# Patient Record
Sex: FEMALE | Race: BLACK OR AFRICAN AMERICAN | NOT HISPANIC OR LATINO | Employment: UNEMPLOYED | URBAN - METROPOLITAN AREA
[De-identification: names, ages, dates, MRNs, and addresses within clinical notes are randomized per-mention and may not be internally consistent; named-entity substitution may affect disease eponyms.]

---

## 2017-01-10 ENCOUNTER — OFFICE VISIT (OUTPATIENT)
Dept: FAMILY MEDICINE | Facility: CLINIC | Age: 42
End: 2017-01-10

## 2017-01-10 VITALS
DIASTOLIC BLOOD PRESSURE: 85 MMHG | HEIGHT: 62 IN | TEMPERATURE: 98.1 F | HEART RATE: 88 BPM | SYSTOLIC BLOOD PRESSURE: 128 MMHG | BODY MASS INDEX: 37.91 KG/M2 | WEIGHT: 206 LBS

## 2017-01-10 DIAGNOSIS — N39.41 URGENCY INCONTINENCE: Primary | ICD-10-CM

## 2017-01-10 RX ORDER — OXYBUTYNIN CHLORIDE 10 MG/1
10 TABLET, EXTENDED RELEASE ORAL DAILY
Qty: 90 TABLET | Refills: 3 | Status: SHIPPED | OUTPATIENT
Start: 2017-01-10 | End: 2018-01-04

## 2017-01-10 NOTE — NURSING NOTE
Patient says she takes seizure medication and blood pressure meds, but does not know the names of them

## 2017-01-10 NOTE — PROGRESS NOTES
Preceptor attestation:  Patient seen and discussed with the resident.  Assessment and plan reviewed with resident and agreed upon.  Supervising physician: Harvey Rondon  [facility]

## 2017-01-10 NOTE — MR AVS SNAPSHOT
"              After Visit Summary   1/10/2017    Mattie Pierre    MRN: [id number]           Patient Information     Date Of Birth          1975        Visit Information        Provider Department      1/10/2017 8:40 AM Marcelo Arriaga MD [facility]        Today's Diagnoses     Urgency incontinence    -  1        Follow-ups after your visit        Who to contact     Please call your clinic at [phone] to:    Ask questions about your health    Make or cancel appointments    Discuss your medicines    Learn about your test results    Speak to your doctor   If you have compliments or concerns about an experience at your clinic, or if you wish to file a complaint, please contact [facility] Patient Relations at [phone] or email us at [email or web address]         Additional Information About Your Visit        Care EveryWhere ID     This is your Care EveryWhere ID. This could be used by other organizations to access your [place] medical records  [id number]        Your Vitals Were     Pulse Temperature Height BMI (Body Mass Index) Last Period       88 98.1  F (36.7  C) (Oral) 5' 2\" (157.5 cm) 37.67 kg/m2 12/12/2016        Blood Pressure from Last 3 Encounters:   01/10/17 128/85   11/30/16 121/74   11/01/16 121/82    Weight from Last 3 Encounters:   01/10/17 206 lb (93.441 kg)   11/30/16 208 lb 12.8 oz (94.711 kg)   11/01/16 205 lb (92.987 kg)              Today, you had the following     No orders found for display         Today's Medication Changes          These changes are accurate as of: 1/10/17  9:34 AM.  If you have any questions, ask your nurse or doctor.               Start taking these medicines.        Dose/Directions    order for DME   Used for:  Urgency incontinence   Started by:  Marcelo Arriaga MD        Equipment being ordered: Adult incontinence diapers   Quantity:  150 Units   Refills:  0       oxybutynin 10 MG 24 hr tablet   Commonly " known as:  DITROPAN XL   Used for:  Urgency incontinence   Started by:  Marcelo Arriaga MD        Dose:  10 mg   Take 1 tablet (10 mg) by mouth daily   Quantity:  90 tablet   Refills:  3            Where to get your medicines      These medications were sent to [id number] - [place], [place] - [address] [place] [address] AT [address]  [address], [place] [address]     Phone:  [phone]    - oxybutynin 10 MG 24 hr tablet      Some of these will need a paper prescription and others can be bought over the counter.  Ask your nurse if you have questions.     Bring a paper prescription for each of these medications    - order for DME             Primary Care Provider Office Phone # Fax #    Marcelo Arriaga [phone]       [facility] [address] [place] [address]        Thank you!     Thank you for choosing [facility]  for your care. Our goal is always to provide you with excellent care. Hearing back from our patients is one way we can continue to improve our services. Please take a few minutes to complete the written survey that you may receive in the mail after your visit with us. Thank you!             Your Updated Medication List - Protect others around you: Learn how to safely use, store and throw away your medicines at www.disposemymeds.org.          This list is accurate as of: 1/10/17  9:34 AM.  Always use your most recent med list.                   Brand Name Dispense Instructions for use    KLONOPIN PO          order for DME     150 Units    Equipment being ordered: Adult incontinence diapers       oxybutynin 10 MG 24 hr tablet    DITROPAN XL    90 tablet    Take 1 tablet (10 mg) by mouth daily       * SEROQUEL PO      Take 50 mg by mouth 2 times daily       * SEROQUEL PO      Take 50 mg by mouth 1 in am and 1 at night and 1 prn       * Notice:  This list has 2 medication(s) that are the same as other medications prescribed for you.  Read the directions carefully, and ask your doctor or other care provider to review them with you.

## 2017-01-10 NOTE — PROGRESS NOTES
"S: Mattie Pierre is a 41 year old female with PMH significant for major depression, obesity, and iron deficient anemia here for evaluation of urinary incontinence. She has had a previous diagnosis of urge incontinence, but she does not remember what she was taking for it in the past, and has not been taking any medications for it in at least 2 years. She notes that she has had 5-6 public \"accidents\" in the last month where she has felt the urge to urinate and soon after empties her bladder. She has this sensation at home around 3 times per day where she is unable to get to her bathroom in time to begin urination. She has intense feelings of embarrassment associated with these episodes. She does not note any urine leak or feeling of incomplete voiding.     Patient also with recent surgery to correct corns on left feet and ganglion cyst of right foot. She walks everywhere for transportation and has not been able to be non-weight bearing following surgery. She notes that pain is worse than before her procedure.     Patient also wishes to discuss her weight management and notes she continues to gain weight despite attempts at lifestyle changes.     Patient Active Problem List   Diagnosis     Moderate major depression (H)     Obesity     Cellulitis of axilla, left     Iron deficiency anemia     Seizure disorder (H)     Hidradenitis suppurativa of left axilla     Medications:   Patient reports taking seroquel, unknown dosing.   Patient has seen multiple physicians within the last year due to social situation and her medication list's do not match up. Patient will bring her medications into clinic at her next visit.    Allergies: Morphine    FMH: Family history reviewed and updated.    O:  /85 mmHg  Pulse 88  Temp(Src) 98.1  F (36.7  C) (Oral)  Ht 5' 2\" (157.5 cm)  Wt 206 lb (93.441 kg)  BMI 37.67 kg/m2  LMP 12/12/2016  General: Patient is obese, well appearing, has surgical shoe on left foot, pleasant to speak " to, answers questions appropriately, NAD.  CV: S1, S2, RRR. II/VI early systolic murmur best heard at RUSB.    RESP: Clear to auscultation bilaterally  ABD: Soft, NT, ND, palpation of suprapubic region does not reproduce sense of urgency. BS +.  EXTREMITIES: No peripheral edema noted. Right dorsum of foot has 1.5 inch healing scar. Area underneath is swollen and tender to touch. No erythema noted. Left foot in surgical shoe and plastic bag.    A/P:  Mattie Pierre is a 41 year old female with PMH significant for major depression, obesity, and iron deficient anemia here for evaluation of urinary incontinence. Symptoms are consistent with urge incontinency. Unlikely UTI, patient has no complaints of dysuria or fever.   - Oxybutynin XL 10mg daily  - Ordered adult incontinence diapers  - Follow-up in  1 week for further evaluation of bilateral feet and for discussion of weight management. Patient to bring medications for accurate med-reconciliation at that time.     Marcelo Arriaga MD  PGY1 [facility].

## 2017-01-11 ASSESSMENT — PATIENT HEALTH QUESTIONNAIRE - PHQ9: SUM OF ALL RESPONSES TO PHQ QUESTIONS 1-9: 19

## 2017-01-17 ENCOUNTER — OFFICE VISIT (OUTPATIENT)
Dept: FAMILY MEDICINE | Facility: CLINIC | Age: 42
End: 2017-01-17

## 2017-01-17 VITALS
WEIGHT: 205.8 LBS | DIASTOLIC BLOOD PRESSURE: 74 MMHG | HEIGHT: 62 IN | BODY MASS INDEX: 37.87 KG/M2 | SYSTOLIC BLOOD PRESSURE: 113 MMHG | HEART RATE: 102 BPM

## 2017-01-17 DIAGNOSIS — M79.675 PAIN OF TOE OF LEFT FOOT: ICD-10-CM

## 2017-01-17 DIAGNOSIS — E66.09 NON MORBID OBESITY DUE TO EXCESS CALORIES: Primary | ICD-10-CM

## 2017-01-17 NOTE — PATIENT INSTRUCTIONS
I will place your bariatric referral today, you will need to schedule that visit for after your psychiatry appointment on February 7th. Please talk to your psychiatrist about readiness for bariatric surgery. Please have him send us records of your visit as well.     Thank you    Marcelo Arriaga MD  PGY1 [facility]  \    Your referral information is below:    Please call to get scheduled for an informational class with the [facility] Bariatric Team at [phone]. I have also included a step by step brochure if you decide to proceed with surgery.    Any other concerns please call our referral coordinator at [phone]  [facility]  Care Coordinator     MAILED TO PATIENT

## 2017-01-17 NOTE — PROGRESS NOTES
SUBJECTIVE       Mattie Pierre is a 41 year old  female with a PMH significant for:     Patient Active Problem List   Diagnosis     Moderate major depression (H)     Obesity     Cellulitis of axilla, left     Iron deficiency anemia     Seizure disorder (H)     Hidradenitis suppurativa of left axilla     Urgency incontinence     She presents for follow-up of left foot pain and weight management.     Patient had partial phalangectomy on the left 2nd toe 3 weeks prior, still has significant pain in the toe. Was told to be non-weightbearing for 2 weeks following procedure, but was unable to comply as walking is her main means of transportation. She notes some slight swelling of the toe. Scar is well healing, no discharge. Denies any fever or chills.    Patient also here to talk about weight management and low back pain. She has seen lifestyle management and implemented changes in her diet as we have asked. She has cut out sugary drinks from her diet and has reduced the amount of fatty snacks that she eats. She has also seen physical therapy and has been doing exercises at home. She notes that her back pain persists and feels like she is not losing any weight. She feels as if bariatric surgery will solve her weight and back pain issues. She denies any pain radiating to her legs. Notes that back pain is worse with walking. She also gets SOB while walking up a flight of stairs. Echocardiogram in October showed LVEF of 65%. She has been seeing a psychiatrist at [facility] regarding her depression and has an appointment on Feb 7th.         PMH, Medications and Allergies were reviewed and updated as needed.        REVIEW OF SYSTEMS     CONSTITUTIONAL: NEGATIVE for fever, chills, change in weight  INTEGUMENTARY/SKIN: NEGATIVE for worrisome rashes, moles or lesions  RESP: NEGATIVE for significant cough  CV: NEGATIVE for chest pain, palpitations or peripheral edema  MUSCULOSKELETAL: See Above  NEURO: NEGATIVE for  "weakness, dizziness or paresthesias  PSYCHIATRIC: Patient feels as if her depression may be getting worse, blames her weight and back pain.         OBJECTIVE     Filed Vitals:    01/17/17 0926   BP: 113/74   Pulse: 102   Height: 5' 2\" (157.5 cm)   Weight: 205 lb 12.8 oz (93.35 kg)     Body mass index is 37.63 kg/(m^2).    Constitutional: Awake, alert, cooperative, no apparent distress, and appears stated age.  Back: Symmetric, no curvature, spinous processes are non-tender on palpation, Lumbar perispinous tenderness to palpation.  Lungs: No increased work of breathing, good air exchange, clear to auscultation bilaterally, no crackles or wheezing.  Cardiovascular: Regular rate and rhythm, normal S1 and S2, no S3 or S4,  II/VI Earlysystolic murmur best heard at RUSB.   Musculoskeletal: Well healing scar on 2nd digit of left toe. No extending erythema noted. Mild edema seen. Moderate tenderness to palpation of the PIP joint. Pain with passive motion of the toe. Patient unable to flex toe.   Psych: Patient denies any suicidal ideation. Notes that her mood feels depressed, slightly worse over the last month.          ASSESSMENT AND PLAN     (E66.09) Non morbid obesity due to excess calories  (primary encounter diagnosis)  Comment: Patient's weight has stabilized since October, with BMI now at 37. She has been compliant with current lifestyle changes and believes she is not making progress despite stabilization of weight. She is unable to engage in increased exercise due to back pain and SOB. Patient has not had a recent psychiatry visit and we have not had records from them. She will speak to psychiatry regarding readiness for bariatric surgery in terms of her depression and we asked that they have her records sent to clinic. Bariatric referral has been made and patient was asked to schedule the appointment following her psychiatry appointment.       (M79.533) Pain of toe of left foot  Comment: Patient with continued " pain in her left 2nd digit following partial phalangectomy. She has follow-up appointment with podiatry today. Physical exam not worrisome for cellulitis or wound infection.   Plan: Follow-up with podiatry.       Follow-up after bariatric consult.     Marcelo Arriaga MD  PGY1, [facility]

## 2017-01-17 NOTE — PROGRESS NOTES
Preceptor attestation:  Patient seen and discussed with the resident.  Assessment and plan reviewed with resident and agreed upon.  Supervising physician: Harvey Rondon  [facility]

## 2017-01-17 NOTE — MR AVS SNAPSHOT
"              After Visit Summary   1/17/2017    Mattie Pierre    MRN: [id number]           Patient Information     Date Of Birth          1975        Visit Information        Provider Department      1/17/2017 9:20 AM Marcelo Arriaga MD [facility]        Today's Diagnoses     Non morbid obesity due to excess calories    -  1       Care Instructions    I will place your bariatric referral today, you will need to schedule that visit for after your psychiatry appointment on February 7th. Please talk to your psychiatrist about readiness for bariatric surgery. Please have him send us records of your visit as well.     Thank you    Marcelo Arriaga MD  PGY1 [facility]        Follow-ups after your visit        Additional Services     BARIATRIC ADULT REFERRAL       Patient prefers to be called    Reason for Referral: Obesity, has attempted lifestyle change for >6months.     needed: No  Language: English    May leave message on voicemail: Yes                  Future tests that were ordered for you today     Open Future Orders        Priority Expected Expires Ordered    BARIATRIC ADULT REFERRAL Routine  1/17/2018 1/17/2017            Who to contact     Please call your clinic at [phone] to:    Ask questions about your health    Make or cancel appointments    Discuss your medicines    Learn about your test results    Speak to your doctor   If you have compliments or concerns about an experience at your clinic, or if you wish to file a complaint, please contact [facility] Patient Relations at [phone] or email us at [email or web address]         Additional Information About Your Visit        Care EveryWhere ID     This is your Care EveryWhere ID. This could be used by other organizations to access your [place] medical records  [id number]        Your Vitals Were     Pulse Height BMI (Body Mass Index) Last Period          102 5' 2\" (157.5 cm) " 37.63 kg/m2 12/12/2016         Blood Pressure from Last 3 Encounters:   01/17/17 113/74   01/10/17 128/85   11/30/16 121/74    Weight from Last 3 Encounters:   01/17/17 205 lb 12.8 oz (93.35 kg)   01/10/17 206 lb (93.441 kg)   11/30/16 208 lb 12.8 oz (94.711 kg)               Primary Care Provider Office Phone # Fax #    Marcelo Enzo Arriaga [phone]       [facility] [address] [place] [address]        Thank you!     Thank you for choosing [facility]  for your care. Our goal is always to provide you with excellent care. Hearing back from our patients is one way we can continue to improve our services. Please take a few minutes to complete the written survey that you may receive in the mail after your visit with us. Thank you!             Your Updated Medication List - Protect others around you: Learn how to safely use, store and throw away your medicines at www.disposemymeds.org.          This list is accurate as of: 1/17/17 10:15 AM.  Always use your most recent med list.                   Brand Name Dispense Instructions for use    KLONOPIN PO          order for DME     150 Units    Equipment being ordered: Adult incontinence diapers       oxybutynin 10 MG 24 hr tablet    DITROPAN XL    90 tablet    Take 1 tablet (10 mg) by mouth daily       * SEROQUEL PO      Take 50 mg by mouth 2 times daily       * SEROQUEL PO      Take 50 mg by mouth 1 in am and 1 at night and 1 prn       * Notice:  This list has 2 medication(s) that are the same as other medications prescribed for you. Read the directions carefully, and ask your doctor or other care provider to review them with you.

## 2017-01-30 ENCOUNTER — TELEPHONE (OUTPATIENT)
Dept: FAMILY MEDICINE | Facility: CLINIC | Age: 42
End: 2017-01-30

## 2017-01-30 NOTE — TELEPHONE ENCOUNTER
[facility] phone call message- general phone call:    Reason for call: Pt was prescribed Oxybutynin 10 mg by Dr Arriaga.   What the doctor doesn't know is the patient is still on Vesicare 5 mg by a different doctor.  The nurse did not start the new medication and would like to know what Dr Arriaga would like her to do.    Return call needed: Yes    OK to leave a message on voice mail? Yes    Primary language: English      needed? No    Call taken on January 30, 2017 at 11:25 AM by Ananda Casarez

## 2017-01-31 NOTE — TELEPHONE ENCOUNTER
Patient is unaware of her medications and she has not brought medications in for med rec. What is the best number to contact her home health nurse so I can speak to her about Mattie's meds?     Marcelo Arriaga MD  PGY1 [facility]

## 2017-02-10 ENCOUNTER — TELEPHONE (OUTPATIENT)
Dept: FAMILY MEDICINE | Facility: CLINIC | Age: 42
End: 2017-02-10

## 2017-02-10 NOTE — TELEPHONE ENCOUNTER
[facility] phone call message- general phone call:    Reason for call: She would like to speak with a nurse in regards to getting a 4 wheel walker for her. Would like to speak with her PCP or her nurse.     Return call needed: Yes    OK to leave a message on voice mail? Yes    Primary language: English      needed? No    Call taken on February 10, 2017 at 12:08 PM by Geovani Watts

## 2017-02-10 NOTE — TELEPHONE ENCOUNTER
Patient calling with request for a prescription for a 4 wheeled walker. She thinks her [employer] worker told her to have it sent to [facility] [employer] but she is not sure. Patient's [employer] worker name is Cindy at [phone].     Nurse called Cindy and she will call back with the name of the medical supply company to send the request to. /GARRY Judge  Routed to Dr. JOHNS

## 2017-02-15 ENCOUNTER — TELEPHONE (OUTPATIENT)
Dept: FAMILY MEDICINE | Facility: CLINIC | Age: 42
End: 2017-02-15

## 2017-02-15 NOTE — TELEPHONE ENCOUNTER
[facility] phone call message- general phone call:    Reason for call: ..    Return call needed: Yes    OK to leave a message on voice mail? Yes    Primary language: English      needed? No    Call taken on February 15, 2017 at 2:31 PM by Nate Nevarez

## 2017-02-21 ENCOUNTER — OFFICE VISIT (OUTPATIENT)
Dept: FAMILY MEDICINE | Facility: CLINIC | Age: 42
End: 2017-02-21

## 2017-02-21 VITALS
HEIGHT: 62 IN | TEMPERATURE: 97.5 F | WEIGHT: 203.8 LBS | SYSTOLIC BLOOD PRESSURE: 112 MMHG | HEART RATE: 73 BPM | DIASTOLIC BLOOD PRESSURE: 74 MMHG | BODY MASS INDEX: 37.5 KG/M2

## 2017-02-21 DIAGNOSIS — G89.29 CHRONIC BILATERAL LOW BACK PAIN WITHOUT SCIATICA: Primary | ICD-10-CM

## 2017-02-21 DIAGNOSIS — M54.50 CHRONIC BILATERAL LOW BACK PAIN WITHOUT SCIATICA: Primary | ICD-10-CM

## 2017-02-21 NOTE — PROGRESS NOTES
Preceptor attestation:  Patient seen and discussed with the resident. Assessment and plan reviewed with resident and agreed upon.  Supervising physician: Kelly Thorpe  [facility]

## 2017-02-21 NOTE — MR AVS SNAPSHOT
"              After Visit Summary   2/21/2017    Mattie Pierre    MRN: [id number]           Patient Information     Date Of Birth          1975        Visit Information        Provider Department      2/21/2017 1:50 PM Marcelo Arriaga MD [facility]        Today's Diagnoses     Chronic bilateral low back pain without sciatica    -  1      Care Instructions    [employer]  [facility]  [phone]        Follow-ups after your visit        Who to contact     Please call your clinic at [phone] to:    Ask questions about your health    Make or cancel appointments    Discuss your medicines    Learn about your test results    Speak to your doctor   If you have compliments or concerns about an experience at your clinic, or if you wish to file a complaint, please contact [facility] Patient Relations at [phone] or email us at [email or web address]         Additional Information About Your Visit        Care EveryWhere ID     This is your Care EveryWhere ID. This could be used by other organizations to access your [place] medical records  [id number]        Your Vitals Were     Pulse Temperature Height BMI (Body Mass Index)          73 97.5  F (36.4  C) 5' 2.25\" (158.1 cm) 36.98 kg/m2         Blood Pressure from Last 3 Encounters:   02/21/17 112/74   01/17/17 113/74   01/10/17 128/85    Weight from Last 3 Encounters:   02/21/17 203 lb 12.8 oz (92.4 kg)   01/17/17 205 lb 12.8 oz (93.4 kg)   01/10/17 206 lb (93.4 kg)              Today, you had the following     No orders found for display         Today's Medication Changes          These changes are accurate as of: 2/21/17  2:44 PM.  If you have any questions, ask your nurse or doctor.               These medicines have changed or have updated prescriptions.        Dose/Directions    * order for DME   This may have changed:  Another medication with the same name was added. Make sure you understand " how and when to take each.   Used for:  Urgency incontinence   Changed by:  Marcelo Arriaga MD        Equipment being ordered: Adult incontinence diapers   Quantity:  150 Units   Refills:  0       * order for DME   This may have changed:  You were already taking a medication with the same name, and this prescription was added. Make sure you understand how and when to take each.   Used for:  Chronic bilateral low back pain without sciatica   Changed by:  Marcelo Arriaga MD        Equipment being ordered: 4 wheeled walker with seat.   Quantity:  1 Device   Refills:  0       * Notice:  This list has 2 medication(s) that are the same as other medications prescribed for you. Read the directions carefully, and ask your doctor or other care provider to review them with you.         Where to get your medicines      Some of these will need a paper prescription and others can be bought over the counter.  Ask your nurse if you have questions.     Bring a paper prescription for each of these medications     order for DME                Primary Care Provider Office Phone # Fax #    Marcelo Arriaga [phone]       [facility] [address] [place] [address]        Thank you!     Thank you for choosing [facility]  for your care. Our goal is always to provide you with excellent care. Hearing back from our patients is one way we can continue to improve our services. Please take a few minutes to complete the written survey that you may receive in the mail after your visit with us. Thank you!             Your Updated Medication List - Protect others around you: Learn how to safely use, store and throw away your medicines at www.disposemymeds.org.          This list is accurate as of: 2/21/17  2:44 PM.  Always use your most recent med list.                   Brand Name Dispense Instructions for use    KLONOPIN PO          * order for DME     150 Units    Equipment being ordered:  Adult incontinence diapers       * order for DME     1 Device    Equipment being ordered: 4 wheeled walker with seat.       oxybutynin 10 MG 24 hr tablet    DITROPAN XL    90 tablet    Take 1 tablet (10 mg) by mouth daily       * SEROQUEL PO      Take 50 mg by mouth 2 times daily       * SEROQUEL PO      Take 50 mg by mouth 1 in am and 1 at night and 1 prn       * Notice:  This list has 4 medication(s) that are the same as other medications prescribed for you. Read the directions carefully, and ask your doctor or other care provider to review them with you.

## 2017-02-26 NOTE — PROGRESS NOTES
SUBJECTIVE       Mattie Pierre is a 41 year old  female with a PMH significant for:     Patient Active Problem List   Diagnosis     Moderate major depression (H)     Obesity     Cellulitis of axilla, left     Iron deficiency anemia     Seizure disorder (H)     Hidradenitis suppurativa of left axilla     Urgency incontinence     She presents for further discussion and evaluation of her obesity and lower back pain. During our last visit Mattie was given bariatric surgery referral. She states that she would be unable to do all the things necessary to qualify for surgery and opted not to pursue that option. She is here to discuss further options. She notes that she has cut soda from her diet and mostly replaced it with water. She does endorse drinking a bottle of Gatorade a day however. She routinely eats one large meal a day and otherwise tends to snack throughout the day eating sugar free jello, sugar free pudding, and fruit snacks. She notes that her back pain bothers her the worst when walking throughout the community, specifically after walking a few blocks. She requests a four wheeled walker with seat to help her rest every few blocks. She also notes that she has ridden her nephew's bike a few times and says that she does not feel back pain while riding a bike, despite it being too small for her. She feels as if she is not making much progress in her weight loss.     Patient also with bilateral foot procedures in December for a ganglion cyst removal, now noting that her pain is now resolving with only intermittent sharp pains in her left 2nd and 3rd digits.    Mattie did not bring updated medication list to clinic, unable to do a medication reconciliation.      Mattie continues to live alone in an apartment building. She walks for most of her transportation or takes the bus. She does not drive.        REVIEW OF SYSTEMS     CONSTITUTIONAL: NEGATIVE for fever, chills, change in weight  INTEGUMENTARY/SKIN:  "NEGATIVE for worrisome rashes, moles or lesions  RESP: NEGATIVE for significant cough or SOB  CV: NEGATIVE for chest pain, palpitations or peripheral edema  GI: NEGATIVE for nausea, abdominal pain, heartburn, or change in bowel habits  MUSCULOSKELETAL: See above  PSYCHIATRIC: NEGATIVE for changes in mood or affect        OBJECTIVE     Vitals:    02/21/17 1321   BP: 112/74   Pulse: 73   Temp: 97.5  F (36.4  C)   Weight: 203 lb 12.8 oz (92.4 kg)   Height: 5' 2.25\" (158.1 cm)     Body mass index is 36.98 kg/(m^2).    Constitutional: Obese, Awake, alert, cooperative, no apparent distress, and appears stated age.  Back: Symmetric, no curvature, Tenderness to palpation of spinal process of L3-L4 with associated paraspinal tenderness. She does not note any radiation of pain. No costal vertebral tenderness.  Lungs: No increased work of breathing, good air exchange, clear to auscultation bilaterally, no crackles or wheezing.  Cardiovascular: S1, S2, RRR. II/VI systolic murmur best heard at RUSB.  Psych: No suicidal or homicidal ideation. Mood is normal    No results found for this or any previous visit (from the past 24 hour(s)).        ASSESSMENT AND PLAN     1. Chronic bilateral low back pain without sciatica, Obesity  Chronic low back pain likely secondary to obesity. Patient has now lost 5lbs since late November despite her not feeling like making much progress. She has embraced the changes that we have discussed regarding her diet and have made further changes in her diet, suggesting that she substitute fruit snacks for fresh fruit such as bananas or clementines which she is agreeable to.   - Provided her information regarding [employer] to assist her in obtaining a bicycle.  -Suggested that she schedule a CPM visit which she was agreeable to.   - order for DME; Equipment being ordered: 4 wheeled walker with seat.  Dispense: 1 Device; Refill: 0    Follow-up in 1 week for initiation of CPM.     Marcelo Arriaga MD " PGY1  [facility]

## 2017-02-28 ENCOUNTER — MEDICAL CORRESPONDENCE (OUTPATIENT)
Dept: HEALTH INFORMATION MANAGEMENT | Facility: CLINIC | Age: 42
End: 2017-02-28

## 2017-02-28 ENCOUNTER — OFFICE VISIT (OUTPATIENT)
Dept: FAMILY MEDICINE | Facility: CLINIC | Age: 42
End: 2017-02-28

## 2017-02-28 VITALS
TEMPERATURE: 97.9 F | DIASTOLIC BLOOD PRESSURE: 75 MMHG | BODY MASS INDEX: 37.19 KG/M2 | SYSTOLIC BLOOD PRESSURE: 111 MMHG | WEIGHT: 205 LBS | HEART RATE: 72 BPM

## 2017-02-28 DIAGNOSIS — G89.4 CHRONIC PAIN SYNDROME: Primary | ICD-10-CM

## 2017-02-28 LAB
AMPHETAMINES QUAL: NEGATIVE
BARBITURATES QUAL URINE: NEGATIVE
BENZODIAZEPINE QUAL URINE: NEGATIVE
BUPRENORPHINE QUAL URINE: NEGATIVE
CANNABINOIDS UR QL SCN: NEGATIVE
COCAINE QUAL URINE: POSITIVE
METHAMPHETAMINE: NEGATIVE
METHODONE QUAL: NEGATIVE
MORPHINE QUAL: NEGATIVE
OXYCODONE QUAL: NEGATIVE
TEMPERATURE OF URINE WAS BETWEEN 90-100 DEGREES F: YES
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
## 2017-02-28 NOTE — PROGRESS NOTES
Preceptor attestation:  Patient seen and discussed with the resident. Assessment and plan reviewed with resident and agreed upon.  Supervising physician: Garth Combs  [facility]

## 2017-03-03 NOTE — PATIENT INSTRUCTIONS
MENTAL HEALTH REFERRAL CPM:  Left voicemail message for patient to call me to schedule CPM with Dr. Lombardi or Dr. Brown.  Daisy Chaidez  3/3/17    Left voicemail message for patient to call me to schedule CPM with Dr. Lombardi or Dr. Brown.  Daisy Chaidez  3/7/17    Patient is scheduled for:  Date:  Tuesday March 21, 2017  Time:  8:30 AM with Dr. Lombardi for 1 hour/CPM  Gave patient information via phone today.  Daisy Chaidez  3/7/17

## 2017-03-14 RX ORDER — LAMOTRIGINE 150 MG/1
150 TABLET ORAL 2 TIMES DAILY
COMMUNITY
Start: 2017-03-14 | End: 2018-06-20

## 2017-03-14 RX ORDER — BUSPIRONE HYDROCHLORIDE 15 MG/1
15 TABLET ORAL 2 TIMES DAILY
Qty: 60 TABLET | Refills: 1 | COMMUNITY
Start: 2017-03-14 | End: 2017-09-05

## 2017-03-14 RX ORDER — GABAPENTIN 300 MG/1
300 CAPSULE ORAL 2 TIMES DAILY
Qty: 90 CAPSULE | Refills: 1 | COMMUNITY
Start: 2017-03-14 | End: 2023-01-11

## 2017-03-14 RX ORDER — HYDROCHLOROTHIAZIDE 25 MG/1
25 TABLET ORAL DAILY
Qty: 30 TABLET | Refills: 1 | COMMUNITY
Start: 2017-03-14 | End: 2017-07-20

## 2017-03-14 RX ORDER — BUPROPION HYDROCHLORIDE 300 MG/1
300 TABLET ORAL EVERY MORNING
Qty: 30 TABLET | Refills: 1 | COMMUNITY
Start: 2017-03-14 | End: 2017-09-05

## 2017-03-15 ENCOUNTER — OFFICE VISIT (OUTPATIENT)
Dept: FAMILY MEDICINE | Facility: CLINIC | Age: 42
End: 2017-03-15

## 2017-03-15 ENCOUNTER — RECORDS - HEALTHEAST (OUTPATIENT)
Dept: ADMINISTRATIVE | Facility: OTHER | Age: 42
End: 2017-03-15

## 2017-03-15 VITALS
SYSTOLIC BLOOD PRESSURE: 111 MMHG | TEMPERATURE: 98.9 F | DIASTOLIC BLOOD PRESSURE: 71 MMHG | OXYGEN SATURATION: 99 % | BODY MASS INDEX: 37.85 KG/M2 | WEIGHT: 208.6 LBS | HEART RATE: 72 BPM

## 2017-03-15 DIAGNOSIS — G89.4 CHRONIC PAIN SYNDROME: Primary | ICD-10-CM
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
## 2017-03-15 NOTE — PATIENT INSTRUCTIONS
You are scheduled for MRI Referral at:  [facility]  Radiology Department 1st floor  [address] [address]  [phone]  Date:  Monday March 20, 2017  Time:  11:00 AM    PHYSICAL THERAPY REFERRAL:  You will recieve a call from [facility] for scheduling.  If you haven't heard from them within 1 week, please give me a call.  Daisy Chaidez  [phone]  Orders and demographics faxed to [facility]  PH:  [phone]  FAX:  [phone]  3/24/17

## 2017-03-15 NOTE — PROGRESS NOTES
Preceptor attestation:  Patient seen and discussed with the resident. Assessment and plan reviewed with resident and agreed upon.  Supervising physician: Sunday Martinez  [facility]

## 2017-03-19 NOTE — PROGRESS NOTES
Patient told of positive cocaine results. She does not know how that may have happened, as she hasn't taken cocaine.

## 2017-03-21 ENCOUNTER — TELEPHONE (OUTPATIENT)
Dept: PSYCHOLOGY | Facility: CLINIC | Age: 42
End: 2017-03-21

## 2017-03-21 NOTE — TELEPHONE ENCOUNTER
This provider placed an outreach call to the patient as she did not show for her scheduled appointment today 3/21/2017. Talked with the patient. Apologized for missing her scheduled appointment and noted she had overslept. Expressed interest in rescheduling. Transferred the patient to scheduling in order to reschedule her appointment.     Nathaniel Lombardi, PhD   Behavioral Health Fellow

## 2017-03-22 ENCOUNTER — HOSPITAL ENCOUNTER (OUTPATIENT)
Dept: MRI IMAGING | Facility: CLINIC | Age: 42
Discharge: HOME | End: 2017-03-22

## 2017-03-22 ENCOUNTER — TRANSFERRED RECORDS (OUTPATIENT)
Dept: HEALTH INFORMATION MANAGEMENT | Facility: CLINIC | Age: 42
End: 2017-03-22

## 2017-03-22 DIAGNOSIS — M54.50 LOW BACK PAIN: ICD-10-CM

## 2017-03-22 RX ORDER — SOLIFENACIN SUCCINATE 10 MG/1
10 TABLET, FILM COATED ORAL DAILY
Qty: 30 TABLET | Refills: 1 | COMMUNITY
Start: 2017-03-15 | End: 2017-07-20

## 2017-03-22 RX ORDER — TOPIRAMATE 50 MG/1
50 TABLET, FILM COATED ORAL AT BEDTIME
Qty: 60 TABLET | Refills: 1 | COMMUNITY
Start: 2017-03-15 | End: 2023-11-10

## 2017-03-23 ENCOUNTER — TELEPHONE (OUTPATIENT)
Dept: FAMILY MEDICINE | Facility: CLINIC | Age: 42
End: 2017-03-23

## 2017-03-23 ENCOUNTER — DOCUMENTATION ONLY (OUTPATIENT)
Dept: FAMILY MEDICINE | Facility: CLINIC | Age: 42
End: 2017-03-23

## 2017-03-23 NOTE — TELEPHONE ENCOUNTER
[facility] phone call message- patient requesting results:    Test: Lab and MRI    Date of test: YESTERDAY    Additional Comments: She would like a call back with her results.    OK to leave a message on voice mail? Yes      Primary language: English      needed? No    Call taken on March 23, 2017 at 11:43 AM by Nate Nevarez

## 2017-03-23 NOTE — TELEPHONE ENCOUNTER
Mattie got the MRI done at [facility]. I got the result in my hospital inbox today. I have it printed and will get it scanned into her chart. I will attempt to call her with the results today.    Marcelo Arriaga MD PGY1

## 2017-03-23 NOTE — TELEPHONE ENCOUNTER
Routed to Dr. Arriaga and referrals.    MRI scheduled on 3/20 but not scanned in EMR yet. Could we request this?    Pt calling for results of MRI and lab (last lab was drug urine test).    /GARRY Granados

## 2017-03-23 NOTE — TELEPHONE ENCOUNTER
Called patient regarding lumbar MRI results done at [facility].   Results are as follows:  1.Mild lumbar spondylosis. No singnificant spinal canal narrowing.  2. Moderate bilateral L5-S1 foraminal narrowing.  3. Minimal scattered multilevel degenerative disc disease and mild lower lumbar facet arthropathy.    These results are unlikely to be causing the severity of pain that Mattie has and I explained this to her. She acknowledges this, though does seem a little bit disappointed. She is still willing to pursue pool therapy at this time.    Marcelo Arriaga MD PGY1  [facility].
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
## 2017-03-28 ENCOUNTER — MEDICAL CORRESPONDENCE (OUTPATIENT)
Dept: HEALTH INFORMATION MANAGEMENT | Facility: CLINIC | Age: 42
End: 2017-03-28

## 2017-04-04 ENCOUNTER — OFFICE VISIT (OUTPATIENT)
Dept: PSYCHOLOGY | Facility: CLINIC | Age: 42
End: 2017-04-04

## 2017-04-04 ENCOUNTER — TRANSFERRED RECORDS (OUTPATIENT)
Dept: HEALTH INFORMATION MANAGEMENT | Facility: CLINIC | Age: 42
End: 2017-04-04

## 2017-04-04 DIAGNOSIS — G89.4 CHRONIC PAIN SYNDROME: Primary | ICD-10-CM

## 2017-04-04 ASSESSMENT — ANXIETY QUESTIONNAIRES
GAD7 TOTAL SCORE: 15
3. WORRYING TOO MUCH ABOUT DIFFERENT THINGS: NEARLY EVERY DAY
2. NOT BEING ABLE TO STOP OR CONTROL WORRYING: SEVERAL DAYS
IF YOU CHECKED OFF ANY PROBLEMS ON THIS QUESTIONNAIRE, HOW DIFFICULT HAVE THESE PROBLEMS MADE IT FOR YOU TO DO YOUR WORK, TAKE CARE OF THINGS AT HOME, OR GET ALONG WITH OTHER PEOPLE: VERY DIFFICULT
5. BEING SO RESTLESS THAT IT IS HARD TO SIT STILL: MORE THAN HALF THE DAYS
1. FEELING NERVOUS, ANXIOUS, OR ON EDGE: SEVERAL DAYS
6. BECOMING EASILY ANNOYED OR IRRITABLE: NEARLY EVERY DAY
7. FEELING AFRAID AS IF SOMETHING AWFUL MIGHT HAPPEN: NEARLY EVERY DAY

## 2017-04-04 ASSESSMENT — PATIENT HEALTH QUESTIONNAIRE - PHQ9: 5. POOR APPETITE OR OVEREATING: MORE THAN HALF THE DAYS

## 2017-04-04 NOTE — MR AVS SNAPSHOT
After Visit Summary   2017    Mattie Pierre    MRN: [id number]           Patient Information     Date Of Birth          1975        Visit Information        Provider Department      2017 10:30 AM Lombardi, Nathaniel, PhD [facility]        Care Instructions    Personal Care Plan for Chronic Pain      1.  Personal Goals:    *  Other Functional Goals (e.g., hobbies, daily activities, etc.): Increase my walking.  *  To live a really healthy life.    2.  Sleep:     *  Basic sleep plan:    *Avoid caffeine after 5:00PM     * Continue going to bed at target bedtime:  8:00pm to 9:00pm    *  Continue taking niighttime medications as instructed.    3.  Physical Activity:    * Formal physical rehabilitation:    * Get started with pool therapy     * Listen to your body.  Pace yourself for success.  Don't over-do it.     4.  Nutrition/Weight:     * Continue discussing this with PCP moving forward.     5.  Mood/Stress Management:     * Consider formal counseling/therapy moving forward.   * Consider home/community based interventions:  * Relaxation techniques  * Meditation  * Yoga  * Creative activity  * Spiritual /prayer  * Service-based activity.   * Medications: Continue working with Dr. Patel    6.  Tobacco/Alcohol/Drug Use:       * Consider tobacco quit plan in the future if interested.     * Maintain healthy relationship with alcohol    * For women this would be no more than 1 drink per day   * Continue avoiding recreational drugs   * Participate in outpatient chemical dependency treatment.    7.  Pain:     * Non-medication treatments:    * Continue using ice/heat    * Continue using massage    * Continue using epsom salts when taking baths    * Consider potential referral for massage in the future    8.  Pain Medications: Discuss this with Dr. Arriaga moving forward.    Follow up with PCP, Dr. Arriaga: 2017 (1:30PM)   Follow up with Behavioral Health, Dr. Lombardi, if interested in the  future.          Follow-ups after your visit        Your next 10 appointments already scheduled     Apr 05, 2017  1:30 PM CDT   Return Visit with Marcelo Arriaga MD   [facility]    [address] [place], [place] [id number] [phone]              Who to contact     Please call your clinic at [phone] to:    Ask questions about your health    Make or cancel appointments    Discuss your medicines    Learn about your test results    Speak to your doctor   If you have compliments or concerns about an experience at your clinic, or if you wish to file a complaint, please contact [facility] Patient Relations at [phone] or email us at [email or web address]         Additional Information About Your Visit        Care EveryWhere ID     This is your Care EveryWhere ID. This could be used by other organizations to access your [facility] medical records  [id number]         Blood Pressure from Last 3 Encounters:   03/15/17 111/71   02/28/17 111/75   02/21/17 112/74    Weight from Last 3 Encounters:   03/15/17 208 lb 9.6 oz (94.6 kg)   02/28/17 205 lb (93 kg)   02/21/17 203 lb 12.8 oz (92.4 kg)              Today, you had the following     No orders found for display       Primary Care Provider Office Phone # Fax #    Marcelo Arriaga [phone]       [facility] [address] [place] [address]        Thank you!     Thank you for choosing [facility]  for your care. Our goal is always to provide you with excellent care. Hearing back from our patients is one way we can continue to improve our services. Please take a few minutes to complete the written survey that you may receive in the mail after your visit with us. Thank you!             Your Updated Medication List - Protect others around you: Learn how to safely use, store and throw away your medicines at www.disposemymeds.org.          This list is accurate as of:  4/4/17 11:40 AM.  Always use your most recent med list.                   Brand Name Dispense Instructions for use    buPROPion 300 MG 24 hr tablet    WELLBUTRIN XL    30 tablet    Take 1 tablet (300 mg) by mouth every morning       busPIRone 15 MG tablet    BUSPAR    60 tablet    Take 1 tablet (15 mg) by mouth 2 times daily       gabapentin 300 MG capsule    NEURONTIN    90 capsule    Take 1 capsule (300 mg) by mouth 2 times daily       hydrochlorothiazide 25 MG tablet    HYDRODIURIL    30 tablet    Take 1 tablet (25 mg) by mouth daily       KLONOPIN PO          lamoTRIgine 150 MG tablet    LaMICtal     Take 1 tablet (150 mg) by mouth 2 times daily       * order for DME     150 Units    Equipment being ordered: Adult incontinence diapers       * order for DME     1 Device    Equipment being ordered: 4 wheeled walker with seat.       oxybutynin 10 MG 24 hr tablet    DITROPAN XL    90 tablet    Take 1 tablet (10 mg) by mouth daily       * SEROQUEL PO      Take 50 mg by mouth 2 times daily       * SEROQUEL PO      Take 50 mg by mouth 1 in am and 1 at night and 1 prn       TOPAMAX 50 MG tablet   Generic drug:  topiramate     60 tablet    Take 1 tablet (50 mg) by mouth At Bedtime       VESICARE 10 MG tablet   Generic drug:  solifenacin     30 tablet    Take 1 tablet (10 mg) by mouth daily       * Notice:  This list has 4 medication(s) that are the same as other medications prescribed for you. Read the directions carefully, and ask your doctor or other care provider to review them with you.

## 2017-04-04 NOTE — Clinical Note
Please note the addended assessment section of the documentation reflecting the requested addition/modification.  Lulu Johnson
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
## 2017-04-04 NOTE — PATIENT INSTRUCTIONS
Personal Care Plan for Chronic Pain      1.  Personal Goals:    *  Other Functional Goals (e.g., hobbies, daily activities, etc.): Increase my walking.  *  To live a really healthy life.    2.  Sleep:     *  Basic sleep plan:    *Avoid caffeine after 5:00PM     * Continue going to bed at target bedtime:  8:00pm to 9:00pm    *  Continue taking nighttime medications as instructed.    3.  Physical Activity:    * Formal physical rehabilitation:    * Get started with pool therapy     * Listen to your body.  Pace yourself for success.  Don't over-do it.     4.  Nutrition/Weight:     * Continue discussing this with PCP moving forward.     5.  Mood/Stress Management:     * Consider formal counseling/therapy moving forward.   * Consider home/community based interventions:  * Relaxation techniques  * Meditation  * Yoga  * Creative activity  * Spiritual /prayer  * Service-based activity.   * Medications: Continue working with Dr. Patel    6.  Tobacco/Alcohol/Drug Use:       * Consider tobacco quit plan in the future if interested.     * Maintain healthy relationship with alcohol    * For women this would be no more than 1 drink per day   * Continue avoiding recreational drugs   * Participate in outpatient chemical dependency treatment.    7.  Pain:     * Non-medication treatments:    * Continue using ice/heat    * Continue using massage    * Continue using epsom salts when taking baths    * Consider potential referral for massage in the future    8.  Pain Medications: Discuss this with Dr. Arriaga moving forward.    Follow up with PCP, Dr. Arriaga: 2017 (1:30PM)   Follow up with Behavioral Health, Dr. Lombardi, if interested in the future.

## 2017-04-04 NOTE — Clinical Note
Please see the completed Behavioral Health consultation. Overview updated to include the most recent FAQ and ORT. Let me know if you'd like me to forward the patient's chart to the supervisory committee.   Lulu Johnson

## 2017-04-05 ENCOUNTER — OFFICE VISIT (OUTPATIENT)
Dept: FAMILY MEDICINE | Facility: CLINIC | Age: 42
End: 2017-04-05

## 2017-04-05 VITALS
BODY MASS INDEX: 37.38 KG/M2 | WEIGHT: 206 LBS | OXYGEN SATURATION: 100 % | SYSTOLIC BLOOD PRESSURE: 107 MMHG | DIASTOLIC BLOOD PRESSURE: 63 MMHG | TEMPERATURE: 97.9 F | HEART RATE: 65 BPM

## 2017-04-05 DIAGNOSIS — L70.0 ACNE VULGARIS: ICD-10-CM

## 2017-04-05 DIAGNOSIS — E66.09 NON MORBID OBESITY DUE TO EXCESS CALORIES: ICD-10-CM

## 2017-04-05 DIAGNOSIS — N91.2 ABSENCE OF MENSTRUATION: Primary | ICD-10-CM

## 2017-04-05 LAB — HCG UR QL: NEGATIVE

## 2017-04-05 ASSESSMENT — ANXIETY QUESTIONNAIRES: GAD7 TOTAL SCORE: 15

## 2017-04-05 ASSESSMENT — PATIENT HEALTH QUESTIONNAIRE - PHQ9: SUM OF ALL RESPONSES TO PHQ QUESTIONS 1-9: 12

## 2017-04-05 NOTE — PROGRESS NOTES
Preceptor attestation:  Patient seen and discussed with the resident. Assessment and plan reviewed with resident and agreed upon.  Supervising physician: Justice Rodriguez  [facility]

## 2017-04-05 NOTE — PATIENT INSTRUCTIONS
Plastic Surgery Referral  Patient given instructions to contact her insurance for coverage and in-network locations.   Nieves Wolf 2:25 PM 4/5/2017

## 2017-04-05 NOTE — PROGRESS NOTES
SUBJECTIVE       Mattie Pierre is a 41 year old  female with a PMH significant for:     Patient Active Problem List   Diagnosis     Moderate major depression (H)     Obesity     Cellulitis of axilla, left     Iron deficiency anemia     Seizure disorder (H)     Hidradenitis suppurativa of left axilla     Urgency incontinence     Chronic pain syndrome     She presents with missed period, continued low back pain, and continued complaints of chronic low back pain. Patient states that she missed her period one week ago, normally does not have much variation in her period. Does not note any cramping or suprapubic pain. Is concerned she may be pregnant.     Also notes that she has had worsening acne on her upper arms and upper back. She does not note any scarring, but does note lots of hyperpigmentation. Lesions are painful to her.     We also continued to have a conversation about her chronic back pain. Her pool therapy begins on April 10th. She continues to believe that her breast weight is a significant factor in her back pain. She is becoming dismayed that even though her weight gain has stabilized, .     Restoration recovery, on [facility] in [facility]. Monday through Friday, from 9-12. Pool therapy starts on April 10th. Continues to feel short of breath while walking and going up steps.     PMH, Medications and Allergies were reviewed and updated as needed.        REVIEW OF SYSTEMS     CONSTITUTIONAL: NEGATIVE for fever, chills, change in weight  RESP: Mild shortness of breath with walking  CV: NEGATIVE for chest pain, palpitations or peripheral edema  MUSCULOSKELETAL: See HPI.  NEURO: NEGATIVE for weakness, dizziness or paresthesias        OBJECTIVE     Vitals:    04/05/17 1326   BP: 107/63   Pulse: 65   Temp: 97.9  F (36.6  C)   TempSrc: Oral   SpO2: 100%   Weight: 206 lb (93.4 kg)     Body mass index is 37.38 kg/(m^2).    Constitutional: Awake, alert, cooperative, no apparent distress, and appears stated  age.  Lungs: No increased work of breathing, good air exchange, clear to auscultation bilaterally, no crackles or wheezing.  Cardiovascular: Regular rate and rhythm, normal S1 and S2, no S3 or S4, II/IV early systolic murmur best heard at RUSB.   Abdomen: No scars, normal bowel sounds, soft, non-distended, non-tender, no masses palpated, no hepatosplenomegally.  Musculoskeletal: No redness, warmth, or swelling of the joints.  Full range of motion noted.  Motor strength is 5 out of 5 all extremities bilaterally.  Tone is normal.  Neuropsychiatric: Normal affect, mood, orientation, memory and insight.  Skin: Scattered inflamed comedones throughout upper back.     No results found for this or any previous visit (from the past 24 hour(s)).        ASSESSMENT AND PLAN     (N91.2) Absence of menstruation  (primary encounter diagnosis)  Comment: Patient notes that she is one week late with her menstruation.  Urine beta hCG is negative.  We'll readdress if patient continues with abnormal menstruation.  Plan: HCG Qualitative Urine (UPT)  ([facility])            (L70.0) Acne vulgaris  Comment: Patient with mild acne on back and upper arms.  Will try to avoid oral antibiotics at this time.  We'll start with topical benzoyl peroxide at this time.  Plan: benzoyl peroxide (BENZOYL PEROXIDE CLEANSER) 3         % LOTN            (E66.09) Non morbid obesity due to excess calories  Comment: Patient continues to believe that her back pain is secondary to her breast mass.  She has not gained any weight over the last few months, but has not lost weight either.  Patient is scheduled for pool therapy, but is considering breast reduction surgery.  Patient has undergone many non-medical, nonsurgical route of weight loss, I believe that this may be appropriate at this time.  Plan: PLASTIC SURGERY REFERRAL for possible breast reduction          Marcelo Arriaga PGY1  [facility]

## 2017-04-05 NOTE — MR AVS SNAPSHOT
After Visit Summary   4/5/2017    Mattie Pierre    MRN: [id number]           Patient Information     Date Of Birth          1975        Visit Information        Provider Department      4/5/2017 1:30 PM Marcelo Arriaga MD [facility]        Today's Diagnoses     Absence of menstruation    -  1    Acne vulgaris        Non morbid obesity due to excess calories           Follow-ups after your visit        Additional Services     PLASTIC SURGERY REFERRAL       Patient to stop at the [facility] Desk    Reason for Referral: Referral for breast reduction surgery     needed: No  Language: English    May leave message on voicemail: Yes                  Future tests that were ordered for you today     Open Future Orders        Priority Expected Expires Ordered    PLASTIC SURGERY REFERRAL Routine  4/5/2018 4/5/2017            Who to contact     Please call your clinic at [phone] to:    Ask questions about your health    Make or cancel appointments    Discuss your medicines    Learn about your test results    Speak to your doctor   If you have compliments or concerns about an experience at your clinic, or if you wish to file a complaint, please contact [facility] Patient Relations at [phone] or email us at [email or web address]         Additional Information About Your Visit        Care EveryWhere ID     This is your Care EveryWhere ID. This could be used by other organizations to access your [place] medical records  [id number]        Your Vitals Were     Pulse Temperature Pulse Oximetry BMI (Body Mass Index)          65 97.9  F (36.6  C) (Oral) 100% 37.38 kg/m2         Blood Pressure from Last 3 Encounters:   04/05/17 107/63   03/15/17 111/71   02/28/17 111/75    Weight from Last 3 Encounters:   04/05/17 206 lb (93.4 kg)   03/15/17 208 lb 9.6 oz (94.6 kg)   02/28/17 205 lb (93 kg)              We Performed the Following     HCG Qualitative  Urine (UPT)  ([facility])          Today's Medication Changes          These changes are accurate as of: 4/5/17  2:09 PM.  If you have any questions, ask your nurse or doctor.               Start taking these medicines.        Dose/Directions    benzoyl peroxide 3 % Lotn   Commonly known as:  BENZOYL PEROXIDE CLEANSER   Used for:  Acne vulgaris   Started by:  Marcelo Arriaga MD        Externally apply topically 3 times daily   Quantity:  1 Bottle   Refills:  0            Where to get your medicines      These medications were sent to [id number] - [place], [place] - [address] [place] [address] AT [place] & [facility] [address]  [address], [place] [address]     Phone:  [phone]     benzoyl peroxide 3 % Lotn                Primary Care Provider Office Phone # Fax #    Marcelo Arriaga [phone]       [facility] [address] [place] [address]        Thank you!     Thank you for choosing [facility]  for your care. Our goal is always to provide you with excellent care. Hearing back from our patients is one way we can continue to improve our services. Please take a few minutes to complete the written survey that you may receive in the mail after your visit with us. Thank you!             Your Updated Medication List - Protect others around you: Learn how to safely use, store and throw away your medicines at www.disposemymeds.org.          This list is accurate as of: 4/5/17  2:09 PM.  Always use your most recent med list.                   Brand Name Dispense Instructions for use    benzoyl peroxide 3 % Lotn    BENZOYL PEROXIDE CLEANSER    1 Bottle    Externally apply topically 3 times daily       buPROPion 300 MG 24 hr tablet    WELLBUTRIN XL    30 tablet    Take 1 tablet (300 mg) by mouth every morning       busPIRone 15 MG tablet    BUSPAR    60 tablet    Take 1 tablet (15 mg) by mouth 2 times daily       gabapentin 300 MG capsule    NEURONTIN    90 capsule    Take  1 capsule (300 mg) by mouth 2 times daily       hydrochlorothiazide 25 MG tablet    HYDRODIURIL    30 tablet    Take 1 tablet (25 mg) by mouth daily       KLONOPIN PO          lamoTRIgine 150 MG tablet    LaMICtal     Take 1 tablet (150 mg) by mouth 2 times daily       * order for DME     150 Units    Equipment being ordered: Adult incontinence diapers       * order for DME     1 Device    Equipment being ordered: 4 wheeled walker with seat.       oxybutynin 10 MG 24 hr tablet    DITROPAN XL    90 tablet    Take 1 tablet (10 mg) by mouth daily       * SEROQUEL PO      Take 50 mg by mouth 2 times daily       * SEROQUEL PO      Take 50 mg by mouth 1 in am and 1 at night and 1 prn       TOPAMAX 50 MG tablet   Generic drug:  topiramate     60 tablet    Take 1 tablet (50 mg) by mouth At Bedtime       VESICARE 10 MG tablet   Generic drug:  solifenacin     30 tablet    Take 1 tablet (10 mg) by mouth daily       * Notice:  This list has 4 medication(s) that are the same as other medications prescribed for you. Read the directions carefully, and ask your doctor or other care provider to review them with you.

## 2017-04-06 DIAGNOSIS — L70.0 ACNE VULGARIS: ICD-10-CM

## 2017-04-07 NOTE — PROGRESS NOTES
I have reviewed and agree with the behavioral health fellow's documentation for this visit.  I did not personally see the patient.  Leeanna Khan, PhD., LP

## 2017-04-21 ENCOUNTER — TELEPHONE (OUTPATIENT)
Dept: FAMILY MEDICINE | Facility: CLINIC | Age: 42
End: 2017-04-21

## 2017-04-21 ENCOUNTER — MEDICAL CORRESPONDENCE (OUTPATIENT)
Dept: HEALTH INFORMATION MANAGEMENT | Facility: CLINIC | Age: 42
End: 2017-04-21

## 2017-04-21 NOTE — TELEPHONE ENCOUNTER
[facility] phone call message- general phone call:    Reason for call: home care wondering if pt should be taking meds for DM, pt has no glucometer, should she be ck her glucose, also have found set up meds pt has not taken in the last 2-4 weeks. Not sure how compliant pt is with taking meds.    Return call needed: Yes    OK to leave a message on voice mail? Yes    Primary language: English      needed? No    Call taken on April 21, 2017 at 3:39 PM by Keyonna Anton

## 2017-04-29 ENCOUNTER — MEDICAL CORRESPONDENCE (OUTPATIENT)
Dept: HEALTH INFORMATION MANAGEMENT | Facility: CLINIC | Age: 42
End: 2017-04-29

## 2017-05-01 NOTE — TELEPHONE ENCOUNTER
Able to contact home health service. Unable to speak to [facility] at the time, faxed over orders to discontinue glucometer and glucose checks as patient is prediabetic with hgba1c of 6.0% in July 2016 with no prior documentation of diabetes. Currently managing with lifestyle changes.    Marcelo Arriaga MD PGY1  [facility]

## 2017-05-08 ENCOUNTER — TELEPHONE (OUTPATIENT)
Dept: FAMILY MEDICINE | Facility: CLINIC | Age: 42
End: 2017-05-08

## 2017-05-08 NOTE — TELEPHONE ENCOUNTER
[facility] phone call message- general phone call:    Reason for call: She needs a call back re if she has a chest cold or the flu she has been coughing and it hurts.    Return call needed: Yes    OK to leave a message on voice mail? Yes    Primary language: English      needed? No    Call taken on May 8, 2017 at 3:43 PM by Nate Nevarez

## 2017-05-10 ENCOUNTER — OFFICE VISIT (OUTPATIENT)
Dept: FAMILY MEDICINE | Facility: CLINIC | Age: 42
End: 2017-05-10

## 2017-05-10 VITALS
HEART RATE: 80 BPM | TEMPERATURE: 99.1 F | DIASTOLIC BLOOD PRESSURE: 71 MMHG | OXYGEN SATURATION: 99 % | WEIGHT: 198.8 LBS | HEIGHT: 62 IN | BODY MASS INDEX: 36.58 KG/M2 | SYSTOLIC BLOOD PRESSURE: 109 MMHG

## 2017-05-10 DIAGNOSIS — F31.31 BIPOLAR AFFECTIVE DISORDER, CURRENTLY DEPRESSED, MILD (H): Primary | ICD-10-CM

## 2017-05-10 DIAGNOSIS — J02.9 SORE THROAT: ICD-10-CM

## 2017-05-10 DIAGNOSIS — J30.2 SEASONAL ALLERGIC RHINITIS, UNSPECIFIED ALLERGIC RHINITIS TRIGGER: ICD-10-CM

## 2017-05-10 DIAGNOSIS — J06.9 VIRAL URI WITH COUGH: Primary | ICD-10-CM

## 2017-05-10 LAB — S PYO AG THROAT QL IA.RAPID: NEGATIVE

## 2017-05-10 RX ORDER — PRAZOSIN HYDROCHLORIDE 5 MG/1
5 CAPSULE ORAL AT BEDTIME
COMMUNITY
Start: 2017-05-10 | End: 2018-08-23

## 2017-05-10 RX ORDER — LORATADINE 10 MG/1
10 TABLET ORAL DAILY
Qty: 30 TABLET | Refills: 1 | Status: SHIPPED | OUTPATIENT
Start: 2017-05-10 | End: 2017-05-10

## 2017-05-10 RX ORDER — DEXTROMETHORPHAN POLISTIREX 30 MG/5ML
60 SUSPENSION ORAL 2 TIMES DAILY
Qty: 148 ML | Refills: 0 | Status: SHIPPED | OUTPATIENT
Start: 2017-05-10 | End: 2018-01-18

## 2017-05-10 RX ORDER — ACETAMINOPHEN 325 MG/1
650 TABLET ORAL EVERY 4 HOURS PRN
Qty: 100 TABLET | Refills: 0 | Status: SHIPPED | OUTPATIENT
Start: 2017-05-10 | End: 2017-10-23

## 2017-05-10 RX ORDER — LORATADINE 10 MG/1
10 TABLET ORAL DAILY
Qty: 30 TABLET | Refills: 1 | Status: SHIPPED | OUTPATIENT
Start: 2017-05-10 | End: 2017-09-05

## 2017-05-10 NOTE — PROGRESS NOTES
"       SUBJECTIVE       aMttie Pierre is a 41 year old  female with a PMH significant for:     Patient Active Problem List   Diagnosis     Moderate major depression (H)     Obesity     Cellulitis of axilla, left     Iron deficiency anemia     Seizure disorder (H)     Hidradenitis suppurativa of left axilla     Urgency incontinence     Chronic pain syndrome     Acne vulgaris     She presents with feeling sick for the last three days.  She has been coughing and had chest pain when she coughs.  She has a sore throat and this has become severe.  She has been coughing up phlegm as well.  She is having congestion and chills as well for the last few days.  She had an OTC cough medication, which did not help too much with the pain. She also is having some headache as well.   She has body aches but this is more chronic for her.      She had some vomiting yesterday after trying to use cough medicine and the taste caused this - but that resolved.  She has no known exposure to the flu or strep.  She has a cat and reports seasonal allergies.  She is not having any itchy eyes but is having runny nose and sneezing.  She is using no allergy medicines at this time.     PMH, Medications and Allergies were reviewed and updated as needed.        REVIEW OF SYSTEMS     Pertinent review, per HPI.        OBJECTIVE     Vitals:    05/10/17 0907   BP: 109/71   Pulse: 80   Temp: 99.1  F (37.3  C)   TempSrc: Oral   SpO2: 99%   Weight: 198 lb 12.8 oz (90.2 kg)   Height: 5' 2.25\" (158.1 cm)     Body mass index is 36.07 kg/(m^2).    Constitutional: Well appearing, no acute distress.  HEENT: TM normal bilaterally, no tonsillar erythema or exudates, hypertrophy.  Bilateral shotty cervical lymphadenopathy.   Cardio: Regular rate and rhythm, no murmurs  Respiratory: No respiratory distress, lungs clear to posterior auscultation bilaterally.     No results found for this or any previous visit (from the past 24 hour(s)).        ASSESSMENT AND PLAN "     Sore throat, nasal congestion, cough:  Patient with subjective fevers at home and lymphadenopathy.   Will screen for strep today.  Likely viral URI with pharyngitis but patient prefers to know she does not have a bacterial infection that could require treatment.   - Strep negative  - Supportive cares, cough syrup and tylenol for pain, fevers.   - Possible and likely rhinitis is from pollen and allergies - claritin prescribed to see if it will help with rhinitis     RTC PRN or sooner if develops new or worsening symptoms.      Ewelina Magana MD  PGY-3 [facility]

## 2017-05-10 NOTE — MR AVS SNAPSHOT
After Visit Summary   5/10/2017    Mattie Pierre    MRN: [id number]           Patient Information     Date Of Birth          1975        Visit Information        Provider Department      5/10/2017 9:20 AM Ewelina Magana MD [facility]        Today's Diagnoses     Viral URI with cough    -  1    Seasonal allergic rhinitis, unspecified allergic rhinitis trigger        Sore throat           Follow-ups after your visit        Who to contact     Please call your clinic at [phone] to:    Ask questions about your health    Make or cancel appointments    Discuss your medicines    Learn about your test results    Speak to your doctor   If you have compliments or concerns about an experience at your clinic, or if you wish to file a complaint, please contact [facility] Patient Relations at [phone] or email us at [email or web address]         Additional Information About Your Visit        MyChart Information     [employer] is an electronic gateway that provides easy, online access to your medical records. With [employer], you can request a clinic appointment, read your test results, renew a prescription or communicate with your care team.     To sign up for [employer] visit the website at [email or web address]   You will be asked to enter the access code listed below, as well as some personal information. Please follow the directions to create your username and password.     Your access code is: [email or web address]  Expires: 2017  9:44 AM     Your access code will  in 90 days. If you need help or a new code, please contact your [facility] or call [phone] for assistance.        Care EveryWhere ID     This is your Care EveryWhere ID. This could be used by other organizations to access your [place] medical records  [id number]        Your Vitals Were     Pulse Temperature Height Pulse Oximetry BMI (Body Mass Index)       80  "99.1  F (37.3  C) (Oral) 5' 2.25\" (158.1 cm) 99% 36.07 kg/m2        Blood Pressure from Last 3 Encounters:   05/10/17 109/71   04/05/17 107/63   03/15/17 111/71    Weight from Last 3 Encounters:   05/10/17 198 lb 12.8 oz (90.2 kg)   04/05/17 206 lb (93.4 kg)   03/15/17 208 lb 9.6 oz (94.6 kg)              We Performed the Following     Group A Strep Throat ([facility])     Rapid Strep Screen (Group) ([facility])          Today's Medication Changes          These changes are accurate as of: 5/10/17  9:44 AM.  If you have any questions, ask your nurse or doctor.               Start taking these medicines.        Dose/Directions    loratadine 10 MG tablet   Commonly known as:  CLARITIN   Used for:  Seasonal allergic rhinitis, unspecified allergic rhinitis trigger   Started by:  Ewelina Magana MD        Dose:  10 mg   Take 1 tablet (10 mg) by mouth daily   Quantity:  30 tablet   Refills:  1            Where to get your medicines      These medications were sent to [facility] - [place], [place] - [address] AT [place] & [facility] [address]  [address], [place] [address]     Phone:  [phone]     loratadine 10 MG tablet                Primary Care Provider Office Phone # Fax #    Marcelo Arriaga [phone]       [facility] [address] [place] [address]        Thank you!     Thank you for choosing [facility]  for your care. Our goal is always to provide you with excellent care. Hearing back from our patients is one way we can continue to improve our services. Please take a few minutes to complete the written survey that you may receive in the mail after your visit with us. Thank you!             Your Updated Medication List - Protect others around you: Learn how to safely use, store and throw away your medicines at www.disposemymeds.org.          This list is accurate as of: 5/10/17  9:44 AM.  Always use your most recent med list.                   Brand Name " Dispense Instructions for use    benzoyl peroxide 3 % Lotn    BENZOYL PEROXIDE CLEANSER    1 Bottle    Externally apply topically 3 times daily       buPROPion 300 MG 24 hr tablet    WELLBUTRIN XL    30 tablet    Take 1 tablet (300 mg) by mouth every morning       busPIRone 15 MG tablet    BUSPAR    60 tablet    Take 1 tablet (15 mg) by mouth 2 times daily       gabapentin 300 MG capsule    NEURONTIN    90 capsule    Take 1 capsule (300 mg) by mouth 2 times daily       hydrochlorothiazide 25 MG tablet    HYDRODIURIL    30 tablet    Take 1 tablet (25 mg) by mouth daily       KLONOPIN PO          lamoTRIgine 150 MG tablet    LaMICtal     Take 1 tablet (150 mg) by mouth 2 times daily       loratadine 10 MG tablet    CLARITIN    30 tablet    Take 1 tablet (10 mg) by mouth daily       * order for DME     150 Units    Equipment being ordered: Adult incontinence diapers       * order for DME     1 Device    Equipment being ordered: 4 wheeled walker with seat.       oxybutynin 10 MG 24 hr tablet    DITROPAN XL    90 tablet    Take 1 tablet (10 mg) by mouth daily       * SEROQUEL PO      Take 50 mg by mouth 2 times daily       * SEROQUEL PO      Take 50 mg by mouth 1 in am and 1 at night and 1 prn       TOPAMAX 50 MG tablet   Generic drug:  topiramate     60 tablet    Take 1 tablet (50 mg) by mouth At Bedtime       VESICARE 10 MG tablet   Generic drug:  solifenacin     30 tablet    Take 1 tablet (10 mg) by mouth daily       * Notice:  This list has 4 medication(s) that are the same as other medications prescribed for you. Read the directions carefully, and ask your doctor or other care provider to review them with you.

## 2017-05-10 NOTE — PROGRESS NOTES
Preceptor attestation:  Patient seen and discussed with the resident. Assessment and plan reviewed with resident and agreed upon.  Supervising physician: Heath Levi  [facility]

## 2017-05-11 LAB — GROUP A STREP,THROAT: NORMAL

## 2017-05-11 ASSESSMENT — PATIENT HEALTH QUESTIONNAIRE - PHQ9: SUM OF ALL RESPONSES TO PHQ QUESTIONS 1-9: 15

## 2017-05-22 ENCOUNTER — OFFICE VISIT (OUTPATIENT)
Dept: FAMILY MEDICINE | Facility: CLINIC | Age: 42
End: 2017-05-22

## 2017-05-22 VITALS
BODY MASS INDEX: 36.62 KG/M2 | OXYGEN SATURATION: 98 % | DIASTOLIC BLOOD PRESSURE: 76 MMHG | WEIGHT: 199 LBS | HEIGHT: 62 IN | HEART RATE: 60 BPM | SYSTOLIC BLOOD PRESSURE: 108 MMHG | TEMPERATURE: 98.6 F

## 2017-05-22 DIAGNOSIS — L73.2 HIDRADENITIS SUPPURATIVA: Primary | ICD-10-CM

## 2017-05-22 DIAGNOSIS — B35.0 TINEA CAPITIS: ICD-10-CM

## 2017-05-22 DIAGNOSIS — R23.4 FISSURE IN SKIN: ICD-10-CM

## 2017-05-22 RX ORDER — GRISEOFULVIN 250 MG/1
500 TABLET ORAL DAILY
Qty: 30 TABLET | Refills: 1 | Status: SHIPPED | OUTPATIENT
Start: 2017-05-22 | End: 2017-09-05

## 2017-05-22 RX ORDER — DOXYCYCLINE 100 MG/1
100 CAPSULE ORAL 2 TIMES DAILY
Qty: 56 CAPSULE | Refills: 0 | Status: SHIPPED | OUTPATIENT
Start: 2017-05-22 | End: 2017-06-19

## 2017-05-22 NOTE — MR AVS SNAPSHOT
After Visit Summary   5/22/2017    Mattie Pierre    MRN: [id number]           Patient Information     Date Of Birth          1975        Visit Information        Provider Department      5/22/2017 9:40 AM Mikael Iqbal MD [facility]        Today's Diagnoses     Hidradenitis suppurativa of left axilla    -  1    Tinea capitis        Fissure in skin          Care Instructions    Mattie was seen today for cyst and mass.    Diagnoses and all orders for this visit:    Hidradenitis suppurativa of left axilla  -     doxycycline Monohydrate 100 MG CAPS; Take 1 capsule (100 mg) by mouth 2 times daily for 28 days    Tinea capitis  -     griseofulvin microsize (GRIFULVIN V) 500 MG TABS tablet; Take 1 tablet (500 mg) by mouth daily    Fissure in skin  -     mupirocin (BACTROBAN NASAL) 2 % nasal ointment; Apply 1 g into each nare 2 times daily for 10 days      Please return to clinic in 2 weeks to recheck, sooner as needed.     Thank you for coming to [facility].  **If you had lab testing today and your results are reassuring or normal they will be be mailed to you within 7 days.   **If the lab tests need quick action we will call you with the results.  The phone number we will call with results is # [phone] (home) . If this is not the best number please call our clinic and change the number.  If you need any refills please call your pharmacy and they will contact us.  If you have any concerns about today's visit or wish to schedule another appointment please call our office during normal business hours [phone] (8-5:00 M-F)  If you have urgent medical concerns please call [phone] at any time of the day.  If you a medical emergency please call [phone]  Again thank you for choosing [facility] and please let us know how we can best partner with you to improve you and your family's health.            Follow-ups after your visit        Who to contact     Please call your clinic at  "[phone] to:    Ask questions about your health    Make or cancel appointments    Discuss your medicines    Learn about your test results    Speak to your doctor   If you have compliments or concerns about an experience at your clinic, or if you wish to file a complaint, please contact [facility] Patient Relations at [phone] or email us at [email or web address]         Additional Information About Your Visit        [employer] Information     [employer] is an electronic gateway that provides easy, online access to your medical records. With [employer], you can request a clinic appointment, read your test results, renew a prescription or communicate with your care team.     To sign up for [employer] visit the website at [email or web address]   You will be asked to enter the access code listed below, as well as some personal information. Please follow the directions to create your username and password.     Your access code is: [email or web address]  Expires: 2017  9:44 AM     Your access code will  in 90 days. If you need help or a new code, please contact your [facility] or call [phone] for assistance.        Care EveryWhere ID     This is your Care EveryWhere ID. This could be used by other organizations to access your [facility] medical records  [id number]        Your Vitals Were     Pulse Temperature Height Pulse Oximetry BMI (Body Mass Index)       60 98.6  F (37  C) 5' 2\" (157.5 cm) 98% 36.4 kg/m2        Blood Pressure from Last 3 Encounters:   17 108/76   05/10/17 109/71   17 107/63    Weight from Last 3 Encounters:   17 199 lb (90.3 kg)   05/10/17 198 lb 12.8 oz (90.2 kg)   17 206 lb (93.4 kg)              Today, you had the following     No orders found for display         Today's Medication Changes          These changes are accurate as of: 17 10:37 AM.  If you have any questions, ask your nurse or " doctor.               Start taking these medicines.        Dose/Directions    doxycycline Monohydrate 100 MG Caps   Used for:  Hidradenitis suppurativa of left axilla   Started by:  Mikael Iqbal MD        Dose:  100 mg   Take 1 capsule (100 mg) by mouth 2 times daily for 28 days   Quantity:  56 capsule   Refills:  0       griseofulvin microsize 500 MG Tabs tablet   Commonly known as:  GRIFULVIN V   Used for:  Tinea capitis   Started by:  Mikael Iqbal MD        Dose:  500 mg   Take 1 tablet (500 mg) by mouth daily   Quantity:  30 tablet   Refills:  1       mupirocin 2 % nasal ointment   Commonly known as:  BACTROBAN NASAL   Used for:  Fissure in skin   Started by:  Mikael Iqbal MD        Dose:  1 g   Apply 1 g into each nare 2 times daily for 10 days   Quantity:  20 g   Refills:  0            Where to get your medicines      These medications were sent to [id number] - [place], [place] - [address] AT [address] [facility] [address]  [address], [place] [address]     Phone:  [phone]     doxycycline Monohydrate 100 MG Caps    griseofulvin microsize 500 MG Tabs tablet    mupirocin 2 % nasal ointment                Primary Care Provider Office Phone # Fax #    Marcelo Arriaga [phone]       [facility] [address] [place] [address]        Thank you!     Thank you for choosing [facility]  for your care. Our goal is always to provide you with excellent care. Hearing back from our patients is one way we can continue to improve our services. Please take a few minutes to complete the written survey that you may receive in the mail after your visit with us. Thank you!             Your Updated Medication List - Protect others around you: Learn how to safely use, store and throw away your medicines at www.disposemymeds.org.          This list is accurate as of: 5/22/17 10:37 AM.  Always use your most recent med list.                   Brand Name  Dispense Instructions for use    acetaminophen 325 MG tablet    TYLENOL    100 tablet    Take 2 tablets (650 mg) by mouth every 4 hours as needed for mild pain       benzoyl peroxide 3 % Lotn    BENZOYL PEROXIDE CLEANSER    1 Bottle    Externally apply topically 3 times daily       buPROPion 300 MG 24 hr tablet    WELLBUTRIN XL    30 tablet    Take 1 tablet (300 mg) by mouth every morning       busPIRone 15 MG tablet    BUSPAR    60 tablet    Take 1 tablet (15 mg) by mouth 2 times daily       dextromethorphan 30 MG/5ML liquid    DELSYM    148 mL    Take 10 mLs (60 mg) by mouth 2 times daily       doxycycline Monohydrate 100 MG Caps     56 capsule    Take 1 capsule (100 mg) by mouth 2 times daily for 28 days       gabapentin 300 MG capsule    NEURONTIN    90 capsule    Take 1 capsule (300 mg) by mouth 2 times daily       griseofulvin microsize 500 MG Tabs tablet    GRIFULVIN V    30 tablet    Take 1 tablet (500 mg) by mouth daily       hydrochlorothiazide 25 MG tablet    HYDRODIURIL    30 tablet    Take 1 tablet (25 mg) by mouth daily       KLONOPIN PO          lamoTRIgine 150 MG tablet    LaMICtal     Take 1 tablet (150 mg) by mouth 2 times daily       loratadine 10 MG tablet    CLARITIN    30 tablet    Take 1 tablet (10 mg) by mouth daily       mupirocin 2 % nasal ointment    BACTROBAN NASAL    20 g    Apply 1 g into each nare 2 times daily for 10 days       * order for DME     150 Units    Equipment being ordered: Adult incontinence diapers       * order for DME     1 Device    Equipment being ordered: 4 wheeled walker with seat.       oxybutynin 10 MG 24 hr tablet    DITROPAN XL    90 tablet    Take 1 tablet (10 mg) by mouth daily       prazosin 5 MG capsule    MINIPRESS     Take 1 capsule (5 mg) by mouth At Bedtime       * SEROQUEL PO      Take 50 mg by mouth 2 times daily       * SEROQUEL PO      Take 50 mg by mouth 1 in am and 1 at night and 1 prn       TOPAMAX 50 MG tablet   Generic drug:  topiramate     60  tablet    Take 1 tablet (50 mg) by mouth At Bedtime       VESICARE 10 MG tablet   Generic drug:  solifenacin     30 tablet    Take 1 tablet (10 mg) by mouth daily       * Notice:  This list has 4 medication(s) that are the same as other medications prescribed for you. Read the directions carefully, and ask your doctor or other care provider to review them with you.

## 2017-05-22 NOTE — PATIENT INSTRUCTIONS
Mattie was seen today for cyst and mass.    Diagnoses and all orders for this visit:    Hidradenitis suppurativa of left axilla  -     doxycycline Monohydrate 100 MG CAPS; Take 1 capsule (100 mg) by mouth 2 times daily for 28 days    Tinea capitis  -     griseofulvin microsize (GRIFULVIN V) 500 MG TABS tablet; Take 1 tablet (500 mg) by mouth daily    Fissure in skin  -     mupirocin (BACTROBAN NASAL) 2 % nasal ointment; Apply 1 g into each nare 2 times daily for 10 days      Please return to clinic in 2 weeks to recheck, sooner as needed.     Thank you for coming to [facility].  **If you had lab testing today and your results are reassuring or normal they will be be mailed to you within 7 days.   **If the lab tests need quick action we will call you with the results.  The phone number we will call with results is # [phone] (home) . If this is not the best number please call our clinic and change the number.  If you need any refills please call your pharmacy and they will contact us.  If you have any concerns about today's visit or wish to schedule another appointment please call our office during normal business hours [phone] (8-5:00 M-F)  If you have urgent medical concerns please call [phone] at any time of the day.  If you a medical emergency please call [phone]  Again thank you for choosing [facility] and please let us know how we can best partner with you to improve you and your family's health.

## 2017-05-22 NOTE — PROGRESS NOTES
OFFICE VISIT    ASSESSMENT/PLAN:   Mattie was seen today for cyst and mass.  Diagnoses and all orders for this visit:    Hidradenitis suppurativa (right axilla and left thigh): right axilla is drained now. No fluctuance on left groin. Will treat with antibiotics at this time. May need long term treatment.   -     doxycycline Monohydrate 100 MG CAPS; Take 1 capsule (100 mg) by mouth 2 times daily for 28 days    Tinea capitis: most likely based on exam. No personal/family history of lupus noted. Could be a seborrheic keratosis but those are not usually tender to palpation. No signs of injury. Surrounding skin appears normal. Griseofulvin does not appear to interfere with antiseizure medications.   -     griseofulvin microsize (GRIFULVIN V) 500 MG TABS tablet; Take 1 tablet (500 mg) by mouth daily    Fissure in skin: small, shallow. Discussed behavioral changes to avoid re-opening of fissure so healing can take place.   -     mupirocin (BACTROBAN NASAL) 2 % nasal ointment; Apply 1 g into each nare 2 times daily for 10 days    Please return to clinic in 2 weeks to recheck, sooner as needed.     SUBJECTIVE: 41 year old female with history of boil on  presenting with boil on inner thigh and lining of butt, bump on head.     Boil: had issues with hydranitis suppurativa for a long time now. Had surgeries in the past for this. Thinks she has some in the buttocks area. It is sore. Also has one on the left inner thigh. There is also one in the axilla. These all started about four days ago, maybe. These are all sore. The axilla one was draining push. Feels cold. Had a viral URI with cough. Still coughing. Denies fevers. Some runny nose. Headaches comes and goes.     Bump on head: bump on left temporal/parietal area. Keeps scabbing. Present for about a week now. No pus. Clear juice comes out and then scabs over again. Not sure if there has been bug bites. Never had this before. No family history of lupus or autoimmune issues.  "    The 10 point ROS is negative except as stated in the HPI.    OBJECTIVE:   /76  Pulse 60  Temp 98.6  F (37  C)  Ht 5' 2\" (157.5 cm)  Wt 199 lb (90.3 kg)  SpO2 98%  BMI 36.4 kg/m2  GENERAL: No acute distress. Cooperative. Well-appearing.  HEENT: Normocephalic atraumatic. No conjunctival injection or scleral icterus. Nares patent without rhinorrhea. Oral mucosa is moist  CARDIO: Regular rate and rhythm. S1 S2 present. No murmurs, gallops, or rubs.  RESP: Clear to auscultation bilaterally. No wheezing, rales or rhonchi. Good breath sounds throughout. No use of intercostal or other accessory muscles to breathe.  : there is a fissure at midline of the intergluteal cleft. Goes through epidermis. No maxi involvements. No surrounding erythema or pus.   NEURO: CNII-XII grossly intact. Patellar and Achilles reflexes are 2+. No focal deficits.  PSYCH: Alert and oriented x 3. Affect is appropriate.  INTEGUMENTARY: Warm, dry. Head, left temporal area: There is a 1.5cm circular raised lesion with rough (similar to wart) top that is tender to palpation. No underlying fluctuance noted. No surround skin changes. There are multiple broken hairs in the lesion. Right armpit: there is a healing, almost scabbed lesion with no underlying fluctuance. No surrounding erythema or evidence of pus. Left thigh: a single lesion on the medial upper left thigh with mild erythema of lesion. Firmness noted. No fluctuance or discharge.     Discussed with Dr. Villegas who agrees with the above assessment and plan.    Mikael Iqbal MD      "

## 2017-05-22 NOTE — PROGRESS NOTES
Preceptor attestation:  Patient seen and discussed with the resident. Assessment and plan reviewed with resident and agreed upon.  Supervising physician: Juanis Villegas  [facility]

## 2017-05-26 ENCOUNTER — TELEPHONE (OUTPATIENT)
Dept: FAMILY MEDICINE | Facility: CLINIC | Age: 42
End: 2017-05-26

## 2017-05-26 NOTE — TELEPHONE ENCOUNTER
Per Pharmacy: Bactroban Nasal 2% ointment, this medication has been discontinued by the manufacture. Please call or fax us with new Rx to replace this medication. Please advise. Thanks.

## 2017-06-28 ENCOUNTER — MEDICAL CORRESPONDENCE (OUTPATIENT)
Dept: HEALTH INFORMATION MANAGEMENT | Facility: CLINIC | Age: 42
End: 2017-06-28

## 2017-07-14 ENCOUNTER — OFFICE VISIT (OUTPATIENT)
Dept: FAMILY MEDICINE | Facility: CLINIC | Age: 42
End: 2017-07-14

## 2017-07-14 VITALS
HEART RATE: 69 BPM | BODY MASS INDEX: 35.74 KG/M2 | TEMPERATURE: 98 F | SYSTOLIC BLOOD PRESSURE: 114 MMHG | DIASTOLIC BLOOD PRESSURE: 73 MMHG | HEIGHT: 62 IN | WEIGHT: 194.2 LBS

## 2017-07-14 DIAGNOSIS — R10.2 PELVIC PAIN: Primary | ICD-10-CM

## 2017-07-14 DIAGNOSIS — O92.70 LACTATION DISORDER: ICD-10-CM

## 2017-07-14 DIAGNOSIS — R53.83 FATIGUE, UNSPECIFIED TYPE: ICD-10-CM

## 2017-07-14 DIAGNOSIS — N63.0 LUMP OR MASS IN BREAST: ICD-10-CM

## 2017-07-14 DIAGNOSIS — N89.8 VAGINAL ITCHING: ICD-10-CM

## 2017-07-14 DIAGNOSIS — N92.6 MISSED PERIOD: ICD-10-CM

## 2017-07-14 LAB
BACTERIA: NORMAL
CLUE CELLS: NORMAL
HCG UR QL: NEGATIVE
MOTILE TRICHOMONAS: NEGATIVE
ODOR: POSITIVE
PH WET PREP: NORMAL
PROLACTIN SERPL-MCNC: 14.9 NG/ML (ref 0–20)
TSH SERPL DL<=0.05 MIU/L-ACNC: 2.77 UIU/ML (ref 0.3–5)
WBC WET PREP: NORMAL
YEAST: NORMAL

## 2017-07-14 ASSESSMENT — ANXIETY QUESTIONNAIRES
2. NOT BEING ABLE TO STOP OR CONTROL WORRYING: MORE THAN HALF THE DAYS
1. FEELING NERVOUS, ANXIOUS, OR ON EDGE: MORE THAN HALF THE DAYS
6. BECOMING EASILY ANNOYED OR IRRITABLE: NEARLY EVERY DAY
5. BEING SO RESTLESS THAT IT IS HARD TO SIT STILL: NEARLY EVERY DAY
IF YOU CHECKED OFF ANY PROBLEMS ON THIS QUESTIONNAIRE, HOW DIFFICULT HAVE THESE PROBLEMS MADE IT FOR YOU TO DO YOUR WORK, TAKE CARE OF THINGS AT HOME, OR GET ALONG WITH OTHER PEOPLE: VERY DIFFICULT
7. FEELING AFRAID AS IF SOMETHING AWFUL MIGHT HAPPEN: NEARLY EVERY DAY
GAD7 TOTAL SCORE: 19
3. WORRYING TOO MUCH ABOUT DIFFERENT THINGS: NEARLY EVERY DAY

## 2017-07-14 ASSESSMENT — PATIENT HEALTH QUESTIONNAIRE - PHQ9: 5. POOR APPETITE OR OVEREATING: NEARLY EVERY DAY

## 2017-07-14 NOTE — PROGRESS NOTES
Preceptor attestation:  Patient seen and discussed with the resident. Assessment and plan reviewed with resident and agreed upon.  Supervising physician: Kelly Thorpe  [facility]

## 2017-07-14 NOTE — PROGRESS NOTES
"  ASSESSMENT AND PLAN      Mattie was seen today for refill request.    Diagnoses and all orders for this visit:    Pelvic pain  -     Chlamydia/Gono Amplified ([facility])    Vaginal itching  -     Wet Prep ([facility])  -     Chlamydia/Gono Amplified ([facility])    Missed period  -     HCG Qualitative Urine (UPT)  ([facility])      Lump or mass in breast  -     US Breast Left Limited 1-3 Quadrants; Future  -     MA DIAGNOSTIC  DIGITAL BILATERAL; Future    Lactation disorder  -     Thyroid [place] ([facility])  -     Prolactin ([facility])    Fatigue, unspecified type  -     Thyroid [place] ([facility])        RTC in 2 weeks for follow up of fatigue or sooner if develops new or worsening symptoms.    Marcelo Arriaga MD PGY2  [facility] Medicine                SUBJECTIVE       Mattie Pierre is a 41 year old  female with a PMH significant for:     Patient Active Problem List   Diagnosis     Bipolar disorder (H)     Obesity     Cellulitis of axilla, left     Iron deficiency anemia     Seizure disorder (H)     Hidradenitis suppurativa of left axilla     Urgency incontinence     Chronic pain syndrome     Acne vulgaris     Tinea capitis     Fissure in skin     She presents with fatigue. Patient also noticing that she is lactating at this time. She was seen a year ago for a similar complaint. It was thought at that time it was secondary to use of Seroquel. She began noticing this happening again over the last few days. Notes that it is bilateral discharge, described as a clear/white fluid. No blood noted.     Patient also has complaints of vaginal itching at this time, noted to have been present for about a week. She states \"It smells bad when I put my hand down there.\" She believes \"My vagina is dry.\"    She also notes that she is late on her period. Her last period was 9th or 10th of June. She is sexually active at this time.     PMH, Medications and Allergies were reviewed and updated as needed.        REVIEW OF SYSTEMS " "    CONSTITUTIONAL: NEGATIVE for fever, chills, change in weight  EYES: NEGATIVE for vision changes or irritation  RESP: NEGATIVE for significant cough or SOB  CV: NEGATIVE for chest pain, palpitations or peripheral edema  GI: NEGATIVE for nausea, abdominal pain, heartburn, or change in bowel habits  : NEGATIVE for frequency, dysuria, or hematuria  NEURO: NEGATIVE for dizziness or paresthesias  ENDOCRINE: NEGATIVE for temperature intolerance, skin/hair changes        OBJECTIVE     Vitals:    07/14/17 1505   BP: 114/73   BP Location: Left arm   Patient Position: Sitting   Cuff Size: Adult Large   Pulse: 69   Temp: 98  F (36.7  C)   TempSrc: Oral   Weight: 194 lb 3.2 oz (88.1 kg)   Height: 5' 1.5\" (156.2 cm)     Body mass index is 36.1 kg/(m^2).    Constitutional: Awake, alert, cooperative, no apparent distress, and appears stated age.  Neck: Supple, symmetrical, trachea midline, no adenopathy, thyroid symmetric, not enlarged and no tenderness, skin normal.  Hematologic / Lymphatic: No cervical lymphadenopathy and no supraclavicular lymphadenopathy, no axillary lymphadenopathy   Lungs: No increased work of breathing, good air exchange, clear to auscultation bilaterally, no crackles or wheezing.  Cardiovascular: Regular rate and rhythm, normal S1 and S2, no S3 or S4, and no murmur noted.  Chest / Breast: nodule on left breast, 1 cm, located at 3 o'clock, irregular, firm and non-tender  Abdomen: No scars, normal bowel sounds, soft, non-distended, non-tender, no masses palpated, no hepatosplenomegally.  Genitourinary: Chalky, white vaginal discharge noted on cervical exam. No vaginal wall tenderness. Cervix appears normal. Patient notes mild uterine tenderness on bimanual exam. No ovarian masses noted.   Skin: Noted to have scars in bilateral axilla. No rashes, erythema, pallor, petechia or purpura.    No results found for this or any previous visit (from the past 24 hour(s)).          "

## 2017-07-14 NOTE — MR AVS SNAPSHOT
After Visit Summary   2017    Mattie Pierre    MRN: [id number]           Patient Information     Date Of Birth          1975        Visit Information        Provider Department      2017 3:10 PM Marcelo Arriaga MD [facility]        Today's Diagnoses     Pelvic pain    -  1    Vaginal itching        Missed period        Lump or mass in breast        Lactation disorder        Fatigue, unspecified type          Care Instructions    -follow-up in 1 week.   -Schedule breast ultrasound  -Schedule ultrasound of lump in right shoulder.          Follow-ups after your visit        Future tests that were ordered for you today     Open Future Orders        Priority Expected Expires Ordered    US Breast Bilateral Routine  2018            Who to contact     Please call your clinic at [phone] to:    Ask questions about your health    Make or cancel appointments    Discuss your medicines    Learn about your test results    Speak to your doctor   If you have compliments or concerns about an experience at your clinic, or if you wish to file a complaint, please contact [facility] Patient Relations at [phone] or email us at [email or web address]         Additional Information About Your Visit        MyChart Information     [employer] is an electronic gateway that provides easy, online access to your medical records. With [employer], you can request a clinic appointment, read your test results, renew a prescription or communicate with your care team.     To sign up for [employer] visit the website at [email or web address]   You will be asked to enter the access code listed below, as well as some personal information. Please follow the directions to create your username and password.     Your access code is: [email or web address]  Expires: 2017  9:44 AM     Your access code will  in 90 days. If you need help or a new code, please contact  "your [facility] or call [phone] for assistance.        Care EveryWhere ID     This is your Care EveryWhere ID. This could be used by other organizations to access your [place] medical records  [id number]        Your Vitals Were     Pulse Temperature Height Last Period BMI (Body Mass Index)       69 98  F (36.7  C) (Oral) 5' 1.5\" (156.2 cm) 06/09/2017 36.1 kg/m2        Blood Pressure from Last 3 Encounters:   07/14/17 114/73   05/22/17 108/76   05/10/17 109/71    Weight from Last 3 Encounters:   07/14/17 194 lb 3.2 oz (88.1 kg)   05/22/17 199 lb (90.3 kg)   05/10/17 198 lb 12.8 oz (90.2 kg)              We Performed the Following     Chlamydia/Gono Amplified ([facility])     HCG Qualitative Urine (UPT)  ([facility])     Prolactin ([facility])     Thyroid [place] ([facility])     Wet Prep ([facility])        Primary Care Provider Office Phone # Fax #    Marcelo Enzo Arriaga [phone]       [facility] [address] [place] [address]        Equal Access to Services     MCKAYLA ALANIZ AH: Hadii yanique Castro, waaxda luireneadaha, qaybta kaalmada adehiroda, paula tobar. So [facility] [phone].    ATENCIÓN: Si habla español, tiene a campos disposición servicios gratuitos de asistencia lingüística. Llame al [phone].    We comply with applicable federal civil rights laws and [place] laws. We do not discriminate on the basis of race, color, national origin, age, disability sex, sexual orientation or gender identity.            Thank you!     Thank you for choosing [facility]  for your care. Our goal is always to provide you with excellent care. Hearing back from our patients is one way we can continue to improve our services. Please take a few minutes to complete the written survey that you may receive in the mail after your visit with us. Thank you!             Your Updated Medication List - Protect others around you: Learn " how to safely use, store and throw away your medicines at www.disposemymeds.org.          This list is accurate as of: 7/14/17  4:09 PM.  Always use your most recent med list.                   Brand Name Dispense Instructions for use Diagnosis    acetaminophen 325 MG tablet    TYLENOL    100 tablet    Take 2 tablets (650 mg) by mouth every 4 hours as needed for mild pain    Viral URI with cough       benzoyl peroxide 3 % Lotn    BENZOYL PEROXIDE CLEANSER    1 Bottle    Externally apply topically 3 times daily    Acne vulgaris       buPROPion 300 MG 24 hr tablet    WELLBUTRIN XL    30 tablet    Take 1 tablet (300 mg) by mouth every morning        busPIRone 15 MG tablet    BUSPAR    60 tablet    Take 1 tablet (15 mg) by mouth 2 times daily        dextromethorphan 30 MG/5ML liquid    DELSYM    148 mL    Take 10 mLs (60 mg) by mouth 2 times daily    Viral URI with cough       gabapentin 300 MG capsule    NEURONTIN    90 capsule    Take 1 capsule (300 mg) by mouth 2 times daily        griseofulvin microsize 500 MG Tabs tablet    GRIFULVIN V    30 tablet    Take 1 tablet (500 mg) by mouth daily    Tinea capitis       hydrochlorothiazide 25 MG tablet    HYDRODIURIL    30 tablet    Take 1 tablet (25 mg) by mouth daily        KLONOPIN PO           lamoTRIgine 150 MG tablet    LaMICtal     Take 1 tablet (150 mg) by mouth 2 times daily        loratadine 10 MG tablet    CLARITIN    30 tablet    Take 1 tablet (10 mg) by mouth daily    Seasonal allergic rhinitis, unspecified allergic rhinitis trigger       * order for DME     150 Units    Equipment being ordered: Adult incontinence diapers    Urgency incontinence       * order for DME     1 Device    Equipment being ordered: 4 wheeled walker with seat.    Chronic bilateral low back pain without sciatica       oxybutynin 10 MG 24 hr tablet    DITROPAN XL    90 tablet    Take 1 tablet (10 mg) by mouth daily    Urgency incontinence       prazosin 5 MG capsule    MINIPRESS     Take  1 capsule (5 mg) by mouth At Bedtime        * SEROQUEL PO      Take 50 mg by mouth 2 times daily        * SEROQUEL PO      Take 50 mg by mouth 1 in am and 1 at night and 1 prn        TOPAMAX 50 MG tablet   Generic drug:  topiramate     60 tablet    Take 1 tablet (50 mg) by mouth At Bedtime        VESICARE 10 MG tablet   Generic drug:  solifenacin     30 tablet    Take 1 tablet (10 mg) by mouth daily        * Notice:  This list has 4 medication(s) that are the same as other medications prescribed for you. Read the directions carefully, and ask your doctor or other care provider to review them with you.

## 2017-07-14 NOTE — PATIENT INSTRUCTIONS
-follow-up in 1 week.   -Schedule breast ultrasound  -Schedule ultrasound of lump in right shoulder.    [facility]  Radiology Department  [address] [place] [address]  [phone]    Appointment  Date:7/28/17  Time:2:30pm         **The day of your mammogram please refrain from using lotions, powders, or perfumes in the area as this could interfere with the imaging.     Please contact the above clinic if you need to cancel or reschedule. Feel free to contact me with any questions. Thanks!    Ewelina  Referral Coordinator  [phone]

## 2017-07-15 ASSESSMENT — ANXIETY QUESTIONNAIRES: GAD7 TOTAL SCORE: 19

## 2017-07-17 ENCOUNTER — TELEPHONE (OUTPATIENT)
Dept: FAMILY MEDICINE | Facility: CLINIC | Age: 42
End: 2017-07-17

## 2017-07-17 LAB
C TRACH RRNA CVX QL NAA+PROBE: NEGATIVE
N GONORRHOEA RRNA SPEC QL NAA+PROBE: NEGATIVE

## 2017-07-17 NOTE — TELEPHONE ENCOUNTER
Patient calling wanting to know if her lab results are in.   Please return call when able.   Thank you.     Ewelina

## 2017-07-17 NOTE — TELEPHONE ENCOUNTER
[facility] phone call message- patient requesting results:    Test: Lab    Date of test: 7/15/2017    Additional Comments: the pt called to ask for her results and would like a call  back ok to leave results on voice mail     OK to leave a message on voice mail? Yes    Primary language: English      needed? No    Call taken on July 17, 2017 at 8:29 AM by Sunday Lemos

## 2017-07-17 NOTE — TELEPHONE ENCOUNTER
Patient notified of results and was informed that her GC and chlamydia was not back yet. Wet showed positive for BV and patient states she does have and odor but denies itching or discharge. She would like something sent to her pharmacy. /GARRY Judge  Routed to Dr. Arriaga

## 2017-07-18 ENCOUNTER — MEDICAL CORRESPONDENCE (OUTPATIENT)
Dept: HEALTH INFORMATION MANAGEMENT | Facility: CLINIC | Age: 42
End: 2017-07-18

## 2017-07-18 DIAGNOSIS — B96.89 BACTERIAL VAGINOSIS: Primary | ICD-10-CM

## 2017-07-18 DIAGNOSIS — N76.0 BACTERIAL VAGINOSIS: Primary | ICD-10-CM

## 2017-07-18 RX ORDER — METRONIDAZOLE 500 MG/1
500 TABLET ORAL 2 TIMES DAILY
Qty: 14 TABLET | Refills: 0 | Status: SHIPPED | OUTPATIENT
Start: 2017-07-18 | End: 2017-09-05

## 2017-07-18 NOTE — TELEPHONE ENCOUNTER
[facility]. Tx being sent for positive bacterial infections. . /Alfie RN

## 2017-07-18 NOTE — PROGRESS NOTES
Patient was called and informed of results. Patient with positive wet prep for bacterial vaginosis. Patient started on metronidazole 500mg BID for 7 days.    Marcelo Arriaga MD PGY2  [facility]

## 2017-07-18 NOTE — NURSING NOTE
Patient informed that tx was sent for bv to her pharmacy. She questions if she can take 2 tabs daily vs 1 tab twice daily. Nurse spoke with Dr. Nieves and DR. Cazares in clinic and was told that taking the medication that way is not recommended and the patient should take the medication as prescribed. Phone call to patient and detailed message regarding above was given. LM to call clinic with questions. /GARRY Judge      Routed to Dr. JOHNS

## 2017-07-19 ENCOUNTER — RECORDS - HEALTHEAST (OUTPATIENT)
Dept: ADMINISTRATIVE | Facility: OTHER | Age: 42
End: 2017-07-19

## 2017-07-20 ENCOUNTER — OFFICE VISIT (OUTPATIENT)
Dept: FAMILY MEDICINE | Facility: CLINIC | Age: 42
End: 2017-07-20

## 2017-07-20 VITALS
DIASTOLIC BLOOD PRESSURE: 66 MMHG | OXYGEN SATURATION: 99 % | SYSTOLIC BLOOD PRESSURE: 102 MMHG | WEIGHT: 194 LBS | TEMPERATURE: 98.3 F | HEART RATE: 79 BPM | BODY MASS INDEX: 35.7 KG/M2 | HEIGHT: 62 IN

## 2017-07-20 DIAGNOSIS — J30.89 CHRONIC NONSEASONAL ALLERGIC RHINITIS DUE TO OTHER ALLERGEN: ICD-10-CM

## 2017-07-20 DIAGNOSIS — N39.41 URGENCY INCONTINENCE: ICD-10-CM

## 2017-07-20 DIAGNOSIS — I10 BENIGN ESSENTIAL HYPERTENSION: Primary | ICD-10-CM

## 2017-07-20 RX ORDER — SOLIFENACIN SUCCINATE 10 MG/1
10 TABLET, FILM COATED ORAL DAILY
Qty: 30 TABLET | Refills: 2 | Status: SHIPPED | OUTPATIENT
Start: 2017-07-20 | End: 2017-09-05

## 2017-07-20 RX ORDER — HYDROCHLOROTHIAZIDE 25 MG/1
25 TABLET ORAL DAILY
Qty: 30 TABLET | Refills: 2 | Status: SHIPPED | OUTPATIENT
Start: 2017-07-20 | End: 2017-09-05

## 2017-07-20 NOTE — PROGRESS NOTES
"       SUBJECTIVE       Mattie Pierre is a 41 year old  female with a PMH significant for:     Patient Active Problem List   Diagnosis     Bipolar disorder (H)     Obesity     Cellulitis of axilla, left     Iron deficiency anemia     Seizure disorder (H)     Hidradenitis suppurativa of left axilla     Urgency incontinence     Chronic pain syndrome     Acne vulgaris     Tinea capitis     Fissure in skin     She presents for follow-up of medications. Patient was supposed to bring in medications for med check today, but forgot to prior to her appointment. Patient says that she needs refills of her hydrochlorothiazide and vesicare. Both were previously provided to her by a different clinic.     Patient is current smoker, 1.5 ppd, drinks 1 alcoholic drink per week, denies any drug use. Is in an outpatient chemical dependency program.     PMH, Medications and Allergies were reviewed and updated as needed.        REVIEW OF SYSTEMS     CONSTITUTIONAL: NEGATIVE for fever, chills, change in weight  INTEGUMENTARY/SKIN: NEGATIVE for worrisome rashes, moles or lesions  EYES: NEGATIVE for vision changes or irritation  RESP: NEGATIVE for significant cough or SOB  CV: NEGATIVE for chest pain, palpitations or peripheral edema  GI: NEGATIVE for nausea, abdominal pain, heartburn, or change in bowel habits  : Continues to have vaginal itching        OBJECTIVE     Vitals:    07/20/17 1122 07/20/17 1125   BP: 99/64 102/66   Pulse: 79    Temp: 98.3  F (36.8  C)    TempSrc: Oral    SpO2: 99%    Weight: 194 lb (88 kg)    Height: 5' 2\" (157.5 cm)      Body mass index is 35.48 kg/(m^2).    Constitutional: Awake, alert, cooperative, no apparent distress, and appears stated age.  Lungs: No increased work of breathing, good air exchange, clear to auscultation bilaterally, no crackles or wheezing.  Cardiovascular: Regular rate and rhythm, normal S1 and S2, no S3 or S4, and no murmur noted.  Abdomen: No scars, normal bowel sounds, soft, " non-distended, non-tender, no masses palpated, no hepatosplenomegally.  Skin: No rashes, erythema, pallor, petechia or purpura.    No results found for this or any previous visit (from the past 24 hour(s)).        ASSESSMENT AND PLAN     1. Benign essential hypertension- Patient's blood pressure is soft at this time, will consider discontinuing HCTZ at next visit if systolic blood pressures continue to be ~100.     - hydrochlorothiazide (HYDRODIURIL) 25 MG tablet; Take 1 tablet (25 mg) by mouth daily  Dispense: 30 tablet; Refill: 2  -follow-up in 1 month    2. Urgency incontinence  - solifenacin (VESICARE) 10 MG tablet; Take 1 tablet (10 mg) by mouth daily  Dispense: 30 tablet; Refill: 2         RTC in 1 month for follow up of hypertension or sooner if develops new or worsening symptoms.    Marcelo Arriaga MD PGY2

## 2017-07-20 NOTE — MR AVS SNAPSHOT
After Visit Summary   7/20/2017    Mattie Pierre    MRN: [id number]           Patient Information     Date Of Birth          1975        Visit Information        Provider Department      7/20/2017 11:20 AM Marcelo Arriaga MD [facility]        Today's Diagnoses     Benign essential hypertension    -  1    Urgency incontinence        Chronic nonseasonal allergic rhinitis due to other allergen          Care Instructions    -I will come for a home visit on 7/24 at 2PM          Follow-ups after your visit        Who to contact     Please call your clinic at [phone] to:    Ask questions about your health    Make or cancel appointments    Discuss your medicines    Learn about your test results    Speak to your doctor   If you have compliments or concerns about an experience at your clinic, or if you wish to file a complaint, please contact [facility] Patient Relations at [phone] or email us at [email or web address]         Additional Information About Your Visit        MyChart Information     [employer] gives you secure access to your electronic health record. If you see a primary care provider, you can also send messages to your care team and make appointments. If you have questions, please call your primary care clinic.  If you do not have a primary care provider, please call [phone] and they will assist you.      [employer] is an electronic gateway that provides easy, online access to your medical records. With [employer], you can request a clinic appointment, read your test results, renew a prescription or communicate with your care team.     To access your existing account, please contact your [facility] or call [phone] for assistance.        Care EveryWhere ID     This is your Care EveryWhere ID. This could be used by other organizations to access your [place] medical records  [id number]        Your Vitals  "Were     Pulse Temperature Height Last Period Pulse Oximetry BMI (Body Mass Index)    79 98.3  F (36.8  C) (Oral) 5' 2\" (157.5 cm) 06/09/2017 99% 35.48 kg/m2       Blood Pressure from Last 3 Encounters:   07/20/17 102/66   07/14/17 114/73   05/22/17 108/76    Weight from Last 3 Encounters:   07/20/17 194 lb (88 kg)   07/14/17 194 lb 3.2 oz (88.1 kg)   05/22/17 199 lb (90.3 kg)              Today, you had the following     No orders found for display         Today's Medication Changes          These changes are accurate as of: 7/20/17 12:20 PM.  If you have any questions, ask your nurse or doctor.               Start taking these medicines.        Dose/Directions    fluticasone 27.5 MCG/SPRAY spray   Commonly known as:  VERAMYST   Used for:  Chronic nonseasonal allergic rhinitis due to other allergen   Started by:  Marcelo Arriaga MD        Dose:  1-2 spray   Spray 1-2 sprays into both nostrils daily   Quantity:  10 g   Refills:  3            Where to get your medicines      These medications were sent to [facility] - [place], [place] - [address] AT [place] [address] [facility] [address]  [address], [place] [address]     Phone:  [phone]     fluticasone 27.5 MCG/SPRAY spray    hydrochlorothiazide 25 MG tablet    solifenacin 10 MG tablet                Primary Care Provider Office Phone # Fax #    Marcelo Arriaga [phone]       [facility] [address] [place] [address]        Equal Access to Services     [facility] Center[facility] AH: Hadii yanique connell Sodemetrius, waaxda luqadaha, qaybta kaalmada hector, paula tobar. So [facility] [phone].    ATENCIÓN: Si habla español, tiene a campos disposición servicios gratuitos de asistencia lingüística. Anand macdonald [phone].    We comply with applicable federal civil rights laws and [place] laws. We do not discriminate on the basis of race, color, national origin, age, disability sex, sexual " orientation or gender identity.            Thank you!     Thank you for choosing [facility]  for your care. Our goal is always to provide you with excellent care. Hearing back from our patients is one way we can continue to improve our services. Please take a few minutes to complete the written survey that you may receive in the mail after your visit with us. Thank you!             Your Updated Medication List - Protect others around you: Learn how to safely use, store and throw away your medicines at www.disposemymeds.org.          This list is accurate as of: 7/20/17 12:20 PM.  Always use your most recent med list.                   Brand Name Dispense Instructions for use Diagnosis    acetaminophen 325 MG tablet    TYLENOL    100 tablet    Take 2 tablets (650 mg) by mouth every 4 hours as needed for mild pain    Viral URI with cough       benzoyl peroxide 3 % Lotn    BENZOYL PEROXIDE CLEANSER    1 Bottle    Externally apply topically 3 times daily    Acne vulgaris       buPROPion 300 MG 24 hr tablet    WELLBUTRIN XL    30 tablet    Take 1 tablet (300 mg) by mouth every morning        busPIRone 15 MG tablet    BUSPAR    60 tablet    Take 1 tablet (15 mg) by mouth 2 times daily        dextromethorphan 30 MG/5ML liquid    DELSYM    148 mL    Take 10 mLs (60 mg) by mouth 2 times daily    Viral URI with cough       fluticasone 27.5 MCG/SPRAY spray    VERAMYST    10 g    Spray 1-2 sprays into both nostrils daily    Chronic nonseasonal allergic rhinitis due to other allergen       gabapentin 300 MG capsule    NEURONTIN    90 capsule    Take 1 capsule (300 mg) by mouth 2 times daily        griseofulvin microsize 500 MG Tabs tablet    GRIFULVIN V    30 tablet    Take 1 tablet (500 mg) by mouth daily    Tinea capitis       hydrochlorothiazide 25 MG tablet    HYDRODIURIL    30 tablet    Take 1 tablet (25 mg) by mouth daily    Benign essential hypertension       KLONOPIN PO           lamoTRIgine 150 MG tablet     LaMICtal     Take 1 tablet (150 mg) by mouth 2 times daily        loratadine 10 MG tablet    CLARITIN    30 tablet    Take 1 tablet (10 mg) by mouth daily    Seasonal allergic rhinitis, unspecified allergic rhinitis trigger       metroNIDAZOLE 500 MG tablet    FLAGYL    14 tablet    Take 1 tablet (500 mg) by mouth 2 times daily    Bacterial vaginosis       * order for DME     150 Units    Equipment being ordered: Adult incontinence diapers    Urgency incontinence       * order for DME     1 Device    Equipment being ordered: 4 wheeled walker with seat.    Chronic bilateral low back pain without sciatica       oxybutynin 10 MG 24 hr tablet    DITROPAN XL    90 tablet    Take 1 tablet (10 mg) by mouth daily    Urgency incontinence       prazosin 5 MG capsule    MINIPRESS     Take 1 capsule (5 mg) by mouth At Bedtime        * SEROQUEL PO      Take 50 mg by mouth 2 times daily        * SEROQUEL PO      Take 50 mg by mouth 1 in am and 1 at night and 1 prn        solifenacin 10 MG tablet    VESICARE    30 tablet    Take 1 tablet (10 mg) by mouth daily    Urgency incontinence       TOPAMAX 50 MG tablet   Generic drug:  topiramate     60 tablet    Take 1 tablet (50 mg) by mouth At Bedtime        * Notice:  This list has 4 medication(s) that are the same as other medications prescribed for you. Read the directions carefully, and ask your doctor or other care provider to review them with you.

## 2017-07-20 NOTE — PROGRESS NOTES
Preceptor attestation:  Patient seen and discussed with the resident. Assessment and plan reviewed with resident and agreed upon.  Supervising physician: Justice Rodriguez  [facility]

## 2017-07-21 ENCOUNTER — TELEPHONE (OUTPATIENT)
Dept: FAMILY MEDICINE | Facility: CLINIC | Age: 42
End: 2017-07-21

## 2017-07-21 DIAGNOSIS — J30.2 SEASONAL ALLERGIC RHINITIS, UNSPECIFIED CHRONICITY, UNSPECIFIED TRIGGER: Primary | ICD-10-CM

## 2017-07-21 NOTE — TELEPHONE ENCOUNTER
PA needed for fluticasone (Veramyst).  Pharmacist states Flonase would be covered. Please send flonase rx if appropriate. Thank you.    Routed to Dr. Arriaga. /GARRY Granados

## 2017-07-27 ENCOUNTER — TELEPHONE (OUTPATIENT)
Dept: FAMILY MEDICINE | Facility: CLINIC | Age: 42
End: 2017-07-27

## 2017-07-27 NOTE — TELEPHONE ENCOUNTER
[facility] phone call message-patient reporting a symptom:     Symptom: pos vaginal infection      Same Day Visit Offered: was seen     Additional comments: the pt called to ask the Dr to call her she is still having odd discharge     OK to leave message on voice mail? Yes    Primary language: English      needed? No    Call taken on July 27, 2017 at 3:00 PM by Sunday Lemos

## 2017-07-28 ENCOUNTER — HOSPITAL ENCOUNTER (OUTPATIENT)
Dept: MAMMOGRAPHY | Facility: HOSPITAL | Age: 42
Discharge: HOME | End: 2017-07-28

## 2017-07-28 DIAGNOSIS — N63.20 LUMP OF LEFT BREAST: ICD-10-CM

## 2017-07-28 DIAGNOSIS — N63.10 LUMP OF RIGHT BREAST: ICD-10-CM

## 2017-07-28 NOTE — TELEPHONE ENCOUNTER
Phone call to patient. VMB is full unable to leave message. /GARRY Judge      Per Dr. Arriaga patient needs to come in for reevaluation. /Alfie RN

## 2017-08-01 DIAGNOSIS — N63.0 LUMP OR MASS IN BREAST: ICD-10-CM

## 2017-08-01 LAB — MAMMOGRAM: NORMAL

## 2017-08-01 NOTE — TELEPHONE ENCOUNTER
S/w pt yesterday, her vaginal symptoms resolved.    Pt requesting results of mammo that was done at [facility]--unable to locate in [facility]. Referrals-could you get copy of this? Thanks.     /GARRY Granados

## 2017-08-02 NOTE — TELEPHONE ENCOUNTER
Gave results of mammo-normal, continue routine screening as recommended by your MD. Pt requested result to be mailed to her. verified address. Mailed. /GARRY Granados

## 2017-08-03 RX ORDER — FLUTICASONE PROPIONATE 50 MCG
1-2 SPRAY, SUSPENSION (ML) NASAL DAILY
Qty: 1 BOTTLE | Refills: 11 | Status: SHIPPED | OUTPATIENT
Start: 2017-08-03 | End: 2018-01-04

## 2017-08-07 NOTE — PROGRESS NOTES
Matti Phelps,    Your mammogram results are back and are normal at this time. No signs suggestive of cancer were found. If you have any questions, please call the clinic. Thanks!    Marcelo Arriaga MD PGY2  [facility]

## 2017-08-09 ENCOUNTER — TELEPHONE (OUTPATIENT)
Dept: FAMILY MEDICINE | Facility: CLINIC | Age: 42
End: 2017-08-09

## 2017-08-09 ENCOUNTER — MEDICAL CORRESPONDENCE (OUTPATIENT)
Dept: HEALTH INFORMATION MANAGEMENT | Facility: CLINIC | Age: 42
End: 2017-08-09

## 2017-08-09 NOTE — TELEPHONE ENCOUNTER
"Left a message on recorder to call back. Ok to relay message:   \"08/03/17 PA needed for veramyst. Alt sent. /GARRY Judge\"  "

## 2017-08-09 NOTE — TELEPHONE ENCOUNTER
[facility] phone call message- general phone call:    Reason for call: Pt has a few questions about the PA for nasal spray    Return call needed: Yes    OK to leave a message on voice mail? Yes    Primary language: English      needed? No    Call taken on August 9, 2017 at 8:17 AM by Keyonna Anton

## 2017-08-16 ENCOUNTER — TELEPHONE (OUTPATIENT)
Dept: FAMILY MEDICINE | Facility: CLINIC | Age: 42
End: 2017-08-16

## 2017-08-16 NOTE — TELEPHONE ENCOUNTER
[facility] phone call message- general phone call:    Reason for call: She needs a call back re some paperwork  was supposed to get for her for depends. She wants to know if he received the order for it.    Return call needed: Yes    OK to leave a message on voice mail? Yes    Primary language: English      needed? No    Call taken on August 16, 2017 at 11:43 AM by Nate Nevarez

## 2017-08-16 NOTE — TELEPHONE ENCOUNTER
Dr. Arriaga have you received these forms. Please advise. /GARRY Judge  Routed to Dr. Arriaga    Routed to Resource Nurse. /Alfie RN

## 2017-08-18 ENCOUNTER — MEDICAL CORRESPONDENCE (OUTPATIENT)
Dept: HEALTH INFORMATION MANAGEMENT | Facility: CLINIC | Age: 42
End: 2017-08-18

## 2017-08-21 ENCOUNTER — MYC MEDICAL ADVICE (OUTPATIENT)
Dept: FAMILY MEDICINE | Facility: CLINIC | Age: 42
End: 2017-08-21

## 2017-08-21 ENCOUNTER — TELEPHONE (OUTPATIENT)
Dept: FAMILY MEDICINE | Facility: CLINIC | Age: 42
End: 2017-08-21

## 2017-08-21 NOTE — TELEPHONE ENCOUNTER
[facility] Medicine phone call message- general phone call:    Reason for call:   Checking on the status of medical supply order of Depends. Patient upset that no-one is getting back to her. Please advise.    Return call needed: Yes    OK to leave a message on voice mail? Yes    Primary language: English      needed? No    Call taken on August 21, 2017 at 11:36 AM by Daisy Chaidez

## 2017-08-21 NOTE — TELEPHONE ENCOUNTER
Pt states she spoke with Dr. Arriaga a few min ago who told her that he filled out and signed orders for depends to [facility]. Advised pt to check with [facility] about this. If they haven't received it, ask them to refax another order form to the clinic.    /GARRY Granados

## 2017-08-22 ENCOUNTER — TELEPHONE (OUTPATIENT)
Dept: FAMILY MEDICINE | Facility: CLINIC | Age: 42
End: 2017-08-22

## 2017-08-22 DIAGNOSIS — N39.41 URGENCY INCONTINENCE: Primary | ICD-10-CM

## 2017-08-22 NOTE — TELEPHONE ENCOUNTER
Routed to Dr. Arriaga. Please include length of need on Depends rx. Rx should be faxed to phone number provided by pt.   /GARRY Granados

## 2017-08-22 NOTE — TELEPHONE ENCOUNTER
Pt notified that rx has been faxed.  Pt is wondering if Dr. Arraiga signed the order for an equipment to her breath at night, pt didn't specify what it was. Pt states she talked to Dr. Arriaga about it already.    Routed to Dr. Arriaga /GARRY Granados

## 2017-08-22 NOTE — TELEPHONE ENCOUNTER
[facility] Family Medicine phone call message- general phone call:    Reason for call:   Please refax order for Depends diapers to [facility] at fax number [phone] or [phone]. URENT!!!   Patient has een waiting for supply for 2 weeks.    Return call needed: Yes    OK to leave a message on voice mail? Yes    Primary language: English      needed? No    Call taken on August 22, 2017 at 11:23 AM by Daisy Chaidez

## 2017-08-27 ENCOUNTER — MEDICAL CORRESPONDENCE (OUTPATIENT)
Dept: HEALTH INFORMATION MANAGEMENT | Facility: CLINIC | Age: 42
End: 2017-08-27

## 2017-08-29 ENCOUNTER — TELEPHONE (OUTPATIENT)
Dept: FAMILY MEDICINE | Facility: CLINIC | Age: 42
End: 2017-08-29

## 2017-08-29 NOTE — TELEPHONE ENCOUNTER
[facility]. Ask to speak with Triage RN.    Need refill on vesicar--  Fax med list to?  Bp?  /M.Earm,RN

## 2017-08-29 NOTE — TELEPHONE ENCOUNTER
[facility] Family Medicine phone call message- general phone call:    Reason for call: the home care provider called to ask about the pt's hcyz medication if she should start it again bp has been 94 to 114 over 62 to75 they need an end date for the antifungal medication they need a refill on vesicaer.  And a curent med list      Return call needed: Yes    OK to leave a message on voice mail? Yes    Primary language: English      needed? No    Call taken on August 29, 2017 at 1:18 PM by Sunday Lemos

## 2017-08-30 DIAGNOSIS — B35.0 TINEA CAPITIS: ICD-10-CM

## 2017-08-30 NOTE — TELEPHONE ENCOUNTER
Called pt's N at [phone]. N giving update.  BPs lately have been:   systolic  60-75 diasystolic  Pt is asymptomatic.   Pt has not been taking been taking HCTZ-fyi.    Regarding fungal infection, it's better now.     Faxed current med list to [facility]: [phone]    topiramate 200 mg at bedtime prescribed by Dr. Barkley-this is not in our med list. Asked N to fax her med list to us.   Routed to Dr. Arriaga. /GARRY Granados

## 2017-09-05 ENCOUNTER — OFFICE VISIT (OUTPATIENT)
Dept: FAMILY MEDICINE | Facility: CLINIC | Age: 42
End: 2017-09-05

## 2017-09-05 VITALS
SYSTOLIC BLOOD PRESSURE: 106 MMHG | WEIGHT: 186.4 LBS | DIASTOLIC BLOOD PRESSURE: 72 MMHG | TEMPERATURE: 97.9 F | HEART RATE: 69 BPM | BODY MASS INDEX: 34.09 KG/M2

## 2017-09-05 DIAGNOSIS — Z76.0 ENCOUNTER FOR MEDICATION REFILL: Primary | ICD-10-CM

## 2017-09-05 DIAGNOSIS — K08.89 PAIN, DENTAL: ICD-10-CM

## 2017-09-05 DIAGNOSIS — J31.0 CHRONIC RHINITIS, UNSPECIFIED TYPE: Primary | ICD-10-CM

## 2017-09-05 DIAGNOSIS — N39.41 URGENCY INCONTINENCE: ICD-10-CM

## 2017-09-05 RX ORDER — SOLIFENACIN SUCCINATE 10 MG/1
10 TABLET, FILM COATED ORAL DAILY
Qty: 30 TABLET | Refills: 2 | Status: SHIPPED | OUTPATIENT
Start: 2017-09-05 | End: 2018-01-04

## 2017-09-05 RX ORDER — NAPROXEN 500 MG/1
500 TABLET ORAL 2 TIMES DAILY PRN
Qty: 30 TABLET | Refills: 0 | Status: SHIPPED | OUTPATIENT
Start: 2017-09-05 | End: 2017-10-23

## 2017-09-05 RX ORDER — LORATADINE 10 MG/1
10 TABLET ORAL DAILY
Qty: 30 TABLET | Refills: 1 | Status: SHIPPED | OUTPATIENT
Start: 2017-09-05 | End: 2018-01-04

## 2017-09-05 RX ORDER — TOPIRAMATE 100 MG/1
200 TABLET, FILM COATED ORAL AT BEDTIME
Qty: 180 TABLET | Refills: 0 | COMMUNITY
Start: 2017-09-05 | End: 2019-03-28

## 2017-09-05 NOTE — PATIENT INSTRUCTIONS
- Try the allergiy medications  - Go see the dentist for the tooth pain. In the mean time, use ice, or warm packs, use the naproxen 1 in the morning, 1 at night  - If it becomes infected, trouble swallowing, increased pain, discharge or bleeding, come see us right away

## 2017-09-05 NOTE — MR AVS SNAPSHOT
After Visit Summary   9/5/2017    Mattie Pierre    MRN: [id number]           Patient Information     Date Of Birth          1975        Visit Information        Provider Department      9/5/2017 8:20 AM Jorge Cantrell DO [facility]        Today's Diagnoses     Chronic rhinitis, unspecified type    -  1    Pain, dental          Care Instructions    - Try the allergiy medications  - Go see the dentist for the tooth pain. In the mean time, use ice, or warm packs, use the naproxen 1 in the morning, 1 at night  - If it becomes infected, trouble swallowing, increased pain, discharge or bleeding, come see us right away          Follow-ups after your visit        Who to contact     Please call your clinic at [phone] to:    Ask questions about your health    Make or cancel appointments    Discuss your medicines    Learn about your test results    Speak to your doctor   If you have compliments or concerns about an experience at your clinic, or if you wish to file a complaint, please contact [facility] Patient Relations at [phone] or email us at [email or web address]         Additional Information About Your Visit        MyChart Information     Moreboats gives you secure access to your electronic health record. If you see a primary care provider, you can also send messages to your care team and make appointments. If you have questions, please call your primary care clinic.  If you do not have a primary care provider, please call [phone] and they will assist you.      Moreboats is an electronic gateway that provides easy, online access to your medical records. With Moreboats, you can request a clinic appointment, read your test results, renew a prescription or communicate with your care team.     To access your existing account, please contact your [facility] or call [phone] for assistance.        Care EveryWhere ID      This is your Care EveryWhere ID. This could be used by other organizations to access your [place] medical records  [id number]        Your Vitals Were     Pulse Temperature Last Period BMI (Body Mass Index)          69 97.9  F (36.6  C) (Oral) 09/03/2017 34.09 kg/m2         Blood Pressure from Last 3 Encounters:   09/05/17 106/72   07/20/17 102/66   07/14/17 114/73    Weight from Last 3 Encounters:   09/05/17 186 lb 6.4 oz (84.6 kg)   07/20/17 194 lb (88 kg)   07/14/17 194 lb 3.2 oz (88.1 kg)              Today, you had the following     No orders found for display         Today's Medication Changes          These changes are accurate as of: 9/5/17  9:08 AM.  If you have any questions, ask your nurse or doctor.               Start taking these medicines.        Dose/Directions    naproxen 500 MG tablet   Commonly known as:  NAPROSYN   Used for:  Pain, dental   Started by:  Jorge Cantrell,         Dose:  500 mg   Take 1 tablet (500 mg) by mouth 2 times daily as needed for moderate pain   Quantity:  30 tablet   Refills:  0            Where to get your medicines      These medications were sent to [facility] - [place], [place] - [address] AT [address], [place] [address]     Phone:  [phone]     loratadine 10 MG tablet    naproxen 500 MG tablet    solifenacin 10 MG tablet                Primary Care Provider Office Phone # Fax #    Marcelo Arriaga [phone]       [facility] [address] [place] [address]        Equal Access to Services     MCKAYLA ALANIZ AH: Slava Castro, bubba luethan, qacade kaalpaula hinojosa. So [facility] [phone].    ATENCIÓN: Si habla español, tiene a campos disposición servicios gratuitos de asistencia lingüística. Anand al [phone].    We comply with applicable federal civil rights laws and [place] laws. We do not discriminate on the  basis of race, color, national origin, age, disability sex, sexual orientation or gender identity.            Thank you!     Thank you for choosing [facility]  for your care. Our goal is always to provide you with excellent care. Hearing back from our patients is one way we can continue to improve our services. Please take a few minutes to complete the written survey that you may receive in the mail after your visit with us. Thank you!             Your Updated Medication List - Protect others around you: Learn how to safely use, store and throw away your medicines at www.disposemymeds.org.          This list is accurate as of: 9/5/17  9:08 AM.  Always use your most recent med list.                   Brand Name Dispense Instructions for use Diagnosis    acetaminophen 325 MG tablet    TYLENOL    100 tablet    Take 2 tablets (650 mg) by mouth every 4 hours as needed for mild pain    Viral URI with cough       benzoyl peroxide 3 % Lotn    BENZOYL PEROXIDE CLEANSER    1 Bottle    Externally apply topically 3 times daily    Acne vulgaris       dextromethorphan 30 MG/5ML liquid    DELSYM    148 mL    Take 10 mLs (60 mg) by mouth 2 times daily    Viral URI with cough       fluticasone 27.5 MCG/SPRAY spray    VERAMYST    10 g    Spray 1-2 sprays into both nostrils daily    Chronic nonseasonal allergic rhinitis due to other allergen       fluticasone 50 MCG/ACT spray    FLONASE    1 Bottle    Spray 1-2 sprays into both nostrils daily    Seasonal allergic rhinitis, unspecified chronicity, unspecified trigger       gabapentin 300 MG capsule    NEURONTIN    90 capsule    Take 1 capsule (300 mg) by mouth 2 times daily        KLONOPIN PO           lamoTRIgine 150 MG tablet    LaMICtal     Take 1 tablet (150 mg) by mouth 2 times daily        loratadine 10 MG tablet    CLARITIN    30 tablet    Take 1 tablet (10 mg) by mouth daily    Chronic rhinitis, unspecified type       naproxen 500 MG tablet    NAPROSYN    30 tablet    Take  1 tablet (500 mg) by mouth 2 times daily as needed for moderate pain    Pain, dental       * order for DME     150 Units    Equipment being ordered: Adult incontinence diapers    Urgency incontinence       * order for DME     1 Device    Equipment being ordered: 4 wheeled walker with seat.    Chronic bilateral low back pain without sciatica       * order for DME     1 Box    Depends- or other adult diapers.    Urgency incontinence       oxybutynin 10 MG 24 hr tablet    DITROPAN XL    90 tablet    Take 1 tablet (10 mg) by mouth daily    Urgency incontinence       prazosin 5 MG capsule    MINIPRESS     Take 1 capsule (5 mg) by mouth At Bedtime        SEROQUEL PO      Take 300 mg by mouth Take at bedtime        solifenacin 10 MG tablet    VESICARE    30 tablet    Take 1 tablet (10 mg) by mouth daily    Urgency incontinence       * TOPAMAX 50 MG tablet   Generic drug:  topiramate     60 tablet    Take 1 tablet (50 mg) by mouth At Bedtime        * topiramate 100 MG tablet    TOPAMAX    180 tablet    Take 2 tablets (200 mg) by mouth At Bedtime        * Notice:  This list has 5 medication(s) that are the same as other medications prescribed for you. Read the directions carefully, and ask your doctor or other care provider to review them with you.

## 2017-09-05 NOTE — PROGRESS NOTES
Preceptor attestation:  Patient seen and discussed with the resident. Assessment and plan reviewed with resident and agreed upon.  Supervising physician: Tyson North MD  [facility]

## 2017-09-05 NOTE — PROGRESS NOTES
Family History   Problem Relation Age of Onset     Dementia Mother      DIABETES Mother      Crohn Disease Mother      Colon Cancer Mother      Unknown age of onset     CEREBROVASCULAR DISEASE Father      Coronary Artery Disease No family hx of      Social History     Social History     Marital status: Single     Spouse name: N/A     Number of children: N/A     Years of education: N/A     Social History Main Topics     Smoking status: Current Every Day Smoker     Packs/day: 1.50     Years: 23.00     Types: Cigarettes     Smokeless tobacco: Never Used      Comment: trying to quit, but states that it's hard     Alcohol use 0.6 oz/week     1 Standard drinks or equivalent per week     Drug use: No     Sexual activity: Yes     Partners: Male     Birth control/ protection: None     Other Topics Concern     None     Social History Narrative       There are no exam notes on file for this visit.  Chief Complaint   Patient presents with     Medication Refill     needs her allergy medication refilled and also having some dental pain as well      Blood pressure 106/72, pulse 69, temperature 97.9  F (36.6  C), temperature source Oral, weight 186 lb 6.4 oz (84.6 kg), last menstrual period 09/03/2017.      S:  Patient is here for 2 issues  1.) Has allergies. Occurs randomly, runny nose, itchy eyes for a couple of days. Use flonase without much relief, would like refill of the claritin. No fevers, chills, trouble hearing, sore throat    2.) ALso has teeth pain for 2 days on area where there is gap. Has had pain before and air and cold aggrevates it. No fevers, no trouble swallowing, no swelling on the outside. Has used tylenol XR advil without relief. No discharge from it. Has called dentist and awaiting appointment for later this month.     O:  /72 (BP Location: Left arm, Patient Position: Chair)  Pulse 69  Temp 97.9  F (36.6  C) (Oral)  Wt 186 lb 6.4 oz (84.6 kg)  LMP 09/03/2017  BMI 34.09 kg/m2  General: On Chair,  occasional cough, NAD,   HEENT: No conjunctivitis, no scleral injections, EOM intact.  TM is non-bulging, no erythema, no fluid behind ear.   Patient has none erythematous nasal tubunates, has clear rhinorhea. raul nares. Patient has missing teeths throughout mouth (upper and lower canines bilaterally). Gum not swollen, not bleeding, no discharge. odour was non-offensive  Throat is non erythmatous with no postnasal drip noted with non swollen tonsils  Neck has no adenopathy  Heart: RRR, no murmurs, rubs clicks  Respiratory: No respiratory distress, no accessory muscle use, no cough. normal  breath sounds throughout. No wheeze, no crackles  Skin: No lesions noted    A/P:  1. Chronic rhinitis, unspecified type  Will refill medications. Also discussed continuing flonase. Unknown triggers. Patient has good breath sounds throughout  - loratadine (CLARITIN) 10 MG tablet; Take 1 tablet (10 mg) by mouth daily  Dispense: 30 tablet; Refill: 1    2. Pain, dental  From symptoms, can be cold sensitivity vs. Tooth infection. From examination, no abscess or infection noted. Patient has dentist appointment scheduled. No narcotics prescribed and patient was visibily disappointed. Will prescribe naproxen and have patient follow up in 3-4 weeks if not seen dentist at that time. Can consider orogel at that time and ensure no infection that warrants abx. Due to no infection from today's PE, will not prescribe Abx  - naproxen (NAPROSYN) 500 MG tablet; Take 1 tablet (500 mg) by mouth 2 times daily as needed for moderate pain  Dispense: 30 tablet; Refill: 0  - discussed warning symptoms of infection and to call us at that time  - encouraged visit to dentist    Jorge Cantrell  Family Medicine Resident PGY3

## 2017-09-07 RX ORDER — GRISEOFULVIN 250 MG/1
500 TABLET ORAL DAILY
Qty: 30 TABLET | Refills: 1 | OUTPATIENT
Start: 2017-09-07

## 2017-09-07 NOTE — TELEPHONE ENCOUNTER
Issue with BP resolved in clinic with Dr. Cantrell. Added Topiramate 200mg qhs to Mattie's med list.    Marcelo Arriaga MD PGY2  [facility]

## 2017-10-23 ENCOUNTER — OFFICE VISIT (OUTPATIENT)
Dept: FAMILY MEDICINE | Facility: CLINIC | Age: 42
End: 2017-10-23

## 2017-10-23 VITALS
TEMPERATURE: 97.9 F | DIASTOLIC BLOOD PRESSURE: 81 MMHG | SYSTOLIC BLOOD PRESSURE: 131 MMHG | BODY MASS INDEX: 32.57 KG/M2 | HEIGHT: 62 IN | WEIGHT: 177 LBS | HEART RATE: 75 BPM

## 2017-10-23 DIAGNOSIS — Z11.3 SCREEN FOR STD (SEXUALLY TRANSMITTED DISEASE): ICD-10-CM

## 2017-10-23 DIAGNOSIS — G43.109 MIGRAINE WITH AURA AND WITHOUT STATUS MIGRAINOSUS, NOT INTRACTABLE: ICD-10-CM

## 2017-10-23 DIAGNOSIS — N89.8 VAGINAL ODOR: ICD-10-CM

## 2017-10-23 DIAGNOSIS — A59.01 TRICHOMONAL VAGINITIS: Primary | ICD-10-CM

## 2017-10-23 LAB
BACTERIA: NORMAL
CLUE CELLS: NORMAL
MOTILE TRICHOMONAS: POSITIVE
ODOR: NORMAL
PH WET PREP: NORMAL
WBC WET PREP: NORMAL
YEAST: NORMAL

## 2017-10-23 RX ORDER — METRONIDAZOLE 500 MG/1
2000 TABLET ORAL ONCE
Qty: 4 TABLET | Refills: 0 | Status: SHIPPED | OUTPATIENT
Start: 2017-10-23 | End: 2017-10-23

## 2017-10-23 RX ORDER — NAPROXEN 500 MG/1
500 TABLET ORAL 2 TIMES DAILY PRN
Qty: 30 TABLET | Refills: 0 | Status: SHIPPED | OUTPATIENT
Start: 2017-10-23 | End: 2018-01-04

## 2017-10-23 RX ORDER — ACETAMINOPHEN 325 MG/1
650 TABLET ORAL EVERY 4 HOURS PRN
Qty: 100 TABLET | Refills: 0 | Status: SHIPPED | OUTPATIENT
Start: 2017-10-23 | End: 2018-02-15

## 2017-10-23 NOTE — PROGRESS NOTES
"  ASSESSMENT AND PLAN      Mattie was seen today for vaginal problem and std.    Diagnoses and all orders for this visit:    Trichomonal vaginitis  -     metroNIDAZOLE (FLAGYL) 500 MG tablet; Take 4 tablets (2,000 mg) by mouth once for 1 dose    Vaginal odor  -     Wet Prep ([facility])    Migraine with aura and without status migrainosus, not intractable  -     acetaminophen (TYLENOL) 325 MG tablet; Take 2 tablets (650 mg) by mouth every 4 hours as needed for mild pain  -     naproxen (NAPROSYN) 500 MG tablet; Take 1 tablet (500 mg) by mouth 2 times daily as needed for moderate pain    Screen for STD (sexually transmitted disease)  -     Chlamydia/Gono Amplified ([facility])        Follow-up as needed    Marcelo Arriaga MD PGY2  [facility]                SUBJECTIVE       Mattie Pierre is a 41 year old  female with a PMH significant for:     Patient Active Problem List   Diagnosis     Bipolar disorder (H)     Obesity     Cellulitis of axilla, left     Iron deficiency anemia     Seizure disorder (H)     Hidradenitis suppurativa of left axilla     Urgency incontinence     Chronic pain syndrome     Acne vulgaris     Tinea capitis     Fissure in skin     She presents with vaginal odor, and itching. Has been going on since last Thursday. Noting cloudy discharge. No bloody discharge. No fever. Had diarrhea the last two days. No abdominal pain. No fever. No nausea or vomiting. Patient states that she is using a feminine wipe and inserting it into her vagina to \"clean my vagina.\" She does not note any new sexual partners, she does report being sexually active at this time. She does not believe he is symptomatic at this time.     PMH, Medications and Allergies were reviewed and updated as needed.            OBJECTIVE     Vitals:    10/23/17 1523   BP: 131/81   Pulse: 75   Temp: 97.9  F (36.6  C)   TempSrc: Oral   Weight: 177 lb (80.3 kg)   Height: 5' 2\" (157.5 cm)     Body mass index is 32.37 " kg/(m^2).    Constitutional: Awake, alert, cooperative, no apparent distress, and appears stated age.  Lungs: No increased work of breathing, good air exchange, clear to auscultation bilaterally, no crackles or wheezing.  Cardiovascular: Regular rate and rhythm, normal S1 and S2, no S3 or S4, II/VI systolic murmur.   Abdomen: normal bowel sounds, soft, non-distended, non-tender, no masses palpated    Wet Prep: Positive for Trichomonas.  <20% clue cells, no yeast noted.

## 2017-10-23 NOTE — Clinical Note
Dr. Arriaga,  Please complete this encounter ASAP and route to your preceptor for closure.  Thank you- Jennie

## 2017-10-23 NOTE — PROGRESS NOTES
Patient with positive wet prep for trichomonas vaginalis. Called patient to inform her of the results.  I have sent her a prescription of metronidozole 2,000mg 1 dose for treatment. I also informed her that she should have her sexual partners treated for trichomonas as well and that she runs the risk of recurrent infection if they are not treated.    Marcelo Arriaga MD PGY2  [facility]

## 2017-10-23 NOTE — MR AVS SNAPSHOT
After Visit Summary   10/23/2017    Mattie Pierre    MRN: [id number]           Patient Information     Date Of Birth          1975        Visit Information        Provider Department      10/23/2017 3:10 PM Marcelo Arriaga MD [facility]        Today's Diagnoses     Vaginal odor    -  1    Migraine with aura and without status migrainosus, not intractable        Screen for STD (sexually transmitted disease)           Follow-ups after your visit        Who to contact     Please call your clinic at [phone] to:    Ask questions about your health    Make or cancel appointments    Discuss your medicines    Learn about your test results    Speak to your doctor   If you have compliments or concerns about an experience at your clinic, or if you wish to file a complaint, please contact [facility] Patient Relations at [phone] or email us at [email or web address]         Additional Information About Your Visit        MyChart Information     Vouchercloudt gives you secure access to your electronic health record. If you see a primary care provider, you can also send messages to your care team and make appointments. If you have questions, please call your primary care clinic.  If you do not have a primary care provider, please call [phone] and they will assist you.      Truveris is an electronic gateway that provides easy, online access to your medical records. With [employer], you can request a clinic appointment, read your test results, renew a prescription or communicate with your care team.     To access your existing account, please contact your [facility] or call [phone] for assistance.        Care EveryWhere ID     This is your Care EveryWhere ID. This could be used by other organizations to access your [place] medical records  [id number]        Your Vitals Were     Pulse Temperature Height BMI (Body Mass Index)  "         75 97.9  F (36.6  C) (Oral) 5' 2\" (157.5 cm) 32.37 kg/m2         Blood Pressure from Last 3 Encounters:   10/23/17 131/81   09/05/17 106/72   07/20/17 102/66    Weight from Last 3 Encounters:   10/23/17 177 lb (80.3 kg)   09/05/17 186 lb 6.4 oz (84.6 kg)   07/20/17 194 lb (88 kg)              We Performed the Following     Chlamydia/Gono Amplified ([facility])     Wet Prep (P )          Where to get your medicines      These medications were sent to [id number] - [place], [place] - [address] AT [address]  [address], [place] [address]     Phone:  [phone]     acetaminophen 325 MG tablet    naproxen 500 MG tablet          Primary Care Provider Office Phone # Fax #    Marcelo Enzo Arriaga [phone]       [facility] [address] [place] [address]        Equal Access to Services     ASHLEY ALANIZ : Hadii aad ku hadasho Soomaali, waaxda luqadaha, qaybta kaalmada adeegyada, paula wall . So [facility] [phone].    ATENCIÓN: Si habla español, tiene a campos disposición servicios gratuitos de asistencia lingüística. Llame al [phone].    We comply with applicable federal civil rights laws and [place] laws. We do not discriminate on the basis of race, color, national origin, age, disability, sex, sexual orientation, or gender identity.            Thank you!     Thank you for choosing [facility]  for your care. Our goal is always to provide you with excellent care. Hearing back from our patients is one way we can continue to improve our services. Please take a few minutes to complete the written survey that you may receive in the mail after your visit with us. Thank you!             Your Updated Medication List - Protect others around you: Learn how to safely use, store and throw away your medicines at www.disposemymeds.org.          This list is accurate as of: 10/23/17  4:16 PM.  Always use your " most recent med list.                   Brand Name Dispense Instructions for use Diagnosis    acetaminophen 325 MG tablet    TYLENOL    100 tablet    Take 2 tablets (650 mg) by mouth every 4 hours as needed for mild pain    Migraine with aura and without status migrainosus, not intractable       benzoyl peroxide 3 % Lotn    BENZOYL PEROXIDE CLEANSER    1 Bottle    Externally apply topically 3 times daily    Acne vulgaris       dextromethorphan 30 MG/5ML liquid    DELSYM    148 mL    Take 10 mLs (60 mg) by mouth 2 times daily    Viral URI with cough       fluticasone 27.5 MCG/SPRAY spray    VERAMYST    10 g    Spray 1-2 sprays into both nostrils daily    Chronic nonseasonal allergic rhinitis due to other allergen       fluticasone 50 MCG/ACT spray    FLONASE    1 Bottle    Spray 1-2 sprays into both nostrils daily    Seasonal allergic rhinitis, unspecified chronicity, unspecified trigger       gabapentin 300 MG capsule    NEURONTIN    90 capsule    Take 1 capsule (300 mg) by mouth 2 times daily        KLONOPIN PO           lamoTRIgine 150 MG tablet    LaMICtal     Take 1 tablet (150 mg) by mouth 2 times daily        loratadine 10 MG tablet    CLARITIN    30 tablet    Take 1 tablet (10 mg) by mouth daily    Chronic rhinitis, unspecified type       naproxen 500 MG tablet    NAPROSYN    30 tablet    Take 1 tablet (500 mg) by mouth 2 times daily as needed for moderate pain    Migraine with aura and without status migrainosus, not intractable       * order for DME     150 Units    Equipment being ordered: Adult incontinence diapers    Urgency incontinence       * order for DME     1 Device    Equipment being ordered: 4 wheeled walker with seat.    Chronic bilateral low back pain without sciatica       * order for DME     1 Box    Depends- or other adult diapers.    Urgency incontinence       oxybutynin 10 MG 24 hr tablet    DITROPAN XL    90 tablet    Take 1 tablet (10 mg) by mouth daily    Urgency incontinence        prazosin 5 MG capsule    MINIPRESS     Take 1 capsule (5 mg) by mouth At Bedtime        SEROQUEL PO      Take 300 mg by mouth Take at bedtime        solifenacin 10 MG tablet    VESICARE    30 tablet    Take 1 tablet (10 mg) by mouth daily    Urgency incontinence       * TOPAMAX 50 MG tablet   Generic drug:  topiramate     60 tablet    Take 1 tablet (50 mg) by mouth At Bedtime        * topiramate 100 MG tablet    TOPAMAX    180 tablet    Take 2 tablets (200 mg) by mouth At Bedtime        * Notice:  This list has 5 medication(s) that are the same as other medications prescribed for you. Read the directions carefully, and ask your doctor or other care provider to review them with you.

## 2017-10-24 ENCOUNTER — TELEPHONE (OUTPATIENT)
Dept: FAMILY MEDICINE | Facility: CLINIC | Age: 42
End: 2017-10-24

## 2017-10-24 LAB
C TRACH RRNA SPEC QL NAA+PROBE: NEGATIVE
N GONORRHOEA RRNA SPEC QL NAA+PROBE: NEGATIVE

## 2017-10-24 NOTE — TELEPHONE ENCOUNTER
[facility] phone call message- patient requesting results:    Test: Lab    Date of test: ?    Additional Comments: She would like a call back with her results.    OK to leave a message on voice mail? Yes      Primary language: English      needed? No    Call taken on October 24, 2017 at 2:03 PM by Nate Nevarez

## 2017-10-24 NOTE — TELEPHONE ENCOUNTER
Discussed pt's treatment for trichomonas. Advised pt to avoid sexual contact for the next 7 days and partner(s) should be treated as well. Pt verbalized understanding of instructions. /GARRY Granados

## 2017-10-25 ENCOUNTER — TELEPHONE (OUTPATIENT)
Dept: FAMILY MEDICINE | Facility: CLINIC | Age: 42
End: 2017-10-25

## 2017-10-25 NOTE — TELEPHONE ENCOUNTER
[facility] phone call message- general phone call:    Reason for call: she would like a call back from a nurse with her test results.    Return call needed: Yes    OK to leave a message on voice mail? Yes    Primary language: English      needed? No    Call taken on October 25, 2017 at 2:55 PM by Nate Nevarez

## 2017-10-27 NOTE — PROGRESS NOTES
Called patient to inform her of negative gonorrhea and chlamydia testing.    Marcelo Arriaga MD PGY2  [facility]

## 2017-11-07 NOTE — PROGRESS NOTES
Preceptor attestation:  Patient seen and discussed with the resident. Assessment and plan reviewed with resident and agreed upon.  Supervising physician: Girish Funes  [facility]

## 2018-01-04 ENCOUNTER — OFFICE VISIT (OUTPATIENT)
Dept: FAMILY MEDICINE | Facility: CLINIC | Age: 43
End: 2018-01-04
Payer: MEDICAID

## 2018-01-04 VITALS
RESPIRATION RATE: 24 BRPM | HEART RATE: 70 BPM | OXYGEN SATURATION: 97 % | TEMPERATURE: 97.9 F | HEIGHT: 62 IN | WEIGHT: 171 LBS | BODY MASS INDEX: 31.47 KG/M2 | DIASTOLIC BLOOD PRESSURE: 93 MMHG | SYSTOLIC BLOOD PRESSURE: 144 MMHG

## 2018-01-04 DIAGNOSIS — J31.0 CHRONIC RHINITIS, UNSPECIFIED TYPE: ICD-10-CM

## 2018-01-04 DIAGNOSIS — N39.41 URGENCY INCONTINENCE: ICD-10-CM

## 2018-01-04 DIAGNOSIS — J30.2 SEASONAL ALLERGIC RHINITIS, UNSPECIFIED CHRONICITY, UNSPECIFIED TRIGGER: ICD-10-CM

## 2018-01-04 DIAGNOSIS — G43.109 MIGRAINE WITH AURA AND WITHOUT STATUS MIGRAINOSUS, NOT INTRACTABLE: Primary | ICD-10-CM

## 2018-01-04 DIAGNOSIS — I10 BENIGN ESSENTIAL HYPERTENSION: ICD-10-CM

## 2018-01-04 LAB
% GRANULOCYTES: 53.6 %G (ref 40–75)
ALBUMIN SERPL-MCNC: 4.5 MG/DL (ref 3.9–5.1)
ALP SERPL-CCNC: 70 U/L (ref 40–150)
ALT SERPL-CCNC: 24.5 U/L (ref 0–45)
AST SERPL-CCNC: 18.8 U/L (ref 0–45)
BILIRUB SERPL-MCNC: 0.3 MG/DL (ref 0.2–1.3)
BUN SERPL-MCNC: 6.8 MG/DL (ref 7–19)
CALCIUM SERPL-MCNC: 9.7 MG/DL (ref 8.5–10.1)
CHLORIDE SERPLBLD-SCNC: 103 MMOL/L (ref 98–110)
CHOLEST SERPL-MCNC: 230.2 MG/DL (ref 0–200)
CHOLEST/HDLC SERPL: 4.6 {RATIO} (ref 0–5)
CO2 SERPL-SCNC: 32.1 MMOL/L (ref 20–32)
CREAT SERPL-MCNC: 0.9 MG/DL (ref 0.5–1)
GFR SERPL CREATININE-BSD FRML MDRD: 72.6 ML/MIN/1.7 M2
GLUCOSE SERPL-MCNC: 105.8 MG'DL (ref 70–99)
GRANULOCYTES #: 2.7 K/UL (ref 1.6–8.3)
HBA1C MFR BLD: 5.8 % (ref 4.1–5.7)
HCT VFR BLD AUTO: 41.1 % (ref 35–47)
HDLC SERPL-MCNC: 50.2 MG/DL
HEMOGLOBIN: 12.4 G/DL (ref 11.7–15.7)
LDLC SERPL CALC-MCNC: 158 MG/DL (ref 0–129)
LYMPHOCYTES # BLD AUTO: 1.8 K/UL (ref 0.8–5.3)
LYMPHOCYTES NFR BLD AUTO: 36 %L (ref 20–48)
MCH RBC QN AUTO: 23.6 PG (ref 26.5–35)
MCHC RBC AUTO-ENTMCNC: 30.2 G/DL (ref 32–36)
MCV RBC AUTO: 78.1 FL (ref 78–100)
MID #: 0.5 K/UL (ref 0–2.2)
MID %: 10.4 %M (ref 0–20)
PLATELET # BLD AUTO: 360 K/UL (ref 150–450)
POTASSIUM SERPL-SCNC: 3.9 MMOL/DL (ref 3.2–4.6)
PROT SERPL-MCNC: 7.7 G/DL (ref 6.8–8.8)
RBC # BLD AUTO: 5.3 M/UL (ref 3.8–5.2)
SODIUM SERPL-SCNC: 145.8 MMOL/L (ref 132–142)
TRIGL SERPL-MCNC: 108.7 MG/DL (ref 0–150)
VLDL CHOLESTEROL: 21.7 MG/DL (ref 7–32)
WBC # BLD AUTO: 5.1 K/UL (ref 4–11)

## 2018-01-04 RX ORDER — FLUTICASONE PROPIONATE 50 MCG
1-2 SPRAY, SUSPENSION (ML) NASAL DAILY
Qty: 1 BOTTLE | Refills: 11 | Status: SHIPPED | OUTPATIENT
Start: 2018-01-04 | End: 2019-02-20

## 2018-01-04 RX ORDER — OXYBUTYNIN CHLORIDE 10 MG/1
10 TABLET, EXTENDED RELEASE ORAL DAILY
Qty: 90 TABLET | Refills: 3 | Status: SHIPPED | OUTPATIENT
Start: 2018-01-04 | End: 2019-01-04

## 2018-01-04 RX ORDER — LORATADINE 10 MG/1
10 TABLET ORAL DAILY
Qty: 30 TABLET | Refills: 1 | Status: SHIPPED | OUTPATIENT
Start: 2018-01-04 | End: 2018-08-13

## 2018-01-04 RX ORDER — AMLODIPINE BESYLATE 5 MG/1
5 TABLET ORAL DAILY
Qty: 90 TABLET | Refills: 3 | Status: SHIPPED | OUTPATIENT
Start: 2018-01-04 | End: 2019-01-04

## 2018-01-04 ASSESSMENT — PATIENT HEALTH QUESTIONNAIRE - PHQ9: SUM OF ALL RESPONSES TO PHQ QUESTIONS 1-9: 15

## 2018-01-04 NOTE — MR AVS SNAPSHOT
After Visit Summary   1/4/2018    Mattie Pierre    MRN: [id number]           Patient Information     Date Of Birth          1975        Visit Information        Provider Department      1/4/2018 10:00 AM Garth Combs MD [facility]        Today's Diagnoses     Migraine with aura and without status migrainosus, not intractable    -  1    Benign essential hypertension        Seasonal allergic rhinitis, unspecified chronicity, unspecified trigger        Chronic rhinitis, unspecified type        Urgency incontinence          Care Instructions    Bad Headache.  Front of head.  Throbs.  Sounds bother.  No vomit.    Blood pressure noted to be elevated.  Wonder if it's the cold weather causing aches and pains    1) Add Amlodipine to lower blood pressure  2) Check labs  3) Use diclofenac for pain;  Take WITH FOOD as needed    Recheck next week          Follow-ups after your visit        Who to contact     Please call your clinic at [phone] to:    Ask questions about your health    Make or cancel appointments    Discuss your medicines    Learn about your test results    Speak to your doctor   If you have compliments or concerns about an experience at your clinic, or if you wish to file a complaint, please contact [facility] Patient Relations at [phone] or email us at [email or web address]         Additional Information About Your Visit        [employer] Information     [employer] gives you secure access to your electronic health record. If you see a primary care provider, you can also send messages to your care team and make appointments. If you have questions, please call your primary care clinic.  If you do not have a primary care provider, please call [phone] and they will assist you.      [employer] is an electronic gateway that provides easy, online access to your medical records. With [employer], you can request a clinic appointment, read your test  "results, renew a prescription or communicate with your care team.     To access your existing account, please contact your [facility] or call [phone] for assistance.        Care EveryWhere ID     This is your Care EveryWhere ID. This could be used by other organizations to access your [facility] medical records  [id number]        Your Vitals Were     Pulse Temperature Respirations Height Pulse Oximetry BMI (Body Mass Index)    70 97.9  F (36.6  C) (Oral) 24 5' 2\" (157.5 cm) 97% 31.28 kg/m2       Blood Pressure from Last 3 Encounters:   01/04/18 (!) 144/93   10/23/17 131/81   09/05/17 106/72    Weight from Last 3 Encounters:   01/04/18 171 lb (77.6 kg)   10/23/17 177 lb (80.3 kg)   09/05/17 186 lb 6.4 oz (84.6 kg)              We Performed the Following     CBC with Diff Plt ([facility])     Comprehensive Metabolic Panel ([facility])     Hemoglobin A1c ([facility])     Lipid Panel ([facility])          Today's Medication Changes          These changes are accurate as of: 1/4/18 10:17 AM.  If you have any questions, ask your nurse or doctor.               Start taking these medicines.        Dose/Directions    amLODIPine 5 MG tablet   Commonly known as:  NORVASC   Used for:  Benign essential hypertension   Started by:  Garth Combs MD        Dose:  5 mg   Take 1 tablet (5 mg) by mouth daily   Quantity:  90 tablet   Refills:  3       diclofenac 50 MG EC tablet   Commonly known as:  VOLTAREN   Used for:  Migraine with aura and without status migrainosus, not intractable   Started by:  Garth Combs MD        Dose:  50 mg   Take 1 tablet (50 mg) by mouth 3 times daily as needed for moderate pain   Quantity:  30 tablet   Refills:  1         Stop taking these medicines if you haven't already. Please contact your care team if you have questions.     naproxen 500 MG tablet   Commonly known as:  NAPROSYN   Stopped by:  Garth Combs MD           solifenacin 10 MG tablet   Commonly known as:  VESICARE "   Stopped by:  Garth Combs MD                Where to get your medicines      These medications were sent to [id number] - [place], [place] - [address] [place] [address] AT [address] [facility] [address]  [address], [place] [address]     Phone:  [phone]     amLODIPine 5 MG tablet    diclofenac 50 MG EC tablet    fluticasone 50 MCG/ACT spray    loratadine 10 MG tablet    oxybutynin 10 MG 24 hr tablet                Primary Care Provider Office Phone # Fax #    Marcelo Enzo Arriaga [phone]       [facility] [address] [place] [address]        Equal Access to Services     [facility]: Hadii aad ku hadasho Soomaali, waaxda luqadaha, qaybta kaalmada adeegyada, waxay idiin haydonnan adejo wall . So [facility] [phone].    ATENCIÓN: Si habla español, tiene a campos disposición servicios gratuitos de asistencia lingüística. [facility] [phone].    We comply with applicable federal civil rights laws and [place] laws. We do not discriminate on the basis of race, color, national origin, age, disability, sex, sexual orientation, or gender identity.            Thank you!     Thank you for choosing [facility]  for your care. Our goal is always to provide you with excellent care. Hearing back from our patients is one way we can continue to improve our services. Please take a few minutes to complete the written survey that you may receive in the mail after your visit with us. Thank you!             Your Updated Medication List - Protect others around you: Learn how to safely use, store and throw away your medicines at www.disposemymeds.org.          This list is accurate as of: 1/4/18 10:17 AM.  Always use your most recent med list.                   Brand Name Dispense Instructions for use Diagnosis    acetaminophen 325 MG tablet    TYLENOL    100 tablet    Take 2 tablets (650 mg) by mouth every 4 hours as needed for mild pain    Migraine with aura and without status  migrainosus, not intractable       amLODIPine 5 MG tablet    NORVASC    90 tablet    Take 1 tablet (5 mg) by mouth daily    Benign essential hypertension       benzoyl peroxide 3 % Lotn    BENZOYL PEROXIDE CLEANSER    1 Bottle    Externally apply topically 3 times daily    Acne vulgaris       dextromethorphan 30 MG/5ML liquid    DELSYM    148 mL    Take 10 mLs (60 mg) by mouth 2 times daily    Viral URI with cough       diclofenac 50 MG EC tablet    VOLTAREN    30 tablet    Take 1 tablet (50 mg) by mouth 3 times daily as needed for moderate pain    Migraine with aura and without status migrainosus, not intractable       fluticasone 27.5 MCG/SPRAY spray    VERAMYST    10 g    Spray 1-2 sprays into both nostrils daily    Chronic nonseasonal allergic rhinitis due to other allergen       fluticasone 50 MCG/ACT spray    FLONASE    1 Bottle    Spray 1-2 sprays into both nostrils daily    Seasonal allergic rhinitis, unspecified chronicity, unspecified trigger       gabapentin 300 MG capsule    NEURONTIN    90 capsule    Take 1 capsule (300 mg) by mouth 2 times daily        KLONOPIN PO           lamoTRIgine 150 MG tablet    LaMICtal     Take 1 tablet (150 mg) by mouth 2 times daily        loratadine 10 MG tablet    CLARITIN    30 tablet    Take 1 tablet (10 mg) by mouth daily    Chronic rhinitis, unspecified type       * order for DME     150 Units    Equipment being ordered: Adult incontinence diapers    Urgency incontinence       * order for DME     1 Device    Equipment being ordered: 4 wheeled walker with seat.    Chronic bilateral low back pain without sciatica       * order for DME     1 Box    Depends- or other adult diapers.    Urgency incontinence       oxybutynin 10 MG 24 hr tablet    DITROPAN XL    90 tablet    Take 1 tablet (10 mg) by mouth daily    Urgency incontinence       prazosin 5 MG capsule    MINIPRESS     Take 1 capsule (5 mg) by mouth At Bedtime        SEROQUEL PO      Take 300 mg by mouth Take at  bedtime        * TOPAMAX 50 MG tablet   Generic drug:  topiramate     60 tablet    Take 1 tablet (50 mg) by mouth At Bedtime        * topiramate 100 MG tablet    TOPAMAX    180 tablet    Take 2 tablets (200 mg) by mouth At Bedtime        * Notice:  This list has 5 medication(s) that are the same as other medications prescribed for you. Read the directions carefully, and ask your doctor or other care provider to review them with you.

## 2018-01-04 NOTE — PATIENT INSTRUCTIONS
Bad Headache.  Front of head.  Throbs.  Sounds bother.  No vomit.    Blood pressure noted to be elevated.  Wonder if it's the cold weather causing aches and pains    1) Add Amlodipine to lower blood pressure  2) Check labs  3) Use diclofenac for pain;  Take WITH FOOD as needed    Recheck next week

## 2018-01-04 NOTE — PROGRESS NOTES
"       SUBJECTIVE       Mattie Pierre is a 42 year old  female with a PMH significant for:     Patient Active Problem List   Diagnosis     Bipolar disorder (H)     Obesity     Cellulitis of axilla, left     Iron deficiency anemia     Seizure disorder (H)     Hidradenitis suppurativa of left axilla     Urgency incontinence     Chronic pain syndrome     Acne vulgaris     Tinea capitis     Fissure in skin     She presents with Bad Headache.  Front of head.  Throbs.  Sounds bother.  No vomit.    Blood pressure noted to be elevated by home health nurse.  Mattie's wondering if it's the cold weather causing aches and pains.    PMH, Medications and Allergies were reviewed and updated as needed.        REVIEW OF SYSTEMS     General: No fevers, chills, sweats, unexplained weight loss  Head: HA, see HPI  Neck: No swallowing problems   CV: No chest pain or palpitations  Resp: No shortness of breath.  No cough. No hemoptysis.  GI: No constipation, diarrhea, or blood in stool.  no nausea or vomiting  : No pain passing urine or urinary frequency            OBJECTIVE     Vitals:    01/04/18 0949 01/04/18 0956   BP: (!) 157/96 (!) 144/93   Pulse: 70 70   Resp: 24    Temp: 97.9  F (36.6  C)    TempSrc: Oral    SpO2: 97%    Weight: 171 lb (77.6 kg)    Height: 5' 2\" (157.5 cm)      Body mass index is 31.28 kg/(m^2).    Gen:  Well nourished and in minimal distress r/t HA  HEENT: PERRLA; TMs normal color and landmarks; nasopharynx pink and moist; oropharynx pink and moist  Neck: supple without lymphadenopathy  CV:  RRR  - no murmurs, rubs, or gallups,   Pulm:  CTAB, no wheezes/rales/rhonchi, good air entry   ABD: soft, nontender, no masses, no rebound, BS intact throughout  Extrem: no cyanosis, edema or clubbing  Psych: Euthymic     Results for orders placed or performed in visit on 01/04/18 (from the past 24 hour(s))   Comprehensive Metabolic Panel ([facility])   Result Value Ref Range    Albumin 4.5 3.9 - 5.1 mg/dL    Alkaline " Phosphatase 70.0 40.0 - 150.0 U/L    ALT 24.5 0.0 - 45.0 U/L    AST 18.8 0.0 - 45.0 U/L    Bilirubin Total 0.3 0.2 - 1.3 mg/dL    Urea Nitrogen 6.8 (L) 7.0 - 19.0 mg/dL    Calcium 9.7 8.5 - 10.1 mg/dL    Chloride 103.0 98.0 - 110.0 mmol/L    Carbon Dioxide 32.1 (H) 20.0 - 32.0 mmol/L    Creatinine 0.9 0.5 - 1.0 mg/dL    Glucose 105.8 (H) 70.0 - 99.0 mg'dL    Potassium 3.9 3.2 - 4.6 mmol/dL    Sodium 145.8 (H) 132.0 - 142.0 mmol/L    Protein Total 7.7 6.8 - 8.8 g/dL    GFR Estimate 72.6 >60.0 mL/min/1.7 m2    GFR Estimate If Black 87.9 >60.0 mL/min/1.7 m2   Hemoglobin A1c ([facility])   Result Value Ref Range    Hemoglobin A1C 5.8 (H) 4.1 - 5.7 %   Lipid Panel ([facility])   Result Value Ref Range    Cholesterol 230.2 (H) 0.0 - 200.0 mg/dL    Cholesterol/HDL Ratio 4.6 0.0 - 5.0    HDL Cholesterol 50.2 >40.0 mg/dL    LDL Cholesterol Calculated 158 (H) 0 - 129 mg/dL    Triglycerides 108.7 0.0 - 150.0 mg/dL    VLDL Cholesterol 21.7 7.0 - 32.0 mg/dL   CBC with Diff Plt ([facility])   Result Value Ref Range    WBC 5.1 4.0 - 11.0 K/uL    Lymphocytes # 1.8 0.8 - 5.3 K/uL    % Lymphocytes 36.0 20.0 - 48.0 %L    Mid # 0.5 0.0 - 2.2 K/uL    Mid % 10.4 0.0 - 20.0 %M    GRANULOCYTES # 2.7 1.6 - 8.3 K/uL    % Granulocytes 53.6 40.0 - 75.0 %G    RBC 5.3 (H) 3.8 - 5.2 M/uL    Hemoglobin 12.4 11.7 - 15.7 g/dL    Hematocrit 41.1 35.0 - 47.0 %    MCV 78.1 78.0 - 100.0 fL    MCH 23.6 (L) 26.5 - 35.0    MCHC 30.2 (L) 32.0 - 36.0 g/dL    Platelets 360.0 150.0 - 450.0 K/uL           ASSESSMENT AND PLAN     Mattie was seen today for headache, other, refill request and back pain.    Diagnoses and all orders for this visit:    Migraine with aura and without status migrainosus, not intractable  -     diclofenac (VOLTAREN) 50 MG EC tablet; Take 1 tablet (50 mg) by mouth 3 times daily as needed for moderate pain    Benign essential hypertension  -     amLODIPine (NORVASC) 5 MG tablet; Take 1 tablet (5 mg) by mouth daily  -     Comprehensive Metabolic  Panel ([facility])  -     Hemoglobin A1c ([facility])  -     Lipid Panel ([facility])  -     CBC with Diff Plt ([facility])    Seasonal allergic rhinitis, unspecified chronicity, unspecified trigger  -     fluticasone (FLONASE) 50 MCG/ACT spray; Spray 1-2 sprays into both nostrils daily    Chronic rhinitis, unspecified type  -     loratadine (CLARITIN) 10 MG tablet; Take 1 tablet (10 mg) by mouth daily    Urgency incontinence  -     oxybutynin (DITROPAN XL) 10 MG 24 hr tablet; Take 1 tablet (10 mg) by mouth daily        Patient Instructions   Bad Headache.  Front of head.  Throbs.  Sounds bother.  No vomit.    Blood pressure noted to be elevated.  Wonder if it's the cold weather causing aches and pains    1) Add Amlodipine to lower blood pressure  2) Check labs  3) Use diclofenac for pain;  Take WITH FOOD as needed    Recheck next week    30 minutes were spent in face to face contact with patient with > 50% in counseling and coordination of care.  Options for treatment and/or follow-up care were reviewed with the patient. Mattie Pierre was engaged and actively involved in the decision making process. She verbalized understanding of the options discussed and was satisfied with the final plan.    RTC in one week for follow up of headache, BP or sooner if develops new or worsening symptoms.    Garth Combs MD

## 2018-01-09 ENCOUNTER — TELEPHONE (OUTPATIENT)
Dept: FAMILY MEDICINE | Facility: CLINIC | Age: 43
End: 2018-01-09

## 2018-01-09 NOTE — TELEPHONE ENCOUNTER
[facility] phone call message- patient requesting results:    Test: Lab    Date of test: 1/4/18    Additional Comments: Please give her a call back with her test results.    OK to leave a message on voice mail? Yes      Primary language: English      needed? No    Call taken on January 9, 2018 at 11:45 AM by Nate Nevarez

## 2018-01-09 NOTE — TELEPHONE ENCOUNTER
Pt would like to know lab results from 1/4. What would you like me to tell her? Thanks!  Routed to Dr. Combs. /GARRY Granados

## 2018-01-18 ENCOUNTER — OFFICE VISIT (OUTPATIENT)
Dept: FAMILY MEDICINE | Facility: CLINIC | Age: 43
End: 2018-01-18
Payer: MEDICAID

## 2018-01-18 ENCOUNTER — TELEPHONE (OUTPATIENT)
Dept: FAMILY MEDICINE | Facility: CLINIC | Age: 43
End: 2018-01-18

## 2018-01-18 VITALS
SYSTOLIC BLOOD PRESSURE: 124 MMHG | HEIGHT: 62 IN | WEIGHT: 170.6 LBS | BODY MASS INDEX: 31.39 KG/M2 | HEART RATE: 58 BPM | OXYGEN SATURATION: 99 % | DIASTOLIC BLOOD PRESSURE: 78 MMHG | TEMPERATURE: 98 F

## 2018-01-18 DIAGNOSIS — N91.2 AMENORRHEA: ICD-10-CM

## 2018-01-18 DIAGNOSIS — I10 BENIGN ESSENTIAL HYPERTENSION: Primary | ICD-10-CM

## 2018-01-18 DIAGNOSIS — F31.31 BIPOLAR AFFECTIVE DISORDER, CURRENTLY DEPRESSED, MILD (H): ICD-10-CM

## 2018-01-18 DIAGNOSIS — M70.71 BURSITIS OF RIGHT HIP, UNSPECIFIED BURSA: ICD-10-CM

## 2018-01-18 LAB — HCG UR QL: NEGATIVE

## 2018-01-18 RX ORDER — METHYLPREDNISOLONE 4 MG
TABLET, DOSE PACK ORAL
Qty: 21 TABLET | Refills: 0 | Status: SHIPPED | OUTPATIENT
Start: 2018-01-18 | End: 2019-05-15

## 2018-01-18 NOTE — PROGRESS NOTES
"There are no exam notes on file for this visit.  Chief Complaint   Patient presents with     RECHECK     come here today to follow up from last visit on high blood pressure medication per patient.      Pain     have pain from back to upper thighs for the last three days per patient.      Blood pressure 109/67, pulse 58, temperature 98  F (36.7  C), temperature source Oral, height 5' 2\" (157.5 cm), weight 170 lb 9.6 oz (77.4 kg), SpO2 99 %.         HPI       Mattie Pierre is a 42 year old  female with PMH below who presents for follow up of hypertension and pain.     Pt was found to be hypertensive (144/93) during office visit on 1/4/18, started on amlodipine 5 mg daily. Pt tolerating medication well, reports taking it as prescribed. She initially experienced dry mouth and felt dehydrated after starting the medication, but symptoms have since resolved. She does not check her blood pressure at home.     Pt has experienced increased pain in right hip over the past week. She has been experiencing pain for years, usually more painful with cold weather. Pain is focused over right hip and does not radiate down her leg or into her groin. It is worse in the morning and at night. Rates the severity 10/10 this AM, required assistance to get out of bed and with getting dressed but is usually able to perform ADLs without help despite pain. Pt describes pain as \"deep bone pain\" and worries it might be due to arthritis. Pain is not improved with tylenol, ibuprofen or diclofenac. She has tried PT in the past, but states it did not help with pain.         Pt reports that she missed her last period (should have started on January 1st), which concerns her as her period has always been regular. She denies any cramping, spotting or vaginal discharge. She has 1 sexual partner and does not use any form of protection with intercourse, last intercourse late December. She thinks missed period may be related to menopause, does not know when " her mother went through menopause.     Patient Active Problem List   Diagnosis     Bipolar disorder (H)     Obesity     Cellulitis of axilla, left     Iron deficiency anemia     Seizure disorder (H)     Hidradenitis suppurativa of left axilla     Urgency incontinence     Chronic pain syndrome     Acne vulgaris     Tinea capitis     Fissure in skin     Current Outpatient Prescriptions   Medication Sig Dispense Refill     BUPROPION HCL PO        fluticasone (FLONASE) 50 MCG/ACT spray Spray 1-2 sprays into both nostrils daily 1 Bottle 11     loratadine (CLARITIN) 10 MG tablet Take 1 tablet (10 mg) by mouth daily 30 tablet 1     oxybutynin (DITROPAN XL) 10 MG 24 hr tablet Take 1 tablet (10 mg) by mouth daily 90 tablet 3     amLODIPine (NORVASC) 5 MG tablet Take 1 tablet (5 mg) by mouth daily 90 tablet 3     diclofenac (VOLTAREN) 50 MG EC tablet Take 1 tablet (50 mg) by mouth 3 times daily as needed for moderate pain 30 tablet 1     acetaminophen (TYLENOL) 325 MG tablet Take 2 tablets (650 mg) by mouth every 4 hours as needed for mild pain 100 tablet 0     topiramate (TOPAMAX) 100 MG tablet Take 2 tablets (200 mg) by mouth At Bedtime 180 tablet 0     order for DME Depends- or other adult diapers. 1 Box 99     fluticasone (VERAMYST) 27.5 MCG/SPRAY spray Spray 1-2 sprays into both nostrils daily 10 g 3     dextromethorphan (DELSYM) 30 MG/5ML liquid Take 10 mLs (60 mg) by mouth 2 times daily 148 mL 0     prazosin (MINIPRESS) 5 MG capsule Take 1 capsule (5 mg) by mouth At Bedtime       benzoyl peroxide (BENZOYL PEROXIDE CLEANSER) 3 % LOTN Externally apply topically 3 times daily 1 Bottle 0     topiramate (TOPAMAX) 50 MG tablet Take 1 tablet (50 mg) by mouth At Bedtime 60 tablet 1     gabapentin (NEURONTIN) 300 MG capsule Take 1 capsule (300 mg) by mouth 2 times daily 90 capsule 1     lamoTRIgine (LAMICTAL) 150 MG tablet Take 1 tablet (150 mg) by mouth 2 times daily       order for DME Equipment being ordered: 4 wheeled  "walker with seat. 1 Device 0     order for DME Equipment being ordered: Adult incontinence diapers 150 Units 0     QUEtiapine Fumarate (SEROQUEL PO) Take 300 mg by mouth Take at bedtime       ClonazePAM (KLONOPIN PO)        Allergies   Allergen Reactions     Morphine      hives     Family History   Problem Relation Age of Onset     Dementia Mother      DIABETES Mother      Crohn Disease Mother      Colon Cancer Mother      Unknown age of onset     CEREBROVASCULAR DISEASE Father      Coronary Artery Disease No family hx of      No results found for this or any previous visit (from the past 24 hour(s)).         Review of Systems:   As Above            Physical Exam:     Vitals:    01/18/18 0824   BP: 109/67   Pulse: 58   Temp: 98  F (36.7  C)   TempSrc: Oral   SpO2: 99%   Weight: 170 lb 9.6 oz (77.4 kg)   Height: 5' 2\" (157.5 cm)     Body mass index is 31.2 kg/(m^2).    GENERAL: healthy, alert, well nourished, well hydrated, no distress  EYES: Eyes grossly normal to inspection  NECK: no tenderness, no adenopathy, no asymmetry, no masses  RESP: lungs clear to auscultation - no rales, no rhonchi, no wheezes  CV: regular rates and rhythm, no murmur  ABDOMEN: soft, no tenderness  MSK: tenderness over right trochanter to palpation  BACK: no CVA tenderness, no paralumbar tenderness  LYMPHATICS: ant. cervical- normal, post. cervical- normal, supraclavicular- normal      Assessment and Plan     Mattie was seen today for recheck of hypertension and pain.    1. Benign essential hypertension  Pt hypertensive in clinic on 1/4/18, started on amlodipine 5 mg daily. Pt tolerating medication well, normotensive today.     2. Bursitis of right hip, unspecified bursa  Focal tenderness with palpation without radiation that improves throughout the day suggestive of bursitis vs arthritis. F/u in clinic in 4 weeks to discuss pain.   - methylPREDNISolone (MEDROL DOSEPAK) 4 MG tablet; Follow package instructions  Dispense: 21 tablet; Refill: " 0    3. Amenorrhea  Missed period on 1/1/2018, periods previously regular. Given age, perimenopause possible. Check UPT, f/u in clinic in 4 weeks.   - HCG Qualitative Urine (UPT)  ([facility])    Options for treatment and follow-up care were reviewed with the patient and/or guardian. Mattie Pierre and/or guardian engaged in the decision making process and verbalized understanding of the options discussed and agreed with the final plan.    Garth Combs MD    This note is scribed by JUMANA Bingham on behalf of Dr. Combs    The medical student acted as a scribe and the encounter documented above was performed completely by me and the documentation accurately reflects the work I have performed today. Garth Combs MD

## 2018-01-18 NOTE — PATIENT INSTRUCTIONS
"Seen 1/4/2018 for Headache, elevated Blood Pressure.  Started on amlodipine.  Blood tests that were checked looked good.    Blood Pressure improved today.  Noticing menstrual flow issues.  Late 2 weeks on period  1) check pregnancy test.  Additional blood tests if cycles don't return to regular. Report to Dr. Arriaga in 4 weeks    \"Hip\" area pains.  Right side \"bursitis\"  1) Medrol dose pack.  Report to Dr. Arriaga in 4 weeks if resolved    1) Continue amlodipine;  ONE every day.  2) Limit smoking  3) Limit alcohol    4) Recheck in 4 weeks with Dr. Arriaga  "

## 2018-01-18 NOTE — TELEPHONE ENCOUNTER
[facility] phone call message- patient requesting results:    Test: Lab LAB      Date of test: 01/18/2017     Additional Comments: UPT results    OK to leave a message on voice mail? Yes    Primary language: English      needed? No    Call taken on January 18, 2018 at 1:32 PM by Ananda Casarez

## 2018-01-18 NOTE — MR AVS SNAPSHOT
"              After Visit Summary   1/18/2018    Mattie Pierre    MRN: [id number]           Patient Information     Date Of Birth          1975        Visit Information        Provider Department      1/18/2018 8:20 AM Garth Combs MD [facility]        Today's Diagnoses     Benign essential hypertension    -  1    Bipolar affective disorder, currently depressed, mild (H)        Bursitis of right hip, unspecified bursa        Amenorrhea          Care Instructions    Seen 1/4/2018 for Headache, elevated Blood Pressure.  Started on amlodipine.  Blood tests that were checked looked good.    Blood Pressure improved today.  Noticing menstrual flow issues.  Late 2 weeks on period  1) check pregnancy test.  Additional blood tests if cycles don't return to regular. Report to Dr. Arriaga in 4 weeks    \"Hip\" area pains.  Right side \"bursitis\"  1) Medrol dose pack.  Report to Dr. Arriaga in 4 weeks if resolved    1) Continue amlodipine;  ONE every day.  2) Limit smoking  3) Limit alcohol    4) Recheck in 4 weeks with Dr. Arriaga          Follow-ups after your visit        Who to contact     Please call your clinic at [phone] to:    Ask questions about your health    Make or cancel appointments    Discuss your medicines    Learn about your test results    Speak to your doctor   If you have compliments or concerns about an experience at your clinic, or if you wish to file a complaint, please contact [facility] Patient Relations at [phone] or email us at [email or web address]         Additional Information About Your Visit        MyChart Information     [employer] gives you secure access to your electronic health record. If you see a primary care provider, you can also send messages to your care team and make appointments. If you have questions, please call your primary care clinic.  If you do not have a primary care provider, please call [phone] and they will assist you.      MyChart " "is an electronic gateway that provides easy, online access to your medical records. With [employer], you can request a clinic appointment, read your test results, renew a prescription or communicate with your care team.     To access your existing account, please contact your [facility] or call [phone] for assistance.        Care EveryWhere ID     This is your Care EveryWhere ID. This could be used by other organizations to access your Portal medical records  [id number]        Your Vitals Were     Pulse Temperature Height Pulse Oximetry BMI (Body Mass Index)       58 98  F (36.7  C) (Oral) 5' 2\" (157.5 cm) 99% 31.2 kg/m2        Blood Pressure from Last 3 Encounters:   01/18/18 109/67   01/04/18 (!) 144/93   10/23/17 131/81    Weight from Last 3 Encounters:   01/18/18 170 lb 9.6 oz (77.4 kg)   01/04/18 171 lb (77.6 kg)   10/23/17 177 lb (80.3 kg)              We Performed the Following     HCG Qualitative Urine (UPT)  ([facility])          Today's Medication Changes          These changes are accurate as of: 1/18/18  9:05 AM.  If you have any questions, ask your nurse or doctor.               Start taking these medicines.        Dose/Directions    methylPREDNISolone 4 MG tablet   Commonly known as:  MEDROL DOSEPAK   Used for:  Bursitis of right hip, unspecified bursa   Started by:  Garth Combs MD        Follow package instructions   Quantity:  21 tablet   Refills:  0         Stop taking these medicines if you haven't already. Please contact your care team if you have questions.     dextromethorphan 30 MG/5ML liquid   Commonly known as:  DELSYM   Stopped by:  Garth Combs MD                Where to get your medicines      These medications were sent to [id number] - [place], [place] - [address] [place] [address] AT [facility] [address]  [address], [place] [address]     Phone:  [phone]     methylPREDNISolone 4 MG tablet                Primary Care Provider Office " Phone # Fax #    Marcelo Enzo Arriaga [phone]       [facility] [address] [place] [address]        Equal Access to Services     MCKAYLA ALANIZ : Hadii aad ku hadshivtorsten Castro, wasteffanieda karunayoliha, qaangelineta kacydneyda filibertokendell, paula sladein hayaadenise de los santosjo alobogdan duke tobar. So [facility] [phone].    ATENCIÓN: Si habla español, tiene a campos disposición servicios gratuitos de asistencia lingüística. Llame al [phone].    We comply with applicable federal civil rights laws and [place] laws. We do not discriminate on the basis of race, color, national origin, age, disability, sex, sexual orientation, or gender identity.            Thank you!     Thank you for choosing [facility]  for your care. Our goal is always to provide you with excellent care. Hearing back from our patients is one way we can continue to improve our services. Please take a few minutes to complete the written survey that you may receive in the mail after your visit with us. Thank you!             Your Updated Medication List - Protect others around you: Learn how to safely use, store and throw away your medicines at www.disposemymeds.org.          This list is accurate as of: 1/18/18  9:05 AM.  Always use your most recent med list.                   Brand Name Dispense Instructions for use Diagnosis    acetaminophen 325 MG tablet    TYLENOL    100 tablet    Take 2 tablets (650 mg) by mouth every 4 hours as needed for mild pain    Migraine with aura and without status migrainosus, not intractable       amLODIPine 5 MG tablet    NORVASC    90 tablet    Take 1 tablet (5 mg) by mouth daily    Benign essential hypertension       benzoyl peroxide 3 % Lotn    BENZOYL PEROXIDE CLEANSER    1 Bottle    Externally apply topically 3 times daily    Acne vulgaris       BUPROPION HCL PO           diclofenac 50 MG EC tablet    VOLTAREN    30 tablet    Take 1 tablet (50 mg) by mouth 3 times daily as needed for moderate pain     Migraine with aura and without status migrainosus, not intractable       fluticasone 27.5 MCG/SPRAY spray    VERAMYST    10 g    Spray 1-2 sprays into both nostrils daily    Chronic nonseasonal allergic rhinitis due to other allergen       fluticasone 50 MCG/ACT spray    FLONASE    1 Bottle    Spray 1-2 sprays into both nostrils daily    Seasonal allergic rhinitis, unspecified chronicity, unspecified trigger       gabapentin 300 MG capsule    NEURONTIN    90 capsule    Take 1 capsule (300 mg) by mouth 2 times daily        KLONOPIN PO           lamoTRIgine 150 MG tablet    LaMICtal     Take 1 tablet (150 mg) by mouth 2 times daily        loratadine 10 MG tablet    CLARITIN    30 tablet    Take 1 tablet (10 mg) by mouth daily    Chronic rhinitis, unspecified type       methylPREDNISolone 4 MG tablet    MEDROL DOSEPAK    21 tablet    Follow package instructions    Bursitis of right hip, unspecified bursa       * order for DME     150 Units    Equipment being ordered: Adult incontinence diapers    Urgency incontinence       * order for DME     1 Device    Equipment being ordered: 4 wheeled walker with seat.    Chronic bilateral low back pain without sciatica       * order for DME     1 Box    Depends- or other adult diapers.    Urgency incontinence       oxybutynin 10 MG 24 hr tablet    DITROPAN XL    90 tablet    Take 1 tablet (10 mg) by mouth daily    Urgency incontinence       prazosin 5 MG capsule    MINIPRESS     Take 1 capsule (5 mg) by mouth At Bedtime        SEROQUEL PO      Take 300 mg by mouth Take at bedtime        * TOPAMAX 50 MG tablet   Generic drug:  topiramate     60 tablet    Take 1 tablet (50 mg) by mouth At Bedtime        * topiramate 100 MG tablet    TOPAMAX    180 tablet    Take 2 tablets (200 mg) by mouth At Bedtime        * Notice:  This list has 5 medication(s) that are the same as other medications prescribed for you. Read the directions carefully, and ask your doctor or other care provider to  review them with you.

## 2018-01-29 ENCOUNTER — TELEPHONE (OUTPATIENT)
Dept: FAMILY MEDICINE | Facility: CLINIC | Age: 43
End: 2018-01-29

## 2018-01-29 NOTE — TELEPHONE ENCOUNTER
[facility] phone call message- general phone call:    Reason for call: She would like to speak to a nurse. She has questions about breast exams.      Return call needed: Yes    OK to leave a message on voice mail? Yes    Primary language: English      needed? No    Call taken on January 29, 2018 at 11:47 AM by Duyen Esposito

## 2018-01-29 NOTE — TELEPHONE ENCOUNTER
Calling to find out when she is due to have another mammogram. Last mammo was August of last year and she would not be due for another one until August of this year. Patient verbalizes understanding. /GARRY Judge

## 2018-02-12 ENCOUNTER — MEDICAL CORRESPONDENCE (OUTPATIENT)
Dept: HEALTH INFORMATION MANAGEMENT | Facility: CLINIC | Age: 43
End: 2018-02-12

## 2018-02-15 ENCOUNTER — OFFICE VISIT (OUTPATIENT)
Dept: FAMILY MEDICINE | Facility: CLINIC | Age: 43
End: 2018-02-15
Payer: MEDICAID

## 2018-02-15 VITALS
TEMPERATURE: 97.8 F | HEIGHT: 62 IN | BODY MASS INDEX: 31.83 KG/M2 | OXYGEN SATURATION: 99 % | WEIGHT: 173 LBS | DIASTOLIC BLOOD PRESSURE: 79 MMHG | HEART RATE: 65 BPM | SYSTOLIC BLOOD PRESSURE: 125 MMHG | RESPIRATION RATE: 16 BRPM

## 2018-02-15 DIAGNOSIS — N92.6 IRREGULAR MENSES: ICD-10-CM

## 2018-02-15 DIAGNOSIS — M76.31 IT BAND SYNDROME, RIGHT: Primary | ICD-10-CM

## 2018-02-15 RX ORDER — ACETAMINOPHEN 325 MG/1
650 TABLET ORAL EVERY 4 HOURS PRN
Qty: 100 TABLET | Refills: 0 | Status: SHIPPED | OUTPATIENT
Start: 2018-02-15 | End: 2021-12-08

## 2018-02-15 NOTE — PROGRESS NOTES
Preceptor attestation:  Patient seen and discussed with the resident. Assessment and plan reviewed with resident and agreed upon.  Supervising physician: Harvey Rondon  [facility]

## 2018-02-15 NOTE — PROGRESS NOTES
ASSESSMENT AND PLAN      Mattie was seen today for recheck.    Diagnoses and all orders for this visit:    It band syndrome, right: Patient's physical exam findings consistent with IT band syndrome, however patient without significant history of overuse. Will trial mild analgesics at this time, follow-up in 1 month.  -     acetaminophen (TYLENOL) 325 MG tablet; Take 2 tablets (650 mg) by mouth every 4 hours as needed for mild pain    Irregular menses: Patient with recent menstruation, will continue to monitor frequency of menstruation, but will not further investigate at this time. Possible disturbances secondary to antipsychotic use, however patient with normal prolactin level in July.     Marcelo Arriaga MD PGY2  [facility]                SUBJECTIVE       Mattie Pierre is a 42 year old  female with a PMH significant for:     Patient Active Problem List   Diagnosis     Bipolar disorder (H)     Obesity     Cellulitis of axilla, left     Iron deficiency anemia     Seizure disorder (H)     Hidradenitis suppurativa of left axilla     Urgency incontinence     Chronic pain syndrome     Acne vulgaris     Tinea capitis     Fissure in skin     She presents for follow-up of right hip pain. Notes stiffness in the morning. Has been laying in bed, pain is reduced when laying down. When she gets up and moves around her pain is worse. The pain is not waking her up at night. Patient points to her right lateral thigh just proximal to the knee when asked where point of maximal tenderness is. Notes left flank tightness, has to lay on her left side so it doesn't feel like it pulls.     Also here to follow-up on lipids and menstruation. She was previously worried that she was pregnant when see on 1/18/18, but had her period on 1/30, two weeks late.     Patient denies any fever, chills, chest pain, SOB, abdominal pain, lower extremity weakness, or distal sensation loss.     Patient continues to smoke 0.5ppd, ASCVD risk 4.7%  "based on recent lipid panel.     PMH, Medications and Allergies were reviewed and updated as needed.            OBJECTIVE     Vitals:    02/15/18 1318   BP: 125/79   Pulse: 65   Resp: 16   Temp: 97.8  F (36.6  C)   TempSrc: Oral   SpO2: 99%   Weight: 173 lb (78.5 kg)   Height: 5' 2\" (157.5 cm)     Body mass index is 31.64 kg/(m^2).    General: Well appearing, NAD, answers questions appropriately.   CV: S1, S2, RRR, I/VI systolic murmur  RESP: Clear to auscultation bilaterally  MSK: Tenderness along the right IT band, greatest tenderness just proximal to the knee. No pain at the greater trochanter. Knee and hip ROM equal bilaterally. No pain with passive ROM.   Neuro: 5/5 LE str bilaterally. No distal sensation loss bilaterally. Negative straight leg raise bilaterally.     No results found for this or any previous visit (from the past 24 hour(s)).          "

## 2018-02-15 NOTE — MR AVS SNAPSHOT
"              After Visit Summary   2/15/2018    Mattie Pierre    MRN: [id number]           Patient Information     Date Of Birth          1975        Visit Information        Provider Department      2/15/2018 1:10 PM Marcelo Arriaga MD [facility]        Today's Diagnoses     It band syndrome, right    -  1      Care Instructions    -Follow-up with me in 1 month.           Follow-ups after your visit        Who to contact     Please call your clinic at [phone] to:    Ask questions about your health    Make or cancel appointments    Discuss your medicines    Learn about your test results    Speak to your doctor            Additional Information About Your Visit        MyChart Information     [employer] gives you secure access to your electronic health record. If you see a primary care provider, you can also send messages to your care team and make appointments. If you have questions, please call your primary care clinic.  If you do not have a primary care provider, please call [phone] and they will assist you.      [employer] is an electronic gateway that provides easy, online access to your medical records. With [employer], you can request a clinic appointment, read your test results, renew a prescription or communicate with your care team.     To access your existing account, please contact your [facility] or call [phone] for assistance.        Care EveryWhere ID     This is your Care EveryWhere ID. This could be used by other organizations to access your [place] medical records  [id number]        Your Vitals Were     Pulse Temperature Respirations Height Pulse Oximetry BMI (Body Mass Index)    65 97.8  F (36.6  C) (Oral) 16 5' 2\" (157.5 cm) 99% 31.64 kg/m2       Blood Pressure from Last 3 Encounters:   02/15/18 125/79   01/18/18 124/78   01/04/18 (!) 144/93    Weight from Last 3 Encounters:   02/15/18 173 lb (78.5 kg)   01/18/18 170 lb 9.6 oz (77.4 kg) "   01/04/18 171 lb (77.6 kg)              Today, you had the following     No orders found for display         Where to get your medicines      These medications were sent to [id number] - [place], [place] - [address] [place] [address] AT [facility] [address]  [address], [place] [address]     Phone:  [phone]     acetaminophen 325 MG tablet          Primary Care Provider Office Phone # Fax #    Marcelo Enzo Arriaga [phone]       [facility] [address] [place] [address]        Equal Access to Services     [facility]: Hadii aad ku hadasho Soomaali, waaxda luqadaha, qaybta kaalmada adeegyada, waxfelicia wall . So [facility] [phone].    ATENCIÓN: Si habla español, tiene a campos disposición servicios gratuitos de asistencia lingüística. [facility] [phone].    We comply with applicable federal civil rights laws and [place] laws. We do not discriminate on the basis of race, color, national origin, age, disability, sex, sexual orientation, or gender identity.            Thank you!     Thank you for choosing [facility]  for your care. Our goal is always to provide you with excellent care. Hearing back from our patients is one way we can continue to improve our services. Please take a few minutes to complete the written survey that you may receive in the mail after your visit with us. Thank you!             Your Updated Medication List - Protect others around you: Learn how to safely use, store and throw away your medicines at www.disposemymeds.org.          This list is accurate as of 2/15/18  2:13 PM.  Always use your most recent med list.                   Brand Name Dispense Instructions for use Diagnosis    acetaminophen 325 MG tablet    TYLENOL    100 tablet    Take 2 tablets (650 mg) by mouth every 4 hours as needed for mild pain    It band syndrome, right       amLODIPine 5 MG tablet    NORVASC    90 tablet    Take 1 tablet (5 mg)  by mouth daily    Benign essential hypertension       benzoyl peroxide 3 % Lotn    BENZOYL PEROXIDE CLEANSER    1 Bottle    Externally apply topically 3 times daily    Acne vulgaris       BUPROPION HCL PO           diclofenac 50 MG EC tablet    VOLTAREN    30 tablet    Take 1 tablet (50 mg) by mouth 3 times daily as needed for moderate pain    Migraine with aura and without status migrainosus, not intractable       fluticasone 27.5 MCG/SPRAY spray    VERAMYST    10 g    Spray 1-2 sprays into both nostrils daily    Chronic nonseasonal allergic rhinitis due to other allergen       fluticasone 50 MCG/ACT spray    FLONASE    1 Bottle    Spray 1-2 sprays into both nostrils daily    Seasonal allergic rhinitis, unspecified chronicity, unspecified trigger       gabapentin 300 MG capsule    NEURONTIN    90 capsule    Take 1 capsule (300 mg) by mouth 2 times daily        KLONOPIN PO           lamoTRIgine 150 MG tablet    LaMICtal     Take 1 tablet (150 mg) by mouth 2 times daily        loratadine 10 MG tablet    CLARITIN    30 tablet    Take 1 tablet (10 mg) by mouth daily    Chronic rhinitis, unspecified type       methylPREDNISolone 4 MG tablet    MEDROL DOSEPAK    21 tablet    Follow package instructions    Bursitis of right hip, unspecified bursa       * order for DME     150 Units    Equipment being ordered: Adult incontinence diapers    Urgency incontinence       * order for DME     1 Device    Equipment being ordered: 4 wheeled walker with seat.    Chronic bilateral low back pain without sciatica       * order for DME     1 Box    Depends- or other adult diapers.    Urgency incontinence       oxybutynin 10 MG 24 hr tablet    DITROPAN XL    90 tablet    Take 1 tablet (10 mg) by mouth daily    Urgency incontinence       prazosin 5 MG capsule    MINIPRESS     Take 1 capsule (5 mg) by mouth At Bedtime        SEROQUEL PO      Take 300 mg by mouth Take at bedtime        * TOPAMAX 50 MG tablet   Generic drug:  topiramate      60 tablet    Take 1 tablet (50 mg) by mouth At Bedtime        * topiramate 100 MG tablet    TOPAMAX    180 tablet    Take 2 tablets (200 mg) by mouth At Bedtime        * Notice:  This list has 5 medication(s) that are the same as other medications prescribed for you. Read the directions carefully, and ask your doctor or other care provider to review them with you.

## 2018-03-01 ENCOUNTER — MEDICAL CORRESPONDENCE (OUTPATIENT)
Dept: HEALTH INFORMATION MANAGEMENT | Facility: CLINIC | Age: 43
End: 2018-03-01

## 2018-03-05 ENCOUNTER — MEDICAL CORRESPONDENCE (OUTPATIENT)
Dept: HEALTH INFORMATION MANAGEMENT | Facility: CLINIC | Age: 43
End: 2018-03-05

## 2018-03-05 ENCOUNTER — TELEPHONE (OUTPATIENT)
Dept: FAMILY MEDICINE | Facility: CLINIC | Age: 43
End: 2018-03-05

## 2018-03-05 NOTE — TELEPHONE ENCOUNTER
[facility] phone call message- general phone call:    Reason for call: [employer] is waiting on a prescription for a fan for her to help her breathing at night.    Return call needed: Yes    OK to leave a message on voice mail? Yes      Primary language: English      needed? No    Call taken on March 5, 2018 at 10:52 AM by Nate Nevarez

## 2018-03-06 ENCOUNTER — MEDICAL CORRESPONDENCE (OUTPATIENT)
Dept: HEALTH INFORMATION MANAGEMENT | Facility: CLINIC | Age: 43
End: 2018-03-06

## 2018-03-20 ENCOUNTER — OFFICE VISIT (OUTPATIENT)
Dept: FAMILY MEDICINE | Facility: CLINIC | Age: 43
End: 2018-03-20
Payer: MEDICAID

## 2018-03-20 VITALS
RESPIRATION RATE: 24 BRPM | HEART RATE: 74 BPM | SYSTOLIC BLOOD PRESSURE: 105 MMHG | OXYGEN SATURATION: 99 % | WEIGHT: 174.4 LBS | BODY MASS INDEX: 31.9 KG/M2 | DIASTOLIC BLOOD PRESSURE: 62 MMHG | TEMPERATURE: 98.1 F

## 2018-03-20 DIAGNOSIS — M25.522 LEFT ELBOW PAIN: Primary | ICD-10-CM

## 2018-03-20 RX ORDER — AMLODIPINE BESYLATE 5 MG/1
5 TABLET ORAL DAILY
Qty: 90 TABLET | Refills: 3 | Status: CANCELLED | OUTPATIENT
Start: 2018-03-20

## 2018-03-20 NOTE — MR AVS SNAPSHOT
After Visit Summary   3/20/2018    Mattie Pierre    MRN: [id number]           Patient Information     Date Of Birth          1975        Visit Information        Provider Department      3/20/2018 11:00 AM Marcelo Arriaga MD [facility]        Today's Diagnoses     Left elbow pain    -  1      Care Instructions    -Follow-up in 2-3 weeks.           Follow-ups after your visit        Who to contact     Please call your clinic at [phone] to:    Ask questions about your health    Make or cancel appointments    Discuss your medicines    Learn about your test results    Speak to your doctor            Additional Information About Your Visit        MyChart Information     [employer] gives you secure access to your electronic health record. If you see a primary care provider, you can also send messages to your care team and make appointments. If you have questions, please call your primary care clinic.  If you do not have a primary care provider, please call [phone] and they will assist you.      [employer] is an electronic gateway that provides easy, online access to your medical records. With [employer], you can request a clinic appointment, read your test results, renew a prescription or communicate with your care team.     To access your existing account, please contact your [facility] or call [phone] for assistance.        Care EveryWhere ID     This is your Care EveryWhere ID. This could be used by other organizations to access your University medical records  [id number]        Your Vitals Were     Pulse Temperature Respirations Last Period Pulse Oximetry Breastfeeding?    74 98.1  F (36.7  C) (Oral) 24 02/19/2018 (Within Weeks) 99% No    BMI (Body Mass Index)                   31.9 kg/m2            Blood Pressure from Last 3 Encounters:   03/20/18 105/62   02/15/18 125/79   01/18/18 124/78    Weight from Last 3 Encounters:   03/20/18 174 lb 6.4 oz  (79.1 kg)   02/15/18 173 lb (78.5 kg)   01/18/18 170 lb 9.6 oz (77.4 kg)              Today, you had the following     No orders found for display         Where to get your medicines      These medications were sent to [id number] - [place], [place] - [address] [place] [address] AT [facility] [address]  [address], [place] [address]     Phone:  [phone]     diclofenac 50 MG EC tablet          Primary Care Provider Office Phone # Fax #    Marcelo Enzo Arriaga [phone]       [facility] [address] [place] [address]        Equal Access to Services     MCKAYLA ALANIZ : Slava Castro, bubba cabezas, qacade kaalmada hector, paula tobar. So [facility] [phone].    ATENCIÓN: Si habla español, tiene a campos disposición servicios gratuitos de asistencia lingüística. Llame al [phone].    We comply with applicable federal civil rights laws and [place] laws. We do not discriminate on the basis of race, color, national origin, age, disability, sex, sexual orientation, or gender identity.            Thank you!     Thank you for choosing [facility]  for your care. Our goal is always to provide you with excellent care. Hearing back from our patients is one way we can continue to improve our services. Please take a few minutes to complete the written survey that you may receive in the mail after your visit with us. Thank you!             Your Updated Medication List - Protect others around you: Learn how to safely use, store and throw away your medicines at www.disposemymeds.org.          This list is accurate as of 3/20/18 11:31 AM.  Always use your most recent med list.                   Brand Name Dispense Instructions for use Diagnosis    acetaminophen 325 MG tablet    TYLENOL    100 tablet    Take 2 tablets (650 mg) by mouth every 4 hours as needed for mild pain    It band syndrome, right       amLODIPine 5 MG tablet     NORVASC    90 tablet    Take 1 tablet (5 mg) by mouth daily    Benign essential hypertension       benzoyl peroxide 3 % Lotn    BENZOYL PEROXIDE CLEANSER    1 Bottle    Externally apply topically 3 times daily    Acne vulgaris       BUPROPION HCL PO           diclofenac 50 MG EC tablet    VOLTAREN    30 tablet    Take 1 tablet (50 mg) by mouth 3 times daily as needed for moderate pain        fluticasone 27.5 MCG/SPRAY spray    VERAMYST    10 g    Spray 1-2 sprays into both nostrils daily    Chronic nonseasonal allergic rhinitis due to other allergen       fluticasone 50 MCG/ACT spray    FLONASE    1 Bottle    Spray 1-2 sprays into both nostrils daily    Seasonal allergic rhinitis, unspecified chronicity, unspecified trigger       gabapentin 300 MG capsule    NEURONTIN    90 capsule    Take 1 capsule (300 mg) by mouth 2 times daily        KLONOPIN PO           lamoTRIgine 150 MG tablet    LaMICtal     Take 1 tablet (150 mg) by mouth 2 times daily        loratadine 10 MG tablet    CLARITIN    30 tablet    Take 1 tablet (10 mg) by mouth daily    Chronic rhinitis, unspecified type       methylPREDNISolone 4 MG tablet    MEDROL DOSEPAK    21 tablet    Follow package instructions    Bursitis of right hip, unspecified bursa       * order for DME     150 Units    Equipment being ordered: Adult incontinence diapers    Urgency incontinence       * order for DME     1 Device    Equipment being ordered: 4 wheeled walker with seat.    Chronic bilateral low back pain without sciatica       * order for DME     1 Box    Depends- or other adult diapers.    Urgency incontinence       oxybutynin 10 MG 24 hr tablet    DITROPAN XL    90 tablet    Take 1 tablet (10 mg) by mouth daily    Urgency incontinence       prazosin 5 MG capsule    MINIPRESS     Take 1 capsule (5 mg) by mouth At Bedtime        SEROQUEL PO      Take 300 mg by mouth Take at bedtime        * TOPAMAX 50 MG tablet   Generic drug:  topiramate     60 tablet    Take 1  tablet (50 mg) by mouth At Bedtime        * topiramate 100 MG tablet    TOPAMAX    180 tablet    Take 2 tablets (200 mg) by mouth At Bedtime        * Notice:  This list has 5 medication(s) that are the same as other medications prescribed for you. Read the directions carefully, and ask your doctor or other care provider to review them with you.

## 2018-03-20 NOTE — PROGRESS NOTES
Preceptor attestation:  Patient seen and discussed with the resident. Assessment and plan reviewed with resident and agreed upon.  Supervising physician: Adriano Nieves  [facility]

## 2018-03-20 NOTE — PROGRESS NOTES
"  ASSESSMENT AND PLAN      Mattie was seen today for refill request and medication reconciliation.    Diagnoses and all orders for this visit:    Left elbow pain: We will try diclofenac at this time.  Unable to talk to patient regarding follow-up, she was quite irritated with me in the left prior to having this discussion.  I am unsure of the chronicity of the patient's elbow pain, she has not mentioned it to me before, but the patient states that I have treated her for her left elbow pain in the past.  Pain does not seem neurogenic in origin.  Possible overuse injury, the patient has not changed any routines recently.  -     diclofenac (VOLTAREN) 50 MG EC tablet; Take 1 tablet (50 mg) by mouth 3 times daily as needed for moderate pain    Marcelo Arriaga MD PGY2  [facility]                SUBJECTIVE       Mattie Pierre is a 42 year old  female with a PMH significant for:     Patient Active Problem List   Diagnosis     Bipolar disorder (H)     Obesity     Cellulitis of axilla, left     Iron deficiency anemia     Seizure disorder (H)     Hidradenitis suppurativa of left axilla     Urgency incontinence     Chronic pain syndrome     Acne vulgaris     Tinea capitis     Fissure in skin     She presents for evaluation of left elbow pain. Patient states that her elbow pain has gradually getting worse \"for a long time.\" Has difficulties holding things in her left hand.  Pain is intermittent, described as a soreness.  She does not recall any acute injuries recently.  There is no swelling in the joint or redness in the joint that she notes.  She denies any loss of sensation in the distal left upper extremity.  Patient currently taking Tylenol  without any relief.  Patient using warm compresses with mild relief.    Patient denies any fever, chest pain, shortness of breath, abdominal pain.    PMH, Medications and Allergies were reviewed and updated as needed.          OBJECTIVE     Vitals:    03/20/18 1031   BP: 105/62 "   BP Location: Left arm   Patient Position: Sitting   Cuff Size: Adult Regular   Pulse: 74   Resp: 24   Temp: 98.1  F (36.7  C)   TempSrc: Oral   SpO2: 99%   Weight: 174 lb 6.4 oz (79.1 kg)     Body mass index is 31.9 kg/(m^2).    General: Patient in mild distress, appears irritated, answering questions appropriately.  CV: S1, S2, regular rate and rhythm.  1/6 systolic murmur noted.  Respiratory: Clear to auscultation bilaterally.  No wheezes or rhonchi noted.  MSK: No tenderness noted with palpation of the elbow, no swelling or erythema noted in the left elbow.  Pain with supination of the left wrist and flexion of the left elbow, and flexion. 4/5 str in the left elbow (flexion/extension).     No results found for this or any previous visit (from the past 24 hour(s)).

## 2018-03-26 ENCOUNTER — MEDICAL CORRESPONDENCE (OUTPATIENT)
Dept: HEALTH INFORMATION MANAGEMENT | Facility: CLINIC | Age: 43
End: 2018-03-26

## 2018-03-26 ENCOUNTER — TELEPHONE (OUTPATIENT)
Dept: FAMILY MEDICINE | Facility: CLINIC | Age: 43
End: 2018-03-26

## 2018-03-26 NOTE — TELEPHONE ENCOUNTER
Pt states [facility] had sent an order for a seated 4 wheeled walker to Dr. Arriaga--she is checking on the status of this.   Dr. Arriaga, did you this order form?  Routed to Dr. Arriaga. /GARRY Granados

## 2018-03-26 NOTE — TELEPHONE ENCOUNTER
I just got the form today. Filled it out, it should get faxed back soon.    Marcelo Arriaga MD PGY2  [facility]

## 2018-04-11 ENCOUNTER — MEDICAL CORRESPONDENCE (OUTPATIENT)
Dept: HEALTH INFORMATION MANAGEMENT | Facility: CLINIC | Age: 43
End: 2018-04-11

## 2018-04-12 ENCOUNTER — OFFICE VISIT (OUTPATIENT)
Dept: FAMILY MEDICINE | Facility: CLINIC | Age: 43
End: 2018-04-12
Payer: MEDICAID

## 2018-04-12 VITALS
HEIGHT: 62 IN | HEART RATE: 60 BPM | SYSTOLIC BLOOD PRESSURE: 118 MMHG | DIASTOLIC BLOOD PRESSURE: 77 MMHG | WEIGHT: 171.4 LBS | TEMPERATURE: 97.8 F | BODY MASS INDEX: 31.54 KG/M2

## 2018-04-12 DIAGNOSIS — Z20.2 STD EXPOSURE: Primary | ICD-10-CM

## 2018-04-12 PROBLEM — F32.A DEPRESSION: Status: ACTIVE | Noted: 2018-04-12

## 2018-04-12 LAB
BACTERIA: NORMAL
CLUE CELLS: NORMAL
HIV 1+2 AB+HIV1 P24 AG SERPL QL IA: NEGATIVE
MOTILE TRICHOMONAS: NEGATIVE
ODOR: NORMAL
PH WET PREP: NORMAL
WBC WET PREP: NORMAL
YEAST: NORMAL

## 2018-04-12 NOTE — PROGRESS NOTES
Preceptor Attestation:   Patient seen, evaluated and discussed with the resident. I have verified the content of the note, which accurately reflects my assessment of the patient and the plan of care.   Supervising Physician:  Garth Combs MD

## 2018-04-12 NOTE — MR AVS SNAPSHOT
After Visit Summary   4/12/2018    Mattie Pierre    MRN: [id number]           Patient Information     Date Of Birth          1975        Visit Information        Provider Department      4/12/2018 3:10 PM Dannie Samano MD [facility]        Today's Diagnoses     STD exposure    -  1      Care Instructions    Thank you for coming to [facility] for your care.     Please be sure to :-  1. What you had today is called the herpetic shaji, it should disappear on its own so no meds necessary at this time. It is not a communicable disease.  2. I will call you with the results of your test.  3. Return to clinic if you have new or concerning symptoms.   4. Call the Clinic or go to the ED if having any worsening and/or concerning symptoms.     Dannie Samano MD              Follow-ups after your visit        Who to contact     Please call your clinic at [phone] to:    Ask questions about your health    Make or cancel appointments    Discuss your medicines    Learn about your test results    Speak to your doctor            Additional Information About Your Visit        [employer] Information     [employer] gives you secure access to your electronic health record. If you see a primary care provider, you can also send messages to your care team and make appointments. If you have questions, please call your primary care clinic.  If you do not have a primary care provider, please call [phone] and they will assist you.      [employer] is an electronic gateway that provides easy, online access to your medical records. With [employer], you can request a clinic appointment, read your test results, renew a prescription or communicate with your care team.     To access your existing account, please contact your [facility] or call [phone] for assistance.        Care EveryWhere ID     This is your Care EveryWhere ID. This could be used by other  "organizations to access your [facility] medical records  [id number]        Your Vitals Were     Pulse Temperature Height BMI (Body Mass Index)          60 97.8  F (36.6  C) 5' 2\" (157.5 cm) 31.35 kg/m2         Blood Pressure from Last 3 Encounters:   04/12/18 118/77   03/20/18 105/62   02/15/18 125/79    Weight from Last 3 Encounters:   04/12/18 171 lb 6.4 oz (77.7 kg)   03/20/18 174 lb 6.4 oz (79.1 kg)   02/15/18 173 lb (78.5 kg)              We Performed the Following     Chlamydia/Gono Amplified ([facility])     Hepatitis B Surface Ab ([facility])     [facility] Ag [facility]     Hepatitis C Antibody ([facility])     HIV Ag/Ab Screen Fisher ([facility])     Syphilis Screen Fisher ([facility])     Wet Prep (P )        Primary Care Provider Office Phone # Fax #    Marcelo Enzo Arriaga [phone]       [facility] [address] [place] [address]        Equal Access to Services     [facility]: Hadii yanique ku hadasho Sodemetrius, waaxda luqadaha, qaybta kaalmada adekendell, paula wall . So [facility] [phone].    ATENCIÓN: Si habla español, tiene a campos disposición servicios gratuitos de asistencia lingüística. Llame al [phone].    We comply with applicable federal civil rights laws and [place] laws. We do not discriminate on the basis of race, color, national origin, age, disability, sex, sexual orientation, or gender identity.            Thank you!     Thank you for choosing [facility]  for your care. Our goal is always to provide you with excellent care. Hearing back from our patients is one way we can continue to improve our services. Please take a few minutes to complete the written survey that you may receive in the mail after your visit with us. Thank you!             Your Updated Medication List - Protect others around you: Learn how to safely use, store and throw away your medicines at www.disposemymeds.org.          This list " is accurate as of 4/12/18  4:23 PM.  Always use your most recent med list.                   Brand Name Dispense Instructions for use Diagnosis    acetaminophen 325 MG tablet    TYLENOL    100 tablet    Take 2 tablets (650 mg) by mouth every 4 hours as needed for mild pain    It band syndrome, right       amLODIPine 5 MG tablet    NORVASC    90 tablet    Take 1 tablet (5 mg) by mouth daily    Benign essential hypertension       benzoyl peroxide 3 % Lotn    BENZOYL PEROXIDE CLEANSER    1 Bottle    Externally apply topically 3 times daily    Acne vulgaris       BUPROPION HCL PO           diclofenac 50 MG EC tablet    VOLTAREN    30 tablet    Take 1 tablet (50 mg) by mouth 3 times daily as needed for moderate pain        fluticasone 27.5 MCG/SPRAY spray    VERAMYST    10 g    Spray 1-2 sprays into both nostrils daily    Chronic nonseasonal allergic rhinitis due to other allergen       fluticasone 50 MCG/ACT spray    FLONASE    1 Bottle    Spray 1-2 sprays into both nostrils daily    Seasonal allergic rhinitis, unspecified chronicity, unspecified trigger       gabapentin 300 MG capsule    NEURONTIN    90 capsule    Take 1 capsule (300 mg) by mouth 2 times daily        KLONOPIN PO           lamoTRIgine 150 MG tablet    LaMICtal     Take 1 tablet (150 mg) by mouth 2 times daily        loratadine 10 MG tablet    CLARITIN    30 tablet    Take 1 tablet (10 mg) by mouth daily    Chronic rhinitis, unspecified type       methylPREDNISolone 4 MG tablet    MEDROL DOSEPAK    21 tablet    Follow package instructions    Bursitis of right hip, unspecified bursa       * order for DME     150 Units    Equipment being ordered: Adult incontinence diapers    Urgency incontinence       * order for DME     1 Device    Equipment being ordered: 4 wheeled walker with seat.    Chronic bilateral low back pain without sciatica       * order for DME     1 Box    Depends- or other adult diapers.    Urgency incontinence       oxybutynin 10 MG 24 hr  tablet    DITROPAN XL    90 tablet    Take 1 tablet (10 mg) by mouth daily    Urgency incontinence       prazosin 5 MG capsule    MINIPRESS     Take 1 capsule (5 mg) by mouth At Bedtime        SEROQUEL PO      Take 300 mg by mouth Take at bedtime        * TOPAMAX 50 MG tablet   Generic drug:  topiramate     60 tablet    Take 1 tablet (50 mg) by mouth At Bedtime        * topiramate 100 MG tablet    TOPAMAX    180 tablet    Take 2 tablets (200 mg) by mouth At Bedtime        * Notice:  This list has 5 medication(s) that are the same as other medications prescribed for you. Read the directions carefully, and ask your doctor or other care provider to review them with you.

## 2018-04-12 NOTE — PROGRESS NOTES
"       SUBJECTIVE       Mattie Pierre is a 42 year old female with a PMH significant for   Patient Active Problem List   Diagnosis     Bipolar disorder (H)     Obesity     Cellulitis of axilla, left     Iron deficiency anemia     Seizure disorder (H)     Hidradenitis suppurativa     Urgency incontinence     Chronic pain syndrome     Acne vulgaris     Tinea capitis     Fissure in skin     Cocaine abuse     Depression     Hypertension     Subclinical hypothyroidism     Vitamin D deficiency    who presents for evaluation of rash on fingers and possible STD exposure.    Patient presents with a week history of vesicular rash on her fingers of her right hand that appeared to be herpetic shaji.  Rash is itchy and slightly painful.  She is worried that it might progress.    Patient also presenting with complaints of possible STD exposure.  She has been with her partner for the past 2 years.  Does not truly trust him and feels like he might have exposed her to an STD.  She is having some vaginal discharge, yellowish white in color, not foul-smelling.  Denies any abdominal pain.  Denies any fevers or chills.  Denies any past history of STDs.  Patient does not consistently use condoms with her partner.  However denies any concern for pregnancy at this time.    Patient speaks English, so an  was not used.    Problem list have been reviewed and updated.         REVIEW OF SYSTEMS     General: No fevers, chills  Head: No headache, dizziness  Neck: No swallowing problems   Resp: No cough. No congestion, coryza  GI: No constipation, diarrhea, no nausea or vomiting  Skin: No rash        OBJECTIVE     Vitals:    04/12/18 1515   BP: 118/77   Pulse: 60   Temp: 97.8  F (36.6  C)   Weight: 171 lb 6.4 oz (77.7 kg)   Height: 5' 2\" (1.575 m)     Body mass index is 31.35 kg/(m^2).  Gen:  In NAD, good color, anxious appearing, tangential at times.  HEENT: No conjunctival injection, jaundice, nasopharynx pink and moist; oropharynx " pink and moist   Neck: Supple without lymphadenopathy  CV:  RRR  - no murmurs, age appropriate rate  Pulm:  CTAB, no wheezes/rales/rhonchi, good air entry   Abdomen: soft, nontender, no masses, no rebound, BS intact throughout.  Skin: Right middle finger and base of right thumb notable for papular and vesicular rash that is most consistent with herpetic shaji.  Has postinflammatory rashes on her arms and chest however no other acute rashes noted.    Results for orders placed or performed in visit on 04/12/18 (from the past 24 hour(s))   Wet Prep ([facility])   Result Value Ref Range    Yeast Wet Prep None none    Motile Trichomonas Wet Prep Negative Negative    Clue Cells Wet Prep None NONE    WBC WET PREP 2-5 2 - 5    Bacteria Wet Prep Moderate None    pH Wet Prep Not performed 3.8 - 4.5    Odor Wet Prep None NONE     ASSESSMENT AND PLAN     Mattie was seen today for rash and vaginal problem. Diagnoses and all orders for this visit:    1. STD exposure  Concern for STD exposure, will plan to do a complete STD check.  Patient self swab did not have a chance to do a cervical exam.  Discussed with patient the importance of using condoms consistently especially if not in a monogamous relationship.  Patient reports understanding this.  Will call with results.  -     Wet Prep   -     Chlamydia/Gono Amplified   -     Syphilis Screen [place]   -     HIV Ag/Ab Screen [place]  -     Hepatitis B Surface Ab  -     Hepatitis B Surface Ag  -     Hepatitis C Antibody    2.  Hepatic withlow  Patient's rash was consistent with herpetic shaji.  Vesicular, pruritic, circular and located on the classic zones.  Discussed treating versus watching.  Acyclovir could decrease the duration of illness however patient okay with waiting and watching at this time.  Discussed following up if continued.  - Watchful waiting, should resolve on its own.    Options for treatment and/or follow-up care were reviewed with the patient who was engaged and  actively involved in the decision making process and verbalized understanding of the options discussed and was satisfied with the final plan.    I discussed the patient's findings, assessment and plan with attending physician Dr. Garth Combs who was agreeable with plan.    Dannie Samano, PGY1

## 2018-04-12 NOTE — PATIENT INSTRUCTIONS
Thank you for coming to [facility] for your care.     Please be sure to :-  1. What you had today is called the herpetic shaji, it should disappear on its own so no meds necessary at this time. It is not a communicable disease.  2. I will call you with the results of your test.  3. Return to clinic if you have new or concerning symptoms.   4. Call the Clinic or go to the ED if having any worsening and/or concerning symptoms.     Dannie Samano MD

## 2018-04-13 ENCOUNTER — TELEPHONE (OUTPATIENT)
Dept: FAMILY MEDICINE | Facility: CLINIC | Age: 43
End: 2018-04-13

## 2018-04-13 ENCOUNTER — MYC MEDICAL ADVICE (OUTPATIENT)
Dept: FAMILY MEDICINE | Facility: CLINIC | Age: 43
End: 2018-04-13

## 2018-04-13 LAB
C TRACH RRNA SPEC QL NAA+PROBE: NEGATIVE
HBV SURFACE AB SER-ACNC: POSITIVE M[IU]/ML
HBV SURFACE AG SERPL QL IA: NEGATIVE
HCV AB SER QL: NEGATIVE
N GONORRHOEA RRNA SPEC QL NAA+PROBE: NEGATIVE
TREPONEMA ANTIBODY (SYPHILIS): NEGATIVE

## 2018-04-13 NOTE — TELEPHONE ENCOUNTER
[facility] phone call message- patient requesting results:    Test: Lab    Date of test: 04/12/2018    Additional Comments: none    OK to leave a message on voice mail? Yes    Primary language: English      needed? No    Call taken on April 13, 2018 at 4:00 PM by Ananda Casarez

## 2018-04-14 ENCOUNTER — MYC REFILL (OUTPATIENT)
Dept: FAMILY MEDICINE | Facility: CLINIC | Age: 43
End: 2018-04-14

## 2018-04-14 DIAGNOSIS — N39.41 URGENCY INCONTINENCE: ICD-10-CM

## 2018-04-16 ENCOUNTER — TELEPHONE (OUTPATIENT)
Dept: URGENT CARE | Facility: URGENT CARE | Age: 43
End: 2018-04-16

## 2018-04-16 ENCOUNTER — TELEPHONE (OUTPATIENT)
Dept: FAMILY MEDICINE | Facility: CLINIC | Age: 43
End: 2018-04-16

## 2018-04-16 NOTE — TELEPHONE ENCOUNTER
Component      Latest Ref Rng & Units 4/12/2018   Yeast Wet Prep      none None   Motile Trichomonas Wet Prep      Negative Negative   Clue Cells Wet Prep      NONE None   WBC WET PREP      2 - 5 2-5   Bacteria Wet Prep      None Moderate   pH Wet Prep      3.8 - 4.5 Not performed   Odor Wet Prep      NONE None   Chlamydia trac,Amplified Prb      Negative Negative   N gonorrhoeae,Amplified Prb      Negative Negative   Treponema Antibody (Syphilis)      Negative Negative   HIV Antigen/Antibody      Negative Negative   Hepatitis B Surface Antibody      Negative Positive (A)   Hepatitis B Surface Antigen      Negative Negative   Hepatitis C Antibody Screen      Negative Negative     Gave the above results to pt.  Routing to [facility]--please mail result to pt per her request. Home address verified.   /GARRY Granados

## 2018-04-16 NOTE — TELEPHONE ENCOUNTER
[facility] phone call message- patient requesting results:    Test: Lab    Date of test: 4/13/2018    Additional Comments: the pt would like to get a call back    OK to leave a message on voice mail? Yes    Primary language: English      needed? No    Call taken on April 16, 2018 at 9:06 AM by Sunday Lemos

## 2018-04-16 NOTE — TELEPHONE ENCOUNTER
[facility] phone call message- general phone call:    Reason for call: Pt calling back about test results    Return call needed: Yes    OK to leave a message on voice mail? Yes    Primary language: English      needed? No    Call taken on April 16, 2018 at 8:08 AM by Keyonna Anton

## 2018-04-16 NOTE — TELEPHONE ENCOUNTER
Message from Rico:  Myah Rosales RN Mon Apr 16, 2018 8:24 AM        ----- Message -----   From: Mattie Pierre   Sent: 4/14/2018 12:04 AM   To: Finn Lopez Rn  Subject: Medication Renewal Request     Original authorizing provider: MD Mattie Bejarano would like a refill of the following medications:  order for DME [Harvey Rondon MD]    Preferred pharmacy: [id number] - [place], [place] - [address] AT [facility] [address]    Comment:  I need more but be small one or med on soon

## 2018-04-17 NOTE — TELEPHONE ENCOUNTER
Discussed results with patient. Labs came back negative for any STDs. She reports understanding and does not have any questions at this time.     Dannie Samano. PGY1

## 2018-04-18 ENCOUNTER — MEDICAL CORRESPONDENCE (OUTPATIENT)
Dept: HEALTH INFORMATION MANAGEMENT | Facility: CLINIC | Age: 43
End: 2018-04-18

## 2018-04-20 ENCOUNTER — TELEPHONE (OUTPATIENT)
Dept: FAMILY MEDICINE | Facility: CLINIC | Age: 43
End: 2018-04-20

## 2018-04-23 ENCOUNTER — OFFICE VISIT (OUTPATIENT)
Dept: FAMILY MEDICINE | Facility: CLINIC | Age: 43
End: 2018-04-23
Payer: MEDICAID

## 2018-04-23 ENCOUNTER — AMBULATORY - HEALTHEAST (OUTPATIENT)
Dept: ADMINISTRATIVE | Facility: REHABILITATION | Age: 43
End: 2018-04-23

## 2018-04-23 VITALS
BODY MASS INDEX: 31.83 KG/M2 | TEMPERATURE: 98.1 F | HEART RATE: 64 BPM | SYSTOLIC BLOOD PRESSURE: 101 MMHG | DIASTOLIC BLOOD PRESSURE: 63 MMHG | HEIGHT: 62 IN | WEIGHT: 173 LBS | OXYGEN SATURATION: 100 % | RESPIRATION RATE: 16 BRPM

## 2018-04-23 DIAGNOSIS — M77.12 LEFT TENNIS ELBOW: ICD-10-CM

## 2018-04-23 DIAGNOSIS — M62.838 MUSCLE SPASM: ICD-10-CM

## 2018-04-23 DIAGNOSIS — M77.12 LEFT TENNIS ELBOW: Primary | ICD-10-CM

## 2018-04-23 RX ORDER — CYCLOBENZAPRINE HCL 5 MG
5 TABLET ORAL 3 TIMES DAILY PRN
Qty: 15 TABLET | Refills: 0 | Status: SHIPPED | OUTPATIENT
Start: 2018-04-23 | End: 2023-01-11

## 2018-04-23 RX ORDER — NAPROXEN 500 MG/1
500 TABLET ORAL 2 TIMES DAILY PRN
Qty: 30 TABLET | Refills: 1 | Status: SHIPPED | OUTPATIENT
Start: 2018-04-23 | End: 2018-05-31

## 2018-04-23 NOTE — MR AVS SNAPSHOT
After Visit Summary   4/23/2018    Mattie Pierre    MRN: [id number]           Patient Information     Date Of Birth          1975        Visit Information        Provider Department      4/23/2018 8:00 AM Belkys Lizama MD [facility]        Today's Diagnoses     Left tennis elbow    -  1    Muscle spasm          Care Instructions    1. Pick prescription for tennis elbow  2. Medications for pain and muscle spasm  3. Flexeril can make you sleepy   4. Stop at desk to set up physical therapy    Thank you for allowing me to be a part of your health care team!    Sincerely,   Dr. Lizama            Follow-ups after your visit        Additional Services     PHYSICAL THERAPY REFERRAL       PT/OT REFERRAL  Mattie Pierre  1975  Phone #: [phone] (home)    needed: No  Language: English    PT/OT  Facility:   [facility], P: [phone], F: [phone]    History: tennis elbow on left for months    Precautions/Contraindications: None    Imaging Studies: None    Surgical Procedure/Test Results: None    Treatment Goals:   Increase: Flexibility, Strength and ROM  Decrease: Pain    Prognosis: good    Visits: Up to 12    Evaluate: Evaluate and treat    Plan: Manual Therapy, Flexibility Exercise and Strength Exercise    Ionto/Phonophoresis                  Future tests that were ordered for you today     Open Future Orders        Priority Expected Expires Ordered    PHYSICAL THERAPY REFERRAL Routine  4/23/2019 4/23/2018            Who to contact     Please call your clinic at [phone] to:    Ask questions about your health    Make or cancel appointments    Discuss your medicines    Learn about your test results    Speak to your doctor            Additional Information About Your Visit        yubackhart Information     [employer] gives you secure access to your electronic health record. If you see a primary care provider, you can also send messages to your care  "team and make appointments. If you have questions, please call your primary care clinic.  If you do not have a primary care provider, please call [phone] and they will assist you.      [email or web address] is an electronic gateway that provides easy, online access to your medical records. With [email or web address], you can request a clinic appointment, read your test results, renew a prescription or communicate with your care team.     To access your existing account, please contact your [facility] or call [phone] for assistance.        Care EveryWhere ID     This is your Care EveryWhere ID. This could be used by other organizations to access your [place] medical records  [id number]        Your Vitals Were     Pulse Temperature Respirations Height Pulse Oximetry BMI (Body Mass Index)    64 98.1  F (36.7  C) (Oral) 16 5' 2\" (157.5 cm) 100% 31.64 kg/m2       Blood Pressure from Last 3 Encounters:   04/23/18 101/63   04/12/18 118/77   03/20/18 105/62    Weight from Last 3 Encounters:   04/23/18 173 lb (78.5 kg)   04/12/18 171 lb 6.4 oz (77.7 kg)   03/20/18 174 lb 6.4 oz (79.1 kg)                 Today's Medication Changes          These changes are accurate as of 4/23/18  8:39 AM.  If you have any questions, ask your nurse or doctor.               Start taking these medicines.        Dose/Directions    cyclobenzaprine 5 MG tablet   Commonly known as:  FLEXERIL   Used for:  Left tennis elbow, Muscle spasm   Started by:  Belkys Lizama MD        Dose:  5 mg   Take 1 tablet (5 mg) by mouth 3 times daily as needed for muscle spasms   Quantity:  15 tablet   Refills:  0       naproxen 500 MG tablet   Commonly known as:  NAPROSYN   Used for:  Left tennis elbow   Started by:  Belkys Lizama MD        Dose:  500 mg   Take 1 tablet (500 mg) by mouth 2 times daily as needed for moderate pain   Quantity:  30 tablet   Refills:  1       order for DME   Used for:  Left tennis elbow   Started by:  " Belkys Lizama MD        Equipment being ordered: tennis elbow strap   Quantity:  1 Units   Refills:  0            Where to get your medicines      These [id number] - [place], [place] - [address] [place] [address] AT [facility] [address]  [address], [place] [address]     Phone:  [phone]     cyclobenzaprine 5 MG tablet    naproxen 500 MG tablet         Some of these will need a paper prescription and others can be bought over the counter.  Ask your nurse if you have questions.     Bring a paper prescription for each of these medications     order for DME                Primary Care Provider Office Phone # Fax #    Marcelo Arriaga [phone]       [facility] [address] [place] [address]        Equal Access to Services     MCKAYLA ALANIZ AH: Hadii yanique cruz hadasho Soomaali, waaxda luqadaha, qaybta kaalmada adeegyada, waxay dylon haydeana tobar. So [facility] [phone].    ATENCIÓN: Si habla español, tiene a campos disposición servicios gratuitos de asistencia lingüística. Anand al [phone].    We comply with applicable federal civil rights laws and [place] laws. We do not discriminate on the basis of race, color, national origin, age, disability, sex, sexual orientation, or gender identity.            Thank you!     Thank you for choosing [facility]  for your care. Our goal is always to provide you with excellent care. Hearing back from our patients is one way we can continue to improve our services. Please take a few minutes to complete the written survey that you may receive in the mail after your visit with us. Thank you!             Your Updated Medication List - Protect others around you: Learn how to safely use, store and throw away your medicines at www.disposemymeds.org.          This list is accurate as of 4/23/18  8:39 AM.  Always use your most recent med list.                   Brand Name Dispense  Instructions for use Diagnosis    acetaminophen 325 MG tablet    TYLENOL    100 tablet    Take 2 tablets (650 mg) by mouth every 4 hours as needed for mild pain    It band syndrome, right       amLODIPine 5 MG tablet    NORVASC    90 tablet    Take 1 tablet (5 mg) by mouth daily    Benign essential hypertension       benzoyl peroxide 3 % Lotn    BENZOYL PEROXIDE CLEANSER    1 Bottle    Externally apply topically 3 times daily    Acne vulgaris       BUPROPION HCL PO           cyclobenzaprine 5 MG tablet    FLEXERIL    15 tablet    Take 1 tablet (5 mg) by mouth 3 times daily as needed for muscle spasms    Left tennis elbow, Muscle spasm       diclofenac 50 MG EC tablet    VOLTAREN    30 tablet    Take 1 tablet (50 mg) by mouth 3 times daily as needed for moderate pain        fluticasone 27.5 MCG/SPRAY spray    VERAMYST    10 g    Spray 1-2 sprays into both nostrils daily    Chronic nonseasonal allergic rhinitis due to other allergen       fluticasone 50 MCG/ACT spray    FLONASE    1 Bottle    Spray 1-2 sprays into both nostrils daily    Seasonal allergic rhinitis, unspecified chronicity, unspecified trigger       gabapentin 300 MG capsule    NEURONTIN    90 capsule    Take 1 capsule (300 mg) by mouth 2 times daily        KLONOPIN PO           lamoTRIgine 150 MG tablet    LaMICtal     Take 1 tablet (150 mg) by mouth 2 times daily        loratadine 10 MG tablet    CLARITIN    30 tablet    Take 1 tablet (10 mg) by mouth daily    Chronic rhinitis, unspecified type       methylPREDNISolone 4 MG tablet    MEDROL DOSEPAK    21 tablet    Follow package instructions    Bursitis of right hip, unspecified bursa       naproxen 500 MG tablet    NAPROSYN    30 tablet    Take 1 tablet (500 mg) by mouth 2 times daily as needed for moderate pain    Left tennis elbow       * order for DME     1 Device    Equipment being ordered: 4 wheeled walker with seat.    Chronic bilateral low back pain without sciatica       * order for DME     1  Box    Depends- or other adult diapers.    Urgency incontinence       order for DME     150 Units    Equipment being ordered: Adult incontinence diapers    Urgency incontinence       order for DME     1 Units    Equipment being ordered: tennis elbow strap    Left tennis elbow       oxybutynin 10 MG 24 hr tablet    DITROPAN XL    90 tablet    Take 1 tablet (10 mg) by mouth daily    Urgency incontinence       prazosin 5 MG capsule    MINIPRESS     Take 1 capsule (5 mg) by mouth At Bedtime        SEROQUEL PO      Take 300 mg by mouth Take at bedtime        * TOPAMAX 50 MG tablet   Generic drug:  topiramate     60 tablet    Take 1 tablet (50 mg) by mouth At Bedtime        * topiramate 100 MG tablet    TOPAMAX    180 tablet    Take 2 tablets (200 mg) by mouth At Bedtime        * Notice:  This list has 4 medication(s) that are the same as other medications prescribed for you. Read the directions carefully, and ask your doctor or other care provider to review them with you.

## 2018-04-23 NOTE — PROGRESS NOTES
Preceptor Attestation:   Patient seen, evaluated and discussed with the resident. I have verified the content of the note, which accurately reflects my assessment of the patient and the plan of care.   Supervising Physician:  Gil Rosario MD

## 2018-04-23 NOTE — PROGRESS NOTES
"S: Mattie Pierre is a 42 year old female with a PMH of   Patient Active Problem List   Diagnosis     Bipolar disorder (H)     Obesity     Cellulitis of axilla, left     Iron deficiency anemia     Seizure disorder (H)     Hidradenitis suppurativa     Urgency incontinence     Chronic pain syndrome     Acne vulgaris     Tinea capitis     Fissure in skin     Cocaine abuse     Depression     Hypertension     Subclinical hypothyroidism     Vitamin D deficiency     presenting to clinic today with a chief complaint of \"i'm in pain\" can't bend arm for months. She reports it is her left arm and she gets swelling. She has tried exercise to help but this causes pain. Nothing improves pain. She tried tylenol, ice, ibuprofen, heat and nothing helps pain. She has seizures and wonders if a seizure caused it but denies recent seizures. Aching pain. Not improved with time. She reports difficulty sleeping. She reports no association with heavy activity or increased cardiovascular stress. She can't quantify her pain.       ROS:  General: No fevers, chills    CV: No chest pain or palpitations.  Resp: No shortness of breath.  No cough.       Current Outpatient Prescriptions   Medication Sig Dispense Refill     cyclobenzaprine (FLEXERIL) 5 MG tablet Take 1 tablet (5 mg) by mouth 3 times daily as needed for muscle spasms 15 tablet 0     naproxen (NAPROSYN) 500 MG tablet Take 1 tablet (500 mg) by mouth 2 times daily as needed for moderate pain 30 tablet 1     order for DME Equipment being ordered: tennis elbow strap 1 Units 0     acetaminophen (TYLENOL) 325 MG tablet Take 2 tablets (650 mg) by mouth every 4 hours as needed for mild pain 100 tablet 0     amLODIPine (NORVASC) 5 MG tablet Take 1 tablet (5 mg) by mouth daily 90 tablet 3     benzoyl peroxide (BENZOYL PEROXIDE CLEANSER) 3 % LOTN Externally apply topically 3 times daily 1 Bottle 0     BUPROPION HCL PO        ClonazePAM (KLONOPIN PO)        diclofenac (VOLTAREN) 50 MG EC tablet " "Take 1 tablet (50 mg) by mouth 3 times daily as needed for moderate pain 30 tablet 1     fluticasone (FLONASE) 50 MCG/ACT spray Spray 1-2 sprays into both nostrils daily 1 Bottle 11     fluticasone (VERAMYST) 27.5 MCG/SPRAY spray Spray 1-2 sprays into both nostrils daily 10 g 3     gabapentin (NEURONTIN) 300 MG capsule Take 1 capsule (300 mg) by mouth 2 times daily 90 capsule 1     lamoTRIgine (LAMICTAL) 150 MG tablet Take 1 tablet (150 mg) by mouth 2 times daily       loratadine (CLARITIN) 10 MG tablet Take 1 tablet (10 mg) by mouth daily 30 tablet 1     methylPREDNISolone (MEDROL DOSEPAK) 4 MG tablet Follow package instructions 21 tablet 0     order for DME Equipment being ordered: 4 wheeled walker with seat. 1 Device 0     order for DME Depends- or other adult diapers. 1 Box [address]     order for DME Equipment being ordered: Adult incontinence diapers 150 Units 0     oxybutynin (DITROPAN XL) 10 MG 24 hr tablet Take 1 tablet (10 mg) by mouth daily 90 tablet 3     prazosin (MINIPRESS) 5 MG capsule Take 1 capsule (5 mg) by mouth At Bedtime       QUEtiapine Fumarate (SEROQUEL PO) Take 300 mg by mouth Take at bedtime       topiramate (TOPAMAX) 100 MG tablet Take 2 tablets (200 mg) by mouth At Bedtime 180 tablet 0     topiramate (TOPAMAX) 50 MG tablet Take 1 tablet (50 mg) by mouth At Bedtime 60 tablet 1       O: /63  Pulse 64  Temp 98.1  F (36.7  C) (Oral)  Resp 16  Ht 5' 2\" (157.5 cm)  Wt 173 lb (78.5 kg)  SpO2 100%  BMI 31.64 kg/m2   Gen:  Well nourished and in No acute distress   Musculoskeletal: tender to palpation at lateral aspect of elbow and proximal aspect of flexor tendons on left. ROM equal bilaterally. No crepitus. No point tenderness  Extrem: no cyanosis, edema or clubbing  Psych: Euthymic      Assessment and Plan:  Mattie was seen today for musculoskeletal problem and medication reconciliation.    Diagnoses and all orders for this visit:    Left tennis elbow  -     naproxen (NAPROSYN) 500 MG " tablet; Take 1 tablet (500 mg) by mouth 2 times daily as needed for moderate pain  -     order for DME; Equipment being ordered: tennis elbow strap  -     PHYSICAL THERAPY REFERRAL; Future  -     cyclobenzaprine (FLEXERIL) 5 MG tablet; Take 1 tablet (5 mg) by mouth 3 times daily as needed for muscle spasms    Muscle spasm  -     cyclobenzaprine (FLEXERIL) 5 MG tablet; Take 1 tablet (5 mg) by mouth 3 times daily as needed for muscle spasms    Comment: plan to attend physical therapy. This does not appear to be cardiac in nature as patient does not have chest pain and there is no association with strenuous activity making the pain worse. Patient does not work outside of her home so no work note is needed. She will attend therapy and try the elbow strap and pain meds and muscle relaxants. Follow up as needed if not improved after attending physical therapy.     This patient was seen and discussed with Dr. Marlene Lizama DO  PGY 2

## 2018-04-23 NOTE — PATIENT INSTRUCTIONS
1. Pick prescription for tennis elbow  2. Medications for pain and muscle spasm  3. Flexeril can make you sleepy   4. Stop at desk to set up physical therapy    Thank you for allowing me to be a part of your health care team!    Sincerely,   Dr. Lizama      Referral sent to [facility]  Phone: [phone]  Fax: [phone]  Clinic will contact patient to schedule  es/April 23, 2018

## 2018-04-24 ASSESSMENT — PATIENT HEALTH QUESTIONNAIRE - PHQ9: SUM OF ALL RESPONSES TO PHQ QUESTIONS 1-9: 6

## 2018-05-05 ENCOUNTER — MEDICAL CORRESPONDENCE (OUTPATIENT)
Dept: HEALTH INFORMATION MANAGEMENT | Facility: CLINIC | Age: 43
End: 2018-05-05

## 2018-05-31 ENCOUNTER — OFFICE VISIT (OUTPATIENT)
Dept: FAMILY MEDICINE | Facility: CLINIC | Age: 43
End: 2018-05-31
Payer: MEDICAID

## 2018-05-31 VITALS
OXYGEN SATURATION: 98 % | BODY MASS INDEX: 30.8 KG/M2 | DIASTOLIC BLOOD PRESSURE: 81 MMHG | RESPIRATION RATE: 24 BRPM | SYSTOLIC BLOOD PRESSURE: 123 MMHG | TEMPERATURE: 98 F | HEART RATE: 67 BPM | WEIGHT: 168.4 LBS

## 2018-05-31 DIAGNOSIS — M77.12 LEFT TENNIS ELBOW: ICD-10-CM

## 2018-05-31 DIAGNOSIS — B00.89 HERPETIC WHITLOW: Primary | ICD-10-CM

## 2018-05-31 RX ORDER — NAPROXEN 500 MG/1
500 TABLET ORAL 2 TIMES DAILY PRN
Qty: 30 TABLET | Refills: 1 | Status: SHIPPED | OUTPATIENT
Start: 2018-05-31 | End: 2023-11-30

## 2018-05-31 RX ORDER — CAPSAICIN 0.025 %
CREAM (GRAM) TOPICAL
Qty: 45 G | Refills: 1 | Status: SHIPPED | OUTPATIENT
Start: 2018-05-31 | End: 2023-01-11

## 2018-05-31 RX ORDER — VALACYCLOVIR HYDROCHLORIDE 500 MG/1
500 TABLET, FILM COATED ORAL 2 TIMES DAILY
Qty: 6 TABLET | Refills: 3 | Status: SHIPPED | OUTPATIENT
Start: 2018-05-31 | End: 2022-03-29

## 2018-05-31 NOTE — PROGRESS NOTES
Preceptor Attestation:   Patient seen, evaluated and discussed with the resident. I have verified the content of the note, which accurately reflects my assessment of the patient and the plan of care.   Supervising Physician:  Kelly Thorpe MD

## 2018-05-31 NOTE — PROGRESS NOTES
ASSESSMENT AND PLAN      Mattie was seen today for follow up for and elbow pain.    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 This was noted last month during a visit and observation was planned. Patient noting significant itching with lesions on the left hand, wishes to be treated. Will start valtrex 500mg BID for 3 days. Refills given for recurrence.   -     valACYclovir (VALTREX) 500 MG tablet; Take 1 tablet (500 mg) by mouth 2 times daily    Left tennis elbow: Patient has had longstanding left lateral malleolus pain. She has a brace, but notes rash when she sweats which is painful so she does not use it often. She has not tried any topicals for this pain. Recommended rest and ICE. She is getting in contact with PT at this time.   -     naproxen (NAPROSYN) 500 MG tablet; Take 1 tablet (500 mg) by mouth 2 times daily as needed for moderate pain  -     capsaicin (ZOSTRIX) 0.025 % CREA cream; Apply to affected elbow three times a day as needed for pain.      RTC in 3 months for follow up of weight and tobacco use or sooner if develops new or worsening symptoms.    Marcelo Arriaga MD PGY2  [facility]                SUBJECTIVE       Mattie Pierre is a 42 year old  female with a PMH significant for:     Patient Active Problem List   Diagnosis     Bipolar disorder (H)     Obesity     Cellulitis of axilla, left     Iron deficiency anemia     Seizure disorder (H)     Hidradenitis suppurativa     Urgency incontinence     Chronic pain syndrome     Acne vulgaris     Tinea capitis     Fissure in skin     Cocaine abuse     Depression     Hypertension     Subclinical hypothyroidism     Vitamin D deficiency     She presents with rash on left middle finger. They appear to be clear roofed blisters 1mm in diameter. She notes that it comes and goes. She also notes it on the left and right thumbs. She was previously  seen by Dr. Samano on 4/12/18 who noted the lesions. Patient agreed to observation at the time, but notes that the lesions have not fully regressed. The lesions on her left thumb are pruritic but non-painful. She doesn't note any drainage, but she has not attempted to open any of the blisters noted. Patient denies any fever, chills, chest pain, SOB.     Patient also continuing to have left elbow pain. Patient has history of tennis elbow. She has a brace, but gets too sweaty and a rash develops. She then avoids using the brace. She does use intermittent nsaids for the pain during the night. She has not tried any topicals. She has not started PT for this yet, but notes that they have called her about it.    Patient endorses smoking 12 cigarettes per day.       PMH, Medications and Allergies were reviewed and updated as needed.          OBJECTIVE     Vitals:    05/31/18 1518   BP: 123/81   BP Location: Left arm   Patient Position: Sitting   Cuff Size: Adult Regular   Pulse: 67   Resp: 24   Temp: 98  F (36.7  C)   TempSrc: Oral   SpO2: 98%   Weight: 168 lb 6.4 oz (76.4 kg)     Body mass index is 30.8 kg/(m^2).    General: Well appearing, NAD, answers questions appropriately.  CV: S1, S2, RRR. I/VI systolic murmur best heard at LUSB.   RESP: Clear to auscultation bilaterally. No wheezes or crackles noted.  Extremities: Clear roofed blisters, 0.5-1mm in diameter noted on the right and left thumb bases, as well as the lateral 3rd left finger. No drainage or crusting noted. No erythema noted.     No results found for this or any previous visit (from the past 24 hour(s)).

## 2018-06-08 ENCOUNTER — TELEPHONE (OUTPATIENT)
Dept: FAMILY MEDICINE | Facility: CLINIC | Age: 43
End: 2018-06-08

## 2018-06-08 NOTE — TELEPHONE ENCOUNTER
[facility] phone call message- general phone call:    Reason for call: Pt was wondering if you received form from pts simran waiver to fill out for air conditioner.     Return call needed: Yes    OK to leave a message on voice mail? Yes    Primary language: English      needed? No    Call taken on June 8, 2018 at 3:37 PM by Keyonna Anton

## 2018-06-13 NOTE — TELEPHONE ENCOUNTER
Called patient to inform her that I did receive her of cadi waiver. I left a message as she did not .    Marcelo Arriaga MD PGY2  [facility]

## 2018-06-20 ENCOUNTER — OFFICE VISIT (OUTPATIENT)
Dept: FAMILY MEDICINE | Facility: CLINIC | Age: 43
End: 2018-06-20
Payer: MEDICAID

## 2018-06-20 VITALS
WEIGHT: 168.2 LBS | OXYGEN SATURATION: 99 % | HEART RATE: 74 BPM | SYSTOLIC BLOOD PRESSURE: 113 MMHG | HEIGHT: 63 IN | TEMPERATURE: 97.7 F | RESPIRATION RATE: 12 BRPM | BODY MASS INDEX: 29.8 KG/M2 | DIASTOLIC BLOOD PRESSURE: 79 MMHG

## 2018-06-20 DIAGNOSIS — G40.909 SEIZURE DISORDER (H): ICD-10-CM

## 2018-06-20 DIAGNOSIS — L30.1 DYSHIDROTIC ECZEMA: Primary | ICD-10-CM

## 2018-06-20 DIAGNOSIS — L73.9 FOLLICULITIS: ICD-10-CM

## 2018-06-20 RX ORDER — LAMOTRIGINE 150 MG/1
150 TABLET ORAL 2 TIMES DAILY
Qty: 180 TABLET | Refills: 3 | Status: SHIPPED | OUTPATIENT
Start: 2018-06-20 | End: 2019-07-26

## 2018-06-20 RX ORDER — TRIAMCINOLONE ACETONIDE 1 MG/G
CREAM TOPICAL
Qty: 30 G | Refills: 0 | Status: SHIPPED | OUTPATIENT
Start: 2018-06-20 | End: 2022-03-29

## 2018-06-20 NOTE — PROGRESS NOTES
ASSESSMENT AND PLAN      Mattie was seen today for musculoskeletal problem, finger, mass, allergies, other and refill request.    Diagnoses and all orders for this visit:    Dyshidrotic eczema: Lesion on left middle finger should have resolved at this time with valacyclovir. Possible that this is dyshidrotic eczema, will trial 14 days of triamcinolone cream. Recommend frequent moisturization of the area.   -     triamcinolone (KENALOG) 0.1 % cream; Apply sparingly to affected area two times daily for 14 days.    Folliculitis: Patient with simple folliculitis on the left neck. Recommended frequent warm compress on the area and to keep the area clean and dry during other times. She will follow-up in 1 week if pain is increasing, noting increasing erythema, or not seeing improvement in size.     Seizure disorder (H): Patient requesting refill of her lamictal. Patient has been stable and has not had a recent seizure. She is not currently following with neurology. Will refill at patient's maintenance dose of lamictal.   -     lamoTRIgine (LAMICTAL) 150 MG tablet; Take 1 tablet (150 mg) by mouth 2 times daily        RTC in 1 week as needed for follow up of neck/hand lesions or sooner if develops new or worsening symptoms.    Marcelo Arriaga MD PGY2  [facility]                SUBJECTIVE       Mattie Pierre is a 42 year old  female with a PMH significant for:     Patient Active Problem List   Diagnosis     Bipolar disorder (H)     Obesity     Cellulitis of axilla, left     Iron deficiency anemia     Seizure disorder (H)     Hidradenitis suppurativa     Urgency incontinence     Chronic pain syndrome     Acne vulgaris     Tinea capitis     Fissure in skin     Cocaine abuse     Depression     Hypertension     Subclinical hypothyroidism     Vitamin D deficiency     She presents for follow-up of a rash. Patient was last seen on 5/31 for vesicular lesion on her left middle finger. She was also noting similar lesions on  "her left thumb and right hand as well. She was prescribed valacyclovir at that time. She notes improvement in the lesions on her right hand and left thumb, but notes continued vesicular lesions on her left middle finger. She does note itching of the resolved lesions on her left thumb.    Patient also having complaints of a bump on her left neck. This has been present for the last 3-4 days. Notes a white head that she tried popping, but wasn't able to. She notes it has been getting slightly bigger, and that it is tender to touch. She denies any fevers, chills, or spreading redness. She has tried a warm compress once or twice.     Patient also requesting refill of her lamictal which she has recently run out of.      Patient smoking 0.75 PPD.     PMH, Medications and Allergies were reviewed and updated as needed.            OBJECTIVE     Vitals:    06/20/18 0847   BP: 113/79   BP Location: Left arm   Patient Position: Sitting   Cuff Size: Adult Regular   Pulse: 74   Resp: 12   Temp: 97.7  F (36.5  C)   TempSrc: Oral   SpO2: 99%   Weight: 168 lb 3.2 oz (76.3 kg)   Height: 5' 2.5\" (158.8 cm)     Body mass index is 30.27 kg/(m^2).    Constitutional: Awake, alert, cooperative, no apparent distress, and appears stated age.  Neck: Supple, symmetrical, trachea midline, no adenopathy, thyroid symmetric, not enlarged and no tenderness, skin normal.  Lungs: No increased work of breathing, good air exchange, clear to auscultation bilaterally, no crackles or wheezing.  Cardiovascular: Regular rate and rhythm, normal S1 and S2, no S3 or S4, and no murmur noted.  Skin: hyperpigmented area on left thumb. Clear vesicular lesions on the left 3rd digit, nonerythematous, non-draining. Single erythematous papule on the left neck that is tender to palpation, non-draining. Firm to touch. No spreading erythema.     No results found for this or any previous visit (from the past 24 hour(s)).          "

## 2018-06-20 NOTE — MR AVS SNAPSHOT
After Visit Summary   6/20/2018    Mattie Pierre    MRN: [id number]           Patient Information     Date Of Birth          1975        Visit Information        Provider Department      6/20/2018 9:40 AM Marcelo Arriaga MD [facility]        Today's Diagnoses     Dyshidrotic eczema    -  1    Folliculitis        Seizure disorder (H)          Care Instructions    -Follow-up as needed.  -Use warm compress on the bump on your neck 3-4 times per day  -Use triamcinolone cream two times per day for 14 days.           Follow-ups after your visit        Your next 10 appointments already scheduled     Jun 20, 2018  9:40 AM CDT   Return Visit with Marcelo Arriaga MD   [facility] ([facility])    [address] [place] [address]   [phone]              Who to contact     Please call your clinic at [phone] to:    Ask questions about your health    Make or cancel appointments    Discuss your medicines    Learn about your test results    Speak to your doctor            Additional Information About Your Visit        [employer] Information     [employer] gives you secure access to your electronic health record. If you see a primary care provider, you can also send messages to your care team and make appointments. If you have questions, please call your primary care clinic.  If you do not have a primary care provider, please call [phone] and they will assist you.      [employer] is an electronic gateway that provides easy, online access to your medical records. With [employer], you can request a clinic appointment, read your test results, renew a prescription or communicate with your care team.     To access your existing account, please contact your [facility] or call [phone] for assistance.        Care EveryWhere ID     This is your Care EveryWhere ID. This could be used by other organizations to access your [facility]  "records  [id number]        Your Vitals Were     Pulse Temperature Respirations Height Pulse Oximetry BMI (Body Mass Index)    74 97.7  F (36.5  C) (Oral) 12 5' 2.5\" (158.8 cm) 99% 30.27 kg/m2       Blood Pressure from Last 3 Encounters:   06/20/18 113/79   05/31/18 123/81   04/23/18 101/63    Weight from Last 3 Encounters:   06/20/18 168 lb 3.2 oz (76.3 kg)   05/31/18 168 lb 6.4 oz (76.4 kg)   04/23/18 173 lb (78.5 kg)              Today, you had the following     No orders found for display         Today's Medication Changes          These changes are accurate as of 6/20/18  9:13 AM.  If you have any questions, ask your nurse or doctor.               Start taking these medicines.        Dose/Directions    triamcinolone 0.1 % cream   Commonly known as:  KENALOG   Used for:  Dyshidrotic eczema   Started by:  Marcelo Arriaga MD        Apply sparingly to affected area two times daily for 14 days.   Quantity:  30 g   Refills:  0            Where to get your medicines      These medications were sent to [id number] - [place], [place] - [address] AT [facility] [address], [place] [address]     Phone:  [phone]     lamoTRIgine 150 MG tablet    triamcinolone 0.1 % cream                Primary Care Provider Office Phone # Fax #    Marcelo Arriaga [phone]       [facility] MEDICINE [address] [place] [address]        Equal Access to Services     [facility] CORBIN AH: Hadii yanique ku hadasho Soomaali, waaxda luqadaha, qaybta kaalmada adeegyada, paula tobar. So [facility] [phone].    ATENCIÓN: Si habla español, tiene a campos disposición servicios gratuitos de asistencia lingüística. Llame al [phone].    We comply with applicable federal civil rights laws and [place] laws. We do not discriminate on the basis of race, color, national origin, age, disability, sex, sexual orientation, or gender identity.            Thank " you!     Thank you for choosing [facility]  for your care. Our goal is always to provide you with excellent care. Hearing back from our patients is one way we can continue to improve our services. Please take a few minutes to complete the written survey that you may receive in the mail after your visit with us. Thank you!             Your Updated Medication List - Protect others around you: Learn how to safely use, store and throw away your medicines at www.disposemymeds.org.          This list is accurate as of 6/20/18  9:13 AM.  Always use your most recent med list.                   Brand Name Dispense Instructions for use Diagnosis    acetaminophen 325 MG tablet    TYLENOL    100 tablet    Take 2 tablets (650 mg) by mouth every 4 hours as needed for mild pain    It band syndrome, right       amLODIPine 5 MG tablet    NORVASC    90 tablet    Take 1 tablet (5 mg) by mouth daily    Benign essential hypertension       benzoyl peroxide 3 % Lotn    BENZOYL PEROXIDE CLEANSER    1 Bottle    Externally apply topically 3 times daily    Acne vulgaris       BUPROPION HCL PO           capsaicin 0.025 % Crea cream    ZOSTRIX    45 g    Apply to affected elbow three times a day as needed for pain.    Left tennis elbow       cyclobenzaprine 5 MG tablet    FLEXERIL    15 tablet    Take 1 tablet (5 mg) by mouth 3 times daily as needed for muscle spasms    Left tennis elbow, Muscle spasm       diclofenac 50 MG EC tablet    VOLTAREN    30 tablet    Take 1 tablet (50 mg) by mouth 3 times daily as needed for moderate pain        fluticasone 27.5 MCG/SPRAY spray    VERAMYST    10 g    Spray 1-2 sprays into both nostrils daily    Chronic nonseasonal allergic rhinitis due to other allergen       fluticasone 50 MCG/ACT spray    FLONASE    1 Bottle    Spray 1-2 sprays into both nostrils daily    Seasonal allergic rhinitis, unspecified chronicity, unspecified trigger       gabapentin 300 MG capsule    NEURONTIN    90 capsule    Take 1  capsule (300 mg) by mouth 2 times daily        KLONOPIN PO           lamoTRIgine 150 MG tablet    LaMICtal    180 tablet    Take 1 tablet (150 mg) by mouth 2 times daily    Seizure disorder (H)       loratadine 10 MG tablet    CLARITIN    30 tablet    Take 1 tablet (10 mg) by mouth daily    Chronic rhinitis, unspecified type       methylPREDNISolone 4 MG tablet    MEDROL DOSEPAK    21 tablet    Follow package instructions    Bursitis of right hip, unspecified bursa       naproxen 500 MG tablet    NAPROSYN    30 tablet    Take 1 tablet (500 mg) by mouth 2 times daily as needed for moderate pain    Left tennis elbow       * order for DME     1 Device    Equipment being ordered: 4 wheeled walker with seat.    Chronic bilateral low back pain without sciatica       * order for DME     1 Box    Depends- or other adult diapers.    Urgency incontinence       order for DME     150 Units    Equipment being ordered: Adult incontinence diapers    Urgency incontinence       order for DME     1 Units    Equipment being ordered: tennis elbow strap    Left tennis elbow       oxybutynin 10 MG 24 hr tablet    DITROPAN XL    90 tablet    Take 1 tablet (10 mg) by mouth daily    Urgency incontinence       prazosin 5 MG capsule    MINIPRESS     Take 1 capsule (5 mg) by mouth At Bedtime        SEROQUEL PO      Take 300 mg by mouth Take at bedtime        * TOPAMAX 50 MG tablet   Generic drug:  topiramate     60 tablet    Take 1 tablet (50 mg) by mouth At Bedtime        * topiramate 100 MG tablet    TOPAMAX    180 tablet    Take 2 tablets (200 mg) by mouth At Bedtime        triamcinolone 0.1 % cream    KENALOG    30 g    Apply sparingly to affected area two times daily for 14 days.    Dyshidrotic eczema       valACYclovir 500 MG tablet    VALTREX    6 tablet    Take 1 tablet (500 mg) by mouth 2 times daily    Herpetic shaji       * Notice:  This list has 4 medication(s) that are the same as other medications prescribed for you. Read the  directions carefully, and ask your doctor or other care provider to review them with you.

## 2018-06-20 NOTE — PROGRESS NOTES
Preceptor Attestation:   Patient seen, evaluated and discussed with the resident. I have verified the content of the note, which accurately reflects my assessment of the patient and the plan of care.   Supervising Physician:  Tyson North MD

## 2018-06-20 NOTE — PATIENT INSTRUCTIONS
-Follow-up as needed.  -Use warm compress on the bump on your neck 3-4 times per day  -Use triamcinolone cream two times per day for 14 days.

## 2018-07-02 ENCOUNTER — MEDICAL CORRESPONDENCE (OUTPATIENT)
Dept: HEALTH INFORMATION MANAGEMENT | Facility: CLINIC | Age: 43
End: 2018-07-02

## 2018-07-04 ENCOUNTER — MEDICAL CORRESPONDENCE (OUTPATIENT)
Dept: HEALTH INFORMATION MANAGEMENT | Facility: CLINIC | Age: 43
End: 2018-07-04

## 2018-07-31 ENCOUNTER — OFFICE VISIT (OUTPATIENT)
Dept: FAMILY MEDICINE | Facility: CLINIC | Age: 43
End: 2018-07-31
Payer: MEDICAID

## 2018-07-31 VITALS
OXYGEN SATURATION: 99 % | BODY MASS INDEX: 29.55 KG/M2 | DIASTOLIC BLOOD PRESSURE: 75 MMHG | RESPIRATION RATE: 16 BRPM | HEIGHT: 62 IN | TEMPERATURE: 97.9 F | SYSTOLIC BLOOD PRESSURE: 121 MMHG | HEART RATE: 58 BPM | WEIGHT: 160.6 LBS

## 2018-07-31 DIAGNOSIS — L30.9 DERMATITIS: Primary | ICD-10-CM

## 2018-07-31 NOTE — MR AVS SNAPSHOT
After Visit Summary   7/31/2018    Mattie Pierre    MRN: [id number]           Patient Information     Date Of Birth          1975        Visit Information        Provider Department      7/31/2018 9:40 AM Gil Rosario MD [facility]        Care Instructions    Today we saw you for a rash on your palms.  Please follow up with your Dermatology appt at 1:45PM today.  They will perform additional tests that we do not do in this clinic.          Follow-ups after your visit        Your next 10 appointments already scheduled     Aug 02, 2018  9:20 AM CDT   Return Visit with Belkys Lizama MD   [facility] ([facility])    [address] [place] [address]   [phone]              Who to contact     Please call your clinic at [phone] to:    Ask questions about your health    Make or cancel appointments    Discuss your medicines    Learn about your test results    Speak to your doctor            Additional Information About Your Visit        MyChart Information     [employer] gives you secure access to your electronic health record. If you see a primary care provider, you can also send messages to your care team and make appointments. If you have questions, please call your primary care clinic.  If you do not have a primary care provider, please call [phone] and they will assist you.      [employer] is an electronic gateway that provides easy, online access to your medical records. With [employer], you can request a clinic appointment, read your test results, renew a prescription or communicate with your care team.     To access your existing account, please contact your [facility] or call [phone] for assistance.        Care EveryWhere ID     This is your Care EveryWhere ID. This could be used by other organizations to access your [facility] medical records  [id number]        Your Vitals Were     Pulse Temperature Respirations Height Pulse  "Oximetry BMI (Body Mass Index)    58 97.9  F (36.6  C) (Oral) 16 5' 2.32\" (158.3 cm) 99% 29.07 kg/m2       Blood Pressure from Last 3 Encounters:   07/31/18 121/75   06/20/18 113/79   05/31/18 123/81    Weight from Last 3 Encounters:   07/31/18 160 lb 9.6 oz (72.8 kg)   06/20/18 168 lb 3.2 oz (76.3 kg)   05/31/18 168 lb 6.4 oz (76.4 kg)              Today, you had the following     No orders found for display       Primary Care Provider Office Phone # Fax #    Marcelo Enzo Arriaga [phone]       [facility] MEDICINE [address] [place] [address]        Equal Access to Services     MCKAYLA ALANIZ : Slava connell Sodemetrius, waaxda luqadaha, qaybta kaalmada hector, paula wall . So [facility] [phone].    ATENCIÓN: Si habla español, tiene a campos disposición servicios gratuitos de asistencia lingüística. Anand al [phone].    We comply with applicable federal civil rights laws and [place] laws. We do not discriminate on the basis of race, color, national origin, age, disability, sex, sexual orientation, or gender identity.            Thank you!     Thank you for choosing [facility]  for your care. Our goal is always to provide you with excellent care. Hearing back from our patients is one way we can continue to improve our services. Please take a few minutes to complete the written survey that you may receive in the mail after your visit with us. Thank you!             Your Updated Medication List - Protect others around you: Learn how to safely use, store and throw away your medicines at www.disposemymeds.org.          This list is accurate as of 7/31/18 10:05 AM.  Always use your most recent med list.                   Brand Name Dispense Instructions for use Diagnosis    acetaminophen 325 MG tablet    TYLENOL    100 tablet    Take 2 tablets (650 mg) by mouth every 4 hours as needed for mild pain    It band syndrome, right       amLODIPine 5 MG " tablet    NORVASC    90 tablet    Take 1 tablet (5 mg) by mouth daily    Benign essential hypertension       benzoyl peroxide 3 % Lotn    BENZOYL PEROXIDE CLEANSER    1 Bottle    Externally apply topically 3 times daily    Acne vulgaris       BUPROPION HCL PO           capsaicin 0.025 % Crea cream    ZOSTRIX    45 g    Apply to affected elbow three times a day as needed for pain.    Left tennis elbow       cyclobenzaprine 5 MG tablet    FLEXERIL    15 tablet    Take 1 tablet (5 mg) by mouth 3 times daily as needed for muscle spasms    Left tennis elbow, Muscle spasm       diclofenac 50 MG EC tablet    VOLTAREN    30 tablet    Take 1 tablet (50 mg) by mouth 3 times daily as needed for moderate pain        fluticasone 27.5 MCG/SPRAY spray    VERAMYST    10 g    Spray 1-2 sprays into both nostrils daily    Chronic nonseasonal allergic rhinitis due to other allergen       fluticasone 50 MCG/ACT spray    FLONASE    1 Bottle    Spray 1-2 sprays into both nostrils daily    Seasonal allergic rhinitis, unspecified chronicity, unspecified trigger       gabapentin 300 MG capsule    NEURONTIN    90 capsule    Take 1 capsule (300 mg) by mouth 2 times daily        KLONOPIN PO           lamoTRIgine 150 MG tablet    LaMICtal    180 tablet    Take 1 tablet (150 mg) by mouth 2 times daily    Seizure disorder (H)       loratadine 10 MG tablet    CLARITIN    30 tablet    Take 1 tablet (10 mg) by mouth daily    Chronic rhinitis, unspecified type       methylPREDNISolone 4 MG tablet    MEDROL DOSEPAK    21 tablet    Follow package instructions    Bursitis of right hip, unspecified bursa       naproxen 500 MG tablet    NAPROSYN    30 tablet    Take 1 tablet (500 mg) by mouth 2 times daily as needed for moderate pain    Left tennis elbow       * order for DME     1 Device    Equipment being ordered: 4 wheeled walker with seat.    Chronic bilateral low back pain without sciatica       * order for DME     1 Box    Depends- or other adult  diapers.    Urgency incontinence       order for DME     150 Units    Equipment being ordered: Adult incontinence diapers    Urgency incontinence       order for DME     1 Units    Equipment being ordered: tennis elbow strap    Left tennis elbow       oxybutynin 10 MG 24 hr tablet    DITROPAN XL    90 tablet    Take 1 tablet (10 mg) by mouth daily    Urgency incontinence       prazosin 5 MG capsule    MINIPRESS     Take 1 capsule (5 mg) by mouth At Bedtime        SEROQUEL PO      Take 300 mg by mouth Take at bedtime        * TOPAMAX 50 MG tablet   Generic drug:  topiramate     60 tablet    Take 1 tablet (50 mg) by mouth At Bedtime        * topiramate 100 MG tablet    TOPAMAX    180 tablet    Take 2 tablets (200 mg) by mouth At Bedtime        triamcinolone 0.1 % cream    KENALOG    30 g    Apply sparingly to affected area two times daily for 14 days.    Dyshidrotic eczema       valACYclovir 500 MG tablet    VALTREX    6 tablet    Take 1 tablet (500 mg) by mouth 2 times daily    Herpetic shaji       * Notice:  This list has 4 medication(s) that are the same as other medications prescribed for you. Read the directions carefully, and ask your doctor or other care provider to review them with you.

## 2018-07-31 NOTE — PATIENT INSTRUCTIONS
Today we saw you for a rash on your palms. I think a type of eczema  Please follow up with your Dermatology appt at 1:45PM today.  They will perform additional tests that we do not do in this clinic.

## 2018-07-31 NOTE — PROGRESS NOTES
"S: Mattie Pierre is a 42 year old female who returns for follow up of  Skin changes in her hands predominantly over the left hand and digits;  Pruritic. Had same problems before, no exposure to irritants  and no hx of allergies   While in the office Recall that she has appointment  with dermatologist at 1:45  PM today  Patients states that main concern today is hand dermatitis follow up.    PMHX/PSHX/MEDS/ALLERGIES/SHX/FHX reviewed and updated in Epic.      ROS:  General: No fevers, chills  Head: No headache  Ears: No acute change in hearing.    CV: No chest pain or palpitations.  Resp: No shortness of breath.  No cough. No hemoptysis.  GI: No nausea, vomiting, constipation, diarrhea  : No urinary pains    O: /75  Pulse 58  Temp 97.9  F (36.6  C) (Oral)  Resp 16  Ht 5' 2.32\" (158.3 cm)  Wt 160 lb 9.6 oz (72.8 kg)  SpO2 99%  BMI 29.07 kg/m2   Gen:  Well nourished and in NAD   Skin; hand with small vesicles in radial side of hands, few along fingers  CV:  RRR  - no murmurs, rubs, or gallups,   Pulm:  CTAB, no wheezes/rales/rhonchi, good air entry   ABD: soft, nontender, no masses, no rebound, BS intact throughout  Extrem: no cyanosis, edema or clubbing  Psych: Euthymic       Dermatitis most likely Dyshidrotic Eczema:  Patient has appt at 1:45pm this after noon with Dermatology which I recommend that she follow up because this is a long-standing problem.    RTC  After derm for regular care or sooner if develops new or worsening symptoms.    Gil Rosario      "

## 2018-08-13 DIAGNOSIS — J31.0 CHRONIC RHINITIS, UNSPECIFIED TYPE: ICD-10-CM

## 2018-08-13 RX ORDER — LORATADINE 10 MG/1
10 TABLET ORAL DAILY
Qty: 30 TABLET | Refills: 11 | Status: SHIPPED | OUTPATIENT
Start: 2018-08-13 | End: 2024-07-19

## 2018-08-23 ENCOUNTER — OFFICE VISIT (OUTPATIENT)
Dept: FAMILY MEDICINE | Facility: CLINIC | Age: 43
End: 2018-08-23
Payer: MEDICAID

## 2018-08-23 VITALS
SYSTOLIC BLOOD PRESSURE: 101 MMHG | BODY MASS INDEX: 29.07 KG/M2 | TEMPERATURE: 98.1 F | RESPIRATION RATE: 16 BRPM | HEART RATE: 69 BPM | WEIGHT: 160.6 LBS | OXYGEN SATURATION: 99 % | DIASTOLIC BLOOD PRESSURE: 67 MMHG

## 2018-08-23 DIAGNOSIS — N17.9 ACUTE KIDNEY INJURY (H): Primary | ICD-10-CM

## 2018-08-23 DIAGNOSIS — R63.4 WEIGHT LOSS: ICD-10-CM

## 2018-08-23 DIAGNOSIS — R35.0 URINARY FREQUENCY: ICD-10-CM

## 2018-08-23 DIAGNOSIS — N91.2 AMENORRHEA: ICD-10-CM

## 2018-08-23 DIAGNOSIS — F31.31 BIPOLAR AFFECTIVE DISORDER, CURRENTLY DEPRESSED, MILD (H): ICD-10-CM

## 2018-08-23 LAB
BILIRUBIN UR: ABNORMAL
BLOOD UR: NEGATIVE
BUN SERPL-MCNC: 18 MG/DL (ref 7–19)
CALCIUM SERPL-MCNC: 9.3 MG/DL (ref 8.5–10.1)
CHLORIDE SERPLBLD-SCNC: 103.2 MMOL/L (ref 98–110)
CO2 SERPL-SCNC: 22.1 MMOL/L (ref 20–32)
CREAT SERPL-MCNC: 1.1 MG/DL (ref 0.5–1)
GFR SERPL CREATININE-BSD FRML MDRD: 57.6 ML/MIN/1.7 M2
GLUCOSE SERPL-MCNC: 90.1 MG'DL (ref 70–99)
GLUCOSE URINE: NEGATIVE
HCG UR QL: NEGATIVE
KETONES UR QL: ABNORMAL
LEUKOCYTE ESTERASE UR: NEGATIVE
NITRITE UR QL STRIP: NEGATIVE
PH UR STRIP: 5.5 [PH] (ref 5–7)
POTASSIUM SERPL-SCNC: 3.8 MMOL/DL (ref 3.2–4.6)
PROTEIN UR: ABNORMAL
SODIUM SERPL-SCNC: 136.9 MMOL/L (ref 132–142)
SP GR UR STRIP: >=1.03
TSH SERPL DL<=0.05 MIU/L-ACNC: 1.91 UIU/ML (ref 0.3–5)
UROBILINOGEN UR STRIP-ACNC: ABNORMAL

## 2018-08-23 RX ORDER — QUETIAPINE FUMARATE 100 MG/1
100 TABLET, FILM COATED ORAL AT BEDTIME
Qty: 60 TABLET | Refills: 0 | Status: SHIPPED | OUTPATIENT
Start: 2018-08-23 | End: 2018-10-05

## 2018-08-23 RX ORDER — BUPROPION HYDROCHLORIDE 75 MG/1
75 TABLET ORAL AT BEDTIME
Qty: 90 TABLET | Refills: 0 | Status: SHIPPED | OUTPATIENT
Start: 2018-08-23 | End: 2019-03-06

## 2018-08-23 RX ORDER — BUPROPION HYDROCHLORIDE 75 MG/1
TABLET ORAL
Qty: 90 TABLET | Status: CANCELLED | OUTPATIENT
Start: 2018-08-23

## 2018-08-23 RX ORDER — PRAZOSIN HYDROCHLORIDE 5 MG/1
5 CAPSULE ORAL AT BEDTIME
Qty: 30 CAPSULE | Refills: 1 | Status: SHIPPED | OUTPATIENT
Start: 2018-08-23 | End: 2018-10-05

## 2018-08-23 NOTE — PROGRESS NOTES
ASSESSMENT AND PLAN      Mattie was seen today for recheck and amenorrhea.    Diagnoses and all orders for this visit:    Bipolar affective disorder, currently depressed, mild (H): Patient wishes for me to take over her psychiatric care at this time.  I had a long discussion with patient regarding this.  I am comfortable continuing medications and treatment of her bipolar disorder as long as she remains stable.  Patient is showing signs of worsening symptoms, I will refer to psychiatry.  Patient is understanding of this. Follow-up monthly.  -     prazosin (MINIPRESS) 5 MG capsule; Take 1 capsule (5 mg) by mouth At Bedtime  -     buPROPion (WELLBUTRIN) 75 MG tablet; Take 1 tablet (75 mg) by mouth At Bedtime  -     QUEtiapine (SEROQUEL) 100 MG tablet; Take 1 tablet (100 mg) by mouth At Bedtime Take at bedtime      Acute kidney injury (H): Patient taking it was previously thought to be secondary to acute dehydration.  Will recheck creatinine today for improvement.  -     Basic Metabolic Panel ([facility])    Urinary frequency: Patient complaining of urinary frequency and malodorous urine.  Patient denies any dysuria however.  Will check a UA today.  -     Urinalysis, Micro If ([facility])  -     Chlamydia/Gono Amplified ([facility])    Amenorrhea: Patient has not had her period for 2 months.  Check beta-hCG at this time.  Patient has been having unprotected sexual intercourse.  -     HCG Qualitative Urine (UPT)  ([facility])    Weight loss: Patient has had significant weight loss over the last year, is now down 40 pounds since 2017.  Patient has been making changes in her diet, but unclear how significant these changes are.  I am concerned about other possibilities for weight loss.  Will check TSH at this time.  -     TSH  Sensitive ([facility])      Marcelo Arriaga MD PGY3  [facility]                SUBJECTIVE       Mattie Pierre is a 42 year old  female with a PMH significant for:     Patient Active Problem List  "  Diagnosis     Bipolar disorder (H)     Obesity     Cellulitis of axilla, left     Iron deficiency anemia     Seizure disorder (H)     Hidradenitis suppurativa     Urgency incontinence     Chronic pain syndrome     Acne vulgaris     Tinea capitis     Fissure in skin     Cocaine abuse     Depression     Hypertension     Subclinical hypothyroidism     Vitamin D deficiency     She presents follow-up of ER visit. Was seen on 8/16/18 for acute dizziness and hypotension. Felt improved with IV fluid bolus. She was found to have an YULIA. Patient's symptoms thought to be secondary to acute dehydration.     Patient also noting \"smelly urine.\" Concerned about possible STI.  Patient has no symptoms of dysuria, however she does have urinary frequency.  Patient does have a history of urinary incontinence.  No vaginal discharge noted, is unsure how long this has been ongoing for. No vaginal bleeding or menstruation for 2 months.     Mattie also requiring refills of her psychiatric medications.  Patient has not seen her psychiatrist for a few months at this time as she is no longer homeless and cannot get her services at [facility] anymore.  Patient wishes to have her medications and bipolar disorder treated here in clinic.  Patient denies any current symptoms of suicidal ideation or homicidal ideation.  No recent history of feeling extremely energetic having periods of not needing sleep.  Per patient, she stopped taking her Seroquel 300 mg nightly on 8/10/18 as it was causing severe drowsiness.  Patient instead started taking Seroquel 100 mg nightly.  She notes that it did improve her drowsiness.  Patient still taking prazosin and bupropion.    Patient is SOB walking 0.5 blocks. Still smoking, approximately 1/2 ppd.     Does have headaches, improves with extra strength tylenol and sleep.     Denies any recent fever, chills, chest pain, abdominal pain, extremity swelling.    PMH, Medications and Allergies were reviewed and " "updated as needed.            OBJECTIVE     Vitals:    08/23/18 1459   BP: 101/67   Pulse: 69   Resp: 16   Temp: 98.1  F (36.7  C)   TempSrc: Oral   SpO2: 99%   Weight: 160 lb 9.6 oz (72.8 kg)     Body mass index is 29.07 kg/(m^2).    General: Well-appearing, no acute distress, answering questions appropriately.  CV: S1, S2, regular rate and rhythm.  2/6 systolic murmur noted.  No clicks or rubs.  Respiratory: Clear to auscultation bilaterally.  No crackles or wheezes noted.  Extremities: No peripheral edema noted.  Psych: Normal affect, mood is \"okay.\"  Normal speech latency and content.  Denies SI/HI.  No auditory or visual hallucinations.    No results found for this or any previous visit (from the past 24 hour(s)).          "

## 2018-08-23 NOTE — PROGRESS NOTES
Preceptor Attestation:   Patient seen, evaluated and discussed with the resident. I have verified the content of the note, which accurately reflects my assessment of the patient and the plan of care.   Supervising Physician:  Juanis Villegas MD

## 2018-08-23 NOTE — LETTER
August 29, 2018      Mattie Pierre  [address] [place] [address]        Dear Mattie,  The results of your labs are back.  Your gonorrhea and chlamydia testing are both negative.  Your urine pregnancy test is also negative.  Your thyroid hormone is at a normal level.  We already discussed the results of your kidney function testing which is improving from your emergency room visit.  Your urine also appears to look like you are slightly dehydrated as well.  If you have any questions about the results of her tests please give a call to the clinic or schedule a follow-up visit.   Please see below for your test results.    Resulted Orders   Basic Metabolic Panel ([facility])   Result Value Ref Range    Urea Nitrogen 18.0 7.0 - 19.0 mg/dL    Calcium 9.3 8.5 - 10.1 mg/dL    Chloride 103.2 98.0 - 110.0 mmol/L    Carbon Dioxide 22.1 20.0 - 32.0 mmol/L    Creatinine 1.1 (H) 0.5 - 1.0 mg/dL    Glucose 90.1 70.0 - 99.0 mg'dL    Potassium 3.8 3.2 - 4.6 mmol/dL    Sodium 136.9 132.0 - 142.0 mmol/L    GFR Estimate 57.6 (L) >60.0 mL/min/1.7 m2    GFR Estimate If Black 69.7 >60.0 mL/min/1.7 m2   Urinalysis, Micro If ([facility])   Result Value Ref Range    Specific Gravity Urine >=1.030 1.005 - 1.030    pH Urine 5.5 4.5 - 8.0    Leukocyte Esterase UR Negative NEGATIVE    Nitrite Urine Negative NEGATIVE    Protein UR 1+ (A) NEGATIVE    Glucose Urine Negative NEGATIVE    Ketones Urine 1+ (A) NEGATIVE    Urobilinogen mg/dL 1.0 E.U./dL (A) 0.2 E.U./dL    Bilirubin UR 1+ (A) NEGATIVE    Blood UR Negative NEGATIVE   Chlamydia/Gono Amplified ([facility])   Result Value Ref Range    Chlamydia trac,Amplified Prb Negative Negative    N gonorrhoeae,Amplified Prb Negative Negative    Narrative    Test performed by:  [facility] [address] [place] [address]   HCG Qualitative Urine (UPT)  ([facility])   Result Value Ref Range    HCG Qual Urine NEGATIVE Negative   TSH  Sensitive ([facility])   Result Value Ref Range    TSH  1.91 0.30 - 5.00 uIU/mL    Narrative    Test performed by:  [facility] [address] [place] [address]       If you have any questions, please call the clinic to make an appointment.    Sincerely,    Marcelo Arriaga MD

## 2018-08-23 NOTE — MR AVS SNAPSHOT
After Visit Summary   8/23/2018    Mattie Pierre    MRN: [id number]           Patient Information     Date Of Birth          1975        Visit Information        Provider Department      8/23/2018 3:10 PM Marcelo Arriaga MD [facility]        Today's Diagnoses     Acute kidney injury (H)    -  1    Urinary frequency        Bipolar affective disorder, currently depressed, mild (H)        Amenorrhea        Weight loss          Care Instructions    -Follow-up in 1 month.          Follow-ups after your visit        Your next 10 appointments already scheduled     Sep 17, 2018  1:30 PM CDT   Return Visit with Marcelo Arriaga MD   [facility] ([facility])    [address] [place] [address]   [phone]              Who to contact     Please call your clinic at [phone] to:    Ask questions about your health    Make or cancel appointments    Discuss your medicines    Learn about your test results    Speak to your doctor            Additional Information About Your Visit        MyChart Information     [facility] gives you secure access to your electronic health record. If you see a primary care provider, you can also send messages to your care team and make appointments. If you have questions, please call your primary care clinic.  If you do not have a primary care provider, please call [phone] and they will assist you.      [facility] is an electronic gateway that provides easy, online access to your medical records. With [facility], you can request a clinic appointment, read your test results, renew a prescription or communicate with your care team.     To access your existing account, please contact your [facility] or call [phone] for assistance.        Care EveryWhere ID     This is your Care EveryWhere ID. This could be used by other organizations to access your [facility] medical records  [id number]        Your Vitals Were     Pulse  Temperature Respirations Pulse Oximetry BMI (Body Mass Index)       69 98.1  F (36.7  C) (Oral) 16 99% 29.07 kg/m2        Blood Pressure from Last 3 Encounters:   08/23/18 101/67   07/31/18 121/75   06/20/18 113/79    Weight from Last 3 Encounters:   08/23/18 160 lb 9.6 oz (72.8 kg)   07/31/18 160 lb 9.6 oz (72.8 kg)   06/20/18 168 lb 3.2 oz (76.3 kg)              We Performed the Following     Basic Metabolic Panel ([facility])     Chlamydia/Gono Amplified ([facility])     HCG Qualitative Urine (UPT)  ([facility])     TSH  Sensitive ([facility])     Urinalysis, Micro If ([facility])          Today's Medication Changes          These changes are accurate as of 8/23/18  3:51 PM.  If you have any questions, ask your nurse or doctor.               These medicines have changed or have updated prescriptions.        Dose/Directions    * BUPROPION HCL PO   This may have changed:  Another medication with the same name was added. Make sure you understand how and when to take each.   Changed by:  Marcelo Arriaga MD        Refills:  0       * buPROPion 75 MG tablet   Commonly known as:  WELLBUTRIN   This may have changed:  You were already taking a medication with the same name, and this prescription was added. Make sure you understand how and when to take each.   Used for:  Bipolar affective disorder, currently depressed, mild (H)   Changed by:  Marcelo Arriaga MD        Dose:  75 mg   Take 1 tablet (75 mg) by mouth At Bedtime   Quantity:  90 tablet   Refills:  0       QUEtiapine 100 MG tablet   Commonly known as:  SEROQUEL   This may have changed:  medication strength   Used for:  Bipolar affective disorder, currently depressed, mild (H)   Changed by:  Marcelo Arriaga MD        Dose:  100 mg   Take 1 tablet (100 mg) by mouth At Bedtime Take at bedtime   Quantity:  60 tablet   Refills:  0       * Notice:  This list has 2 medication(s) that are the same as other medications prescribed for you. Read the directions  carefully, and ask your doctor or other care provider to review them with you.         Where to get your medicines      These medications were sent to [id number] - [place], [place] - [address]  [address], [place] [address]     Phone:  [phone]     buPROPion 75 MG tablet    prazosin 5 MG capsule    QUEtiapine 100 MG tablet                Primary Care Provider Office Phone # Fax #    Marcelo Enzo Arriaga [phone]       [facility] [address] [place] [address]        Equal Access to Services     ASHLEY ALANIZ : Hadii aad ku hadasho Soomaali, waaxda luqadaha, qaybta kaalmada adeegyada, waxfelicia junein hayaan kamron wall . So [facility] [phone].    ATENCIÓN: Si habla español, tiene a campos disposición servicios gratuitos de asistencia lingüística. [facility] [phone].    We comply with applicable federal civil rights laws and [place] laws. We do not discriminate on the basis of race, color, national origin, age, disability, sex, sexual orientation, or gender identity.            Thank you!     Thank you for choosing [facility]  for your care. Our goal is always to provide you with excellent care. Hearing back from our patients is one way we can continue to improve our services. Please take a few minutes to complete the written survey that you may receive in the mail after your visit with us. Thank you!             Your Updated Medication List - Protect others around you: Learn how to safely use, store and throw away your medicines at www.disposemymeds.org.          This list is accurate as of 8/23/18  3:51 PM.  Always use your most recent med list.                   Brand Name Dispense Instructions for use Diagnosis    acetaminophen 325 MG tablet    TYLENOL    100 tablet    Take 2 tablets (650 mg) by mouth every 4 hours as needed for mild pain    It band syndrome, right       amLODIPine 5 MG tablet    NORVASC    90  tablet    Take 1 tablet (5 mg) by mouth daily    Benign essential hypertension       benzoyl peroxide 3 % Lotn    BENZOYL PEROXIDE CLEANSER    1 Bottle    Externally apply topically 3 times daily    Acne vulgaris       * BUPROPION HCL PO           * buPROPion 75 MG tablet    WELLBUTRIN    90 tablet    Take 1 tablet (75 mg) by mouth At Bedtime    Bipolar affective disorder, currently depressed, mild (H)       capsaicin 0.025 % Crea cream    ZOSTRIX    45 g    Apply to affected elbow three times a day as needed for pain.    Left tennis elbow       cyclobenzaprine 5 MG tablet    FLEXERIL    15 tablet    Take 1 tablet (5 mg) by mouth 3 times daily as needed for muscle spasms    Left tennis elbow, Muscle spasm       diclofenac 50 MG EC tablet    VOLTAREN    30 tablet    Take 1 tablet (50 mg) by mouth 3 times daily as needed for moderate pain        fluticasone 27.5 MCG/SPRAY spray    VERAMYST    10 g    Spray 1-2 sprays into both nostrils daily    Chronic nonseasonal allergic rhinitis due to other allergen       fluticasone 50 MCG/ACT spray    FLONASE    1 Bottle    Spray 1-2 sprays into both nostrils daily    Seasonal allergic rhinitis, unspecified chronicity, unspecified trigger       gabapentin 300 MG capsule    NEURONTIN    90 capsule    Take 1 capsule (300 mg) by mouth 2 times daily        KLONOPIN PO           lamoTRIgine 150 MG tablet    LaMICtal    180 tablet    Take 1 tablet (150 mg) by mouth 2 times daily    Seizure disorder (H)       loratadine 10 MG tablet    CLARITIN    30 tablet    Take 1 tablet (10 mg) by mouth daily    Chronic rhinitis, unspecified type       methylPREDNISolone 4 MG tablet    MEDROL DOSEPAK    21 tablet    Follow package instructions    Bursitis of right hip, unspecified bursa       naproxen 500 MG tablet    NAPROSYN    30 tablet    Take 1 tablet (500 mg) by mouth 2 times daily as needed for moderate pain    Left tennis elbow       * order for DME     1 Device    Equipment being ordered:  4 wheeled walker with seat.    Chronic bilateral low back pain without sciatica       * order for DME     1 Box    Depends- or other adult diapers.    Urgency incontinence       order for DME     150 Units    Equipment being ordered: Adult incontinence diapers    Urgency incontinence       order for DME     1 Units    Equipment being ordered: tennis elbow strap    Left tennis elbow       oxybutynin 10 MG 24 hr tablet    DITROPAN XL    90 tablet    Take 1 tablet (10 mg) by mouth daily    Urgency incontinence       prazosin 5 MG capsule    MINIPRESS    30 capsule    Take 1 capsule (5 mg) by mouth At Bedtime    Bipolar affective disorder, currently depressed, mild (H)       QUEtiapine 100 MG tablet    SEROQUEL    60 tablet    Take 1 tablet (100 mg) by mouth At Bedtime Take at bedtime    Bipolar affective disorder, currently depressed, mild (H)       * TOPAMAX 50 MG tablet   Generic drug:  topiramate     60 tablet    Take 1 tablet (50 mg) by mouth At Bedtime        * topiramate 100 MG tablet    TOPAMAX    180 tablet    Take 2 tablets (200 mg) by mouth At Bedtime        triamcinolone 0.1 % cream    KENALOG    30 g    Apply sparingly to affected area two times daily for 14 days.    Dyshidrotic eczema       valACYclovir 500 MG tablet    VALTREX    6 tablet    Take 1 tablet (500 mg) by mouth 2 times daily    Herpetic shaji       * Notice:  This list has 6 medication(s) that are the same as other medications prescribed for you. Read the directions carefully, and ask your doctor or other care provider to review them with you.

## 2018-08-24 ENCOUNTER — TELEPHONE (OUTPATIENT)
Dept: FAMILY MEDICINE | Facility: CLINIC | Age: 43
End: 2018-08-24

## 2018-08-24 LAB
C TRACH RRNA SPEC QL NAA+PROBE: NEGATIVE
N GONORRHOEA RRNA SPEC QL NAA+PROBE: NEGATIVE

## 2018-08-24 NOTE — TELEPHONE ENCOUNTER
[facility] phone call message- patient requesting results:    Test: Lab    Date of test: 8/23/18    Additional Comments: She doesn't have a call back number can you please mail her her lab results from yesterday.    OK to leave a message on voice mail? Yes      Primary language: English      needed? No    Call taken on August 24, 2018 at 8:17 AM by Nate Nevarez

## 2018-08-28 NOTE — PROGRESS NOTES
Phone number out of service, unable to call to give results. Please send the following as a letter.    Matti Phelps,    The results of your labs are back.  Your gonorrhea and chlamydia testing are both negative.  Your urine pregnancy test is also negative.  Your thyroid hormone is at a normal level.  We already discussed the results of your kidney function testing which is improving from your emergency room visit.  Your urine also appears to look like you are slightly dehydrated as well.  If you have any questions about the results of her tests please give a call to the clinic or schedule a follow-up visit.    Thanks,    Marcelo Arriaga MD PGY3  [facility]

## 2018-09-02 ENCOUNTER — MEDICAL CORRESPONDENCE (OUTPATIENT)
Dept: HEALTH INFORMATION MANAGEMENT | Facility: CLINIC | Age: 43
End: 2018-09-02

## 2018-10-05 DIAGNOSIS — F31.31 BIPOLAR AFFECTIVE DISORDER, CURRENTLY DEPRESSED, MILD (H): ICD-10-CM

## 2018-10-05 RX ORDER — PRAZOSIN HYDROCHLORIDE 5 MG/1
5 CAPSULE ORAL AT BEDTIME
Qty: 30 CAPSULE | Refills: 1 | Status: SHIPPED | OUTPATIENT
Start: 2018-10-05 | End: 2023-01-11

## 2018-10-05 RX ORDER — QUETIAPINE FUMARATE 100 MG/1
100 TABLET, FILM COATED ORAL AT BEDTIME
Qty: 60 TABLET | Refills: 0 | Status: SHIPPED | OUTPATIENT
Start: 2018-10-05 | End: 2019-04-01

## 2018-11-01 ENCOUNTER — MEDICAL CORRESPONDENCE (OUTPATIENT)
Dept: HEALTH INFORMATION MANAGEMENT | Facility: CLINIC | Age: 43
End: 2018-11-01

## 2018-12-31 ENCOUNTER — MEDICAL CORRESPONDENCE (OUTPATIENT)
Dept: HEALTH INFORMATION MANAGEMENT | Facility: CLINIC | Age: 43
End: 2018-12-31

## 2019-01-04 ENCOUNTER — OFFICE VISIT (OUTPATIENT)
Dept: FAMILY MEDICINE | Facility: CLINIC | Age: 44
End: 2019-01-04
Payer: MEDICAID

## 2019-01-04 VITALS
HEART RATE: 71 BPM | SYSTOLIC BLOOD PRESSURE: 123 MMHG | WEIGHT: 162.4 LBS | TEMPERATURE: 98.1 F | RESPIRATION RATE: 20 BRPM | DIASTOLIC BLOOD PRESSURE: 81 MMHG | BODY MASS INDEX: 29.4 KG/M2 | OXYGEN SATURATION: 99 %

## 2019-01-04 DIAGNOSIS — N39.41 URGENCY INCONTINENCE: ICD-10-CM

## 2019-01-04 DIAGNOSIS — R89.9 ABNORMAL LABORATORY TEST: Primary | ICD-10-CM

## 2019-01-04 DIAGNOSIS — I10 BENIGN ESSENTIAL HYPERTENSION: ICD-10-CM

## 2019-01-04 LAB
ALBUMIN SERPL BCP-MCNC: 3.4 G/DL (ref 3.5–5)
ALP SERPL-CCNC: 61 U/L (ref 45–120)
ALT SERPL W/O P-5'-P-CCNC: 33 U/L (ref 0–45)
ANION GAP SERPL CALCULATED.3IONS-SCNC: 11 MMOL/L (ref 5–18)
AST SERPL-CCNC: 41 U/L (ref 0–40)
BILIRUB SERPL-MCNC: 0.3 MG/DL (ref 0–1)
BUN SERPL-MCNC: 11 MG/DL (ref 8–22)
CALCIUM SERPL-MCNC: 9.3 MG/DL (ref 8.5–10.5)
CHLORIDE SERPL-SCNC: 105 MMOL/L (ref 98–107)
CO2 SERPL-SCNC: 27 MMOL/L (ref 22–31)
CREAT SERPL-MCNC: 0.89 MG/DL (ref 0.6–1.1)
GLUCOSE SERPL-MCNC: 94 MG/DL (ref 70–125)
HIV 1+2 AB+HIV1 P24 AG SERPL QL IA: NEGATIVE
POTASSIUM SERPL-SCNC: 4 MMOL/L (ref 3.5–5)
PROT SERPL-MCNC: 7.3 G/DL (ref 6–8)
SODIUM SERPL-SCNC: 143 MMOL/L (ref 136–145)

## 2019-01-04 RX ORDER — OXYBUTYNIN CHLORIDE 10 MG/1
10 TABLET, EXTENDED RELEASE ORAL DAILY
Qty: 90 TABLET | Refills: 3 | Status: SHIPPED | OUTPATIENT
Start: 2019-01-04 | End: 2022-03-29

## 2019-01-04 RX ORDER — AMLODIPINE BESYLATE 5 MG/1
5 TABLET ORAL DAILY
Qty: 90 TABLET | Refills: 3 | Status: SHIPPED | OUTPATIENT
Start: 2019-01-04 | End: 2022-10-14

## 2019-01-04 NOTE — PROGRESS NOTES
"  ASSESSMENT AND PLAN      Mattie was seen today for recheck, recheck medication and medication reconciliation.    Diagnoses and all orders for this visit:    Abnormal laboratory test: Will check CMP, HIV, HEP C, and HEB B Surface AB. Unclear why patient is unable to donate plasma. Asked patient to bring back the form if this testing is not sufficient. Follow-up in 1 month.   -     Cancel: Comprehensive Metabolic Panel ([facility])  -     HIV Ag/Ab Screen [place] ([facility])  -     Hepatitis B Surface Ab ([facility])  -     Hepatitis C Antibody ([facility])    Benign essential hypertension: Refilled patient's amlodipine. Patient's HTN is well controlled at this time.   -     amLODIPine (NORVASC) 5 MG tablet; Take 1 tablet (5 mg) by mouth daily  -     Comprehensive Metabolic ([facility]) - Results > 1 hr    Urgency incontinence: Refilled patient's oxybutynin.   -     oxybutynin ER (DITROPAN XL) 10 MG 24 hr tablet; Take 1 tablet (10 mg) by mouth daily    Marcelo Arriaga MD PGY3  [facility]                SUBJECTIVE       Mattie Pierre is a 43 year old  female with a PMH significant for:     Patient Active Problem List   Diagnosis     Bipolar disorder (H)     Obesity     Cellulitis of axilla, left     Iron deficiency anemia     Seizure disorder (H)     Hidradenitis suppurativa     Urgency incontinence     Chronic pain syndrome     Acne vulgaris     Tinea capitis     Fissure in skin     Cocaine abuse (H)     Depression     Hypertension     Subclinical hypothyroidism     Vitamin D deficiency     She presents with questions regarding her ability to donate plasma. Was told by her plasma center that she could not donate due to \"something going on with my blood cells.\" \"They told me that if I gave my red cells to someone else it would make them sick.\" She does not have any paperwork with her detailing any concerns. She denies any recent fevers, chills, significant weight loss, chest pain, SOB, abdominal pain.    She " does endorse using cocaine infrequently.     PMH, Medications and Allergies were reviewed and updated as needed.        OBJECTIVE     Vitals:    01/04/19 1543   BP: 123/81   Pulse: 71   Resp: 20   Temp: 98.1  F (36.7  C)   TempSrc: Oral   SpO2: 99%   Weight: 73.7 kg (162 lb 6.4 oz)     Body mass index is 29.4 kg/m .    General: Well appearing, NAD, answering questions appropriately  CV: S1, S2, RRR. No murmurs noted.  RESP: Clear to ausculation bilaterally. No rhonchi or wheezing  ABD: Obese, soft, NT, ND, BS+    No results found for this or any previous visit (from the past 24 hour(s)).

## 2019-01-04 NOTE — PROGRESS NOTES
Preceptor Attestation:   Patient seen, evaluated and discussed with the resident. I have verified the content of the note, which accurately reflects my assessment of the patient and the plan of care.   Supervising Physician:  Esequiel Johnson MD

## 2019-01-07 LAB
HBV SURFACE AB SER-ACNC: POSITIVE M[IU]/ML
HCV AB SER QL: NEGATIVE

## 2019-01-18 ENCOUNTER — TELEPHONE (OUTPATIENT)
Dept: FAMILY MEDICINE | Facility: CLINIC | Age: 44
End: 2019-01-18

## 2019-01-18 NOTE — TELEPHONE ENCOUNTER
[facility] phone call message- patient requesting results:    Test: Lab    Date of test: ?    Additional Comments: She would like a call back with her test results.    OK to leave a message on voice mail? Yes    Primary language: English      needed? No    Call taken on January 18, 2019 at 9:08 AM by Nate Nevarez

## 2019-01-18 NOTE — TELEPHONE ENCOUNTER
Patient called requesting her test results.    Reviewed with patient her Comprehenive Metabolic, Hep C, Hep B antibody (which shows that she is protected  and HIV results. Patient has no other questions or concerns at present time.      Routed to Dr. Arriaga/DOMENIC Angulo RN

## 2019-01-28 NOTE — TELEPHONE ENCOUNTER
Letter written. Attached lab values to it, will have Shoua send the letter.    Thanks!    Marcelo Arriaga MD PGY3  [facility]

## 2019-01-28 NOTE — TELEPHONE ENCOUNTER
Patient called and left a message with medical records on 1/24 asking about her results letter from Dr Arriaga. Patient confirmed her address and asked about her letter. Noted that there isn't a letter about patients results as Dr Arriaga spoke with her. Unable to reach patient with this information as her number is not in service.    Routing to Dr Arriaga to see if he would be able to draft a letter regarding the results from patients last visit. Also routing to the nurses in patient calls back and is routed to triage.    Shila Alvarez

## 2019-02-19 DIAGNOSIS — J30.2 SEASONAL ALLERGIC RHINITIS: ICD-10-CM

## 2019-02-19 DIAGNOSIS — J30.2 SEASONAL ALLERGIC RHINITIS, UNSPECIFIED TRIGGER: Primary | ICD-10-CM

## 2019-02-20 RX ORDER — FLUTICASONE PROPIONATE 50 MCG
1-2 SPRAY, SUSPENSION (ML) NASAL DAILY
Qty: 1 BOTTLE | Refills: 11 | Status: SHIPPED | OUTPATIENT
Start: 2019-02-20 | End: 2019-08-26

## 2019-03-01 ENCOUNTER — MEDICAL CORRESPONDENCE (OUTPATIENT)
Dept: HEALTH INFORMATION MANAGEMENT | Facility: CLINIC | Age: 44
End: 2019-03-01

## 2019-03-05 DIAGNOSIS — F31.31 BIPOLAR AFFECTIVE DISORDER, CURRENTLY DEPRESSED, MILD (H): ICD-10-CM

## 2019-03-06 RX ORDER — BUPROPION HYDROCHLORIDE 75 MG/1
75 TABLET ORAL AT BEDTIME
Qty: 90 TABLET | Refills: 0 | Status: SHIPPED | OUTPATIENT
Start: 2019-03-06 | End: 2019-08-20

## 2019-03-22 DIAGNOSIS — G40.909 SEIZURE DISORDER (H): Primary | ICD-10-CM

## 2019-03-28 RX ORDER — TOPIRAMATE 100 MG/1
200 TABLET, FILM COATED ORAL AT BEDTIME
Qty: 180 TABLET | Refills: 0 | Status: SHIPPED | OUTPATIENT
Start: 2019-03-28 | End: 2022-10-11

## 2019-03-29 DIAGNOSIS — F31.31 BIPOLAR AFFECTIVE DISORDER, CURRENTLY DEPRESSED, MILD (H): ICD-10-CM

## 2019-04-01 RX ORDER — QUETIAPINE FUMARATE 100 MG/1
100 TABLET, FILM COATED ORAL AT BEDTIME
Qty: 90 TABLET | Refills: 1 | Status: SHIPPED | OUTPATIENT
Start: 2019-04-01 | End: 2019-12-27

## 2019-04-19 ENCOUNTER — OFFICE VISIT (OUTPATIENT)
Dept: FAMILY MEDICINE | Facility: CLINIC | Age: 44
End: 2019-04-19
Payer: MEDICAID

## 2019-04-19 VITALS
SYSTOLIC BLOOD PRESSURE: 121 MMHG | RESPIRATION RATE: 20 BRPM | DIASTOLIC BLOOD PRESSURE: 82 MMHG | OXYGEN SATURATION: 100 % | WEIGHT: 161.2 LBS | BODY MASS INDEX: 30.43 KG/M2 | TEMPERATURE: 98.3 F | HEART RATE: 68 BPM | HEIGHT: 61 IN

## 2019-04-19 DIAGNOSIS — L02.91 ABSCESS: ICD-10-CM

## 2019-04-19 DIAGNOSIS — L30.1 DYSHIDROTIC ECZEMA: Primary | ICD-10-CM

## 2019-04-19 RX ORDER — CLOBETASOL PROPIONATE 0.5 MG/G
OINTMENT TOPICAL 2 TIMES DAILY
Qty: 15 G | Refills: 0 | Status: SHIPPED | OUTPATIENT
Start: 2019-04-19 | End: 2019-04-26

## 2019-04-19 ASSESSMENT — MIFFLIN-ST. JEOR: SCORE: 1325.19

## 2019-04-19 NOTE — PROGRESS NOTES
Preceptor Attestation:   Patient seen, evaluated and discussed with the resident. I have verified the content of the note, which accurately reflects my assessment of the patient and the plan of care.   Supervising Physician:  Harvey Rondon MD

## 2019-04-19 NOTE — PATIENT INSTRUCTIONS
-Warm compress to the right elbow 3-4 times a day. Do not pick at it if possible.  -Follow-up in 1 week.

## 2019-04-25 ENCOUNTER — TELEPHONE (OUTPATIENT)
Dept: FAMILY MEDICINE | Facility: CLINIC | Age: 44
End: 2019-04-25

## 2019-04-25 DIAGNOSIS — L30.1 DYSHIDROTIC ECZEMA: Primary | ICD-10-CM

## 2019-04-25 NOTE — TELEPHONE ENCOUNTER
[facility] phone call message- general phone call:    Reason for call: She is not feeling any better  told her to call back in 7 days if she wasn't feeling any better.She would like a call back from the nurse.    Return call needed: Yes    OK to leave a message on voice mail? Yes    Primary language: English      needed? No    Call taken on April 25, 2019 at 8:40 AM by Nate Nevarez

## 2019-04-25 NOTE — TELEPHONE ENCOUNTER
Spoke with patient who stated that she was told to f/u in 7 days if her symptoms haven't gotten better.  She states that she has been using the cream that was prescribed to her (Clobetasol) but the symptoms of itchiness and being red on her foot continue.  She denies any new symptoms from previous office visit.  Patient wondering what else she can be given.  Encouraged to continue to use cream until reply back from provider.    Routed to Dr. Arriaga/DOMENIC Angulo RN

## 2019-04-30 ENCOUNTER — MEDICAL CORRESPONDENCE (OUTPATIENT)
Dept: HEALTH INFORMATION MANAGEMENT | Facility: CLINIC | Age: 44
End: 2019-04-30

## 2019-05-07 NOTE — TELEPHONE ENCOUNTER
Patient called in regarding her referral. Discussed scheduling options, patient would like to be seen closer to home and reports not having seen dermatology in a very long time. Assisted her in scheduling the following appointment. Provided her with the appointment information listed below.    DERMATOLOGY REFERRAL  [facility]     Phone: [phone]  Fax: [phone]     [facility]  [address] [place] [id number]    Appointment: Wednesday, 5/8/19   Arrival Time:  8am  Provider:  Dr Espinoza    Please bring a copy of your insurance card and photo ID    If you cannot make this appointment please call [phone] to reschedule.     Shila Alvarez

## 2019-05-15 ENCOUNTER — OFFICE VISIT (OUTPATIENT)
Dept: FAMILY MEDICINE | Facility: CLINIC | Age: 44
End: 2019-05-15
Payer: MEDICAID

## 2019-05-15 VITALS
TEMPERATURE: 98.5 F | OXYGEN SATURATION: 98 % | WEIGHT: 168.4 LBS | RESPIRATION RATE: 16 BRPM | DIASTOLIC BLOOD PRESSURE: 70 MMHG | HEART RATE: 82 BPM | SYSTOLIC BLOOD PRESSURE: 112 MMHG | BODY MASS INDEX: 31.39 KG/M2

## 2019-05-15 DIAGNOSIS — R13.12 OROPHARYNGEAL DYSPHAGIA: Primary | ICD-10-CM

## 2019-05-15 DIAGNOSIS — E03.8 SUBCLINICAL HYPOTHYROIDISM: ICD-10-CM

## 2019-05-15 LAB — TSH SERPL DL<=0.05 MIU/L-ACNC: 1.65 UIU/ML (ref 0.3–5)

## 2019-05-15 NOTE — PATIENT INSTRUCTIONS
-Follow-up in one month.     May 17, 2019 Referral and demographics faxed to [facility] at [phone] who will contact patient/family to schedule. Vivian Combs Referral Coordinator    [facility]  Phone: [phone]  Fax: [phone]    [address], [address]   [place] [address]    Faxed over the referral, they will contact the patient. Vivian Combs [facility]  Phone: [phone]  Fax: [phone]    [facility]  [address], [address]  [place] [address]    [facility]  [address], [address]  [place]  [address]    [facility]  [address] [place]  [address]    Appointment:    Arrival Time:      Please bring a copy of your insurance card and photo ID    If you cannot make this appointment, please call [phone] to reschedule    Referral and demographics faxed to [phone]

## 2019-05-15 NOTE — PROGRESS NOTES
"  ASSESSMENT AND PLAN      Mattie was seen today for throat problem.    Diagnoses and all orders for this visit:    Oropharyngeal dysphagia: Patient with 1 month of dysphagia to solids.  Patient does have a positive smoking history.  We will proceed with barium swallow at this time.  Follow-up in 1 month.  -     Speech Therapy Referral; Future  -     XR Video Swallow w Esophagram - Order with Speech Therapy Referral; Future    Subclinical hypothyroidism: Patient does have a history of subclinical hypothyroidism in the past, TSH is in this clinic have all been normal.  We will recheck a thyroid cascade today given her thyroid fullness and history of fatigue.  -     Thyroid [place] ([facility])      Marcelo Arriaga MD PGY3  [facility]                SUBJECTIVE       Mattie Pierre is a 43 year old  female with a PMH significant for:     Patient Active Problem List   Diagnosis     Bipolar disorder (H)     Obesity     Cellulitis of axilla, left     Iron deficiency anemia     Seizure disorder (H)     Hidradenitis suppurativa     Urgency incontinence     Chronic pain syndrome     Acne vulgaris     Tinea capitis     Fissure in skin     Cocaine abuse (H)     Depression     Hypertension     Subclinical hypothyroidism     Vitamin D deficiency     She presents with throat pain. She was seen by dermatology, dermatologist noted that her \"thyroid looked big\". Noting throat pain with swallowing. Has had a feeling of solids getting stuck in her throat.  She feels like she needs to wash it down with fluids.  This is been ongoing for 1 month.    Patient feels like she is gaining weight. Feeling more tired lately.    Denies any fevers, chills, chest pain, nausea, vomiting, constipation, or diarrhea. Does note some mild shortness of breath.     Patient is smoking 0.5 ppd.     PMH, Medications and Allergies were reviewed and updated as needed.          OBJECTIVE     Vitals:    05/15/19 1334   BP: 112/70   Pulse: 82   Resp: 16 "   Temp: 98.5  F (36.9  C)   TempSrc: Oral   SpO2: 98%   Weight: 76.4 kg (168 lb 6.4 oz)     Body mass index is 31.39 kg/m .    General: Well-appearing, no acute distress, answering questions appropriately.  HEENT: Pharynx clear, no tonsillar enlargement or erythema.  Mucous membranes moist.  Neck: Thyroid feels full bilaterally, no nodules noted.  No lymphadenopathy noted.  CV: S1, S2, regular rate and rhythm.  2/6 systolic murmur.  Respiratory: Clear to auscultation bilaterally.  No wheezes or rhonchi.  Abdomen: Soft, nontender, nondistended.  Bowel sounds positive.  Extremities: No peripheral edema noted.      No results found for this or any previous visit (from the past 24 hour(s)).

## 2019-05-15 NOTE — LETTER
June 4, 2019      Mattie Pierre  [address]  [place] [address]        Dear Mattie,  The results of your thyroid testing have come back normal. If you have any questions or concerns, let me know.   Please see below for your test results.    Resulted Orders   Thyroid [place] ([facility])   Result Value Ref Range    TSH 1.65 0.30 - 5.00 uIU/mL    Narrative    Test performed by:  [facility]  [address] [place] [address]       If you have any questions, please call the clinic to make an appointment.    Sincerely,    Marcelo Arriaga MD

## 2019-05-20 ENCOUNTER — TELEPHONE (OUTPATIENT)
Dept: FAMILY MEDICINE | Facility: CLINIC | Age: 44
End: 2019-05-20

## 2019-05-20 NOTE — TELEPHONE ENCOUNTER
[facility] phone call message- patient requesting results:    Test: Lab    Date of test: 5/15/2018    Additional Comments: The pt called to ask for her results and would like a call back      OK to leave a message on voice mail? Yes      Primary language: English      needed? No    Call taken on May 20, 2019 at 12:00 PM by Sunday Lemos

## 2019-06-03 NOTE — RESULT ENCOUNTER NOTE
Matti Phelps,    The results of your thyroid testing have come back normal. If you have any questions or concerns, let me know.    Thanks,    Marcelo Arriaga MD PGY3  [facility]

## 2019-06-11 ENCOUNTER — OFFICE VISIT (OUTPATIENT)
Dept: FAMILY MEDICINE | Facility: CLINIC | Age: 44
End: 2019-06-11
Payer: MEDICAID

## 2019-06-11 ENCOUNTER — RECORDS - HEALTHEAST (OUTPATIENT)
Dept: ADMINISTRATIVE | Facility: OTHER | Age: 44
End: 2019-06-11

## 2019-06-11 VITALS
SYSTOLIC BLOOD PRESSURE: 110 MMHG | HEART RATE: 68 BPM | OXYGEN SATURATION: 99 % | RESPIRATION RATE: 20 BRPM | DIASTOLIC BLOOD PRESSURE: 69 MMHG | WEIGHT: 168 LBS | BODY MASS INDEX: 30.91 KG/M2 | TEMPERATURE: 98.1 F | HEIGHT: 62 IN

## 2019-06-11 DIAGNOSIS — N94.6 DYSMENORRHEA: Primary | ICD-10-CM

## 2019-06-11 DIAGNOSIS — R10.819 SUPRAPUBIC TENDERNESS: ICD-10-CM

## 2019-06-11 DIAGNOSIS — Z30.09 ENCOUNTER FOR OTHER GENERAL COUNSELING OR ADVICE ON CONTRACEPTION: ICD-10-CM

## 2019-06-11 DIAGNOSIS — F32.A DEPRESSION, UNSPECIFIED DEPRESSION TYPE: ICD-10-CM

## 2019-06-11 DIAGNOSIS — Z31.9 DESIRE FOR PREGNANCY: ICD-10-CM

## 2019-06-11 LAB
BILIRUBIN UR: ABNORMAL
BLOOD UR: ABNORMAL
GLUCOSE URINE: NEGATIVE
HCG UR QL: NEGATIVE
KETONES UR QL: ABNORMAL
LEUKOCYTE ESTERASE UR: NEGATIVE
NITRITE UR QL STRIP: NEGATIVE
PH UR STRIP: 5.5 [PH] (ref 4.5–8)
PROTEIN UR: ABNORMAL
SP GR UR STRIP: 1.02 (ref 1–1.03)
UROBILINOGEN UR STRIP-ACNC: ABNORMAL

## 2019-06-11 RX ORDER — PRENATAL VIT/IRON FUM/FOLIC AC 27MG-0.8MG
1 TABLET ORAL DAILY
Qty: 100 TABLET | Refills: 3 | Status: SHIPPED | OUTPATIENT
Start: 2019-06-11 | End: 2023-01-31

## 2019-06-11 ASSESSMENT — PAIN SCALES - GENERAL: PAINLEVEL: MODERATE PAIN (5)

## 2019-06-11 ASSESSMENT — ANXIETY QUESTIONNAIRES
6. BECOMING EASILY ANNOYED OR IRRITABLE: NEARLY EVERY DAY
2. NOT BEING ABLE TO STOP OR CONTROL WORRYING: NEARLY EVERY DAY
5. BEING SO RESTLESS THAT IT IS HARD TO SIT STILL: MORE THAN HALF THE DAYS
1. FEELING NERVOUS, ANXIOUS, OR ON EDGE: NEARLY EVERY DAY
7. FEELING AFRAID AS IF SOMETHING AWFUL MIGHT HAPPEN: NEARLY EVERY DAY
GAD7 TOTAL SCORE: 19
3. WORRYING TOO MUCH ABOUT DIFFERENT THINGS: NEARLY EVERY DAY
IF YOU CHECKED OFF ANY PROBLEMS ON THIS QUESTIONNAIRE, HOW DIFFICULT HAVE THESE PROBLEMS MADE IT FOR YOU TO DO YOUR WORK, TAKE CARE OF THINGS AT HOME, OR GET ALONG WITH OTHER PEOPLE: SOMEWHAT DIFFICULT

## 2019-06-11 ASSESSMENT — PATIENT HEALTH QUESTIONNAIRE - PHQ9
SUM OF ALL RESPONSES TO PHQ QUESTIONS 1-9: 16
5. POOR APPETITE OR OVEREATING: MORE THAN HALF THE DAYS

## 2019-06-11 ASSESSMENT — MIFFLIN-ST. JEOR: SCORE: 1365.42

## 2019-06-11 NOTE — PATIENT INSTRUCTIONS
6/11/2019  Mattie Pierre    It was a pleasure to see you today at [facility].     My recommendations after this visit include:   1. Referral to OB for pre-pregnancy counseling; prenatal vitamin sent to pharmacy  2. Results of your thyroid were normal  3. Set up swallow study at referrals  4. Return to discuss mood with Dr. Arriaga    Thank you for allowing me to be a part of your health care team!    Sincerely,   Dr. Lizama      Swallow Study and Speech Therapy:    Address:  [address] [place] [address]  Phone number: [phone]    Appointment:   Tuesday June 18, 2019  Arrival: 8:15 am

## 2019-06-11 NOTE — PROGRESS NOTES
S: Mattie Pierre is a 43 year old female with a PMH of   Patient Active Problem List   Diagnosis     Bipolar disorder (H)     Obesity     Cellulitis of axilla, left     Iron deficiency anemia     Seizure disorder (H)     Hidradenitis suppurativa     Urgency incontinence     Chronic pain syndrome     Acne vulgaris     Tinea capitis     Fissure in skin     Cocaine abuse (H)     Depression     Hypertension     Subclinical hypothyroidism     Vitamin D deficiency     Ganglion cyst of right foot     Hypertrophy of bone     presenting to clinic today with a chief complaint of following up for results and for SLP and swallow study. She is concerned she is pregnant. She is 3-4 days late for her menses. She is not on birth control. She reports last month was a normal period and lasted 7 days. No spotting in between. She reports no new sexual partner. She reports no concern for STI. Reports whitish discharge. She reports not on birthcontrol due to weight gain prior.  Patient reports she is interested in having another baby.  Patient states she is not currently taking prenatal vitamin.  She is on sure when her mother went through menopause.    ROS:  General: Reports hot and cold flashes.   GI: No nausea, vomiting  : No urinary pains. Increased urination. No vaginal itching.     Current Outpatient Medications   Medication Sig Dispense Refill     acetaminophen (TYLENOL) 325 MG tablet Take 2 tablets (650 mg) by mouth every 4 hours as needed for mild pain 100 tablet 0     amLODIPine (NORVASC) 5 MG tablet Take 1 tablet (5 mg) by mouth daily 90 tablet 3     buPROPion (WELLBUTRIN) 75 MG tablet Take 1 tablet (75 mg) by mouth At Bedtime 90 tablet 0     BUPROPION HCL PO        capsaicin (ZOSTRIX) 0.025 % CREA cream Apply to affected elbow three times a day as needed for pain. 45 g 1     ClonazePAM (KLONOPIN PO)        cyclobenzaprine (FLEXERIL) 5 MG tablet Take 1 tablet (5 mg) by mouth 3 times daily as needed for muscle spasms 15  tablet 0     diclofenac (VOLTAREN) 50 MG EC tablet Take 1 tablet (50 mg) by mouth 3 times daily as needed for moderate pain 30 tablet 1     fluticasone (FLONASE) 50 MCG/ACT nasal spray Spray 1-2 sprays into both nostrils daily 1 Bottle 11     fluticasone (VERAMYST) 27.5 MCG/SPRAY spray Spray 1-2 sprays into both nostrils daily 10 g 3     gabapentin (NEURONTIN) 300 MG capsule Take 1 capsule (300 mg) by mouth 2 times daily 90 capsule 1     lamoTRIgine (LAMICTAL) 150 MG tablet Take 1 tablet (150 mg) by mouth 2 times daily 180 tablet 3     loratadine (CLARITIN) 10 MG tablet Take 1 tablet (10 mg) by mouth daily 30 tablet 11     naproxen (NAPROSYN) 500 MG tablet Take 1 tablet (500 mg) by mouth 2 times daily as needed for moderate pain 30 tablet 1     order for DME Equipment being ordered: tennis elbow strap 1 Units 0     order for DME Equipment being ordered: Adult incontinence diapers 150 Units 0     order for DME Depends- or other adult diapers. 1 Box 99     order for DME Equipment being ordered: 4 wheeled walker with seat. 1 Device 0     oxybutynin ER (DITROPAN XL) 10 MG 24 hr tablet Take 1 tablet (10 mg) by mouth daily 90 tablet 3     prazosin (MINIPRESS) 5 MG capsule Take 1 capsule (5 mg) by mouth At Bedtime 30 capsule 1     Prenatal Vit-Fe Fumarate-FA (PRENATAL MULTIVITAMIN W/IRON) 27-0.8 MG tablet Take 1 tablet by mouth daily 100 tablet 3     QUEtiapine (SEROQUEL) 100 MG tablet Take 1 tablet (100 mg) by mouth At Bedtime Take at bedtime 90 tablet 1     topiramate (TOPAMAX) 100 MG tablet Take 2 tablets (200 mg) by mouth At Bedtime 180 tablet 0     topiramate (TOPAMAX) 50 MG tablet Take 1 tablet (50 mg) by mouth At Bedtime 60 tablet 1     triamcinolone (KENALOG) 0.1 % cream Apply sparingly to affected area two times daily for 14 days. 30 g 0     valACYclovir (VALTREX) 500 MG tablet Take 1 tablet (500 mg) by mouth 2 times daily 6 tablet 3       O: /69   Pulse 68   Temp 98.1  F (36.7  C) (Oral)   Resp 20   Ht  "1.575 m (5' 2.01\")   Wt 76.2 kg (168 lb)   LMP 05/13/2019 (Approximate)   SpO2 99%   Breastfeeding? No   BMI 30.72 kg/m     Gen:  Well nourished and in No acute distress   CV:  RRR  - no murmurs noted   Pulm:  CTAB, no wheezes or crackles noted, good air entry   ABD: soft, nontender, no masses, no rebound, BS intact throughout, no hepatosplenomegaly. Suprapubic tenderness  Back: no CVA tenderness  Psych: Euthymic      Assessment and Plan:  Mattie was seen today for recheck.    Diagnoses and all orders for this visit:    Dysmenorrhea  -     HCG Qualitative Urine (UPT)  ([facility])  Comment: Discussed with patient negative urine pregnancy test today.  Patient at that time reported that she is interested in having another pregnancy and therefore discussed with patient that due to her age and concern for early menopausal symptoms, patient may be referred to OB/GYN for further discussion.    Encounter for other general counseling or advice on contraception  Patient also interested in learning about IUD for pregnancy prevention in the future.  Patient however would like to have further pregnancies at this time.    Depression:   Comment; elevated PHQ9 today. Patient not here for these concerns however advised patient follow up with her PCP for further discussion regarding this.     Desire for pregnancy  High risk due to medications, age and will need further discussion regarding prepregnancy management.   -     OB/GYN REFERRAL; Future  -     Prenatal Vit-Fe Fumarate-FA (PRENATAL MULTIVITAMIN W/IRON) 27-0.8 MG tablet; Take 1 tablet by mouth daily    Suprapubic tenderness  -     Urinalysis ([facility])  Due to patient's nonspecific complaints and with suprapubic tenderness as well as reported hot and cold flashes, will obtain a urine analysis.     This patient was seen and discussed with Dr. Nieves who agrees with the assessment and plan.     Belkys Lizama, DO  PGY3    "

## 2019-06-11 NOTE — NURSING NOTE
Chief Complaint   Patient presents with     RECHECK     Check Thyroid Funtion F/U       Patient also wants to be checked for pregnancy today.   Gene Salas, CMA

## 2019-06-11 NOTE — PROGRESS NOTES
Preceptor Attestation:   Patient seen, evaluated and discussed with the resident. I have verified the content of the note, which accurately reflects my assessment of the patient and the plan of care.   Supervising Physician:  Adriano Nieves MD

## 2019-06-12 ENCOUNTER — TELEPHONE (OUTPATIENT)
Dept: FAMILY MEDICINE | Facility: CLINIC | Age: 44
End: 2019-06-12

## 2019-06-12 ASSESSMENT — ANXIETY QUESTIONNAIRES: GAD7 TOTAL SCORE: 19

## 2019-06-12 NOTE — TELEPHONE ENCOUNTER
Called patient [facility], would like to discuss what days and times work better for her for the referral Dr. Lizama placed.

## 2019-06-12 NOTE — TELEPHONE ENCOUNTER
Pt c/o abdominal pain-worsen compared to yesterday. No new symptoms. denies fever, chills, dysuria, flank pain. UA and UPT came back.   Paged Dr. Lizama. /GARRY Granados

## 2019-06-12 NOTE — TELEPHONE ENCOUNTER
Per Dr. Lizama, pt should be seen in clinic for new abdominal pain. UA was neg.   Gave instructions to the pt-transferred call to appt line. /GARRY Granados

## 2019-06-12 NOTE — TELEPHONE ENCOUNTER
[facility] phone call message- patient requesting results:    Test: Lab    Date of test: YESTERDAY    Additional Comments: She would like a call back with her results.    OK to leave a message on voice mail? Yes    Primary language: English      needed? No    Call taken on June 12, 2019 at 10:28 AM by Nate Nevarez

## 2019-06-13 ENCOUNTER — OFFICE VISIT (OUTPATIENT)
Dept: FAMILY MEDICINE | Facility: CLINIC | Age: 44
End: 2019-06-13
Payer: MEDICAID

## 2019-06-13 VITALS
TEMPERATURE: 97.7 F | HEIGHT: 62 IN | DIASTOLIC BLOOD PRESSURE: 64 MMHG | SYSTOLIC BLOOD PRESSURE: 95 MMHG | OXYGEN SATURATION: 98 % | RESPIRATION RATE: 21 BRPM | WEIGHT: 167.6 LBS | HEART RATE: 69 BPM | BODY MASS INDEX: 30.84 KG/M2

## 2019-06-13 DIAGNOSIS — A59.9 TRICHOMONAS INFECTION: ICD-10-CM

## 2019-06-13 DIAGNOSIS — N92.6 ABNORMAL MENSES: ICD-10-CM

## 2019-06-13 DIAGNOSIS — R10.2 SUPRAPUBIC PAIN: Primary | ICD-10-CM

## 2019-06-13 DIAGNOSIS — Z12.4 SCREENING FOR CERVICAL CANCER: ICD-10-CM

## 2019-06-13 LAB
BACTERIA: NORMAL
BACTERIA: NORMAL
BILIRUBIN UR: ABNORMAL
BLOOD UR: NEGATIVE
CASTS: NORMAL /LPF
CLUE CELLS: NORMAL
CRYSTAL URINE: NORMAL /LPF
EPITHELIAL CELLS UR: NORMAL /LPF (ref 0–2)
ERYTHROCYTE [DISTWIDTH] IN BLOOD BY AUTOMATED COUNT: 16.8 % (ref 11–14.5)
FSH: 6.7 MIU/ML
GLUCOSE URINE: NEGATIVE
HCG UR QL: NEGATIVE
HCT VFR BLD AUTO: 42.2 % (ref 35–47)
HGB BLD-MCNC: 12.4 G/DL (ref 12–16)
KETONES UR QL: ABNORMAL
LEUKOCYTE ESTERASE UR: NEGATIVE
LH, SERUM: 5.2 MIU/ML
MCH RBC QN AUTO: 24.1 PG (ref 27–34)
MCHC RBC AUTO-ENTMCNC: 29.4 G/DL (ref 32–36)
MCV RBC AUTO: 82 FL (ref 80–100)
MOTILE TRICHOMONAS: POSITIVE
MUCOUS URINE: NORMAL LPF
NITRITE UR QL STRIP: NEGATIVE
ODOR: NORMAL
PH UR STRIP: 5.5 [PH] (ref 4.5–8)
PH WET PREP: NORMAL (ref 3.8–4.5)
PLATELET # BLD AUTO: 342 THOU/UL (ref 140–440)
PMV BLD AUTO: 11.2 FL (ref 8.5–12.5)
PROTEIN UR: ABNORMAL
RBC # BLD AUTO: 5.15 MILL/UL (ref 3.8–5.4)
RBC URINE: NORMAL /HPF
SP GR UR STRIP: 1.02 (ref 1–1.03)
UROBILINOGEN UR STRIP-ACNC: ABNORMAL
WBC # BLD AUTO: 5.9 THOU/UL (ref 4–11)
WBC URINE: <2 /HPF
WBC WET PREP: <2 (ref 2–5)
YEAST: NORMAL

## 2019-06-13 ASSESSMENT — MIFFLIN-ST. JEOR: SCORE: 1363.48

## 2019-06-13 NOTE — PROGRESS NOTES
"Subjective:  Mattie Pierre is a 43 year old woman with a past medical history significant for:    Patient Active Problem List   Diagnosis     Bipolar disorder (H)     Obesity     Cellulitis of axilla, left     Iron deficiency anemia     Seizure disorder (H)     Hidradenitis suppurativa     Urgency incontinence     Chronic pain syndrome     Acne vulgaris     Tinea capitis     Fissure in skin     Cocaine abuse (H)     Depression     Hypertension     Subclinical hypothyroidism     Vitamin D deficiency     Ganglion cyst of right foot     Hypertrophy of bone     She was last seen on 6/11/19. At that time, she was incidentally found to have mild suprapubic tenderness to palpation. UPT was negative. Urinalysis was negative for UTI, but showed 1+ bilirubin, trace ketones, 1+ protein, and trace-lysed blood.    She is presenting for worsened suprapubic pain. She states that the pain she was found to have at her last visit worsened yesterday. She describes the pain as a feeling of pressure in the lower part of her abdomen. She states that the pain was so bad yesterday that she couldn't walk. She curled up in the fetal position on her side all day. She also describes having had discomfort with bumps in the road while riding in the car yesterday. The pain is much better today, although she still has some mild pain. She denies any fever, chills, dysuria, back pain, diarrhea, or constipation. She was last sexually active last week. She states that she had some pain with intercourse at that time. She still has not had her period. She denies any change in vaginal discharge or vaginal bleeding. She notes that her acid reflux has been worse recently.    Objective:  BP 95/64 (BP Location: Left arm, Patient Position: Sitting, Cuff Size: Adult Regular)   Pulse 69   Temp 97.7  F (36.5  C) (Oral)   Resp 21   Ht 1.575 m (5' 2\")   Wt 76 kg (167 lb 9.6 oz)   SpO2 98%   BMI 30.65 kg/m      General: Awake and alert. Pleasant. Middle aged " "woman. No acute distress.  Heart: RRR. No murmurs, gallops, or rubs.  Lung: CTAB. No wheezes/rales/rhonchi. Good air movement.  Abdomen: Bowel sounds present. Soft. Moderate tenderness to palpation of suprapubic region. No masses appreciated. No hepatomegaly or splenomegaly. No rebound or rigidity   Back: No CVA tenderness.  : Moist, pink vaginal rugae. Creamy, white, foul smelling discharge. Cervix tilted to the right. Mild atrophy of cervix. No lesions. No cervical motion tenderness. No bleeding.  Psych: \"Okay\" mood. Appropriate affect.    Assessment and Plan:    Mattie was seen today for abdominal pain.    Diagnoses and all orders for this visit:    Suprapubic pain  Severe abdominal pain yesterday however no peritoneal signs on exam today. UPT negative on 6/11/19. Urinalysis negative for UTI, but showed 1+ bilirubin, trace ketones, 1+ protein, and trace-lysed blood on 6/11/19. Differential remains broad. Concern due to description of possible peritoneal signs yesterday. Possible constipation, as well as gynecologic or urinary etiology. Dyspareunia last week. No history of STI or abnormal Pap. Mild cervical atrophy. No cervical motion tenderness. Unlikely ectopic pregnancy based on negative UPT at last visit. Unlikely UTI based on previous urinalysis and lack of urinary symptoms. Denies fever, chills, dysuria, back pain, diarrhea, constipation, change in vaginal discharge, and vaginal bleeding.  -     Urinalysis, Micro If ([facility])  -     HCG Qualitative Urine (UPT)  ([facility])  -     Urine Microscopic ([facility])  -     CBC w/ Plt. ([facility])  -     Wet Prep ([facility])  -     Chlamydia/Gono Amplified ([facility])    Abnormal menses  Possible early menopausal symptoms.   -     FSH ([facility])  -     LH ([facility])  -     Wet Prep ([facility])    Health maintenance:  -- Last mammogram on 7/28/17 normal.  -- Last Pap on 7/25/16 normal. Performed Pap today.    I discussed the patient with Dr. Lizama who is in agreement with " the assessment and plan.      Tana Conn, MS4    Resident/Fellow Attestation   I, Belkys Lizama, was present with the medical student who participated in the service and in the documentation of the note.  I have verified the history and personally performed the physical exam and medical decision making.  I agree with the assessment and plan of care as documented in the note.      Patient was seen and discussed with Dr. Rosario who agrees with assessment and plan.     Belkys Lizama MD  PGY3

## 2019-06-13 NOTE — LETTER
June 25, 2019      Mattie Pierre  [address]  [place] [address]        Dear Mattie Pierre,     The results from your pap smear was normal. This is good news. We will plan to see you back for a yearly physical however the pap smear is not needed for 5 more years. As previously mentioned you did have trichomonas which I would like you to return for a recheck this week. Please call clinic to do this. [phone]     Thank you for allowing me to be a part of your health care!     Sincerely,   Dr. Lizama     Resulted Orders   Urinalysis, Micro If ([facility])   Result Value Ref Range    Specific Gravity Urine 1.025 1.005 - 1.030    pH Urine 5.5 4.5 - 8.0    Leukocyte Esterase UR Negative -ATIVE    Nitrite Urine Negative -ATIVE    Protein UR 2+ (A) -ATIVE    Glucose Urine Negative -ATIVE    Ketones Urine Trace (A) -ATIVE    Urobilinogen mg/dL 0.2 E.U./dL 0.2 E.U./dL    Bilirubin UR 1+ (A) -ATIVE    Blood UR Negative -ATIVE   HCG Qualitative Urine (UPT)  ([facility])   Result Value Ref Range    HCG Qual Urine NEGATIVE Negative   Urine Microscopic ([facility])   Result Value Ref Range    WBC Urine <2 <5 /hpf    RBC Urine None <5 /hpf    Epithelial Cells UR 2-5 0 - 2 /lpf    Mucous Urine Moderate NONE lpf    Casts Urine None NONE /lpf    Crystal Urine None NONE /lpf    Bacteria Wet Prep Few None   FSH ([facility])   Result Value Ref Range    FSH 6.7 mIU/mL      Comment:         Females:     Prepubertal     0-10 mIU/mL    Follicular      3-20 mIU/mL    Luteal          0-12 mIU/mL    Ovulatory       9-26 mIU/mL    Postmenopausal   mIU/mL      Narrative    Test performed by:  [facility]  [address] [place] [address]   LH ([facility])   Result Value Ref Range    LH 5.2 mIU/mL    Narrative    Test performed by:  [facility]  [address] [place] [address]  Male:......................................0.6-12.1 mIU/mL  Female:  Follicular..................................1.8-11.8  mIU/mL  Mid-Cycle...................................7.6-89.1 mIU/mL  Luteal......................................0.6-14.0 mIU/mL  Postmenopausal..............................5.2-62.0 mIU/mL  Male and Female:  Prepubertal.................................1.0-3.5 mIU/mL  Prepubertal ranges from Pediatric Reference  Intervals; Jcarlos, Chace López and Jcarlos;  7th Edition; 2011   CBC w/ Plt. ([facility])   Result Value Ref Range    WBC 5.9 4.0 - 11.0 thou/uL    RBC 5.15 3.80 - 5.40 mill/uL    Hemoglobin 12.4 12.0 - 16.0 g/dL    Hematocrit 42.2 35.0 - 47.0 %    MCV 82 80 - 100 fL    MCH 24.1 (L) 27.0 - 34.0 pg    MCHC 29.4 (L) 32.0 - 36.0 g/dL    RDW 16.8 (H) 11.0 - 14.5 %    Platelets 342 140 - 440 thou/uL    Mean Platelet Volume 11.2 8.5 - 12.5 fL    Narrative    Test performed by:  [facility]  [address] [place] [address]   Wet Prep ([facility])   Result Value Ref Range    Yeast Wet Prep None none    Motile Trichomonas Wet Prep Positive Negative    Clue Cells Wet Prep None NONE    WBC WET PREP <2 2 - 5    Bacteria Wet Prep Few None    pH Wet Prep Not performed 3.8 - 4.5    Odor Wet Prep None NONE   Chlamydia/Gono Amplified ([facility])   Result Value Ref Range    Chlamydia trac,Amplified Prb Negative Negative    N gonorrhoeae,Amplified Prb Negative Negative    Narrative    Test performed by:  [facility]  [address] [place] [address]   GYN Cytology ([facility])   Result Value Ref Range    Lab AP Case Report SEE RESULTS BELOW       Comment:      Gynecologic Cytology Report                       Case: [id number]                                     Authorizing Provider:  Gil Rosario MD       Collected:             06/13/2019 1114              Ordering Location:      [facility] Received:              06/14/2019 0925              First Screen:          Karla Alvarenga CT                                                                                 (ASCP)                                                                          Rescreen:              Duyen Rojas CT                                                                              (ASCP)                                                                         Specimen:    SUREPATH PAP, SCREENING, Endocervical/cervical                                               Lab AP Gyn Interpretation SEE RESULTS BELOW Negative for squamous intraepithelial le      Comment:      Negative for squamous intraepithelial lesion or malignancy  Electronically signed by Duyen Rojas CT (ASCP) on 6/24/2019 at  8:30   AM      Lab AP Malignant? Normal Normal    Specimen adequacy:       Satisfactory for evaluation, endocervical/transformation zone component absent    HPV Reflex? Yes regardless of result     High Risk? No     Last Mens Period 5/12/19     Lab AP Abnormal Bleeding No     Lab AP Patient Status None     Lab AP Birth Control/Hormones None     Lab AP Previous Normal 7/2016     Lab AP Previous Abnl unknown per patient     Lab AP Cervical Appearance pink, atrophic     Narrative    Test performed by:  [facility] [address] [place] [address]   HPV [place] PCR ([facility])   Result Value Ref Range    HPV Source SurePath     HPV 16 DNA Negative NEG    HPV 18 DNA Negative NEG    Other HR HPV Negative NEG    Final Diagnosis SEE NOTES       Comment:      This patient's sample is negative for HPV DNA.  This test was developed and its performance characteristics determined by the  [facility], [facility]. It  has not been cleared or approved by the FDA. The laboratory is regulated under  CLIA as qualified to perform high-complexity testing. This test is used for  clinical purposes. It should not be regarded as investigational or for  research.  (Note)  METHODOLOGY:  The Roche justine 4800 system uses automated extraction,  simultaneous amplification of HPV (L1 region)  and beta-globin,  followed by  real time detection of fluorescent labeled HPV and beta  globin using specific oligonucleotide probes . The test specifically  identifies types HPV 16 DNA and HPV 18 DNA while concurrently  detecting the rest of the high risk types (31, 33, 35, 39, 45, 51,  52, 56, 58, 59, 66 or 68).     COMMENTS:  This test is not intended for use as a screening device  for women under age 30 with normal cervical   cytology.  Results should  be correlated with cytologic and histologic findings. Close clinical  followup is recommended.         Specimen Description Cervical Cells       Comment:      Performed and/or entered by:  [address] [address]       Narrative    Test performed by:  [facility]  [address], [place] [address]       If you have any questions, please call the clinic to make an appointment.    Sincerely,    Belkys Lizama MD

## 2019-06-14 ENCOUNTER — TELEPHONE (OUTPATIENT)
Dept: FAMILY MEDICINE | Facility: CLINIC | Age: 44
End: 2019-06-14

## 2019-06-14 LAB
C TRACH RRNA CVX QL NAA+PROBE: NEGATIVE
FINAL DIAGNOSIS: NORMAL
HPV 16 DNA: NEGATIVE
HPV 18 DNA: NEGATIVE
HPV SOURCE: NORMAL
N GONORRHOEA RRNA SPEC QL NAA+PROBE: NEGATIVE
OTHER HR HPV: NEGATIVE
SPECIMEN DESCRIPTION: NORMAL

## 2019-06-14 RX ORDER — METRONIDAZOLE 500 MG/1
2000 TABLET ORAL ONCE
Qty: 4 TABLET | Refills: 0 | Status: SHIPPED | OUTPATIENT
Start: 2019-06-14 | End: 2019-06-14

## 2019-06-14 ASSESSMENT — ANXIETY QUESTIONNAIRES
7. FEELING AFRAID AS IF SOMETHING AWFUL MIGHT HAPPEN: SEVERAL DAYS
IF YOU CHECKED OFF ANY PROBLEMS ON THIS QUESTIONNAIRE, HOW DIFFICULT HAVE THESE PROBLEMS MADE IT FOR YOU TO DO YOUR WORK, TAKE CARE OF THINGS AT HOME, OR GET ALONG WITH OTHER PEOPLE: SOMEWHAT DIFFICULT
6. BECOMING EASILY ANNOYED OR IRRITABLE: MORE THAN HALF THE DAYS
2. NOT BEING ABLE TO STOP OR CONTROL WORRYING: MORE THAN HALF THE DAYS
1. FEELING NERVOUS, ANXIOUS, OR ON EDGE: NEARLY EVERY DAY
5. BEING SO RESTLESS THAT IT IS HARD TO SIT STILL: NEARLY EVERY DAY
3. WORRYING TOO MUCH ABOUT DIFFERENT THINGS: NEARLY EVERY DAY
GAD7 TOTAL SCORE: 17

## 2019-06-14 ASSESSMENT — PATIENT HEALTH QUESTIONNAIRE - PHQ9
SUM OF ALL RESPONSES TO PHQ QUESTIONS 1-9: 15
5. POOR APPETITE OR OVEREATING: NEARLY EVERY DAY

## 2019-06-14 NOTE — TELEPHONE ENCOUNTER
Gave wet prep and UA results to the pt.     Pt aware of positive trichomonas result---please send txt to [facility].     Pt would like result of chlamydia and gonorrhea/ pap smear results when they come in.     Routed to Dr. Lizama. /GARRY Granados

## 2019-06-14 NOTE — LETTER
June 18, 2019      Mattie Pierre  [address]  [place] [address]        Dear Mattie,    The following are your lab results. Please call the clinic if you have any questions or concerns.     Component      Latest Ref Rng & Units 6/13/2019   Chlamydia trac,Amplified Prb      Negative Negative   N gonorrhoeae,Amplified Prb      Negative Negative       Sincerely,    Dr. Lizama

## 2019-06-14 NOTE — TELEPHONE ENCOUNTER
[facility] phone call message- general phone call:    Reason for call: She would like a call back with her results.    Return call needed: Yes    OK to leave a message on voice mail? Yes    Primary language: English      needed? No    Call taken on June 14, 2019 at 8:22 AM by Nate Nevarez

## 2019-06-15 ASSESSMENT — ANXIETY QUESTIONNAIRES: GAD7 TOTAL SCORE: 17

## 2019-06-18 NOTE — TELEPHONE ENCOUNTER
Discussed chlamydia and gonorrhea result with the pt. Negative from 6/13/19.  Pt wanted her results mailed. Mailed today. /GARRY Granados

## 2019-06-20 ENCOUNTER — MEDICAL CORRESPONDENCE (OUTPATIENT)
Dept: HEALTH INFORMATION MANAGEMENT | Facility: CLINIC | Age: 44
End: 2019-06-20

## 2019-06-24 ENCOUNTER — RECORDS - HEALTHEAST (OUTPATIENT)
Dept: ADMINISTRATIVE | Facility: OTHER | Age: 44
End: 2019-06-24

## 2019-06-24 LAB
CYTOLOGY CVX/VAG DOC THIN PREP: NORMAL
HIGH RISK?: NO
HPV REFLEX?: NORMAL
LAB AP ABNORMAL BLEEDING: NO
LAB AP BIRTH CONTROL/HORMONES: NORMAL
LAB AP CERVICAL APPEARANCE: NORMAL
LAB AP PATIENT STATUS: NORMAL
LAB AP PREVIOUS ABNL: NORMAL
LAB AP PREVIOUS NORMAL: NORMAL
LAST MENS PERIOD: NORMAL
PATH REPORT.COMMENTS IMP SPEC: NORMAL
PATH REPORT.COMMENTS IMP SPEC: NORMAL
SPECIMEN ADEQUACY:: NORMAL

## 2019-06-25 NOTE — RESULT ENCOUNTER NOTE
Please send letter.     Dear Mattie Pierre,     The results from your pap smear was normal. This is good news. We will plan to see you back for a yearly physical however the pap smear is not needed for 5 more years. As previously mentioned you did have trichomonas which I would like you to return for a recheck this week. Please call clinic to do this. [phone]    Thank you for allowing me to be a part of your health care!    Sincerely,   Dr. Lizama  6/25/2019

## 2019-07-05 ENCOUNTER — TELEPHONE (OUTPATIENT)
Dept: FAMILY MEDICINE | Facility: CLINIC | Age: 44
End: 2019-07-05

## 2019-07-05 NOTE — TELEPHONE ENCOUNTER
Patient was transferred to me to discuss no shows. Patient has no showed 5 appointments in the past year.    Explained that I want to explore any barriers with her. Patient didn't communicate any barriers but said that she will work on cancelling her appointments when she can't make it.    Confirmed/Updated patients contact information. Will confirm that they are set up to receive reminder calls/texts.    Shila Alvarez

## 2019-07-12 ENCOUNTER — OFFICE VISIT (OUTPATIENT)
Dept: FAMILY MEDICINE | Facility: CLINIC | Age: 44
End: 2019-07-12
Payer: MEDICAID

## 2019-07-12 VITALS
RESPIRATION RATE: 16 BRPM | OXYGEN SATURATION: 97 % | WEIGHT: 171.2 LBS | TEMPERATURE: 98.3 F | HEART RATE: 89 BPM | DIASTOLIC BLOOD PRESSURE: 73 MMHG | BODY MASS INDEX: 31.31 KG/M2 | SYSTOLIC BLOOD PRESSURE: 112 MMHG

## 2019-07-12 DIAGNOSIS — L30.1 DYSHIDROTIC ECZEMA: ICD-10-CM

## 2019-07-12 DIAGNOSIS — R19.5 DARK STOOLS: ICD-10-CM

## 2019-07-12 DIAGNOSIS — F32.9 MAJOR DEPRESSIVE DISORDER WITH CURRENT ACTIVE EPISODE, UNSPECIFIED DEPRESSION EPISODE SEVERITY, UNSPECIFIED WHETHER RECURRENT: ICD-10-CM

## 2019-07-12 DIAGNOSIS — A59.9 TRICHOMONIASIS: Primary | ICD-10-CM

## 2019-07-12 LAB
BACTERIA: NORMAL
CLUE CELLS: NORMAL
MOTILE TRICHOMONAS: NEGATIVE
ODOR: NORMAL
PH WET PREP: NORMAL (ref 3.8–4.5)
WBC WET PREP: <2 (ref 2–5)
YEAST: NORMAL

## 2019-07-12 RX ORDER — CLOBETASOL PROPIONATE 0.5 MG/G
OINTMENT TOPICAL 2 TIMES DAILY
Qty: 30 G | Refills: 0 | Status: SHIPPED | OUTPATIENT
Start: 2019-07-12 | End: 2019-07-12

## 2019-07-12 RX ORDER — CLOBETASOL PROPIONATE 0.5 MG/G
OINTMENT TOPICAL 2 TIMES DAILY
Qty: 30 G | Refills: 0 | Status: SHIPPED | OUTPATIENT
Start: 2019-07-12 | End: 2019-07-26

## 2019-07-12 NOTE — PROGRESS NOTES
"       SUBJECTIVE       Mattie Pierre is a 43 year old  female with a PMH significant for:     Patient Active Problem List   Diagnosis     Bipolar disorder (H)     Obesity     Cellulitis of axilla, left     Iron deficiency anemia     Seizure disorder (H)     Hidradenitis suppurativa     Urgency incontinence     Chronic pain syndrome     Acne vulgaris     Tinea capitis     Fissure in skin     Cocaine abuse (H)     Depression     Hypertension     Subclinical hypothyroidism     Vitamin D deficiency     Ganglion cyst of right foot     Hypertrophy of bone     Mattie is here today for follow-up of Trichomonas which was diagnosed on a wet prep on 6/13/19 at which point she had suprapubic pain. Thes rest of the workup was negative. She has never had this before. 1 sexual partner. She was not treated here but she reports she was treated with 4pills, 1 dose, or a medicine that started with a \"T\". She reports that she got treated at this clinic around the 6/15/19. She recommended that her partner get treated but she is unsure if he got treated. She has had sex with him one time since 6/13.    She also reports that she has had some dark stool yesterday and today. She ha never had this before. No diarrhea or constipation. No bright red blood in the rectum. She reports having heartburn symptoms when she eats tomato products. She has not had any heartburn symptoms for 1-2wks. She has not had any increased fatigue.    She also reports worsening of her dyshidrotic eczema. She is using Vaseline and previously used Tmovate for 1 week in April 2019 which did not help.     PMH, Medications and Allergies were reviewed and updated as needed.    No illicit drug use at this time. No personal or known family history of a GI bleed.         OBJECTIVE     Vitals:    07/12/19 1530   BP: 112/73   BP Location: Right arm   Patient Position: Sitting   Pulse: 89   Resp: 16   Temp: 98.3  F (36.8  C)   TempSrc: Oral   SpO2: 97%   Weight: 77.7 kg (171 " lb 3.2 oz)     Body mass index is 31.31 kg/m .    General :  healthy and alert, no distress  HEENT:  PERRLA, MMM, no JVD, no discharge, sclera anicteric,   Cardiovascular: regular rate and rhythm, normal S1/S2 no other heart sounds  Respiratory:  CTA, normal respiratory effort  Musculoskeletal: Area of scaly, thickened, erythematous, lichenified plaque. No vesicles or weeping.   Abdomen:                  Non-tender, non-distended  Skin:   no lesions or rashes on exposed skin  Neurological:  normal gait, no gross defects, moves all fours  Psychiatric:  Appropriate. Depressed mood. No suicidal or homicidal ideation. Occasionally rocks back and forth.                       Results for orders placed or performed in visit on 07/12/19 (from the past 24 hour(s))   Wet Prep ([facility])   Result Value Ref Range    Yeast Wet Prep None none    Motile Trichomonas Wet Prep Negative Negative    Clue Cells Wet Prep None NONE    WBC WET PREP <2 2 - 5    Bacteria Wet Prep Few None    pH Wet Prep Not performed 3.8 - 4.5    Odor Wet Prep None NONE       ASSESSMENT AND PLAN     Mattie was seen today for follow up of trichomonas and dyshidrotic eczema     Diagnoses and all orders for this visit:    Trichomoniasis: Negative wet prep, will not treat, Return if she develops symptoms. Recommend that partner get treated if he has not already.   -     Wet Prep ([facility])    Dyshidrotic eczema: Use the Temovate two times per day. Wait 3-5 mintues after applying the ointment and then apply Vaseline as you have previous done. 14 day treatment course.   -     clobetasol (TEMOVATE) 0.05 % external ointment; Apply topically 2 times daily    Dark Stools: Dark brown stools for the last 2 days. No blood. Rare heartburn symptoms. No fatigue. Low concern for gastritis or GI bleed. Will follow-up in 4 weeks or sooner if more symptoms.     RTC in 4 weeks for follow up of Dyshidrotic Eczema or sooner if develops new or worsening symptoms.    Discussed with   MD Marcelo Medellin PGY 2  [facility]

## 2019-07-12 NOTE — PATIENT INSTRUCTIONS
[facility]  [address]   [address]  [place] [address]  [phone] (for appointments)  Fax: [phone]  [facility]  [address]  [place] [address]  [phone]    If you have thoughts about self harm or if you just need additional support and care, here are some resources for you:    Crisis Lines:    Psychiatric Adult Crisis:  [phone]  [facility] Multilingual Crisis Line:  [phone]  [facility] Adult Crisis:  [phone]    Crisis Text Line: Text [place] to 823528. Free support at your fingertips 24/7  People who text [place] to 224031 will be connected with a counselor. Crisis Text Line is available 24 hours a day, seven days a week.    You can also consider going to the [facility] at the following address.  Walk ins are welcome:    [address], [place]   [phone] (for 24 hour crisis consultation)    Monday - Friday 8:00am - 7:00pm  Saturday:  11:00am - 3:00pm  Sunday and Holidays Closed    If you feel at risk of immediate harm, go directly to the Emergency Department.    Your wet prep was negative. You do not have Trichomonas.     Use the Temovate two times per day. Wait 3-5 mintues after applying the ointment and then apply Vaseline as you have previous done. Only do for 14 days.
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
## 2019-07-15 NOTE — PROGRESS NOTES
Preceptor Attestation:   Patient seen, evaluated and discussed with the resident. I have verified the content of the note, which accurately reflects my assessment of the patient and the plan of care.   Supervising Physician:  Justice Rodriguez MD

## 2019-07-16 ENCOUNTER — DOCUMENTATION ONLY (OUTPATIENT)
Dept: FAMILY MEDICINE | Facility: CLINIC | Age: 44
End: 2019-07-16

## 2019-07-16 NOTE — PROGRESS NOTES
Behavioral Health Team,    Patient is being referred for mental health services by their provider, Dr. Crump. After Visit Summary and notation by Dr. Steele at 7/12/19 appt indicate that referral resources were already provided to patient. Therefore this referral will be closed.    SUDHIR Currie

## 2019-07-25 DIAGNOSIS — G40.909 SEIZURE DISORDER (H): ICD-10-CM

## 2019-07-26 RX ORDER — LAMOTRIGINE 150 MG/1
150 TABLET ORAL 2 TIMES DAILY
Qty: 180 TABLET | Refills: 3 | Status: SHIPPED | OUTPATIENT
Start: 2019-07-26 | End: 2021-12-21

## 2019-08-20 DIAGNOSIS — F31.31 BIPOLAR AFFECTIVE DISORDER, CURRENTLY DEPRESSED, MILD (H): ICD-10-CM

## 2019-08-20 RX ORDER — BUPROPION HYDROCHLORIDE 75 MG/1
75 TABLET ORAL AT BEDTIME
Qty: 90 TABLET | Refills: 0 | Status: SHIPPED | OUTPATIENT
Start: 2019-08-20 | End: 2022-06-24

## 2019-08-26 ENCOUNTER — OFFICE VISIT (OUTPATIENT)
Dept: FAMILY MEDICINE | Facility: CLINIC | Age: 44
End: 2019-08-26
Payer: MEDICAID

## 2019-08-26 ENCOUNTER — DOCUMENTATION ONLY (OUTPATIENT)
Dept: FAMILY MEDICINE | Facility: CLINIC | Age: 44
End: 2019-08-26

## 2019-08-26 VITALS
HEART RATE: 63 BPM | OXYGEN SATURATION: 97 % | WEIGHT: 172 LBS | BODY MASS INDEX: 31.65 KG/M2 | HEIGHT: 62 IN | DIASTOLIC BLOOD PRESSURE: 82 MMHG | RESPIRATION RATE: 16 BRPM | TEMPERATURE: 97.7 F | SYSTOLIC BLOOD PRESSURE: 133 MMHG

## 2019-08-26 DIAGNOSIS — J06.9 VIRAL URI WITH COUGH: Primary | ICD-10-CM

## 2019-08-26 DIAGNOSIS — M79.10 MYALGIA: ICD-10-CM

## 2019-08-26 RX ORDER — FLUTICASONE PROPIONATE 50 MCG
1-2 SPRAY, SUSPENSION (ML) NASAL DAILY
Qty: 18.2 ML | Refills: 0 | Status: SHIPPED | OUTPATIENT
Start: 2019-08-26 | End: 2019-09-09

## 2019-08-26 RX ORDER — ACETAMINOPHEN 325 MG/1
325-650 TABLET ORAL EVERY 6 HOURS PRN
Qty: 1 BOTTLE | Refills: 3 | Status: SHIPPED | OUTPATIENT
Start: 2019-08-26 | End: 2022-10-14

## 2019-08-26 RX ORDER — DEXTROMETHORPHAN HBR. AND GUAIFENESIN 10; 100 MG/5ML; MG/5ML
5 SOLUTION ORAL 4 TIMES DAILY PRN
Qty: 237 ML | Refills: 0 | Status: SHIPPED | OUTPATIENT
Start: 2019-08-26 | End: 2023-01-11

## 2019-08-26 ASSESSMENT — MIFFLIN-ST. JEOR: SCORE: 1381.69

## 2019-08-26 NOTE — PATIENT INSTRUCTIONS
Nice to meet you today!    Please try taking robitussin and flonase for relief of your cold symptoms. Your viral upper respiratory infection may take up to 1-2 weeks before you start to see improvement.    Please come back and be seen if develop a fever or new symptoms or symptoms worsen after one week.    Thank you,  Dr. Arriaga

## 2019-08-26 NOTE — PROGRESS NOTES
Patient was transferred to me to discuss no shows. Patient has no showed 5 appointments in the past year.    Explained that I want to explore any barriers with her. Patient said that with her memory loss she struggles to remember appointments. She confirmed her phone number and said that she will continue to work on telling her ILS worker when she has appointments so that they can reming her too.    Also asked that her call and cancel appointments if she isn't going to make it in.    Shila Alvarez

## 2019-08-26 NOTE — PROGRESS NOTES
ASSESSMENT AND PLAN     1. Viral URI with cough  2. Myalgia  Four days of ongoing URI symptoms with productive cough and myalgias. Afebrile. States minimal improvement in her symptoms. Has not tried anything to alleviate symptoms. Tender cervical lymphadenopathy, however, low suspicion for strep pharyngitis. Low suspicion for bacterial etiology. Likely still undergoing her viral URI and warrants symptomatic cares. Will prescribe flonase and cough suppressants. Also, given tylenol for myalgias.   - fluticasone (FLONASE) 50 MCG/ACT nasal spray; Spray 1-2 sprays into both nostrils daily for 14 days  Dispense: 18.2 mL; Refill: 0  - dextromethorphan-guaiFENesin (TUSSIN DM)  MG/5ML liquid; Take 5 mLs by mouth 4 times daily as needed (cough)  Dispense: 237 mL; Refill: 0  - acetaminophen (TYLENOL) 325 MG tablet; Take 1-2 tablets (325-650 mg) by mouth every 6 hours as needed for mild pain  Dispense: 1 Bottle; Refill: 3    RTC in 7-10 days for follow up of symptoms if no improvement or develops new or worsening symptoms.    The patient was seen by, and discussed with, Dr. Girish Bowles, who agrees with the plan.    Rebeca Arriaga MD PGY2  [facility]         [facility]       Mattie Pierre is a 43 year old  female with a PMH significant for:     Patient Active Problem List   Diagnosis     Bipolar disorder (H)     Obesity     Cellulitis of axilla, left     Iron deficiency anemia     Seizure disorder (H)     Hidradenitis suppurativa     Urgency incontinence     Chronic pain syndrome     Acne vulgaris     Tinea capitis     Fissure in skin     Cocaine abuse (H)     Depression     Hypertension     Subclinical hypothyroidism     Vitamin D deficiency     Ganglion cyst of right foot     Hypertrophy of bone     She presents for follow up from ED visit on 8/23/19.     Was seen at [facility] ED on 8/23/19 with URI type symptoms. She had a cough ongoing for one days prior that was productive of yellow sputum.  "Was given breathing treatment which mildly improved symptoms.     Today, she reports that symptoms are the same. Has been drinking lots of fluids, taking Halls cough drops.   Reports that she had a temperature of 100.4F on her forehead thermometer one time yesterday. Then checked temperature again this morning and was 98.8F. Last took tylenol one day ago for her headache which resolved her symptoms. Has a lot of nasal congestion. Would like another cough medicine. Patient reports that her boyfriend had similar symptoms. They live together.     She smokes 1.5 packs per day.   Works at [employer]. Had to call in and take the day off.     PMH, Medications and Allergies were reviewed and updated as needed.        REVIEW OF SYSTEMS     ROS reviewed in HPI.         OBJECTIVE     Vitals:    08/26/19 1417   BP: 133/82   Pulse: 63   Resp: 16   Temp: 97.7  F (36.5  C)   TempSrc: Oral   SpO2: 97%   Weight: 78 kg (172 lb)   Height: 1.572 m (5' 1.89\")     Body mass index is 31.57 kg/m .    General: Alert, well appearing, no acute distress  Eyes: PERRL, EOMI, mildly erythematous conjunctiva  HENT: Normocephalic, atraumatic, no sinus tenderness; moist mucous membranes, no oropharyngeal erythema  Neck: Mild tender cervical lymphadenopathy  Lungs: Clear to auscultation bilaterally, no wheezing, no rhonchi, no crackles  CV: Regular rate and rhythm, no murmur, no rubs, no gallops  Abdomen: Soft, nontender, non-distended, no masses palpated, normal bowel sounds  Skin: Dry, no cyanosis, no abrasions, no discoloration.      No results found for this or any previous visit (from the past 24 hour(s)).        "

## 2019-08-26 NOTE — PROGRESS NOTES
Preceptor Attestation:   Patient seen, evaluated and discussed with the resident. I have verified the content of the note, which accurately reflects my assessment of the patient and the plan of care.   Supervising Physician:  Girish Funes MD MD

## 2019-09-19 ENCOUNTER — DOCUMENTATION ONLY (OUTPATIENT)
Dept: FAMILY MEDICINE | Facility: CLINIC | Age: 44
End: 2019-09-19

## 2019-09-20 ENCOUNTER — DOCUMENTATION ONLY (OUTPATIENT)
Dept: FAMILY MEDICINE | Facility: CLINIC | Age: 44
End: 2019-09-20

## 2019-09-20 NOTE — PROGRESS NOTES
To be completed in Nursing note:    Please reference list for forms that require a visit for completion.  Please remind patients that providers are given 3-5 business days to complete and return forms.      Form type: Discharge Summary from [facility]    Date form received: 19    Date form completed by Physician: 19    How was form returned to patient (mailed, faxed, or at  for patient to ): fax to [phone] and [phone] ATTN: Kiley White    Date form mailed/faxed/left at  for patient and sent to HIM for [email or web address]    Once form is left for patient, faxed, or mailed PCS will then close the documentation only encounter.

## 2019-09-25 ENCOUNTER — AMBULATORY - HEALTHEAST (OUTPATIENT)
Dept: OTHER | Facility: CLINIC | Age: 44
End: 2019-09-25

## 2019-09-25 ENCOUNTER — DOCUMENTATION ONLY (OUTPATIENT)
Dept: OTHER | Facility: CLINIC | Age: 44
End: 2019-09-25

## 2019-09-27 ENCOUNTER — MEDICAL CORRESPONDENCE (OUTPATIENT)
Dept: HEALTH INFORMATION MANAGEMENT | Facility: CLINIC | Age: 44
End: 2019-09-27

## 2019-10-17 ENCOUNTER — DOCUMENTATION ONLY (OUTPATIENT)
Dept: FAMILY MEDICINE | Facility: CLINIC | Age: 44
End: 2019-10-17

## 2019-10-17 NOTE — PROGRESS NOTES
To be completed in Nursing note:    Please reference list for forms that require a visit for completion.  Please remind patients that providers are given 3-5 business days to complete and return forms.      Form type: Provider Response Form from [facility] Retrospective Drug Utilization Review Program    Date form received: 10/17/19    Date form completed by Physician: 10/18/19    How was form returned to patient (mailed, faxed, or at  for patient to ): fax to [phone]    Date form mailed/faxed/left at  for patient and sent to HIM for scanning:  10/21/19    Once form is left for patient, faxed, or mailed PCS will then close the documentation only encounter.

## 2019-10-22 ENCOUNTER — COMMUNICATION - HEALTHEAST (OUTPATIENT)
Dept: SCHEDULING | Facility: CLINIC | Age: 44
End: 2019-10-22

## 2019-11-07 ENCOUNTER — HEALTH MAINTENANCE LETTER (OUTPATIENT)
Age: 44
End: 2019-11-07

## 2019-12-27 DIAGNOSIS — F31.31 BIPOLAR AFFECTIVE DISORDER, CURRENTLY DEPRESSED, MILD (H): ICD-10-CM

## 2019-12-27 RX ORDER — QUETIAPINE FUMARATE 100 MG/1
100 TABLET, FILM COATED ORAL AT BEDTIME
Qty: 90 TABLET | Refills: 1 | Status: SHIPPED | OUTPATIENT
Start: 2019-12-27 | End: 2022-06-24

## 2020-11-29 ENCOUNTER — HEALTH MAINTENANCE LETTER (OUTPATIENT)
Age: 45
End: 2020-11-29

## 2021-02-14 ENCOUNTER — HEALTH MAINTENANCE LETTER (OUTPATIENT)
Age: 46
End: 2021-02-14

## 2021-05-25 ENCOUNTER — RECORDS - HEALTHEAST (OUTPATIENT)
Dept: ADMINISTRATIVE | Facility: CLINIC | Age: 46
End: 2021-05-25

## 2021-06-04 ENCOUNTER — RECORDS - HEALTHEAST (OUTPATIENT)
Dept: ADMINISTRATIVE | Facility: CLINIC | Age: 46
End: 2021-06-04

## 2021-09-25 ENCOUNTER — HEALTH MAINTENANCE LETTER (OUTPATIENT)
Age: 46
End: 2021-09-25

## 2021-10-18 ENCOUNTER — OFFICE VISIT (OUTPATIENT)
Dept: FAMILY MEDICINE | Facility: CLINIC | Age: 46
End: 2021-10-18
Payer: MEDICAID

## 2021-10-18 VITALS
SYSTOLIC BLOOD PRESSURE: 152 MMHG | DIASTOLIC BLOOD PRESSURE: 101 MMHG | BODY MASS INDEX: 38.47 KG/M2 | OXYGEN SATURATION: 99 % | WEIGHT: 209.6 LBS | HEART RATE: 73 BPM | TEMPERATURE: 97.7 F | RESPIRATION RATE: 18 BRPM

## 2021-10-18 DIAGNOSIS — M65.312 TRIGGER THUMB OF LEFT HAND: Primary | ICD-10-CM

## 2021-10-18 DIAGNOSIS — F31.31 BIPOLAR AFFECTIVE DISORDER, CURRENTLY DEPRESSED, MILD (H): ICD-10-CM

## 2021-10-18 DIAGNOSIS — Z00.00 ENCOUNTER FOR SCREENING AND PREVENTATIVE CARE: ICD-10-CM

## 2021-10-18 DIAGNOSIS — E03.8 SUBCLINICAL HYPOTHYROIDISM: ICD-10-CM

## 2021-10-18 DIAGNOSIS — J30.2 SEASONAL ALLERGIC RHINITIS, UNSPECIFIED TRIGGER: ICD-10-CM

## 2021-10-18 DIAGNOSIS — I10 PRIMARY HYPERTENSION: ICD-10-CM

## 2021-10-18 DIAGNOSIS — L30.8 OTHER ECZEMA: ICD-10-CM

## 2021-10-18 DIAGNOSIS — D50.9 IRON DEFICIENCY ANEMIA, UNSPECIFIED IRON DEFICIENCY ANEMIA TYPE: ICD-10-CM

## 2021-10-18 DIAGNOSIS — G40.909 SEIZURE DISORDER (H): ICD-10-CM

## 2021-10-18 DIAGNOSIS — Z23 HIGH PRIORITY FOR 2019-NCOV VACCINE: ICD-10-CM

## 2021-10-18 LAB
ALBUMIN SERPL-MCNC: 3.5 G/DL (ref 3.5–5)
ALP SERPL-CCNC: 81 U/L (ref 45–120)
ALT SERPL W P-5'-P-CCNC: 24 U/L (ref 0–45)
ANION GAP SERPL CALCULATED.3IONS-SCNC: 10 MMOL/L (ref 5–18)
AST SERPL W P-5'-P-CCNC: 27 U/L (ref 0–40)
BILIRUB SERPL-MCNC: 0.2 MG/DL (ref 0–1)
BUN SERPL-MCNC: 12 MG/DL (ref 8–22)
CALCIUM SERPL-MCNC: 9.7 MG/DL (ref 8.5–10.5)
CHLORIDE BLD-SCNC: 104 MMOL/L (ref 98–107)
CHOLEST SERPL-MCNC: 223 MG/DL
CO2 SERPL-SCNC: 27 MMOL/L (ref 22–31)
CREAT SERPL-MCNC: 0.87 MG/DL (ref 0.6–1.1)
ERYTHROCYTE [DISTWIDTH] IN BLOOD BY AUTOMATED COUNT: 16.4 % (ref 10–15)
FASTING STATUS PATIENT QL REPORTED: NO
GFR SERPL CREATININE-BSD FRML MDRD: 80 ML/MIN/1.73M2
GLUCOSE BLD-MCNC: 101 MG/DL (ref 70–125)
HBA1C MFR BLD: 6.3 % (ref 0–5.6)
HCT VFR BLD AUTO: 38.5 % (ref 35–47)
HDLC SERPL-MCNC: 42 MG/DL
HGB BLD-MCNC: 11.7 G/DL (ref 11.7–15.7)
LDLC SERPL CALC-MCNC: 146 MG/DL
MCH RBC QN AUTO: 22.7 PG (ref 26.5–33)
MCHC RBC AUTO-ENTMCNC: 30.4 G/DL (ref 31.5–36.5)
MCV RBC AUTO: 75 FL (ref 78–100)
PLATELET # BLD AUTO: 428 10E3/UL (ref 150–450)
POTASSIUM BLD-SCNC: 4.3 MMOL/L (ref 3.5–5)
PROT SERPL-MCNC: 7.9 G/DL (ref 6–8)
RBC # BLD AUTO: 5.16 10E6/UL (ref 3.8–5.2)
SODIUM SERPL-SCNC: 141 MMOL/L (ref 136–145)
TRIGL SERPL-MCNC: 176 MG/DL
TSH SERPL DL<=0.005 MIU/L-ACNC: 3.56 UIU/ML (ref 0.3–5)
WBC # BLD AUTO: 7.1 10E3/UL (ref 4–11)

## 2021-10-18 PROCEDURE — 36415 COLL VENOUS BLD VENIPUNCTURE: CPT | Performed by: STUDENT IN AN ORGANIZED HEALTH CARE EDUCATION/TRAINING PROGRAM

## 2021-10-18 PROCEDURE — 80053 COMPREHEN METABOLIC PANEL: CPT | Performed by: STUDENT IN AN ORGANIZED HEALTH CARE EDUCATION/TRAINING PROGRAM

## 2021-10-18 PROCEDURE — 99214 OFFICE O/P EST MOD 30 MIN: CPT | Mod: 25 | Performed by: STUDENT IN AN ORGANIZED HEALTH CARE EDUCATION/TRAINING PROGRAM

## 2021-10-18 PROCEDURE — 0001A COVID-19,PF,PFIZER (12+ YRS): CPT | Performed by: STUDENT IN AN ORGANIZED HEALTH CARE EDUCATION/TRAINING PROGRAM

## 2021-10-18 PROCEDURE — 84443 ASSAY THYROID STIM HORMONE: CPT | Performed by: STUDENT IN AN ORGANIZED HEALTH CARE EDUCATION/TRAINING PROGRAM

## 2021-10-18 PROCEDURE — 80061 LIPID PANEL: CPT | Performed by: STUDENT IN AN ORGANIZED HEALTH CARE EDUCATION/TRAINING PROGRAM

## 2021-10-18 PROCEDURE — 91300 COVID-19,PF,PFIZER (12+ YRS): CPT | Performed by: STUDENT IN AN ORGANIZED HEALTH CARE EDUCATION/TRAINING PROGRAM

## 2021-10-18 PROCEDURE — 83036 HEMOGLOBIN GLYCOSYLATED A1C: CPT | Performed by: STUDENT IN AN ORGANIZED HEALTH CARE EDUCATION/TRAINING PROGRAM

## 2021-10-18 PROCEDURE — 85027 COMPLETE CBC AUTOMATED: CPT | Performed by: STUDENT IN AN ORGANIZED HEALTH CARE EDUCATION/TRAINING PROGRAM

## 2021-10-18 RX ORDER — DESOXIMETASONE 2.5 MG/G
CREAM TOPICAL 2 TIMES DAILY
Qty: 60 G | Refills: 0 | Status: SHIPPED | OUTPATIENT
Start: 2021-10-18 | End: 2023-01-11

## 2021-10-18 RX ORDER — QUETIAPINE FUMARATE 200 MG/1
200 TABLET, FILM COATED ORAL AT BEDTIME
Qty: 30 TABLET | Refills: 0 | Status: SHIPPED | OUTPATIENT
Start: 2021-10-18 | End: 2021-11-27

## 2021-10-18 RX ORDER — HYDROCHLOROTHIAZIDE 12.5 MG/1
12.5 TABLET ORAL DAILY
Qty: 30 TABLET | Refills: 0 | Status: SHIPPED | OUTPATIENT
Start: 2021-10-18 | End: 2021-12-21

## 2021-10-18 RX ORDER — QUETIAPINE FUMARATE 25 MG/1
50 TABLET, FILM COATED ORAL EVERY MORNING
Qty: 60 TABLET | Refills: 0 | Status: SHIPPED | OUTPATIENT
Start: 2021-10-18 | End: 2021-11-27

## 2021-10-18 RX ORDER — AMLODIPINE BESYLATE 10 MG/1
10 TABLET ORAL DAILY
Qty: 60 TABLET | Refills: 0 | Status: SHIPPED | OUTPATIENT
Start: 2021-10-18 | End: 2021-12-28

## 2021-10-18 RX ORDER — PETROLATUM,WHITE
OINTMENT IN PACKET (GRAM) TOPICAL
Qty: 390 G | Refills: 0 | Status: SHIPPED | OUTPATIENT
Start: 2021-10-18 | End: 2023-01-11

## 2021-10-18 RX ORDER — FERROUS SULFATE 325(65) MG
325 TABLET ORAL
Qty: 90 TABLET | Refills: 1 | Status: SHIPPED | OUTPATIENT
Start: 2021-10-18 | End: 2023-01-11

## 2021-10-18 RX ORDER — LAMOTRIGINE 150 MG/1
150 TABLET ORAL 2 TIMES DAILY
Qty: 60 TABLET | Refills: 0 | Status: SHIPPED | OUTPATIENT
Start: 2021-10-18 | End: 2021-11-27

## 2021-10-18 RX ORDER — LEVOTHYROXINE SODIUM 25 UG/1
25 TABLET ORAL DAILY
Qty: 30 TABLET | Refills: 0 | Status: SHIPPED | OUTPATIENT
Start: 2021-10-18 | End: 2021-11-27

## 2021-10-18 RX ORDER — BUPROPION HYDROCHLORIDE 75 MG/1
75 TABLET ORAL 2 TIMES DAILY
Qty: 60 TABLET | Refills: 0 | Status: SHIPPED | OUTPATIENT
Start: 2021-10-18 | End: 2021-12-08

## 2021-10-18 NOTE — PROGRESS NOTES
Preceptor Attestation:    I discussed the patient with the resident and evaluated the patient in person. I have verified the content of the note, which accurately reflects my assessment of the patient and the plan of care.   Supervising Physician:  Jaime Dumont MD.
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
## 2021-10-19 ENCOUNTER — TELEPHONE (OUTPATIENT)
Dept: FAMILY MEDICINE | Facility: CLINIC | Age: 46
End: 2021-10-19

## 2021-10-19 DIAGNOSIS — Z13.220 SCREENING FOR HYPERLIPIDEMIA: Primary | ICD-10-CM

## 2021-10-19 RX ORDER — SIMVASTATIN 20 MG
20 TABLET ORAL AT BEDTIME
Qty: 30 TABLET | Refills: 0 | Status: SHIPPED | OUTPATIENT
Start: 2021-10-19 | End: 2021-11-27

## 2021-10-19 NOTE — TELEPHONE ENCOUNTER
Patient called SW and left VM requesting a ride to apt with her PCP tomorrow. Called [employer] and scheduled the following rides:    Scheduled ride for 10/20/21 apt with Dr. Joy at [facility] at 3:50pm. Ride will be with [employer] ([phone]) with pickup 2:50-3:20pm and will call return ride.     Scheduled ride for 10/27/21 apt with Dr. Mishra at 12:50pm. Ride will be with [employer] ([phone]) with pickup 11:50-12:20pm and will-call return ride.     Scheduled ride for 11/8/21 apt for COVID shot at 10:30am. Cab company not assigned yet, will be assigned closer to apt date.     Saw that patient is scheduled for initial visit with Dr. Joy at [facility] tomorrow and for visit with Dr. Mishra at this clinic on Weds 10/27/21.     Called patient to verify and see if she needs both apts at [facility] and [facility]. Reached patient who clarified that she intends to get primary care from [facility], and scheduled with [facility] on accident. Canceled apt at [facility] tomorrow. Patient will still attend 10/27 visit with Dr. Mishra.     Called [employer] to cancel ride for tomorrow. Was on hold for 12 minutes.     Ewelina Kerr LGSW

## 2021-10-19 NOTE — RESULT ENCOUNTER NOTE
Discussed results with patient via telephone. Saw she was previously on simvastatin 20mg so sent this to her pharmacy. MARY

## 2021-10-20 ENCOUNTER — DOCUMENTATION ONLY (OUTPATIENT)
Dept: PSYCHOLOGY | Facility: CLINIC | Age: 46
End: 2021-10-20

## 2021-10-20 NOTE — PROGRESS NOTES
Behavioral Health Team,    Patient is being referred for mental health services by their provider, Dr. Mishra.  Please advise if we are able to see patient for in house treatment or if a community option would be best.    Thank you,    Jacqueline Haynes  10/20/2021

## 2021-10-22 ENCOUNTER — TELEPHONE (OUTPATIENT)
Dept: FAMILY MEDICINE | Facility: CLINIC | Age: 46
End: 2021-10-22

## 2021-10-22 NOTE — PROGRESS NOTES
Called patient for transportation to visits, and discussed mental health referral. She was seen by Dr. Nathaniel Lombardi in 2017, and would like to see a fellow at this clinic again. Is moving on 11/1/21 so prefers not to schedule during that week.   Scheduled:      Tuesday 11/9/21 at 11:00am  Dr. Nolberto Zepeda  [address], [address]     will set up medical cab ride for this apt. Patient has apts for ultrasound and for vaccination the day prior to this, 11/8. Will send letter to patient with apt list and details.     Routing to Dr. Zepeda, Dr. Khan, Dr. Gilmore, and Jacqueline.    SVETLANA Jarvis

## 2021-10-22 NOTE — TELEPHONE ENCOUNTER
Patient has ortho apt:     Tuesday 10/26/21 at 11:45am, arrive at 11:15am  Dr. Kwame Alva  [facility]  [address], [place], [place], [address]    Scheduled ride through [facility]. Cab company is not scheduled yet, but pickup will be about 1 hour before apt arrival time, so 10:15am. Return ride will be will-call. They will call patient when the cab company is picked, in 1-2 business days.     Called patient to inform, and discussed mental health referral. Chart review shows she was most recentmly assessed by a behavioral yudith fellow at this clinic, Dr. Nathaniel Lombardi, 4/4/2017. Would like to see someone at this clinic again. Scheduled:     Monday 11/8/21 at 3:00pm  Dr. Nolberto Zepeda  [facility]   [address], [place], [address]  Noting this in mental health referral doc only encounter as well.    Housing: Informed that she is moving on November 1 into her own apartment. She is very excited about this. New address will be: [address] [place] [address] [address], [place], [address]. SW will need to update chart and reschedule rides after 11/1/21 to be picked up at new address.     Sent letter to patient with upcoming apts and the rides for them, as she has 5 scheduled in the next 2 weeks.     SVETLANA Jarvis

## 2021-10-26 ENCOUNTER — TELEPHONE (OUTPATIENT)
Dept: FAMILY MEDICINE | Facility: CLINIC | Age: 46
End: 2021-10-26
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
## 2021-10-26 NOTE — PROGRESS NOTES
Review of Dr. Mishra's order and note indicates that this is a referral for psychiatry. I do see that Ewelina assisted Ms. Pierre in scheduling with Dr. Zepeda for therapy so wanted to clarify if psychiatry is also wanted at this time.  If so, we can offer the following community based options for psychiatry.  Review of the care team indicates she saw a psychiatry provider, Dr. Edwin Patel at the [facility] in 2017, but not sure if that is a current option for her.  Will ask Ewelina to reach out to Ms. Pierre to offer these resources and support with connecting to care if needed.     [facility]  [address]  [address]  [place], [place] [address]  [phone]    [facility]  [address]  [place] [address]  [phone] (for appointments)  Fax: [phone]  [address]  [place] [address]  Phone:  [phone]  Fax: [phone]  Hours:  Monday - Friday 8:30 - 5:00pm    [facility]  [address]  [place] [address]  [phone]    [facility]  [address]  [place] [address]  [phone]    [facility] - offers psychiatry and psychology services across the lifespan  [facility]  [address] [place] [address] [address]  [place], [place]  [phone]  Serving the [facility] and surrounding areas.  Services provided:     Child, Adolescent, and Adult Psychotherapy     Family and Couples Counseling     Ketamine Treatment     Child and Adult Psychiatry     Neuropsychological Evaluation     Psychological Assessment and Testing     Domestic Violence Assessment     Anger Management/Domestic Violence Prevention Program    Let me know if you have questions or would like additional follow up from me.  Thanks!      Leeanna Khan, Ph.D.,     Disclaimer  The above treatment recommendations are based on consultation with the patient's primary care provider and a review of relevant information in EPIC. I have not personally examined the patient. All recommendations should be implemented with considerations of the patient's relevant prior history and current clinical status. Please contact me with any questions about the care of this patient.

## 2021-10-26 NOTE — TELEPHONE ENCOUNTER
Call from patient requesting details for ride to visit with PCP tomorrow, Weds 10/27. SAYDA relayed info:     Ride for 10/27/21 apt with Dr. Mishra at 12:50pm with [employer] ([phone]) with pickup 11:50-12:20pm and will-call return ride.    Patient is concerned that she will not be picked up by the [employer] company as her phone will likely be disconnected tomorrow as it is near the end of the month. SW encouraged her to wait outside at pickup time, 11:50-12:20pm.     SW asked how her apt at [facility] in [place] went today. She was not aware of apt and did not attend. Does have a lot of wrist pain though, so is frustrated that she missed apt. SW reminded patient that she had been informed of this at time of scheduling, again on Friday 10/22/21, and via letter mailed to her address. Informed that the address on chart is her mom's address and that she cannot get mail there, only gets mail at her PO box. Does want to be picked up at the address listed. SW is not clear on this but could not clarify.     Sayda will assist in rescheduling apt with Dr. Kwame Alva at [facility] in [place]. Will make sure patient has this information when she comes in for PCP visit tomorrow.     SVETLANA Jarvis

## 2021-10-27 ENCOUNTER — OFFICE VISIT (OUTPATIENT)
Dept: FAMILY MEDICINE | Facility: CLINIC | Age: 46
End: 2021-10-27
Payer: MEDICAID

## 2021-10-27 VITALS
BODY MASS INDEX: 39.1 KG/M2 | WEIGHT: 213 LBS | DIASTOLIC BLOOD PRESSURE: 93 MMHG | TEMPERATURE: 97.9 F | SYSTOLIC BLOOD PRESSURE: 144 MMHG | OXYGEN SATURATION: 96 % | HEART RATE: 84 BPM | RESPIRATION RATE: 18 BRPM

## 2021-10-27 DIAGNOSIS — N64.4 SORENESS BREAST: Primary | ICD-10-CM

## 2021-10-27 DIAGNOSIS — M65.312 TRIGGER THUMB OF LEFT HAND: ICD-10-CM

## 2021-10-27 PROCEDURE — 99213 OFFICE O/P EST LOW 20 MIN: CPT | Mod: GC | Performed by: STUDENT IN AN ORGANIZED HEALTH CARE EDUCATION/TRAINING PROGRAM

## 2021-10-27 RX ORDER — NAPROXEN 500 MG/1
500 TABLET ORAL 2 TIMES DAILY WITH MEALS
Qty: 14 TABLET | Refills: 0 | Status: SHIPPED | OUTPATIENT
Start: 2021-10-27 | End: 2021-12-08

## 2021-10-27 NOTE — TELEPHONE ENCOUNTER
10/27/21:   Call from patient, her phone has not been shut off yet which is good. Gave reminder for cab ride to PCP apt today.     Called for ortho apt with [facility] ([phone]). Scheduled:     Friday 10/29/21 at 10:15am, arrive at 10:00am  Dr. Garth Thomas  [facility] [place]  [address], [place], [place], [address]  [phone].     Scheduled ride for this apt with [employer] for Fri 10/29/21 apt at 10:15 am at [facility] in [place]. Ride will be with [employer] ([phone]) with pickup 9:00-9:30am and will-call return ride.    Put this in patient's after visit summary for PCP visit today.     SVETLANA Jarvis

## 2021-10-27 NOTE — PROGRESS NOTES
Preceptor Attestation:    I discussed the patient with the resident and evaluated the patient in person. I have verified the content of the note, which accurately reflects my assessment of the patient and the plan of care.   Supervising Physician:  Justo Wylie DO.

## 2021-10-29 ENCOUNTER — TELEPHONE (OUTPATIENT)
Dept: FAMILY MEDICINE | Facility: CLINIC | Age: 46
End: 2021-10-29

## 2021-10-29 NOTE — TELEPHONE ENCOUNTER
Call from patient requesting ride set up for orthopedic procedure on Monday 11/1/21 at [facility] in [place]. This was scheduled this morning after her consultation visit with [facility]. This is shorter notice than [facility]'s typical 3 day policy, but SW will try.     Monday 11/1/21 at 1:45pm  [facility] [place] location  [address] [place], [place], [address]  [phone]  Ride will be through [employer] ([phone]) with pickup  12:45-1:15pm and will call return ride.     [facility] requires patient to be accompanied by someone as this will be a procedure in which there are pain meds given. Patient's mom Josy plans to attend.   [facility] requires additional passenger form signed by patient in order for someone other than patient to get a ride.     No action taken, hopefully [facility] will accept patient and mother for ride on Monday. If not, patient knows how to reach this  and other arrangements can be made.     Ewelina eKrr LGSW

## 2021-11-04 NOTE — TELEPHONE ENCOUNTER
Set up ride for ortho apt:     Friday 11/12/21 at 9:00am   [facility]: [place] [id number], [place] [address]  [phone]    Ride will be with [employer] ([phone]) and pickup 8:00-8:30am. Can call Becca with questions.     Recently moved into her own apartment on 11/1/21. New address is [address] [place] [address], [address], [place], [place], [address]. Updated this in chart and with [employer] rides. They will  from new address.     Called patient to inform. No answer, left VM. Also discussed getting her connected with psychiatry services.     SVETLANA Jarvis

## 2021-11-08 ENCOUNTER — TELEPHONE (OUTPATIENT)
Dept: FAMILY MEDICINE | Facility: CLINIC | Age: 46
End: 2021-11-08

## 2021-11-08 ENCOUNTER — IMMUNIZATION (OUTPATIENT)
Dept: FAMILY MEDICINE | Facility: CLINIC | Age: 46
End: 2021-11-08
Payer: MEDICAID

## 2021-11-08 ENCOUNTER — LAB (OUTPATIENT)
Dept: LAB | Facility: CLINIC | Age: 46
End: 2021-11-08
Payer: MEDICAID

## 2021-11-08 ENCOUNTER — ANCILLARY PROCEDURE (OUTPATIENT)
Dept: ULTRASOUND IMAGING | Facility: CLINIC | Age: 46
End: 2021-11-08
Payer: MEDICAID

## 2021-11-08 DIAGNOSIS — E03.8 SUBCLINICAL HYPOTHYROIDISM: ICD-10-CM

## 2021-11-08 DIAGNOSIS — N64.4 SORENESS BREAST: ICD-10-CM

## 2021-11-08 LAB
FSH SERPL-ACNC: 24.3 MIU/ML
LH SERPL-ACNC: 10.2 MIU/ML
PROLACTIN SERPL-MCNC: 9.8 NG/ML (ref 0–20)

## 2021-11-08 PROCEDURE — 76536 US EXAM OF HEAD AND NECK: CPT | Performed by: RADIOLOGY

## 2021-11-08 PROCEDURE — 83001 ASSAY OF GONADOTROPIN (FSH): CPT

## 2021-11-08 PROCEDURE — 84146 ASSAY OF PROLACTIN: CPT

## 2021-11-08 PROCEDURE — 91300 PR COVID VAC PFIZER DIL RECON 30 MCG/0.3 ML IM: CPT

## 2021-11-08 PROCEDURE — 0002A PR COVID VAC PFIZER DIL RECON 30 MCG/0.3 ML IM: CPT

## 2021-11-08 PROCEDURE — 36415 COLL VENOUS BLD VENIPUNCTURE: CPT

## 2021-11-08 PROCEDURE — 83002 ASSAY OF GONADOTROPIN (LH): CPT

## 2021-11-08 NOTE — TELEPHONE ENCOUNTER
Call from Cheri Mao with [employer] (?), [employer] worker ([employer]), patient needs ride to therapy apt tomorrow.     Tuesday 11/9/21 at 11:00am  Nolberto Zepeda, Psychologist   [address] [place] [address], [address]  [phone]    Cab will be is Heart to "[employer]" ([phone]) with pickup around 10:30am.      Called patient to inform, she is happy with this plan.     Routing to Dr. Zepeda to get [facility] for IHS worker Cheri while patient is in clinic today.     Ewelina Kerr, ZHANGSW

## 2021-11-12 ENCOUNTER — TELEPHONE (OUTPATIENT)
Dept: FAMILY MEDICINE | Facility: CLINIC | Age: 46
End: 2021-11-12
Payer: MEDICAID

## 2021-11-12 NOTE — TELEPHONE ENCOUNTER
[facility] phone call message- patient requesting results:    Test: Lab    Date of test: 11/08/2021    Additional Comments: none    OK to leave a message on voice mail? Yes    Primary language: English      needed? No    Call taken on November 12, 2021 at 2:15 PM by Ananda Casarez

## 2021-11-16 ENCOUNTER — TELEPHONE (OUTPATIENT)
Dept: FAMILY MEDICINE | Facility: CLINIC | Age: 46
End: 2021-11-16
Payer: MEDICAID

## 2021-11-18 ENCOUNTER — OFFICE VISIT (OUTPATIENT)
Dept: FAMILY MEDICINE | Facility: CLINIC | Age: 46
End: 2021-11-18
Payer: MEDICAID

## 2021-11-18 VITALS
OXYGEN SATURATION: 99 % | BODY MASS INDEX: 38.36 KG/M2 | TEMPERATURE: 98 F | DIASTOLIC BLOOD PRESSURE: 84 MMHG | SYSTOLIC BLOOD PRESSURE: 135 MMHG | WEIGHT: 209 LBS | RESPIRATION RATE: 20 BRPM | HEART RATE: 70 BPM

## 2021-11-18 DIAGNOSIS — E66.01 MORBID OBESITY (H): ICD-10-CM

## 2021-11-18 DIAGNOSIS — L30.8 OTHER ECZEMA: Primary | ICD-10-CM

## 2021-11-18 DIAGNOSIS — J02.9 ACUTE PHARYNGITIS, UNSPECIFIED ETIOLOGY: ICD-10-CM

## 2021-11-18 PROCEDURE — 99213 OFFICE O/P EST LOW 20 MIN: CPT | Mod: GC | Performed by: STUDENT IN AN ORGANIZED HEALTH CARE EDUCATION/TRAINING PROGRAM

## 2021-11-18 RX ORDER — TRIAMCINOLONE ACETONIDE 5 MG/G
CREAM TOPICAL 2 TIMES DAILY
Qty: 15 G | Refills: 0 | Status: SHIPPED | OUTPATIENT
Start: 2021-11-18 | End: 2023-01-11

## 2021-11-18 NOTE — Clinical Note
WILLA--there was a lot uncovered during this visit. We'll back off on the MH at this clinic and focus on the legal stuff, and maybe address barriers to connecting with [facility].     Thanks,   Ewelina

## 2021-11-18 NOTE — PROGRESS NOTES
[facility] Clinic Note    Patient: Mattie Pierre  : 1975  MRN: [id number]      ASSESSMENT AND PLAN   Mattie with a complex history was seen today for sore throat for 4 days which is has been getting better.  Exam reveals minimal rest pharyngeal erythema, no tonsillar exudate or enlargement as well as cervical lymph node.  Patient also complaining of lesions on hands bilateral with some vesicles on her fingers.  Patient was referred to dermatology previously who used steroid cream.  Patient was seen in the [facility] requested diet form to be filled, and also requested to discuss other health conditions that include urinary incontinence.  When patient was recommended to make an appointment to discuss her other complaints and cleaning out her form, will be happy with the recommendations.  Also patient was not happy to be seen downstairs.  Overall patient was not happy with the visit and she wanted to be seen for all of her complaint, not only the respiratory complaint.  Patient was provided with a fax number to fax her diet form to the clinic    Diagnoses and all orders for this visit:    Other eczema  -     triamcinolone (ARISTOCORT HP) 0.5 % external cream; Apply topically 2 times daily    Morbid obesity (H)    Acute pharyngitis, unspecified etiology          Patient was discussed with attending physician, Dr. Harvey Rondon MD, who agrees with the assessment and plan.    Earle Dalton, PGY-2  [facility] Medicine Residency  2021                   SUBJECTIVE       Mattie Pierre is a 46 year old female with a PMH significant for:  Patient Active Problem List   Diagnosis     Bipolar disorder (H)     Obesity     Cellulitis of axilla, left     Iron deficiency anemia     Seizure disorder (H)     Hidradenitis suppurativa     Urgency incontinence     Chronic pain syndrome     Acne vulgaris     Tinea capitis     Fissure in skin     Cocaine abuse (H)     Depression     Hypertension      Subclinical hypothyroidism     Vitamin D deficiency     Ganglion cyst of right foot     Hypertrophy of bone     She presents to clinic today with chief complaint of sore throat for 4 days.  Patient stated she has a hard time.  Swallow due to pain which is getting better.  Also she tried tea with a good help.  She endorses dry mouth.  Denies fever, runny nose, or cough also denies change in his smell/taste or diarrhea.  No history of Covid exposure.  Patient is fully vaccinated.    Also patient's complaining of small blistering with some skin sloughing of on her hands bilateral.  Her conditions has been on for some time, and dermatologist used a steroid for the lesion.  Endorses some relief using steroid cream.    Past Medical History, Past Surgical History, Medications, Allergies, and Family History were reviewed and updated as needed.        REVIEW OF SYSTEMS     CONSTITUTIONAL: No fever or chills. No fatigue, unintentional changes in weight, or change in appetite.  HEENT: No headache or neck pain. No recent changes in vision, eye redness, dry eyes. No runny nose, nasal congestion, or sinus pain, + sore throat.  SKIN: No rashes, bruises, jaundice, or skin lesions.  RESPIRATORY: No cough, wheezes, or SOB.  CARDIOVASCULAR: No chest pain. No palpitations or irregular heart beats. No syncope. No lower extremity swelling.  GASTROINTESTINAL: No N/V, D/C. No abdominal pain, or bloating.  GENITOURINARY: No hematuria, dysuria, changes in frequency, or leaking of urine.  MUSCULOSKELETAL:No muscle stiffness or pain. No joint pain or swelling.        OBJECTIVE     Vitals:    11/18/21 0954 11/18/21 0956   BP: (!) 143/99 135/84   Pulse: 70    Resp: 20    Temp: 98  F (36.7  C)    TempSrc: Oral    SpO2: 99%    Weight: 94.8 kg (209 lb)      Body mass index is 38.36 kg/m .    Physical Exam:  GENERAL: Awake, alert. Well-nourished.No acute distress.   HEENT: Head: Normocephalic and atraumatic.  Eyes: PERLLA, EOM intact, no scleral  icterus or conjunctival injection.   Ears: External auditory canals patent; tympanic membranes pearly and opaque without erythema, effusion or bulging.   Nose: nares patent and without discharge; frontal and maxillary sinuses non-tender to palpation. Oropharynx: mucus membranes pink and moist; tonsils without enlargement, erythema or exudates.  NECK: No anterior or posterior cervical lymphadenopathy, no supraclavicular lymphadenopathy. Thyroid symmetric and without enlargement or tenderness. Skin normal.  SKIN: Warm and dry. No rashes, lesions, erythema, petechiae, purpura, ecchymosis, or jaundice.  LUNGS: CTA bilaterally throughout all lung fields with no crackles or wheezing.  CARDIOVASCULAR: Regular rate and rhythm; normal S1 and S2; no S3 or S4; no murmur, rub or click.   ABDOMEN: Non-distended. Soft and non-tender in all quadrants; no masses or hepatosplenomegally.      LABORATORY:  No results found for this or any previous visit (from the past 24 hour(s)).

## 2021-11-18 NOTE — PATIENT INSTRUCTIONS
Thank you for coming  You have and upper respiratory infection  Use the steroid cream for you hand, 2x a day  Make an appointment for you form and other health condition check
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
## 2021-11-18 NOTE — PROGRESS NOTES
11/18/21:   Patient has been scheduled with Dr. Zepeda at [facility] on 11/9/21 and 11/15/21 and no-showed both apts, the one on 11/15 due to lack of transportation.     During clinic visit today, this SW learned that patient is already connected to [facility] for the following services:     [facility]   [address] [facility], [address]  [place] [address]  Phone: [phone]  Fax: [phone]    Faxed in [facility] to main line and called. Mattie has seen the following providers:   Ole Coburn, [facility] Worker/SUDS Treatment Specialist: Was last seen 10/11/21 for initial visit, but she cancelled the rest of apts.   Dr. Aleisha Stephen, Psychiatrist: Was scheduled several times with most recent visit schedueld for 11/4, patient did not attend.    CD treatment group with Kwame De La Cruz: Started with this group which meets bi-weekly, but has not attended as far as the representative could tell.     Will not connect to another agency for psychiatry or therapy, will support patient in reconnecting with [facility] and addressing barriers to attending apts.     Ewelina Kerr, ZHANGSW

## 2021-11-19 ENCOUNTER — DOCUMENTATION ONLY (OUTPATIENT)
Dept: FAMILY MEDICINE | Facility: CLINIC | Age: 46
End: 2021-11-19

## 2021-11-19 NOTE — PROGRESS NOTES
To be completed in Nursing note:    Please reference list for forms that require a visit for completion.  Please remind patients that providers are given 3-5 business days to complete and return forms.      Form type: VERIFICATION PF PRESCRIBED DIET ~ [facility]    Date form received: 11/18/2021    Date form completed by Physician: 11/19/2021    How was form returned to patient (mailed, faxed, or at  for patient to ):    Fax to [phone]    Date form mailed/faxed/left at  for patient and sent to HIM for scanning:    Fax on 11/19/2021    Once form is left for patient, faxed, or mailed PCS will then close the documentation only encounter.
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
## 2021-11-22 NOTE — PROGRESS NOTES
11/22/21: Care Coordinators    I called [facility] to schedule a ride for Ms. Pierre, who has an appointment in our office:11/24/2021 at 8:40am. The  time is 8:10am with an hour window for the office visit.    I then called Ms. Pierre to remind her of the appointment and the  times. She understood.    Henrry Sanchez  Care Coordination   Direct: [phone]
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
## 2021-11-24 ENCOUNTER — TELEPHONE (OUTPATIENT)
Dept: FAMILY MEDICINE | Facility: CLINIC | Age: 46
End: 2021-11-24

## 2021-11-24 ENCOUNTER — OFFICE VISIT (OUTPATIENT)
Dept: FAMILY MEDICINE | Facility: CLINIC | Age: 46
End: 2021-11-24
Payer: MEDICAID

## 2021-11-24 VITALS
SYSTOLIC BLOOD PRESSURE: 108 MMHG | WEIGHT: 208 LBS | OXYGEN SATURATION: 94 % | RESPIRATION RATE: 18 BRPM | HEART RATE: 79 BPM | DIASTOLIC BLOOD PRESSURE: 74 MMHG | TEMPERATURE: 98.2 F | BODY MASS INDEX: 38.18 KG/M2

## 2021-11-24 DIAGNOSIS — Z11.52 ENCOUNTER FOR SCREENING LABORATORY TESTING FOR COVID-19 VIRUS: ICD-10-CM

## 2021-11-24 DIAGNOSIS — N64.4 SORENESS BREAST: ICD-10-CM

## 2021-11-24 DIAGNOSIS — E04.1 THYROID NODULE: ICD-10-CM

## 2021-11-24 DIAGNOSIS — Z11.3 SCREEN FOR STD (SEXUALLY TRANSMITTED DISEASE): ICD-10-CM

## 2021-11-24 DIAGNOSIS — L30.8 OTHER ECZEMA: ICD-10-CM

## 2021-11-24 DIAGNOSIS — N76.0 BACTERIAL VAGINOSIS: Primary | ICD-10-CM

## 2021-11-24 DIAGNOSIS — N39.41 URGE INCONTINENCE OF URINE: ICD-10-CM

## 2021-11-24 DIAGNOSIS — B96.89 BACTERIAL VAGINOSIS: Primary | ICD-10-CM

## 2021-11-24 DIAGNOSIS — E03.8 SUBCLINICAL HYPOTHYROIDISM: Primary | ICD-10-CM

## 2021-11-24 LAB
CLUE CELLS: PRESENT
HIV 1+2 AB+HIV1 P24 AG SERPL QL IA: NEGATIVE
TRICHOMONAS, WET PREP: ABNORMAL
WBC'S/HIGH POWER FIELD, WET PREP: ABNORMAL
YEAST, WET PREP: ABNORMAL

## 2021-11-24 PROCEDURE — 99214 OFFICE O/P EST MOD 30 MIN: CPT | Mod: CS | Performed by: STUDENT IN AN ORGANIZED HEALTH CARE EDUCATION/TRAINING PROGRAM

## 2021-11-24 PROCEDURE — 86803 HEPATITIS C AB TEST: CPT | Performed by: STUDENT IN AN ORGANIZED HEALTH CARE EDUCATION/TRAINING PROGRAM

## 2021-11-24 PROCEDURE — 86704 HEP B CORE ANTIBODY TOTAL: CPT | Performed by: STUDENT IN AN ORGANIZED HEALTH CARE EDUCATION/TRAINING PROGRAM

## 2021-11-24 PROCEDURE — 86706 HEP B SURFACE ANTIBODY: CPT | Performed by: STUDENT IN AN ORGANIZED HEALTH CARE EDUCATION/TRAINING PROGRAM

## 2021-11-24 PROCEDURE — 87389 HIV-1 AG W/HIV-1&-2 AB AG IA: CPT | Performed by: STUDENT IN AN ORGANIZED HEALTH CARE EDUCATION/TRAINING PROGRAM

## 2021-11-24 PROCEDURE — U0005 INFEC AGEN DETEC AMPLI PROBE: HCPCS | Performed by: STUDENT IN AN ORGANIZED HEALTH CARE EDUCATION/TRAINING PROGRAM

## 2021-11-24 PROCEDURE — 36415 COLL VENOUS BLD VENIPUNCTURE: CPT | Performed by: STUDENT IN AN ORGANIZED HEALTH CARE EDUCATION/TRAINING PROGRAM

## 2021-11-24 PROCEDURE — U0003 INFECTIOUS AGENT DETECTION BY NUCLEIC ACID (DNA OR RNA); SEVERE ACUTE RESPIRATORY SYNDROME CORONAVIRUS 2 (SARS-COV-2) (CORONAVIRUS DISEASE [COVID-19]), AMPLIFIED PROBE TECHNIQUE, MAKING USE OF HIGH THROUGHPUT TECHNOLOGIES AS DESCRIBED BY CMS-2020-01-R: HCPCS | Performed by: STUDENT IN AN ORGANIZED HEALTH CARE EDUCATION/TRAINING PROGRAM

## 2021-11-24 PROCEDURE — 86780 TREPONEMA PALLIDUM: CPT | Performed by: STUDENT IN AN ORGANIZED HEALTH CARE EDUCATION/TRAINING PROGRAM

## 2021-11-24 PROCEDURE — 87591 N.GONORRHOEAE DNA AMP PROB: CPT | Performed by: STUDENT IN AN ORGANIZED HEALTH CARE EDUCATION/TRAINING PROGRAM

## 2021-11-24 PROCEDURE — 87340 HEPATITIS B SURFACE AG IA: CPT | Performed by: STUDENT IN AN ORGANIZED HEALTH CARE EDUCATION/TRAINING PROGRAM

## 2021-11-24 PROCEDURE — 87210 SMEAR WET MOUNT SALINE/INK: CPT | Performed by: STUDENT IN AN ORGANIZED HEALTH CARE EDUCATION/TRAINING PROGRAM

## 2021-11-24 PROCEDURE — 87491 CHLMYD TRACH DNA AMP PROBE: CPT | Performed by: STUDENT IN AN ORGANIZED HEALTH CARE EDUCATION/TRAINING PROGRAM

## 2021-11-24 PROCEDURE — 86376 MICROSOMAL ANTIBODY EACH: CPT | Performed by: STUDENT IN AN ORGANIZED HEALTH CARE EDUCATION/TRAINING PROGRAM

## 2021-11-24 RX ORDER — METRONIDAZOLE 500 MG/1
500 TABLET ORAL 2 TIMES DAILY
Qty: 14 TABLET | Refills: 0 | Status: SHIPPED | OUTPATIENT
Start: 2021-11-24 | End: 2021-12-01

## 2021-11-24 NOTE — PROGRESS NOTES
Preceptor Attestation:    I discussed the patient with the resident and evaluated the patient in person. I have verified the content of the note, which accurately reflects my assessment of the patient and the plan of care.   Supervising Physician:  Gil Rosario MD.

## 2021-11-25 LAB
C TRACH DNA SPEC QL NAA+PROBE: NEGATIVE
HBV SURFACE AG SERPL QL IA: NONREACTIVE
N GONORRHOEA DNA SPEC QL NAA+PROBE: NEGATIVE
SARS-COV-2 RNA RESP QL NAA+PROBE: NEGATIVE
THYROPEROXIDASE AB SERPL-ACNC: 3 IU/ML (ref 0–6)

## 2021-11-26 ENCOUNTER — TELEPHONE (OUTPATIENT)
Dept: FAMILY MEDICINE | Facility: CLINIC | Age: 46
End: 2021-11-26
Payer: MEDICAID

## 2021-11-26 LAB
HBV CORE AB SERPL QL IA: POSITIVE
HBV SURFACE AB SERPLBLD QL IA.RAPID: POSITIVE
HCV AB SERPL QL IA: NEGATIVE
T PALLIDUM AB SER QL: NONREACTIVE

## 2021-11-26 NOTE — TELEPHONE ENCOUNTER
Communicated results to patient from 11/24/21. Requested I mail them to her, done per request. ./LR

## 2021-11-26 NOTE — TELEPHONE ENCOUNTER
[facility] phone call message- patient requesting results:    Test: Lab    Date of test: 11/24/2021    Additional Comments:     Pt's calling for her results. Please return call.     OK to leave a message on voice mail? Yes    Primary language: English      needed? No    Call taken on November 26, 2021 at 8:44 AM by Christina Rose

## 2021-11-30 ENCOUNTER — DOCUMENTATION ONLY (OUTPATIENT)
Dept: FAMILY MEDICINE | Facility: CLINIC | Age: 46
End: 2021-11-30
Payer: MEDICAID

## 2021-12-03 ENCOUNTER — OFFICE VISIT (OUTPATIENT)
Dept: FAMILY MEDICINE | Facility: CLINIC | Age: 46
End: 2021-12-03
Payer: MEDICAID

## 2021-12-03 VITALS
SYSTOLIC BLOOD PRESSURE: 145 MMHG | BODY MASS INDEX: 37.41 KG/M2 | WEIGHT: 203.8 LBS | TEMPERATURE: 98.1 F | RESPIRATION RATE: 20 BRPM | HEART RATE: 102 BPM | DIASTOLIC BLOOD PRESSURE: 101 MMHG | OXYGEN SATURATION: 98 %

## 2021-12-03 DIAGNOSIS — F31.31 BIPOLAR AFFECTIVE DISORDER, CURRENTLY DEPRESSED, MILD (H): ICD-10-CM

## 2021-12-03 DIAGNOSIS — N93.9 VAGINAL BLEEDING: ICD-10-CM

## 2021-12-03 DIAGNOSIS — G89.4 CHRONIC PAIN SYNDROME: Primary | ICD-10-CM

## 2021-12-03 DIAGNOSIS — L30.8 OTHER ECZEMA: ICD-10-CM

## 2021-12-03 DIAGNOSIS — E03.8 SUBCLINICAL HYPOTHYROIDISM: ICD-10-CM

## 2021-12-03 DIAGNOSIS — E04.1 THYROID NODULE: ICD-10-CM

## 2021-12-03 PROCEDURE — 99214 OFFICE O/P EST MOD 30 MIN: CPT | Mod: GC | Performed by: STUDENT IN AN ORGANIZED HEALTH CARE EDUCATION/TRAINING PROGRAM

## 2021-12-03 NOTE — PROGRESS NOTES
Preceptor Attestation:    I discussed the patient with the resident and evaluated the patient in person. I have verified the content of the note, which accurately reflects my assessment of the patient and the plan of care.   Supervising Physician:  Garth Combs MD.

## 2021-12-03 NOTE — PROGRESS NOTES
[facility] Visit    Assessment & Plan   1. Chronic pain syndrome  She has a history of chronic back pain for which she was prescribed a walker in the past to help her get around.  I am agreeable to prescribing this again today.  Prescription printed and faxed by RN.  - Ramon Johnson    2. Subclinical hypothyroidism  3. Thyroid nodule  She has not been scheduled for her fine-needle biopsy.  We will follow this up.    4. Vaginal bleeding  Unfortunately, did not get to do pelvic exam today due to #5.  She may be perimenopausal as her bleeding is irregular. She is not feeling dizzy or lightheaded, thus will not recheck hemoglobin (hemoglobin in October was 11.7).  If becomes symptomatic, will recheck.  I do think her fatigue is related to her depressed mood.    5. Bipolar affective disorder, currently depressed, mild (H)  Tearful and depressed mood today.  Much of this is related to her hepatitis B results (showed cleared hepatitis B infection in the past, antigen negative).  I did call her on the phone and discussed the results with her last week, but due to memory issues patient did not remember this. I spent the majority of this visit explaining these results and reassuring the patient she is not infected and she will not pass anything to others.  I do not see any visits with psychiatry scheduled yet so we will follow this up.  She does have a complex medication list and requires both psychiatry and therapy.    6. Other eczema  Rash is improved today because she is moisturizing.  I again stressed the importance of this.  She has appointments with podiatry in a week and with dermatology in approximately 1 month.  No other changes to treatment.      Options for treatment and follow-up care were reviewed with the patient who was engaged and actively involved in the decision making process, verbalized understanding of the options discussed, and satisfied with the final plan.    Patient was staffed  "with supervising physician, Dr. Combs.     Jacqueline Mishra MD, PGY3  [facility]   Mattie Pierre is a 46 year old female with a history including subclinical hypothyroidism, MDD, iron deficiency anemia, and morbid obesity who presents with 2-3 days of increased menstrual bleeding and diarrhea.     Pt reports that her menstrual cycles have been irregular for awhile but in the past couple of days has had she is currently wearing pull-ups but has bled through them and stained the sheets. In the past she reports having to get a blood transfusion for this but denies any loss of consciousness or dizziness. She has reported increased fatigue recently but thinks that might be more related to her depression. She also reports that around the same time she has had increased diarrhea. She had an accident a couple days ago where she couldn't get to the bathroom in time and that the feeling of \"urgency\" has been more frequent. Her mood has worsened with these events as she reports feeling more depressed, unmotivated, ashamed, and overwhelmed.     Pt was also very anxious about her positive Hep B antibody results as she thought that it meant she had an active infection. She was worried that she would \"spread it to others and that if she had to tell others, she wouldn't be able to have sex anymore.\" Pt had difficulty interpreting her results as positive despite telling her that the results show her body has cleared the infection.     She also complains of her rash on her hands saying that she's been moisturizing, applying the steroid cream, and putting a glove on after every day but feels like it has not been getting better.    She also complains of pain in her knees and back and says \"my body is not like how it used to be no more.\" Previously had a walker prescribed for her but was unable to get one. Dr. Arriaga sent this previously. She has tried Physical therapy in the past and not helpful. She gets " out of breath easily.     Obtaining history for this pt was difficult as pt has memory difficulties and is not the best historian.     Patient does not     Social: She reports that she has been smoking cigarettes. She has a 11.50 pack-year smoking history. She has never used smokeless tobacco. She reports previous alcohol use of about 1.0 standard drink of alcohol per week. She reports that she does not use drugs.    Nursing Notes:   Wolf JesusCELINA  12/3/2021  9:49 AM  Signed  Critical Vital Report    Did patient have a critical vital during today's visit? Yes  Which vital is reporting as critical?: Blood Pressure    I personally notified the following: Provider  Preceptor    Action(s) taken: I left room and notified provider personally      Objective     Vitals:    12/03/21 0925 12/03/21 0929   BP: (!) 158/107 (!) 145/101   Pulse: 102    Resp: 20    Temp: 98.1  F (36.7  C)    TempSrc: Oral    SpO2: 98%    Weight: 92.4 kg (203 lb 12.8 oz)      Body mass index is 37.41 kg/m .    GEN: NAD, healthy, alert  Constitutional: Awake, alert, cooperative, no acute distress, and appears stated age.  HEENT: NC/AT, EOMI, normal conjunctivae/sclerae, mask on  Lungs: No increased WOB. CTABL, no crackles or wheezing appreciated.  Cardiovascular: Appears well perfused. RRR, normal S1 and S2, no S3 or S4, and no murmur appreciated.  Abdomen: Normal BS, soft, non-distended, non-tender, no masses palpated  Musculoskeletal: No redness, warmth, or swelling of the joints. Tone is normal.  Neurologic: A&Ox3.  Cranial nerves II-XII are grossly intact.  Sensory is intact and gait is normal.  Neuropsychiatric: Depressed affect, tearful during visit  Skin: hyperkeratotic, lichenified raised plaques on medial foot and on hands (actually improved from previous visit)    Labs:  Office Visit on 11/24/2021   Component Date Value Ref Range Status     Trichomonas 11/24/2021 Absent  Absent Final     Yeast 11/24/2021 Absent  Absent Final     Clue  Cells 11/24/2021 Present* Absent Final     WBCs/high power field 11/24/2021 1+* None Final     SARS CoV2 PCR 11/24/2021 Negative  Negative, Testing sent to reference lab. Results will be returned via unsolicited result Final    NEGATIVE: SARS-CoV-2 (COVID-19) RNA not detected, presumed negative.     Neisseria gonorrhoeae 11/24/2021 Negative  Negative Final    Negative for N. gonorrhoeae rRNA by transcription mediated amplification. A negative result by transcription mediated amplification does not preclude the presence of C. trachomatis infection because results are dependent on proper and adequate collection, absence of inhibitors and sufficient rRNA to be detected.     Chlamydia trachomatis 11/24/2021 Negative  Negative Final    A negative result by transcription mediated amplification does not preclude the presence of C. trachomatis infection because results are dependent on proper and adequate collection, absence of inhibitors and sufficient rRNA to be detected.     Thyroid Peroxidase Antibody 11/24/2021 3  0 - 6 IU/mL Final     Treponema Antibody Total 11/24/2021 Nonreactive  Nonreactive Final     Hepatitis C Antibody 11/24/2021 Negative  Negative Final     Hepatitis B Surface Antigen 11/24/2021 Nonreactive  Nonreactive Final     Hepatitis B Surface Antibody 11/24/2021 Positive* Negative Final     Hepatitis B Core Antibody Total 11/24/2021 Positive* Negative Final     HIV Antigen Antibody Combo 11/24/2021 Negative  Negative Final   Lab on 11/08/2021   Component Date Value Ref Range Status     Lutropin 11/08/2021 10.2  mIU/mL Final     FSH 11/08/2021 24.3  mIU/mL Final    Females:  Prepubertal: 0-10 mIU/mL  Follicular: 3-20 mIU/mL  Luteal: 0-12 mIU/mL  Ovulatory: 9-26 mIU/mL  Postmenopausal:  mIU/mL     Prolactin 11/08/2021 9.8  0.0 - 20.0 ng/mL Final       DME (Durable Medical Equipment) Orders and Documentation  Orders Placed This Encounter   Procedures     Walker Order      The patient was assessed and  it was determined the patient is in need of the following listed DME Supplies/Equipment. Please complete supporting documentation below to demonstrate medical necessity.      DME All Other Item(s) Documentation    List reason for need and supporting documentation for medical necessity below for each DME item.     1. Walker           No

## 2021-12-03 NOTE — NURSING NOTE
Critical Vital Report    Did patient have a critical vital during today's visit? Yes  Which vital is reporting as critical?: Blood Pressure    I personally notified the following: Provider  Preceptor    Action(s) taken: I left room and notified provider personally

## 2021-12-06 ENCOUNTER — TELEPHONE (OUTPATIENT)
Dept: FAMILY MEDICINE | Facility: CLINIC | Age: 46
End: 2021-12-06
Payer: MEDICAID

## 2021-12-07 DIAGNOSIS — Z11.59 ENCOUNTER FOR SCREENING FOR OTHER VIRAL DISEASES: ICD-10-CM

## 2021-12-08 ENCOUNTER — DOCUMENTATION ONLY (OUTPATIENT)
Dept: FAMILY MEDICINE | Facility: CLINIC | Age: 46
End: 2021-12-08

## 2021-12-08 ENCOUNTER — OFFICE VISIT (OUTPATIENT)
Dept: VASCULAR SURGERY | Facility: CLINIC | Age: 46
End: 2021-12-08
Attending: PODIATRIST
Payer: MEDICAID

## 2021-12-08 VITALS — TEMPERATURE: 98.4 F | DIASTOLIC BLOOD PRESSURE: 86 MMHG | SYSTOLIC BLOOD PRESSURE: 124 MMHG | HEART RATE: 80 BPM

## 2021-12-08 DIAGNOSIS — L57.0 KERATOMA: ICD-10-CM

## 2021-12-08 DIAGNOSIS — L74.4 ANHIDROSIS: Primary | ICD-10-CM

## 2021-12-08 DIAGNOSIS — Q66.6 PES PLANOVALGUS: ICD-10-CM

## 2021-12-08 PROCEDURE — G0463 HOSPITAL OUTPT CLINIC VISIT: HCPCS

## 2021-12-08 PROCEDURE — 99203 OFFICE O/P NEW LOW 30 MIN: CPT | Mod: 25 | Performed by: PODIATRIST

## 2021-12-08 PROCEDURE — 11057 PARNG/CUTG B9 HYPRKR LES >4: CPT | Performed by: PODIATRIST

## 2021-12-08 RX ORDER — AMMONIUM LACTATE 12 G/100G
LOTION TOPICAL 2 TIMES DAILY
Qty: 225 G | Refills: 4 | Status: SHIPPED | OUTPATIENT
Start: 2021-12-08 | End: 2022-09-14

## 2021-12-08 NOTE — PROGRESS NOTES
To be completed in Nursing note:    Please reference list for forms that require a visit for completion.  Please remind patients that providers are given 3-5 business days to complete and return forms.      Form type: [facility]Y ~ INCONTINENCE ORDER FORM    Date form received: 12/3/2021    Date form completed by Physician: 12/3/2021    How was form returned to patient (mailed, faxed, or at  for patient to ):    Fax to [phone]    Date form mailed/faxed/left at  for patient and sent to HIM for scanning:    Fax on 12/8/2021    Once form is left for patient, faxed, or mailed PCS will then close the documentation only encounter.

## 2021-12-08 NOTE — PROGRESS NOTES
FOOT AND ANKLE SURGERY/PODIATRY CONSULT NOTE         ASSESSMENT:   Pes Planovalgus  Anhidrosis       TREATMENT:  -I trimmed callus tissue x8 plantar 1st, 5th MPJ and heels with a #15 blade bilateral feet.     -I will start her on LacHydrin cream bid. Avoid soaking feet.     -I have referred her to [facility] for custom orthotics.     -Patient's questions invited and answered. She was encouraged to call my office with any further questions or concerns.     Jani Reynaga DPM  [facility]/Foot [facility] Ankle Surgery  [phone]      HPI: I was asked to see Mattie JOSUE Ignacio today for painful callus tissue on both feet. She is currently not applying lotion to her feet and does not see a foot specialist for callus trimming. She also complains of painful arches and is interested in orthotics.      Past Medical History:   Diagnosis Date     Anemia      Arthritis      COPD (chronic obstructive pulmonary disease) (H)      Depression      Depressive disorder      Diabetes (H)      History of blood transfusion      Hypertension      Obesity      Seizures (H)      Thyroid disease          Social History     Socioeconomic History     Marital status: Single     Spouse name: Not on file     Number of children: Not on file     Years of education: Not on file     Highest education level: Not on file   Occupational History     Not on file   Tobacco Use     Smoking status: Current Every Day Smoker     Packs/day: 0.50     Years: 23.00     Pack years: 11.50     Types: Cigarettes     Smokeless tobacco: Never Used     Tobacco comment: trying to quit, but states that it's hard   Substance and Sexual Activity     Alcohol use: Not Currently     Alcohol/week: 1.0 standard drink     Types: 1 Standard drinks or equivalent per week     Drug use: No     Sexual activity: Yes     Partners: Male     Birth control/protection: None   Other Topics Concern     Not on file   Social History Narrative     Not on file     Social  Determinants of Health     Financial Resource Strain: Not on file   Food Insecurity: Not on file   Transportation Needs: Not on file   Physical Activity: Not on file   Stress: Not on file   Social Connections: Not on file   Intimate Partner Violence: Not on file   Housing Stability: Not on file            Allergies   Allergen Reactions     Fumaric Acid Itching     Morphine Anxiety, Hives, Itching and Rash     hives         MEDICATIONS:   Current Outpatient Medications   Medication     acetaminophen (TYLENOL) 325 MG tablet     amLODIPine (NORVASC) 10 MG tablet     amLODIPine (NORVASC) 5 MG tablet     ammonium lactate (LAC-HYDRIN) 12 % external lotion     buPROPion (WELLBUTRIN) 75 MG tablet     capsaicin (ZOSTRIX) 0.025 % CREA cream     ClonazePAM (KLONOPIN PO)     cyclobenzaprine (FLEXERIL) 5 MG tablet     desoximetasone (TOPICORT) 0.25 % external cream     dextromethorphan-guaiFENesin (TUSSIN DM)  MG/5ML liquid     diclofenac (VOLTAREN) 50 MG EC tablet     ferrous sulfate (FEROSUL) 325 (65 Fe) MG tablet     fluticasone (VERAMYST) 27.5 MCG/SPRAY spray     gabapentin (NEURONTIN) 300 MG capsule     hydrochlorothiazide (HYDRODIURIL) 12.5 MG tablet     ketotifen (ZADITOR) 0.025 % ophthalmic solution     lamoTRIgine (LAMICTAL) 150 MG tablet     levothyroxine (SYNTHROID/LEVOTHROID) 25 MCG tablet     loratadine (CLARITIN) 10 MG tablet     naproxen (NAPROSYN) 500 MG tablet     oxybutynin ER (DITROPAN XL) 10 MG 24 hr tablet     prazosin (MINIPRESS) 5 MG capsule     Prenatal Vit-Fe Fumarate-FA (PRENATAL MULTIVITAMIN W/IRON) 27-0.8 MG tablet     QUEtiapine (SEROQUEL) 100 MG tablet     QUEtiapine (SEROQUEL) 200 MG tablet     QUEtiapine (SEROQUEL) 25 MG tablet     simvastatin (ZOCOR) 20 MG tablet     topiramate (TOPAMAX) 100 MG tablet     topiramate (TOPAMAX) 50 MG tablet     triamcinolone (ARISTOCORT HP) 0.5 % external cream     triamcinolone (KENALOG) 0.1 % cream     valACYclovir (VALTREX) 500 MG tablet     white  petrolatum external gel     No current facility-administered medications for this visit.        Family History   Problem Relation Age of Onset     Dementia Mother      Diabetes Mother      Crohn's Disease Mother      Colon Cancer Mother         Unknown age of onset     Cerebrovascular Disease Father      No Known Problems Maternal Grandmother      No Known Problems Maternal Grandfather      No Known Problems Paternal Grandmother      No Known Problems Paternal Grandfather      No Known Problems Brother      No Known Problems Sister      No Known Problems Son      No Known Problems Daughter      No Known Problems Maternal Half-Brother      No Known Problems Maternal Half-Sister      No Known Problems Paternal Half-Brother      No Known Problems Paternal Half-Sister      No Known Problems Niece      No Known Problems Nephew      No Known Problems Cousin      No Known Problems Other      Coronary Artery Disease No family hx of      Cancer No family hx of      Heart Disease No family hx of      Hypertension No family hx of      Hyperlipidemia No family hx of      Kidney Disease No family hx of      Obesity No family hx of      Thrombosis No family hx of      Asthma No family hx of      Arthritis No family hx of      Thyroid Disease No family hx of      Depression No family hx of      Mental Illness No family hx of      Substance Abuse No family hx of      Cystic Fibrosis No family hx of      Early Death No family hx of      Coronary Artery Disease Early Onset No family hx of      Heart Failure No family hx of      Bleeding Diathesis No family hx of      Breast Cancer No family hx of      Ovarian Cancer No family hx of      Uterine Cancer No family hx of      Prostate Cancer No family hx of      Colorectal Cancer No family hx of      Pancreatic Cancer No family hx of      Lung Cancer No family hx of      Melanoma No family hx of      Autoimmune Disease No family hx of      Unknown/Adopted No family hx of      Genetic  Disorder No family hx of           Review of Systems - 10 point Review of Systems is negative except for callus tissue which is noted in HPI.    OBJECTIVE:  Appearance: alert, well appearing, and in no distress.    VITAL SIGNS: /86   Pulse 80   Temp 98.4  F (36.9  C)       General appearance: Patient is alert and fully cooperative with history & exam.  No sign of distress is noted during the visit.     Psychiatric: Affect is pleasant & appropriate.  Patient appears motivated to improve health.     Respiratory: Breathing is regular & unlabored while sitting.     HEENT: Hearing is intact to spoken word.  Speech is clear.  No gross evidence of visual impairment that would impact ambulation.      Vascular: Dorsalis pedis palpable. There is no appreciable edema noted.  Dermatologic: Hyperkeratotic tissue, dry flaky skin plantar 1st, 5th and heels bilateral x8. No erythema or open lesions bilateral.   Neurologic: All epicritic and proprioceptive sensations are grossly intact bilateral.  Musculoskeletal: Contracted digits bilateral. Decreased medial arch bilateral.

## 2021-12-08 NOTE — PATIENT INSTRUCTIONS
[facility] (Please call to make an appointment)    [facility]ly [facility]  [address], [address]  [place], [place].  Phone: [phone]        Patient Education     Lac-Hydrin Lotion 12%  Uses  For dry, cracked skin.  Instructions  DO NOT take this medicine by mouth.  Shake the bottle well before using the medicine.  Apply the medicine to the affected area.  Use medicine only on clean, intact, dry skin. Rub medication thoroughly into skin. Allow medicine to dry before it touches your clothing.  Do not cover the area after applying the medicine.  Keep the medicine at room temperature. Avoid heat and direct light.  Do not apply other lotions, creams or gels to the same place you apply this medicine.  Avoid getting the medicine in the eyes, nose, or mouth. Wash the medicine off your fingers after applying it.  Gently rub the medicine into area until evenly spread.  Avoid prolonged or excessive sunlight exposure. Use sunscreen lotion with SPF 15 or higher.  Let the medicine dry completely before allowing anyone else to touch the area.  Use the medicine only on normal looking skin. Avoid areas of the skin that are red, have scrapes, or damaged.  Do not touch your eyes, nose or mouth after handling the medicine.  Please tell your doctor and pharmacist about all the medicines you take. Include both prescription and over-the-counter medicines. Also tell them about any vitamins, herbal medicines, or anything else you take for your health.  You may stop using this medicine if you no longer have symptoms.  Cautions  Do not use the medication any more than instructed.  Tell the doctor or pharmacist if you are pregnant, planning to be pregnant, or breastfeeding.  Side Effects  The following is a list of some common side effects from this medicine. Please speak with your doctor about what you should do if you experience these or other side effects.    burning or  stinging    itching    skin irritation where medicine is applied    increased risk of sunburn  A few people may have an allergic reactions to this medicine. Symptoms can include difficulty breathing, skin rash, itching, swelling, or severe dizziness. If you notice any of these symptoms, seek medical help quickly.  Extra  Please speak with your doctor, nurse, or pharmacist if you have any questions about this medicine.  [email or web address]  IMPORTANT NOTE: This document tells you briefly how to take your medicine, but it does not tell you all there is to know about it.Your doctor or pharmacist may give you other documents about your medicine. Please talk to them if you have any questions.Always follow their advice. There is a more complete description of this medicine available in English.Scan this code on your smartphone or tablet or use the web address below. You can also ask your pharmacist for a printout. If you have any questions, please ask your pharmacist.     2021 [employer].

## 2021-12-14 ENCOUNTER — TRANSFERRED RECORDS (OUTPATIENT)
Dept: HEALTH INFORMATION MANAGEMENT | Facility: CLINIC | Age: 46
End: 2021-12-14
Payer: MEDICAID

## 2021-12-14 ENCOUNTER — HOSPITAL ENCOUNTER (OUTPATIENT)
Dept: ULTRASOUND IMAGING | Facility: CLINIC | Age: 46
Discharge: HOME | End: 2021-12-14
Admitting: RADIOLOGY
Payer: MEDICAID

## 2021-12-14 DIAGNOSIS — E04.1 THYROID NODULE: ICD-10-CM

## 2021-12-14 PROCEDURE — 88173 CYTOPATH EVAL FNA REPORT: CPT | Mod: TC,XS

## 2021-12-14 PROCEDURE — 88305 TISSUE EXAM BY PATHOLOGIST: CPT | Mod: TC

## 2021-12-14 PROCEDURE — 272N000431 US BIOPSY THYROID FINE NEEDLE ASPIRATION

## 2021-12-15 ENCOUNTER — TELEPHONE (OUTPATIENT)
Dept: FAMILY MEDICINE | Facility: CLINIC | Age: 46
End: 2021-12-15
Payer: MEDICAID

## 2021-12-15 NOTE — TELEPHONE ENCOUNTER
[facility] phone call message- patient requesting results:    Test: Lab    Date of test:     12/14/2021    Additional Comments:     Pt's waiting for results.     OK to leave a message on voice mail? Yes    Primary language: English      needed? No    Call taken on December 15, 2021 at 12:29 PM by Christina Rose

## 2021-12-16 LAB
PATH REPORT.COMMENTS IMP SPEC: NORMAL
PATH REPORT.FINAL DX SPEC: NORMAL
PATH REPORT.GROSS SPEC: NORMAL
PATH REPORT.RELEVANT HX SPEC: NORMAL

## 2021-12-16 PROCEDURE — 88173 CYTOPATH EVAL FNA REPORT: CPT | Mod: 26 | Performed by: PATHOLOGY

## 2021-12-16 PROCEDURE — 88305 TISSUE EXAM BY PATHOLOGIST: CPT | Mod: 26 | Performed by: PATHOLOGY

## 2021-12-16 PROCEDURE — 88172 CYTP DX EVAL FNA 1ST EA SITE: CPT | Mod: 26 | Performed by: PATHOLOGY

## 2021-12-16 NOTE — TELEPHONE ENCOUNTER
Let patient know these results generally take at least a few days to come back if not more because a special doctor needs to review them. She understands and will await our call. ./LR

## 2021-12-17 ENCOUNTER — TELEPHONE (OUTPATIENT)
Dept: FAMILY MEDICINE | Facility: CLINIC | Age: 46
End: 2021-12-17
Payer: MEDICAID

## 2021-12-17 NOTE — TELEPHONE ENCOUNTER
----- Message from Jacqueline Mishra MD sent at 12/16/2021  7:28 PM CST -----  Regarding: Thyroid bx  Matti Kelly,    Would you mind calling this patient and letting her know that her thyroid biopsies were benign - NO signs of cancer.     Thank you,   Jacqueline Mishra

## 2021-12-17 NOTE — LETTER
"Dear Mattie,    As we discussed on the phone, here are the results of your thyroid biopsies. \"Negative for malignancy\" means NO sign of cancer.     Sincerely,  GARRY Gagnon                "

## 2021-12-21 ENCOUNTER — TELEPHONE (OUTPATIENT)
Dept: FAMILY MEDICINE | Facility: CLINIC | Age: 46
End: 2021-12-21

## 2021-12-21 ENCOUNTER — OFFICE VISIT (OUTPATIENT)
Dept: FAMILY MEDICINE | Facility: CLINIC | Age: 46
End: 2021-12-21
Payer: MEDICAID

## 2021-12-21 VITALS
WEIGHT: 200.4 LBS | HEART RATE: 73 BPM | RESPIRATION RATE: 18 BRPM | BODY MASS INDEX: 36.78 KG/M2 | TEMPERATURE: 98.5 F | OXYGEN SATURATION: 98 % | SYSTOLIC BLOOD PRESSURE: 170 MMHG | DIASTOLIC BLOOD PRESSURE: 109 MMHG

## 2021-12-21 DIAGNOSIS — F31.31 BIPOLAR AFFECTIVE DISORDER, CURRENTLY DEPRESSED, MILD (H): ICD-10-CM

## 2021-12-21 DIAGNOSIS — E03.8 SUBCLINICAL HYPOTHYROIDISM: ICD-10-CM

## 2021-12-21 DIAGNOSIS — Z13.220 SCREENING FOR HYPERLIPIDEMIA: ICD-10-CM

## 2021-12-21 DIAGNOSIS — Z20.822 ENCOUNTER FOR LABORATORY TESTING FOR COVID-19 VIRUS: ICD-10-CM

## 2021-12-21 DIAGNOSIS — I10 PRIMARY HYPERTENSION: ICD-10-CM

## 2021-12-21 DIAGNOSIS — J30.89 SEASONAL ALLERGIC RHINITIS DUE TO OTHER ALLERGIC TRIGGER: Primary | ICD-10-CM

## 2021-12-21 DIAGNOSIS — G40.909 SEIZURE DISORDER (H): ICD-10-CM

## 2021-12-21 PROCEDURE — U0005 INFEC AGEN DETEC AMPLI PROBE: HCPCS | Performed by: STUDENT IN AN ORGANIZED HEALTH CARE EDUCATION/TRAINING PROGRAM

## 2021-12-21 PROCEDURE — 99214 OFFICE O/P EST MOD 30 MIN: CPT | Mod: CS | Performed by: STUDENT IN AN ORGANIZED HEALTH CARE EDUCATION/TRAINING PROGRAM

## 2021-12-21 PROCEDURE — U0003 INFECTIOUS AGENT DETECTION BY NUCLEIC ACID (DNA OR RNA); SEVERE ACUTE RESPIRATORY SYNDROME CORONAVIRUS 2 (SARS-COV-2) (CORONAVIRUS DISEASE [COVID-19]), AMPLIFIED PROBE TECHNIQUE, MAKING USE OF HIGH THROUGHPUT TECHNOLOGIES AS DESCRIBED BY CMS-2020-01-R: HCPCS | Performed by: STUDENT IN AN ORGANIZED HEALTH CARE EDUCATION/TRAINING PROGRAM

## 2021-12-21 RX ORDER — LAMOTRIGINE 150 MG/1
150 TABLET ORAL 2 TIMES DAILY
Qty: 180 TABLET | Refills: 3 | Status: SHIPPED | OUTPATIENT
Start: 2021-12-21 | End: 2023-01-15

## 2021-12-21 RX ORDER — QUETIAPINE FUMARATE 25 MG/1
TABLET, FILM COATED ORAL
Qty: 60 TABLET | Refills: 0 | Status: SHIPPED | OUTPATIENT
Start: 2021-12-21 | End: 2023-01-31

## 2021-12-21 RX ORDER — QUETIAPINE FUMARATE 200 MG/1
TABLET, FILM COATED ORAL
Qty: 30 TABLET | Refills: 0 | Status: SHIPPED | OUTPATIENT
Start: 2021-12-21 | End: 2022-06-24

## 2021-12-21 RX ORDER — LOSARTAN POTASSIUM 100 MG/1
100 TABLET ORAL DAILY
Qty: 90 TABLET | Refills: 1 | Status: SHIPPED | OUTPATIENT
Start: 2021-12-21 | End: 2022-04-04

## 2021-12-21 RX ORDER — SIMVASTATIN 20 MG
20 TABLET ORAL AT BEDTIME
Qty: 30 TABLET | Refills: 0 | Status: SHIPPED | OUTPATIENT
Start: 2021-12-21 | End: 2023-01-16

## 2021-12-21 RX ORDER — LEVOTHYROXINE SODIUM 25 UG/1
25 TABLET ORAL DAILY
Qty: 30 TABLET | Refills: 0 | Status: SHIPPED | OUTPATIENT
Start: 2021-12-21 | End: 2023-12-27

## 2021-12-21 RX ORDER — CETIRIZINE HYDROCHLORIDE 10 MG/1
10 TABLET ORAL DAILY
Qty: 60 TABLET | Refills: 1 | Status: SHIPPED | OUTPATIENT
Start: 2021-12-21 | End: 2022-04-25

## 2021-12-21 RX ORDER — LOSARTAN POTASSIUM 100 MG/1
100 TABLET ORAL DAILY
Qty: 90 TABLET | Refills: 1 | Status: SHIPPED | OUTPATIENT
Start: 2021-12-21 | End: 2021-12-21

## 2021-12-21 NOTE — PROGRESS NOTES
Preceptor Attestation:    I discussed the patient with the resident and evaluated the patient in person. I have verified the content of the note, which accurately reflects my assessment of the patient and the plan of care.   Supervising Physician:  Tyson North MD.
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
## 2021-12-21 NOTE — TELEPHONE ENCOUNTER
[facility] Medicine Clinic phone call message- medication clarification/question:    Full Medication Name:     DME -     Foot Orthotics  Walker    Question:     Pt's insurance is not covering because she has medicare.     Pharmacy confirmed as      [facility] \[facility]\""?    OK to leave a message on voice mail? Yes    Primary language: English      needed? No    Call taken on December 21, 2021 at 11:30 AM by Christina Rose

## 2021-12-22 LAB — SARS-COV-2 RNA RESP QL NAA+PROBE: NEGATIVE

## 2021-12-22 NOTE — TELEPHONE ENCOUNTER
Patient called in and stated that foot orthotics and walker was not cover by her medicare insurance. DME foot orthotics was order by Dr. Contreras at [facility].    Please advise.    LUANA Nunez

## 2021-12-23 NOTE — TELEPHONE ENCOUNTER
Patient states she received a new walker today. (she thinks it may be from her [employer] waiver she is unsure unable to give writer name or number of [employer] worker)  She states she was informed that her insurance does not cover orthotics. Patient informed writer would f/u regarding her orthotics patient voices understanding     BTRN

## 2021-12-29 ENCOUNTER — DOCUMENTATION ONLY (OUTPATIENT)
Dept: FAMILY MEDICINE | Facility: CLINIC | Age: 46
End: 2021-12-29
Payer: MEDICAID

## 2021-12-29 NOTE — PROGRESS NOTES
To be completed in Nursing note:    Please reference list for forms that require a visit for completion.  Please remind patients that providers are given 3-5 business days to complete and return forms.      Form type:[employer] home medical equipment for 4 wheel walker form    Date form received: 21    Date form completed by Physician:21    How was form returned to patient (mailed, faxed, or at  for patient to ):fax to [phone] Attn: Geri    Date form mailed/faxed/left at  for patient and sent to HIM for [email or web address]      Once form is left for patient, faxed, or mailed PCS will then close the documentation only encounter.

## 2022-01-04 ENCOUNTER — TELEPHONE (OUTPATIENT)
Dept: FAMILY MEDICINE | Facility: CLINIC | Age: 47
End: 2022-01-04
Payer: MEDICAID

## 2022-01-04 NOTE — TELEPHONE ENCOUNTER
Call return no answer at given number no message left since message was left earlier today writer will await return call    btrn

## 2022-01-04 NOTE — TELEPHONE ENCOUNTER
[facility] Medicine Clinic phone call message- general phone call:    Reason for call: Would like to speak with nurse about covid results.     Return call needed: Yes    OK to leave a message on voice mail? Yes    Primary language: English      needed? No    Call taken on January 4, 2022 at 10:27 AM by Grisel Flores-Cardona

## 2022-01-04 NOTE — TELEPHONE ENCOUNTER
[facility] Clinic phone call message- general phone call:    Voicemail: Pt tested positive COVID on 12/28/2021 and wanted to know how long to quarantine for. She didn't state in the voicemail if she has been experiencing any symptoms.      Return call needed: Yes    OK to leave a message on voice mail? Yes    Primary language: English      needed? No    Call taken on January 4, 2022 at 1:07 PM by Marnie Arevalo

## 2022-01-13 ENCOUNTER — TELEPHONE (OUTPATIENT)
Dept: FAMILY MEDICINE | Facility: CLINIC | Age: 47
End: 2022-01-13
Payer: MEDICAID

## 2022-01-13 NOTE — TELEPHONE ENCOUNTER
[facility] phone call message- general phone call:    Reason for call: She needs a call back re getting some antibiotics she is having another out break.  Action desired: call back.    Return call needed: Yes    OK to leave a message on voice mail? Yes    Advised patient to response may take up to 2 business days: Yes    Primary language: English      needed? No    Call taken on January 13, 2022 at 8:52 AM by Nate Nevarez

## 2022-01-15 ENCOUNTER — HEALTH MAINTENANCE LETTER (OUTPATIENT)
Age: 47
End: 2022-01-15

## 2022-01-19 ENCOUNTER — TELEPHONE (OUTPATIENT)
Dept: FAMILY MEDICINE | Facility: CLINIC | Age: 47
End: 2022-01-19
Payer: MEDICAID

## 2022-01-19 NOTE — TELEPHONE ENCOUNTER
"Called patient back to talk more about her \"antibiotic\". She reports that I told her she previously was given \"antibiotics for herpes or something.\" She has small bumps between her buttocks, but they are not painful or itchy. I do not see a history of HSV2 in her chart. I did recently treat her for BV with flagyl, but not for HSV2 or other STI. She said the bumps are bothering her and she is reassured today. She also mentioned urinary incontinence (which we have discussed previously and I have prescribed depends). She was thankful for this but would like to discuss her urinary incontinence more. Reports she may have been on medication in the past, but unsure. She was agreeable to making an appt to discuss further.     AFMD  "

## 2022-01-27 ENCOUNTER — OFFICE VISIT (OUTPATIENT)
Dept: DERMATOLOGY | Facility: CLINIC | Age: 47
End: 2022-01-27
Payer: MEDICAID

## 2022-01-27 ENCOUNTER — PATIENT OUTREACH (OUTPATIENT)
Dept: CARE COORDINATION | Facility: CLINIC | Age: 47
End: 2022-01-27

## 2022-01-27 ENCOUNTER — MEDICAL CORRESPONDENCE (OUTPATIENT)
Dept: HEALTH INFORMATION MANAGEMENT | Facility: CLINIC | Age: 47
End: 2022-01-27

## 2022-01-27 DIAGNOSIS — L30.8 OTHER ECZEMA: ICD-10-CM

## 2022-01-27 DIAGNOSIS — L73.2 HIDRADENITIS SUPPURATIVA: Primary | ICD-10-CM

## 2022-01-27 PROCEDURE — 99204 OFFICE O/P NEW MOD 45 MIN: CPT | Mod: GC | Performed by: DERMATOLOGY

## 2022-01-27 RX ORDER — DOXYCYCLINE 100 MG/1
100 TABLET ORAL 2 TIMES DAILY
Qty: 180 TABLET | Refills: 0 | Status: SHIPPED | OUTPATIENT
Start: 2022-01-27 | End: 2024-07-19

## 2022-01-27 RX ORDER — DOXYCYCLINE 100 MG/1
100 CAPSULE ORAL 2 TIMES DAILY
Qty: 180 CAPSULE | Refills: 0 | Status: SHIPPED | OUTPATIENT
Start: 2022-01-27 | End: 2022-01-27

## 2022-01-27 ASSESSMENT — PAIN SCALES - GENERAL: PAINLEVEL: MODERATE PAIN (5)

## 2022-01-27 NOTE — LETTER
"1/27/2022       RE: Mattie Pierre  [address]  [facility] [address]     Dear Colleague,    Thank you for referring your patient, Mattie Pierre, to the [facility] DERMATOLOG[facility] [place] at [facility]. Please see a copy of my visit note below.    a    [facility] Dermatology Note  Encounter Date: Jan 27, 2022  Office Visit     Dermatology Problem List:  1. Nummular/eczematous dermatitis - hands, medial ankles, feet  - Current: clobetasol oint BID, petitioning insurance for nbUVB  - Prior: many topical steroids    2. Hidradenitis suppurative - axillae, intergluteal cleft  - S/p excision + grafting in L armpit \"many years ago\"  - Current: doxycycline 100 mg BID (started 1/27/22)    ____________________________________________    Assessment & Plan:     # Nummular dermatitis  Longstanding with recalcitrance to potent topical steroids. Discussed that the next step would be light treatments, and if insurance doesn't cover that then systemic medications such as methotrexate or Dupixent. Of note she states she is not open to given herself shots or having someone else give her shots. She is open to light treatment if fully covered by insurance as she has severe financial difficulty. She would need to have a home unit as she does not have reliable or accessible transportation to clinic.   - Continue clobetasol oint BID. Try to do under saran wrap/glove/sock occlusion at bedtime  - Will submit for nbUVB home unit today    # Hidradenitis suppurativa  Patient reports a longstanding history of this, s/p grafting of a small area in the L armpit several years ago. She is currently flaring in her R axilla. She requests surgical excision but discussed that surgery is ideally performed once the inflammation has been somewhat calmed down. She is not amenable to anything requiring needles like ILK but is open to a course of PO doxycycline.   - " Start doxycycline 100 mg BID x 12w    Patient has complex social and financial situation. Please see social work note from today.    Procedures Performed:   None    Follow-up: 3 month(s) in-person, or earlier for new or changing lesions    Staff and Resident:     Mary Morillo MD  Dermatology Resident PGY2    I have seen and examined this patient and agree with the assessment and plan as documented in the resident's note.    Emory Li MD  Dermatology Attending    ____________________________________________    CC: Derm Problem (Mattie is here today for eczema on hands and feet)    HPI:  Ms. Mattie Pierre is a(n) 46 year old female who presents today as a new patient for evaluation of eczema on her hands, feet, ankles and hidradenitis suppurativa. The history regarding her skin issues is a bit unclear. Per chart review she was last seen by a dermatologist in 2018 at [facility] and diagnosed with dyshidrotic eczema. She was also seen at [facility] dermatology in 2014 at which time HS was addressed.    In terms of the rash on the hands/feet/medial ankles, she states she has been dealing with this for at least 8 years. For treatment she has primarily tried topical steroids. She has tried many, most recently clobetasol. She is using it at least twice a daily without relief. She has tried doing this under glove/sock occlusion at night. She does not work. She does endorse doing dishes a lot. She does not usually wear gloves when she does this because she has tried that and the gloves rip.    She states she has been dealing with HS for many, many years. Many years ago she had an excsion followed by grafting of a recalcitrant lesion in her L axilla. She is currently flaring in her R axilla and wants this cut out. For treatment right now she is using baking soda and water but no prescriptions. She is not open to ILK or anything involving needles while awake.     Patient is otherwise feeling well, without additional skin  concerns. She does express concerns with finances and asks to speak with a SW.    Labs Reviewed:  Reviewed    Physical Exam:  Vitals: There were no vitals taken for this visit.  SKIN: Focused examination of hands, feet, ankles, axillae was performed. Patient declined examination of the buttocks/intergluteal cleft.  - In the R axilla there is a large, tender, somewhat fluctuant nodule. No overlying erythema and no drainage. There is a well healed ~ 2cm x 2cm grafted surgical site in the L axilla.  - The bilateral palmar hands appear xerotic with hyperkeratosis and lichenification, especially on the proximal palmar aspect  - The bilateral medial ankles have poorly demarcated lichenified scaly plaques.   - No other lesions of concern on areas examined.     Medications:  Current Outpatient Medications   Medication     acetaminophen (TYLENOL) 325 MG tablet     amLODIPine (NORVASC) 10 MG tablet     amLODIPine (NORVASC) 5 MG tablet     ammonium lactate (LAC-HYDRIN) 12 % external lotion     buPROPion (WELLBUTRIN) 75 MG tablet     capsaicin (ZOSTRIX) 0.025 % CREA cream     cetirizine (ZYRTEC) 10 MG tablet     ClonazePAM (KLONOPIN PO)     cyclobenzaprine (FLEXERIL) 5 MG tablet     desoximetasone (TOPICORT) 0.25 % external cream     dextromethorphan-guaiFENesin (TUSSIN DM)  MG/5ML liquid     diclofenac (VOLTAREN) 50 MG EC tablet     ferrous sulfate (FEROSUL) 325 (65 Fe) MG tablet     fluticasone (VERAMYST) 27.5 MCG/SPRAY spray     gabapentin (NEURONTIN) 300 MG capsule     ketotifen (ZADITOR) 0.025 % ophthalmic solution     lamoTRIgine (LAMICTAL) 150 MG tablet     levothyroxine (SYNTHROID/LEVOTHROID) 25 MCG tablet     loratadine (CLARITIN) 10 MG tablet     losartan (COZAAR) 100 MG tablet     naproxen (NAPROSYN) 500 MG tablet     oxybutynin ER (DITROPAN XL) 10 MG 24 hr tablet     prazosin (MINIPRESS) 5 MG capsule     Prenatal Vit-Fe Fumarate-FA (PRENATAL MULTIVITAMIN W/IRON) 27-0.8 MG tablet     QUEtiapine (SEROQUEL) 100  MG tablet     QUEtiapine (SEROQUEL) 200 MG tablet     QUEtiapine (SEROQUEL) 25 MG tablet     simvastatin (ZOCOR) 20 MG tablet     topiramate (TOPAMAX) 100 MG tablet     topiramate (TOPAMAX) 50 MG tablet     triamcinolone (ARISTOCORT HP) 0.5 % external cream     triamcinolone (KENALOG) 0.1 % cream     valACYclovir (VALTREX) 500 MG tablet     white petrolatum external gel     No current facility-administered medications for this visit.      Past Medical History:   Patient Active Problem List   Diagnosis     Bipolar disorder (H)     Obesity     Cellulitis of axilla, left     Iron deficiency anemia     Seizure disorder (H)     Hidradenitis suppurativa     Urgency incontinence     Chronic pain syndrome     Acne vulgaris     Tinea capitis     Fissure in skin     Depression     Hypertension     Subclinical hypothyroidism     Vitamin D deficiency     Ganglion cyst of right foot     Hypertrophy of bone     Morbid obesity (H)     Anhidrosis     Pes planovalgus     Past Medical History:   Diagnosis Date     Anemia      Arthritis      COPD (chronic obstructive pulmonary disease) (H)      Depression      Depressive disorder      Diabetes (H)      History of blood transfusion      Hypertension      Obesity      Seizures (H)      Thyroid disease        CC Jacqueline Mishra MD  [address] [place] [address] on close of this encounter.
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
## 2022-01-27 NOTE — NURSING NOTE
Dermatology Rooming Note    Mattie Pierre's goals for this visit include:   Chief Complaint   Patient presents with     Derm Problem     Mattie is here today for eczema on hands and feet     Cecille Walker, EMT

## 2022-01-27 NOTE — PATIENT INSTRUCTIONS
For the rash on your hands and feet:  - Continue clobetasol twice daily to the rashy areas. Try your best to wear saran wrap over the areas and the over the top wear gloves and socks over the medicine at night time the best you can.  - We will see if your insurance will approve light therapy. If this is approved, you would use it three times weekly.  - In the future if this doesn't work. we could consider other medications like Dupixent (dupilumab). This is an injection.    For the hidradenitis suppurativa (HS)  - Start taking doxycycline 100 mg twice daily for 3 months. Take with food.    --------------    Doxycycline     1.Doxycycline treats and prevents infections and may be used to treat acne.   2.Do not use it if you had an allergic reaction to doxycycline or another tetracycline antibiotic, or if you are pregnant or breastfeeding.  3. If you miss a dose, take a dose as soon as you remember. If it is almost time for your next dose, wait until then and take a regular dose. Do not take extra medicine to make up for a missed dose.   4 . Some foods and medicines can affect the medication. Tell your doctor if you are using any of the following: bismuth subsalicylate, isotretinoin, acitretin, medications that contain aluminum, calcium, or iron (such an antacid or vitamin supplement)  5. This medicine may cause birth defects if being used during pregnancy.  Use two forms of birth control to keep from getting pregnant.   6. Tell your doctor if you had stomach surgery or if you have a history of yeast infections.   7. This medicine may cause the following problems (stop the medication if you experience these symptoms and call your doctor):  Permanent change in tooth color (in children younger than 8 years old)   Increased pressure inside the head   Yeast infection   Diarrhea    This medicine may make your skin more sun sensitive and increase sun burns. Wear sunscreen and do not tan.     Allergic reaction with itching,  hives, facial swelling, throat swelling, chest tightness, trouble breathing     Blistering, peeling, red skin rash     Burning, pain, or irritation in your upper stomach or throat     Joint pain, fever, rash, and unusual tiredness or weakness     Severe headache, dizziness, or vision changes  8. Call your doctor if your symptoms worsen or do not improve  Who should I call with questions?    [facility]: [phone]     [facility]: [phone]    For urgent needs outside of business hours call the [facility] at [phone] and ask for the dermatology resident on call

## 2022-01-28 NOTE — PROGRESS NOTES
"[facility] Dermatology Note  Encounter Date: Jan 27, 2022  Office Visit     Dermatology Problem List:  1. Nummular/eczematous dermatitis - hands, medial ankles, feet  - Current: clobetasol oint BID, petitioning insurance for nbUVB  - Prior: many topical steroids    2. Hidradenitis suppurative - axillae, intergluteal cleft  - S/p excision + grafting in L armpit \"many years ago\"  - Current: doxycycline 100 mg BID (started 1/27/22)    ____________________________________________    Assessment & Plan:     # Nummular dermatitis  Longstanding with recalcitrance to potent topical steroids. Discussed that the next step would be light treatments, and if insurance doesn't cover that then systemic medications such as methotrexate or Dupixent. Of note she states she is not open to given herself shots or having someone else give her shots. She is open to light treatment if fully covered by insurance as she has severe financial difficulty. She would need to have a home unit as she does not have reliable or accessible transportation to clinic.   - Continue clobetasol oint BID. Try to do under saran wrap/glove/sock occlusion at bedtime  - Will submit for nbUVB home unit today    # Hidradenitis suppurativa  Patient reports a longstanding history of this, s/p grafting of a small area in the L armpit several years ago. She is currently flaring in her R axilla. She requests surgical excision but discussed that surgery is ideally performed once the inflammation has been somewhat calmed down. She is not amenable to anything requiring needles like ILK but is open to a course of PO doxycycline.   - Start doxycycline 100 mg BID x 12w    Patient has complex social and financial situation. Please see social work note from today.    Procedures Performed:   None    Follow-up: 3 month(s) in-person, or earlier for new or changing lesions    Staff and Resident:     Mary Morillo MD  Dermatology Resident PGY2    I have seen " and examined this patient and agree with the assessment and plan as documented in the resident's note.    Emory Li MD  Dermatology Attending    ____________________________________________    CC: Derm Problem (Mattie is here today for eczema on hands and feet)    HPI:  Ms. Mattie Pierre is a(n) 46 year old female who presents today as a new patient for evaluation of eczema on her hands, feet, ankles and hidradenitis suppurativa. The history regarding her skin issues is a bit unclear. Per chart review she was last seen by a dermatologist in 2018 at [facility] and diagnosed with dyshidrotic eczema. She was also seen at [facility] dermatology in 2014 at which time HS was addressed.    In terms of the rash on the hands/feet/medial ankles, she states she has been dealing with this for at least 8 years. For treatment she has primarily tried topical steroids. She has tried many, most recently clobetasol. She is using it at least twice a daily without relief. She has tried doing this under glove/sock occlusion at night. She does not work. She does endorse doing dishes a lot. She does not usually wear gloves when she does this because she has tried that and the gloves rip.    She states she has been dealing with HS for many, many years. Many years ago she had an excsion followed by grafting of a recalcitrant lesion in her L axilla. She is currently flaring in her R axilla and wants this cut out. For treatment right now she is using baking soda and water but no prescriptions. She is not open to ILK or anything involving needles while awake.     Patient is otherwise feeling well, without additional skin concerns. She does express concerns with finances and asks to speak with a SW.    Labs Reviewed:  Reviewed    Physical Exam:  Vitals: There were no vitals taken for this visit.  SKIN: Focused examination of hands, feet, ankles, axillae was performed. Patient declined examination of the buttocks/intergluteal cleft.  - In  the R axilla there is a large, tender, somewhat fluctuant nodule. No overlying erythema and no drainage. There is a well healed ~ 2cm x 2cm grafted surgical site in the L axilla.  - The bilateral palmar hands appear xerotic with hyperkeratosis and lichenification, especially on the proximal palmar aspect  - The bilateral medial ankles have poorly demarcated lichenified scaly plaques.   - No other lesions of concern on areas examined.     Medications:  Current Outpatient Medications   Medication     acetaminophen (TYLENOL) 325 MG tablet     amLODIPine (NORVASC) 10 MG tablet     amLODIPine (NORVASC) 5 MG tablet     ammonium lactate (LAC-HYDRIN) 12 % external lotion     buPROPion (WELLBUTRIN) 75 MG tablet     capsaicin (ZOSTRIX) 0.025 % CREA cream     cetirizine (ZYRTEC) 10 MG tablet     ClonazePAM (KLONOPIN PO)     cyclobenzaprine (FLEXERIL) 5 MG tablet     desoximetasone (TOPICORT) 0.25 % external cream     dextromethorphan-guaiFENesin (TUSSIN DM)  MG/5ML liquid     diclofenac (VOLTAREN) 50 MG EC tablet     ferrous sulfate (FEROSUL) 325 (65 Fe) MG tablet     fluticasone (VERAMYST) 27.5 MCG/SPRAY spray     gabapentin (NEURONTIN) 300 MG capsule     ketotifen (ZADITOR) 0.025 % ophthalmic solution     lamoTRIgine (LAMICTAL) 150 MG tablet     levothyroxine (SYNTHROID/LEVOTHROID) 25 MCG tablet     loratadine (CLARITIN) 10 MG tablet     losartan (COZAAR) 100 MG tablet     naproxen (NAPROSYN) 500 MG tablet     oxybutynin ER (DITROPAN XL) 10 MG 24 hr tablet     prazosin (MINIPRESS) 5 MG capsule     Prenatal Vit-Fe Fumarate-FA (PRENATAL MULTIVITAMIN W/IRON) 27-0.8 MG tablet     QUEtiapine (SEROQUEL) 100 MG tablet     QUEtiapine (SEROQUEL) 200 MG tablet     QUEtiapine (SEROQUEL) 25 MG tablet     simvastatin (ZOCOR) 20 MG tablet     topiramate (TOPAMAX) 100 MG tablet     topiramate (TOPAMAX) 50 MG tablet     triamcinolone (ARISTOCORT HP) 0.5 % external cream     triamcinolone (KENALOG) 0.1 % cream     valACYclovir  (VALTREX) 500 MG tablet     white petrolatum external gel     No current facility-administered medications for this visit.      Past Medical History:   Patient Active Problem List   Diagnosis     Bipolar disorder (H)     Obesity     Cellulitis of axilla, left     Iron deficiency anemia     Seizure disorder (H)     Hidradenitis suppurativa     Urgency incontinence     Chronic pain syndrome     Acne vulgaris     Tinea capitis     Fissure in skin     Depression     Hypertension     Subclinical hypothyroidism     Vitamin D deficiency     Ganglion cyst of right foot     Hypertrophy of bone     Morbid obesity (H)     Anhidrosis     Pes planovalgus     Past Medical History:   Diagnosis Date     Anemia      Arthritis      COPD (chronic obstructive pulmonary disease) (H)      Depression      Depressive disorder      Diabetes (H)      History of blood transfusion      Hypertension      Obesity      Seizures (H)      Thyroid disease        CC Jacqueline Mishra MD  [address] [place] [id number] on close of this encounter.

## 2022-02-14 ENCOUNTER — OFFICE VISIT (OUTPATIENT)
Dept: FAMILY MEDICINE | Facility: CLINIC | Age: 47
End: 2022-02-14
Payer: MEDICAID

## 2022-02-14 ENCOUNTER — TELEPHONE (OUTPATIENT)
Dept: DERMATOLOGY | Facility: CLINIC | Age: 47
End: 2022-02-14

## 2022-02-14 VITALS
DIASTOLIC BLOOD PRESSURE: 110 MMHG | WEIGHT: 197.8 LBS | RESPIRATION RATE: 16 BRPM | BODY MASS INDEX: 36.31 KG/M2 | OXYGEN SATURATION: 99 % | HEART RATE: 78 BPM | TEMPERATURE: 97.9 F | SYSTOLIC BLOOD PRESSURE: 142 MMHG

## 2022-02-14 DIAGNOSIS — Z11.3 SCREEN FOR STD (SEXUALLY TRANSMITTED DISEASE): Primary | ICD-10-CM

## 2022-02-14 DIAGNOSIS — B96.89 BACTERIAL VAGINOSIS: ICD-10-CM

## 2022-02-14 DIAGNOSIS — N76.0 BACTERIAL VAGINOSIS: ICD-10-CM

## 2022-02-14 LAB
CLUE CELLS: PRESENT
HCG UR QL: NEGATIVE
HIV 1+2 AB+HIV1 P24 AG SERPL QL IA: NEGATIVE
TRICHOMONAS, WET PREP: ABNORMAL
WBC'S/HIGH POWER FIELD, WET PREP: ABNORMAL
YEAST, WET PREP: ABNORMAL

## 2022-02-14 PROCEDURE — 81025 URINE PREGNANCY TEST: CPT | Performed by: STUDENT IN AN ORGANIZED HEALTH CARE EDUCATION/TRAINING PROGRAM

## 2022-02-14 PROCEDURE — 36415 COLL VENOUS BLD VENIPUNCTURE: CPT | Performed by: STUDENT IN AN ORGANIZED HEALTH CARE EDUCATION/TRAINING PROGRAM

## 2022-02-14 PROCEDURE — 86780 TREPONEMA PALLIDUM: CPT | Performed by: STUDENT IN AN ORGANIZED HEALTH CARE EDUCATION/TRAINING PROGRAM

## 2022-02-14 PROCEDURE — 87210 SMEAR WET MOUNT SALINE/INK: CPT | Performed by: STUDENT IN AN ORGANIZED HEALTH CARE EDUCATION/TRAINING PROGRAM

## 2022-02-14 PROCEDURE — 87591 N.GONORRHOEAE DNA AMP PROB: CPT | Performed by: STUDENT IN AN ORGANIZED HEALTH CARE EDUCATION/TRAINING PROGRAM

## 2022-02-14 PROCEDURE — 87491 CHLMYD TRACH DNA AMP PROBE: CPT | Performed by: STUDENT IN AN ORGANIZED HEALTH CARE EDUCATION/TRAINING PROGRAM

## 2022-02-14 PROCEDURE — 87389 HIV-1 AG W/HIV-1&-2 AB AG IA: CPT | Performed by: STUDENT IN AN ORGANIZED HEALTH CARE EDUCATION/TRAINING PROGRAM

## 2022-02-14 PROCEDURE — 99214 OFFICE O/P EST MOD 30 MIN: CPT | Mod: GC | Performed by: STUDENT IN AN ORGANIZED HEALTH CARE EDUCATION/TRAINING PROGRAM

## 2022-02-14 RX ORDER — METRONIDAZOLE 500 MG/1
500 TABLET ORAL 2 TIMES DAILY
Qty: 14 TABLET | Refills: 0 | Status: SHIPPED | OUTPATIENT
Start: 2022-02-14 | End: 2022-02-21

## 2022-02-14 NOTE — PROGRESS NOTES
Assessment & Plan     Bacterial vaginosis  Mattie has a history of BV, was douching in the past but counseled not to do this. In the past few days has had foul smelling discharge; precipitated by condom retained in her vagina during sex and then she scrubbed her vagina with soap after pulling it out. Positive for clue cells on wet prep today. Discussed importance of not using any soap in the vagina as this contributes to BV. She expressed understanding.   - metroNIDAZOLE (FLAGYL) 500 MG tablet; Take 1 tablet (500 mg) by mouth 2 times daily for 7 days  - BV teaching included in AVS    Screen for STD (sexually transmitted disease)  Recently had sex with a new partner and here for full STI screen.   - Chlamydia trachomatis PCR - Clinic Collect  - Neisseria gonorrhoeae PCR - Clinic Collect  - HCG qualitative urine  - Syphilis Screen Cascade  - HIV Ag/Ab Screen C[facility]    Mixed urinary and fecal incontinence  She currently uses pull ups due to episodes of both urinary and fecal incontinence. We encouraged follow up in clinic for routine visit so that we can address this.     Hypertension  Mattie takes amlodipine 5 mg and losartan 100 mg. /110 on arrival, was 137/88 on repeat. No changes to meds today.   - Follow up in clinic for further BP management     Tobacco Cessation:   reports that she has been smoking cigarettes. She has a 11.50 pack-year smoking history. She has never used smokeless tobacco.  Tobacco cessation discussion deferred today.    I was present with the medical student who participated in the service and in the documentation of this note. I have verified the history and personally performed the physical exam and medical decision making, and have verified the content of the note, which accurately reflects my assessment of the patient and the plan of care.       Rajiv Ruiz MD  [facility]    Samina Phelps is a 46 year old who presents for the following [facility]  issues  accompanied by her PCA.    HPI     Mattie comes in today for STI check. She has a history of BV and has taken metronidazole in the past, most recently 12/2021. She reports that 3 days ago she slept with a new person and then found out he had a girlfriend, so she came in due to concern that she got an STI from him. During intercourse the condom fell off inside her vagina, she was able to pull it out at the time. She then washed her vagina with a wash cloth and soap. Since then she has noticed foul smelling white discharge. In addition, she has struggled with episodes of urinary and fecal incontinence and wears pull ups currently. She has spoken with her primary doctor about this.    Review of Systems   CONSTITUTIONAL: NEGATIVE for fever, chills  GI: NEGATIVE for nausea, vomiting, abdominal pain  : negative for painful urination, vaginal or vulvar itching or sores      Objective    BP (!) 142/110 (BP Location: Left arm, Patient Position: Sitting, Cuff Size: Adult Large)   Pulse 78   Temp 97.9  F (36.6  C) (Oral)   Resp 16   Wt 89.7 kg (197 lb 12.8 oz)   SpO2 99%   BMI 36.31 kg/m    Body mass index is 36.31 kg/m .  Physical Exam   GENERAL: healthy, alert and no distress  RESP: normal respiratory effort   (female): deferred  MS: no gross musculoskeletal defects noted    Results back this visit include positive clue cells on wet prep, negative yeast and negative trichomonas. UPT is negative.    Sydney Haines, MS4

## 2022-02-14 NOTE — PROGRESS NOTES
Preceptor Attestation:    I discussed the patient with the resident and evaluated the patient in person. I have verified the content of the note, which accurately reflects my assessment of the patient and the plan of care.   Supervising Physician:  Esequiel Johnson MD.

## 2022-02-14 NOTE — TELEPHONE ENCOUNTER
While scheduling patient follow up she mention that she was denied by her insurance for coverage for Photo Therapy and was looking for another solution, patient is not active on mychart.     Yue Beltran  Dermatology Clinic Coordinator  2/14/22

## 2022-02-14 NOTE — PATIENT INSTRUCTIONS
1. Don't use soap on your vagina. Soap is okay on the outside, but not the inside.   2. Take Flagyl (metronidazole) 1 tablet twice a day for 7 days.  3. We will call you with the rest of your results.   4. Please schedule a follow up for blood pressure.        Bacterial Vaginosis    You have a vaginal infection called bacterial vaginosis (BV). Both good and bad bacteria are present in a healthy vagina. BV occurs when these bacteria get out of balance. The number of bad bacteria increase. And the number of good bacteria decrease. BV is linked with sexual activity, but it's not a sexually transmitted infection (STI).   BV may or may not cause symptoms. If symptoms do occur, they can include:     Thin, gray, milky-white, or sometimes green discharge    Unpleasant odor or  fishy  smell    Itching, burning, or pain in or around the vagina  It is not known what causes BV, but certain factors can make the problem more likely. These can include:     Douching    Spermicides    Use of antibiotics    Change in hormone levels with pregnancy, breastfeeding, or menopause    Having sex with a new partner    Having sex with more than one partner  BV will sometimes go away on its own. But treatment is often advised. This is because untreated BV can raise the risk of more serious health problems such as:     Pelvic inflammatory disease (PID)     delivery (giving birth to a baby early if you re pregnant)    HIV and some other sexually transmitted infections (STIs)    Infection after surgery on the reproductive organs  Home care  General care    BV is most often treated with medicines called antibiotics. These may be given as pills or as a vaginal cream. If antibiotics are prescribed, be sure to use them exactly as directed. And complete all of the medicine, even if your symptoms go away.    Don't douche or having sex during treatment.    If you have sex with a female partner, ask your healthcare provider if she should also be  treated.  Prevention    Don't douche.    Don't have sex. If you do have sex, then take steps to lower your risk:  ? Use condoms when having sex.  ? Limit the number of sex partners you have.    Follow-up care  Follow up with your healthcare provider, or as advised.   When to get medical advice  Call your healthcare provider right away if:     You have a fever of 100.4 F (38 C) or higher, or as directed by your provider.    Your symptoms get worse, or they don t go away within a few days of starting treatment.    You have new pain in the lower belly or pelvic region.    You have side effects that bother you or a reaction to the pills or cream you re prescribed.    You or any of your sex partners have new symptoms, such as a rash, joint pain, or sores.  [employer] last reviewed this educational content on 6/1/2020 2000-2021 [employer]. All rights reserved. This information is not intended as a substitute for professional medical care. Always follow your healthcare professional's instructions.

## 2022-02-15 LAB
C TRACH DNA SPEC QL NAA+PROBE: NEGATIVE
N GONORRHOEA DNA SPEC QL NAA+PROBE: NEGATIVE
T PALLIDUM AB SER QL: NONREACTIVE

## 2022-02-17 ENCOUNTER — TELEPHONE (OUTPATIENT)
Dept: FAMILY MEDICINE | Facility: CLINIC | Age: 47
End: 2022-02-17
Payer: MEDICAID

## 2022-02-17 PROBLEM — G89.4 CHRONIC PAIN SYNDROME: Status: ACTIVE | Noted: 2017-03-14

## 2022-02-17 NOTE — TELEPHONE ENCOUNTER
[facility] phone call message- patient requesting results:    Test: Lab    Date of test: [id number]    Additional Comments: none    OK to leave a message on voice mail? Yes    Primary language: English      needed? No    Call taken on February 17, 2022 at 1:59 PM by Ananda Casarez

## 2022-02-19 PROBLEM — L30.8 OTHER ECZEMA: Status: ACTIVE | Noted: 2022-02-19

## 2022-02-21 ENCOUNTER — TELEPHONE (OUTPATIENT)
Dept: FAMILY MEDICINE | Facility: CLINIC | Age: 47
End: 2022-02-21
Payer: MEDICAID

## 2022-02-21 NOTE — TELEPHONE ENCOUNTER
Insurance is not paying for medication for light therapist treatment. Patient need a call back.    Please advise.    BARBARA NunezA

## 2022-02-21 NOTE — TELEPHONE ENCOUNTER
[facility] phone call message- general phone call:    Reason for call: Insurance is not paying for medication for light therapist treatment.   question  Return call needed: Yes    OK to leave a message on voice mail? Yes    Primary language: English      needed? No    Call taken on February 21, 2022 at 12:09 PM by Grisel Flores-Cardona

## 2022-02-23 NOTE — TELEPHONE ENCOUNTER
Copy Dr. Anton's notes for documentation purpose    The diet form is in her media tab 11/19/2021. I can help facilitate this to  be faxed when I am at clinic tomorrow. I will check with our PharmD team about insurance and light therapy.    Message text

## 2022-03-12 ENCOUNTER — HEALTH MAINTENANCE LETTER (OUTPATIENT)
Age: 47
End: 2022-03-12

## 2022-03-21 ENCOUNTER — MEDICAL CORRESPONDENCE (OUTPATIENT)
Dept: HEALTH INFORMATION MANAGEMENT | Facility: CLINIC | Age: 47
End: 2022-03-21
Payer: MEDICAID

## 2022-03-21 ENCOUNTER — DOCUMENTATION ONLY (OUTPATIENT)
Dept: FAMILY MEDICINE | Facility: CLINIC | Age: 47
End: 2022-03-21
Payer: MEDICAID

## 2022-03-21 NOTE — PROGRESS NOTES
To be completed in Nursing note:    Please reference list for forms that require a visit for completion.  Please remind patients that providers are given 3-5 business days to complete and return forms.      Form type: [facility] UROLOGY ~ INCONTINENCE ORDER FORM #[id number]    Date form received: 3/16/2022    Date form completed by Physician: 3/21/2022    How was form returned to patient (mailed, faxed, or at  for patient to ):    Fax to [phone]    Date form mailed/faxed/left at  for patient and sent to HIM for scanning:    Fax on 3/21/2022    Once form is left for patient, faxed, or mailed PCS will then close the documentation only encounter.

## 2022-03-28 ENCOUNTER — MEDICAL CORRESPONDENCE (OUTPATIENT)
Dept: HEALTH INFORMATION MANAGEMENT | Facility: CLINIC | Age: 47
End: 2022-03-28
Payer: MEDICAID

## 2022-03-28 ENCOUNTER — DOCUMENTATION ONLY (OUTPATIENT)
Dept: FAMILY MEDICINE | Facility: CLINIC | Age: 47
End: 2022-03-28
Payer: MEDICAID

## 2022-03-29 ENCOUNTER — OFFICE VISIT (OUTPATIENT)
Dept: FAMILY MEDICINE | Facility: CLINIC | Age: 47
End: 2022-03-29
Payer: MEDICAID

## 2022-03-29 ENCOUNTER — MEDICAL CORRESPONDENCE (OUTPATIENT)
Dept: HEALTH INFORMATION MANAGEMENT | Facility: CLINIC | Age: 47
End: 2022-03-29

## 2022-03-29 VITALS
SYSTOLIC BLOOD PRESSURE: 136 MMHG | TEMPERATURE: 97.9 F | HEART RATE: 74 BPM | RESPIRATION RATE: 18 BRPM | DIASTOLIC BLOOD PRESSURE: 89 MMHG | WEIGHT: 190.8 LBS | OXYGEN SATURATION: 97 % | BODY MASS INDEX: 35.02 KG/M2

## 2022-03-29 DIAGNOSIS — R21 RASH: ICD-10-CM

## 2022-03-29 DIAGNOSIS — L30.8 OTHER ECZEMA: Primary | ICD-10-CM

## 2022-03-29 DIAGNOSIS — B02.9 HERPES ZOSTER WITHOUT COMPLICATION: ICD-10-CM

## 2022-03-29 DIAGNOSIS — N39.41 URGENCY INCONTINENCE: ICD-10-CM

## 2022-03-29 PROCEDURE — 87529 HSV DNA AMP PROBE: CPT | Performed by: STUDENT IN AN ORGANIZED HEALTH CARE EDUCATION/TRAINING PROGRAM

## 2022-03-29 PROCEDURE — 99214 OFFICE O/P EST MOD 30 MIN: CPT | Mod: GC | Performed by: STUDENT IN AN ORGANIZED HEALTH CARE EDUCATION/TRAINING PROGRAM

## 2022-03-29 RX ORDER — OXYBUTYNIN CHLORIDE 10 MG/1
10 TABLET, EXTENDED RELEASE ORAL DAILY
Qty: 90 TABLET | Refills: 3 | Status: SHIPPED | OUTPATIENT
Start: 2022-03-29 | End: 2022-03-29

## 2022-03-29 RX ORDER — CLOBETASOL PROPIONATE 0.5 MG/G
OINTMENT TOPICAL 2 TIMES DAILY
Qty: 30 G | Refills: 0 | Status: SHIPPED | OUTPATIENT
Start: 2022-03-29 | End: 2022-09-14

## 2022-03-29 RX ORDER — CLOBETASOL PROPIONATE 0.5 MG/G
OINTMENT TOPICAL 2 TIMES DAILY
Qty: 30 G | Refills: 0 | Status: SHIPPED | OUTPATIENT
Start: 2022-03-29 | End: 2022-03-29

## 2022-03-29 RX ORDER — VALACYCLOVIR HYDROCHLORIDE 500 MG/1
1000 TABLET, FILM COATED ORAL 3 TIMES DAILY
Qty: 42 TABLET | Refills: 0 | Status: SHIPPED | OUTPATIENT
Start: 2022-03-29

## 2022-03-29 RX ORDER — VALACYCLOVIR HYDROCHLORIDE 500 MG/1
1000 TABLET, FILM COATED ORAL 3 TIMES DAILY
Qty: 42 TABLET | Refills: 0 | Status: SHIPPED | OUTPATIENT
Start: 2022-03-29 | End: 2022-03-29

## 2022-03-29 RX ORDER — OXYBUTYNIN CHLORIDE 10 MG/1
10 TABLET, EXTENDED RELEASE ORAL DAILY
Qty: 90 TABLET | Refills: 3 | Status: SHIPPED | OUTPATIENT
Start: 2022-03-29 | End: 2023-11-30
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
## 2022-03-29 NOTE — PROGRESS NOTES
To be completed in Nursing note:    Please reference list for forms that require a visit for completion.  Please remind patients that providers are given 3-5 business days to complete and return forms.      Form type: [facility] ~ INCONTINENCE ORDER FORM     Date form received: 3/24/2022    Date form completed by Physician: 3/28/2022    How was form returned to patient (mailed, faxed, or at  for patient to ):    Fax to [phone]    Date form mailed/faxed/left at  for patient and sent to HIM for scanning:    Fax on 3/28/2022    Once form is left for patient, faxed, or mailed PCS will then close the documentation only encounter.

## 2022-03-30 ENCOUNTER — TELEPHONE (OUTPATIENT)
Dept: FAMILY MEDICINE | Facility: CLINIC | Age: 47
End: 2022-03-30
Payer: MEDICAID

## 2022-03-30 LAB
HSV1 DNA SPEC QL NAA+PROBE: NOT DETECTED
HSV2 DNA SPEC QL NAA+PROBE: DETECTED

## 2022-03-30 NOTE — TELEPHONE ENCOUNTER
Requested information given to pt and,her questions answered   Patient voiced understanding and will take the med twice daily    btrn

## 2022-03-30 NOTE — TELEPHONE ENCOUNTER
----- Message from Jacqueline Mishra MD sent at 3/30/2022  2:02 PM CDT -----  Regarding: Treatment change  Hi Leticia,     Could you call Mattie and let her know that the virus that causes her bumps needs a different regimen of the medicine I sent her. I sent for TID (this is the treatment for shingles), but for HSV2 which we tested for needs to be taken BID instead. I started her on treatment for shingles just in case, but looks like it is HSV2.    Thanks!  Jacqueline

## 2022-03-30 NOTE — TELEPHONE ENCOUNTER
Read patient the message but she would like Yumiko to call her back and explain it to her a little more.

## 2022-04-21 ENCOUNTER — DOCUMENTATION ONLY (OUTPATIENT)
Dept: FAMILY MEDICINE | Facility: CLINIC | Age: 47
End: 2022-04-21
Payer: MEDICAID

## 2022-04-21 DIAGNOSIS — N39.41 URGENCY INCONTINENCE: Primary | ICD-10-CM

## 2022-04-25 ENCOUNTER — OFFICE VISIT (OUTPATIENT)
Dept: FAMILY MEDICINE | Facility: CLINIC | Age: 47
End: 2022-04-25
Payer: MEDICAID

## 2022-04-25 VITALS
DIASTOLIC BLOOD PRESSURE: 79 MMHG | WEIGHT: 186 LBS | HEART RATE: 80 BPM | TEMPERATURE: 98.4 F | HEIGHT: 62 IN | SYSTOLIC BLOOD PRESSURE: 120 MMHG | OXYGEN SATURATION: 98 % | RESPIRATION RATE: 18 BRPM | BODY MASS INDEX: 34.23 KG/M2

## 2022-04-25 DIAGNOSIS — B96.89 ACUTE BACTERIAL SINUSITIS: Primary | ICD-10-CM

## 2022-04-25 DIAGNOSIS — J30.89 SEASONAL ALLERGIC RHINITIS DUE TO OTHER ALLERGIC TRIGGER: ICD-10-CM

## 2022-04-25 DIAGNOSIS — J01.90 ACUTE BACTERIAL SINUSITIS: Primary | ICD-10-CM

## 2022-04-25 PROCEDURE — 99213 OFFICE O/P EST LOW 20 MIN: CPT | Mod: GC | Performed by: STUDENT IN AN ORGANIZED HEALTH CARE EDUCATION/TRAINING PROGRAM

## 2022-04-25 RX ORDER — AMOXICILLIN AND CLAVULANATE POTASSIUM 400; 57 MG/5ML; MG/5ML
30 POWDER, FOR SUSPENSION ORAL 2 TIMES DAILY
Qty: 200 ML | Refills: 0 | Status: SHIPPED | OUTPATIENT
Start: 2022-04-25 | End: 2022-05-05

## 2022-04-25 RX ORDER — CETIRIZINE HYDROCHLORIDE 10 MG/1
10 TABLET ORAL DAILY
Qty: 60 TABLET | Refills: 1 | Status: SHIPPED | OUTPATIENT
Start: 2022-04-25 | End: 2023-09-12

## 2022-04-25 NOTE — PROGRESS NOTES
Assessment & Plan     Seasonal allergic rhinitis due to other allergic trigger  Patient has a history of poorly controlled seasonal allergies likely poor allergy control contributing to her symptoms and her eventual developing sinus infection. Refilled Flonase and Cetirizine.  - fluticasone furoate 27.5 MCG/SPRAY nasal spray; Spray 1-2 sprays into both nostrils daily  - cetirizine (ZYRTEC) 10 MG tablet; Take 1 tablet (10 mg) by mouth daily    Acute bacterial sinusitis  VS are wnl, patient is afebrile and does not need higher level of care. Patient has findings consistent with bacterial sinusitis, will treat with 10 days of liq Augmentin.  - amoxicillin-clavulanate (AUGMENTIN) 400-57 MG/5ML suspension; Take 10 mLs (800 mg) by mouth 2 times daily for 10 days    Return if symptoms worsen or fail to improve.    Zach Haro MD  [facility] SARABJIT Phelps is a 46 year old who presents for the following health issues    HPI     Patient Active Problem List   Diagnosis     Bipolar disorder (H)     Obesity     Cellulitis of axilla, left     Iron deficiency anemia     Seizure disorder (H)     Hidradenitis suppurativa     Urgency incontinence     Chronic pain syndrome     Acne vulgaris     Tinea capitis     Fissure in skin     Depression     Hypertension     Subclinical hypothyroidism     Vitamin D deficiency     Ganglion cyst of right foot     Hypertrophy of bone     Morbid obesity (H)     Anhidrosis     Pes planovalgus     Other eczema     Patient presents with weeks of poorly controlled seasonal allergies has not been taking antihistamine or flonase.  Endorses chronic nasal discharge, congestion and post-nasal drip and cough. Denies any recent acute illness or illness exposure. Did take a home COVID antigen test that was negative.   She has recently developed body aches, headaches, worsening sinus congestion and productive coughing.  Also endorses subjective chills but denies measuring  "fevers at home.    Review of Systems   Constitutional, HEENT, cardiovascular, pulmonary, gi and gu systems are negative, except as otherwise noted.      Objective    /79 (BP Location: Left arm, Patient Position: Sitting, Cuff Size: Adult Large)   Pulse 80   Temp 98.4  F (36.9  C) (Tympanic)   Resp 18   Ht 1.568 m (5' 1.75\")   Wt 84.4 kg (186 lb)   SpO2 98%   BMI 34.30 kg/m    Body mass index is 34.3 kg/m .  Physical Exam    GENERAL: healthy, alert and no distress  EYES: Eyes grossly normal to inspection, PERRL and conjunctivae and sclerae normal  HENT: Maxillary sinus pressure and tenderness to palpation, ear canals and TM's normal, nose and mouth without ulcers or lesions  NECK: no adenopathy, no asymmetry, masses, or scars and thyroid normal to palpation  RESP: lungs clear to auscultation - no rales, rhonchi or wheezes  CV: regular rate and rhythm, normal S1 S2, no S3 or S4, no murmur, click or rub, no peripheral edema and peripheral pulses strong  ABDOMEN: soft, nontender, no hepatosplenomegaly, no masses and bowel sounds normal  MS: no gross musculoskeletal defects noted, no edema  SKIN: no suspicious lesions or rashes  NEURO: Normal strength and tone, mentation intact and speech normal  PSYCH: mentation appears normal, affect normal/bright    ----- Service Performed and Documented by Resident or Fellow ------        "

## 2022-06-24 ENCOUNTER — OFFICE VISIT (OUTPATIENT)
Dept: FAMILY MEDICINE | Facility: CLINIC | Age: 47
End: 2022-06-24
Payer: MEDICAID

## 2022-06-24 VITALS
RESPIRATION RATE: 20 BRPM | TEMPERATURE: 97 F | SYSTOLIC BLOOD PRESSURE: 106 MMHG | BODY MASS INDEX: 32.86 KG/M2 | DIASTOLIC BLOOD PRESSURE: 76 MMHG | HEART RATE: 68 BPM | WEIGHT: 178.2 LBS | OXYGEN SATURATION: 97 %

## 2022-06-24 DIAGNOSIS — G89.4 CHRONIC PAIN SYNDROME: Primary | ICD-10-CM

## 2022-06-24 DIAGNOSIS — F31.31 BIPOLAR AFFECTIVE DISORDER, CURRENTLY DEPRESSED, MILD (H): ICD-10-CM

## 2022-06-24 PROCEDURE — 99214 OFFICE O/P EST MOD 30 MIN: CPT | Mod: GC | Performed by: FAMILY MEDICINE

## 2022-06-24 RX ORDER — QUETIAPINE FUMARATE 100 MG/1
100 TABLET, FILM COATED ORAL AT BEDTIME
Qty: 90 TABLET | Refills: 1 | Status: SHIPPED | OUTPATIENT
Start: 2022-06-24 | End: 2023-01-11

## 2022-06-24 RX ORDER — QUETIAPINE FUMARATE 200 MG/1
TABLET, FILM COATED ORAL
Qty: 30 TABLET | Refills: 0 | Status: SHIPPED | OUTPATIENT
Start: 2022-06-24 | End: 2023-01-31

## 2022-06-24 RX ORDER — DULOXETIN HYDROCHLORIDE 30 MG/1
30 CAPSULE, DELAYED RELEASE ORAL DAILY
Qty: 30 CAPSULE | Refills: 1 | Status: SHIPPED | OUTPATIENT
Start: 2022-06-24 | End: 2022-10-14

## 2022-06-24 ASSESSMENT — PATIENT HEALTH QUESTIONNAIRE - PHQ9: SUM OF ALL RESPONSES TO PHQ QUESTIONS 1-9: 19

## 2022-06-24 NOTE — PROGRESS NOTES
Preceptor Attestation:    I discussed the patient with the resident and evaluated the patient in person. I have verified the content of the note, which accurately reflects my assessment of the patient and the plan of care.   Supervising Physician:  Adriano Nieves MD.

## 2022-06-24 NOTE — COMMUNITY RESOURCES LIST (ENGLISH)
06/24/2022   [facility] - Outpatient Clinics  [facility]  For questions about this resource list or additional care needs, please contact your primary care clinic or care manager.  Phone: [phone]   Email: N/A   Address: [address] [place] [address]   Hours: N/A        Hotlines and Helplines       Hotline - Crisis help  1  [facility] of Human Services - Crisis Text Line - Crisis Text Line Distance: 0.55 miles      COVID-19 Status: Phone/Virtual   [address] [place] [address]  Language: English  Hours: Mon - Sun Open 24 Hours   Website: https://[place].gov/dhs/partners-and-providers/policies-procedures/adult-mental-health/crisis-text-line/     2  [facility] Mental Health Urgent Care Distance: 0.59 miles      COVID-19 Status: Phone/Virtual   [address] [place] [address]  Language: English, Hmong, Macedonian  Hours: Mon - Sun Open 24 Hours   Phone: (660) 164-6635 Website: https://www.[facility]./residents/health-medical/clinics-services/mental-behavorial-health/adult-mental-health-chemical-health/urgent-care-adult-mental-health          Mental Health       Individual counseling  3  [facility] Distance: 0.22 miles      COVID-19 Status: Regular Operations, COVID-19 Status: Phone/Virtual   [address] [facility] [address] [place] [address]  Language: English  Hours: Mon - Fri 8:00 AM - 4:30 PM  Fees: Free   Phone: (996) 246-2151 Website: https://www.[place].gov/find-locations/facility/vc_0416V     4  [facility] Sexual Violence Services - Crisis Counseling Distance: 0.23 miles      COVID-19 Status: Phone/Virtual   [address] [place] [address]  Language: English  Hours: Mon - Fri 8:30 AM - 5:00 PM  Fees: Free   Phone: (141) 932-1851 Email: missy@co.[facility].mn. Website: http://sosramsey.org     Mental health crisis care  5  [facility] Mental Health Urgent Care - Adult Program Distance: 0.59 miles      COVID-19 Status:  Regular Operations   [address] [facility] [place] [address]  Language: English, Hmong, Wallisian  Hours: Mon - Fri 8:00 AM - 5:30 PM  Fees: Insurance, Self Pay, Sliding Fee   Phone: (682) 376-1060 Website: https://www.[facility]./residents/health-medical/clinics-services/mental-behavorial-health/adult-mental-health-chemical-health/urgent-care-adult-mental-health     6  [employer] - Crisis Stabilization Services ([facility]) Distance: 4.86 miles      COVID-19 Status: Regular Operations   [address]  [place] [address]  Language: English, Namibian  Hours: Mon - Sun Open 24 Hours  Fees: Insurance   Phone: (398) 685-8469 Email: info@[employer].Tyba Website: https://[employer].org/services-programs/residential-services/panchito-perla-house/     Mental health support group  7  [facility] Distance: 0.22 miles      COVID-19 Status: Phone/Virtual   [address] [facility] [address] [place] [address]  Language: English  Hours: Mon - Fri 8:00 AM - 4:30 PM  Fees: Free   Phone: (988) 789-9553 Website: https://www.[place].gov/find-locations/facility/vc_0416V     8  [employer] [facility] Distance: 3.39 miles      COVID-19 Status: Regular Operations, COVID-19 Status: Phone/Virtual   [address] [place] [address]  Language: English  Hours: Mon - Thu 12:00 PM - 5:00 PM  Fees: Free   Phone: (151) 658-5134 Email: info@[employer].org Website: http://[employer]org/          Important Numbers & Websites       Emergency Services   911  City Services   311  Poison Control   (368) 693-9866  Suicide Prevention Lifeline   (797) 648-6347 (TALK)  Child Abuse Hotline   (677) 600-6669 (4-A-Child)  Sexual Assault Hotline   (941) 986-7077 (HOPE)  National Runaway Safeline   (977) 472-9225 (RUNAWAY)  All-Options Talkline   (661) 713-8823  Substance Abuse Referral   (386) 975-1577 (HELP)

## 2022-06-24 NOTE — PROGRESS NOTES
Social Work Note:    Data and Intervention: Met with patient after clinic visit in [facility]. She is concerned about not being able to get meds due to not being able to afford the copays. SW helped her call her pharmacy, [facility] [address] ([phone]). They informed her MA now covers 100% of her meds without any copays. Patient was very pleased with this.     Gave Matter box of non-perishable foods, and snack pack as patient expressed she does not have a lot of food at home.     Assessment and Plan: Follow up as needs arise.    SVETLANA Jarvis
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
## 2022-06-28 NOTE — PATIENT INSTRUCTIONS
PAIN MANAGEMENT REFERRAL  New patient packet needs to be completed and returned to [facility] prior to scheduling new patient appointment.  [facility]  [address]   [place] [address]  Phone: [phone]  Fax: [phone]    Lizzie Hodge

## 2022-06-29 ENCOUNTER — OFFICE VISIT (OUTPATIENT)
Dept: FAMILY MEDICINE | Facility: CLINIC | Age: 47
End: 2022-06-29
Payer: MEDICAID

## 2022-06-29 VITALS
WEIGHT: 178 LBS | OXYGEN SATURATION: 99 % | RESPIRATION RATE: 16 BRPM | SYSTOLIC BLOOD PRESSURE: 134 MMHG | BODY MASS INDEX: 32.82 KG/M2 | DIASTOLIC BLOOD PRESSURE: 89 MMHG | TEMPERATURE: 98 F | HEART RATE: 71 BPM

## 2022-06-29 DIAGNOSIS — G89.4 CHRONIC PAIN SYNDROME: ICD-10-CM

## 2022-06-29 DIAGNOSIS — Z23 NEED FOR VACCINATION: ICD-10-CM

## 2022-06-29 DIAGNOSIS — L73.9 FOLLICULITIS: Primary | ICD-10-CM

## 2022-06-29 PROCEDURE — 99213 OFFICE O/P EST LOW 20 MIN: CPT | Mod: 25 | Performed by: STUDENT IN AN ORGANIZED HEALTH CARE EDUCATION/TRAINING PROGRAM

## 2022-06-29 PROCEDURE — 90715 TDAP VACCINE 7 YRS/> IM: CPT | Performed by: STUDENT IN AN ORGANIZED HEALTH CARE EDUCATION/TRAINING PROGRAM

## 2022-06-29 PROCEDURE — 90471 IMMUNIZATION ADMIN: CPT | Performed by: STUDENT IN AN ORGANIZED HEALTH CARE EDUCATION/TRAINING PROGRAM

## 2022-06-29 NOTE — PROGRESS NOTES
"There are no exam notes on file for this visit.    Assessment & Plan      1. Folliculitis  Patient has painful small, simple cyst on mons pubis that is actively draining, does not appear infected. She is afebrile, VSS.   - warm compresses to facilitate drainage    2. Chronic pain syndrome  Patient was seen 6/24 for CPM visit. It appears she was started on Cymbalta and referred to a pain clinic. Discussed with patient she will be called in the next few days to weeks to get this scheduled.    3. Need for vaccination  - TDAP VACCINE (Adacel, Boostrix)  [[id number]]       Patient Instructions   Use warm compresses to help cyst drain    20 minutes spent on the date of the encounter doing chart review, history and exam, documentation and further activities per the note    Benita Behm, MD PGY2  [facility]  06/29/22    I precepted today with Dr. Wylie.    Subjective   Mattie Pierre is a 46 year old who presents for the following health issues: cyst    HPI    Patient reports she has a cyst in her groin area she noticed a few days ago. She said it \"popped\" and has been draining purulent fluid. She has had cysts before in her arms that required core removal and wonders if this needs the same treatment.     She wonders when she needs CPM visit scheduled.     Review of Systems  Constitutional, HEENT, cardiovascular, pulmonary, gi and gu systems are negative, except as otherwise noted.    Objective   /89 (BP Location: Left arm, Patient Position: Sitting, Cuff Size: Adult Regular)   Pulse 71   Temp 98  F (36.7  C) (Oral)   Resp 16   Wt 80.7 kg (178 lb)   LMP  (LMP Unknown)   SpO2 99%   BMI 32.82 kg/m    Physical Exam    Constitutional: Awake, alert, cooperative, no apparent distress  Eyes: Lids and lashes normal, pupils equal, round and reactive to light, extra ocular muscles intact, sclera clear, conjunctiva normal.  ENT: Normocephalic, without obvious abnormality, atraumatic, external ears " without lesions.  Neck: Supple, symmetrical, trachea midline, skin normal.  Lungs: No audible wheezing or stridor, no increased work of breathing, talks in full sentences  Musculoskeletal: Full range of motion noted. Tone is normal.  Neurologic: Awake, alert, oriented to name, place and time.  Cranial nerves II-XII are grossly intact. Gait is normal.  Neuropsychiatric: Normal affect, mood, orientation, and memory. Coherent speech, normal rate and volume, able to articulate logical thoughts, able to abstract reason, no tangential thoughts, no hallucinations or delusions   Skin: small draining cyst in mons pubis area without surrounding erythema or swelling, does not appear to be abscessed.     ----- Service Performed and Documented by Resident or Fellow ------

## 2022-07-25 ENCOUNTER — OFFICE VISIT (OUTPATIENT)
Dept: FAMILY MEDICINE | Facility: CLINIC | Age: 47
End: 2022-07-25
Payer: MEDICAID

## 2022-07-25 VITALS
DIASTOLIC BLOOD PRESSURE: 106 MMHG | BODY MASS INDEX: 33.01 KG/M2 | WEIGHT: 179 LBS | TEMPERATURE: 96.8 F | OXYGEN SATURATION: 99 % | RESPIRATION RATE: 16 BRPM | HEART RATE: 78 BPM | SYSTOLIC BLOOD PRESSURE: 152 MMHG

## 2022-07-25 DIAGNOSIS — G89.4 CHRONIC PAIN SYNDROME: Primary | ICD-10-CM

## 2022-07-25 LAB
AMPHETAMINES UR QL: NOT DETECTED
BARBITURATES UR QL SCN: NOT DETECTED
BENZODIAZ UR QL SCN: NOT DETECTED
BUPRENORPHINE UR QL: NOT DETECTED
CANNABINOIDS UR QL: NOT DETECTED
COCAINE UR QL SCN: DETECTED
D-METHAMPHET UR QL: NOT DETECTED
METHADONE UR QL SCN: NOT DETECTED
OPIATES UR QL SCN: NOT DETECTED
OXYCODONE UR QL SCN: NOT DETECTED
PCP UR QL SCN: NOT DETECTED
PROPOXYPH UR QL: NOT DETECTED
TRICYCLICS UR QL SCN: NOT DETECTED

## 2022-07-25 PROCEDURE — 80306 DRUG TEST PRSMV INSTRMNT: CPT | Performed by: STUDENT IN AN ORGANIZED HEALTH CARE EDUCATION/TRAINING PROGRAM

## 2022-07-25 PROCEDURE — 99214 OFFICE O/P EST MOD 30 MIN: CPT | Mod: GC | Performed by: STUDENT IN AN ORGANIZED HEALTH CARE EDUCATION/TRAINING PROGRAM

## 2022-07-25 RX ORDER — DULOXETIN HYDROCHLORIDE 30 MG/1
30 CAPSULE, DELAYED RELEASE ORAL DAILY
Qty: 30 CAPSULE | Refills: 0 | Status: SHIPPED | OUTPATIENT
Start: 2022-07-25 | End: 2022-09-05

## 2022-07-25 NOTE — PROGRESS NOTES
"Assessment & Plan     Chronic pain syndrome  Unfortunately, does not appear that the patient's pain has changed in any way.  It seems as though part of this is due to her not picking up her medications from the pharmacy.  I did represcribe them today, and I did call the pharmacy noting: The medications Cymbalta and Voltaren are covered by her insurance.  I also put in another pain management referral, and discussed care with referral coordinator to coordinate the care better for this patient.  Unfortunately, patient is not a good candidate for opioid medications giving high risk of overdose, and per UDS today, she is positive for cocaine.  - Urine Drugs of Abuse Screen Panel 13  - DULoxetine (CYMBALTA) 30 MG capsule; Take 1 capsule (30 mg) by mouth daily  - diclofenac (VOLTAREN) 1 % topical gel; Apply 4 g topically 4 times daily  - Pain Management  Referral; Future    Review of prior external note(s) from - previous office visit  30 minutes spent on the date of the encounter doing chart review, history and exam, documentation and further activities per the note       Nicotine/Tobacco Cessation:  She reports that she has been smoking cigarettes. She has a 11.50 pack-year smoking history. She has never used smokeless tobacco.  Nicotine/Tobacco Cessation Plan:   Will discuss at later visit      BMI:   Estimated body mass index is 33.01 kg/m  as calculated from the following:    Height as of 4/25/22: 1.568 m (5' 1.75\").    Weight as of this encounter: 81.2 kg (179 lb).   Will discuss at later visit    Clau Morgan MD PGY3  [facility]  Precepted with Dr. Ezequiel Haas   Mattie is a 46 year old, presenting for the following health issues:  chronic pain Management (Current Pain score is 9 )      HPI     Reports feeling fairly frustrated that her chronic pain is not under good control.  She says that she got a call from the pain clinic from the referral that was placed during her " last visit, and they told her to continue going to her primary care office.  She also was told that this is a pain clinic.  She says that she did not  the Cymbalta or the Voltaren gel because they were not covered; however, I did review the notes, stating that social work did tell her that these medications were covered 100% through [place].  She says because of her pain, she took an unknown pill from a neighbor, noting improvement of the pain; however, she is not sure what that pill was comprised of.       Pain History:  When did you first notice your pain? - Chronic Pain   Have you seen this provider for your pain in the past?   No   Where in your body do your have pain? All over  Are you seeing anyone else for your pain? Yes - former PCP, Dr. Boykin  What makes your pain better? nothing  What makes your pain worse? stress  How has pain affected your ability to work? Unable to work due to pain   What type of work do you or did you do? NA  Who lives in your household? NA    PHQ-9 SCORE 6/11/2019 6/14/2019 6/24/2022   PHQ-9 Total Score 16 15 19         PEG Score 6/24/2022 7/25/2022   PEG Total Score 10 10       OPIOID RISK TOOL TOTAL SCORE 6/24/2022   Total Score 11     Low Risk (0-3)  Moderate Risk (4-7)  High Risk (>8)  RIOSORD Total Score 6/24/2022   RIOSORD Total Score 23     Average probability of overdose or serious opioid-induced respiratory depression in the next six months:   Points    Risk   0-4    2%   5-7    5%   8-9    7%   10-17    15%   18-25    30%   26-41    55%   42    83%  DIRE SCORE 3/14/2017   DIRE Total Score 13     Chronic Pain - Initial Assessment:    How would you describe your pain? Generalized  Have you had any recent changes to the severity or character of your pain? no  Is there an underlying cause that has been identified? no  Has your ability to work or do daily activities changed recently because of your pain? yes  Which of these pain treatments have you tried?  Acupuncture  Previous medication treatments:  NSAIDs - ibuprofen (Motrin)  Other - acetaminophen (Tylenol)    OPIOID RISK TOOL TOTAL SCORE 6/24/2022   Total Score 11     Low Risk (0-3)  Moderate Risk (4-7)  High Risk (>8)  RIOSORD Total Score 6/24/2022   RIOSORD Total Score 23     Average probability of overdose or serious opioid-induced respiratory depression in the next six months:   Points    Risk   0-4    2%   5-7    5%   8-9    7%   10-17    15%   18-25    30%   26-41    55%   42    83%      Opioid Risk Tool 6/24/2022   Illegal Drugs 0   Prescription Drugs 0   Alcohol 0   Illegal Drugs 4   Prescription Drugs 0   Is the patient age 16-45 0   History of Preadolescence Sexual Abuse 3   Psychological Disease: Attention-Deficit/Hyperactivity Disorder; Obsessive Compulsive Disorder; Bipolar Disorder; Schizophrenia 2   Depression 1   Total Score 11     Low Risk (0-3)  Moderate Risk (4-7)  High Risk (>8)  Opioid Overdose Calculator (RIOSORD) 6/24/2022   Substance use disorder or any kind, including alcohol or cannabis 0   Bipolar Disorder or Schizophrenia?  10   Stroke or other cerebrovascular disease?  0   Significant Chronic Kidney Disease? 0   Heart Failure?  0   Nonmalignant pancreatic disease?  0   Chronic Pulmonary Disease? 0   Chronic headache?  5   Does your patient take Fentanyl (transdermal or transmucosal)? 0   Does your patient take Morphone?  0   Does your patient take Methadone? 0   Does your patient take Hydromorphone? 0   Does your patient take extended release (ER) or long acting (LA) opioid? 0   Does your patient take a Benzodiazepine?  0   Does your patient take an antidepressant?  8   Is the patient's MME >100mg/day? 0   RIOSORD Total Score 23     Average probability of overdose or serious opioid-induced respiratory depression in the next six months:   Points    Risk   0-4    2%   5-7    5%   8-9    7%   10-17    15%   18-25    30%   26-41    55%   42    83%  D.I.R.E. Assessment Results 3/14/2017    Diagnosis 2   Intractability 2   Psychological 2   Social Support 2   Reliability 2   Chemical Health 1   Risk Sum 7    Efficacy Score 2   DIRE Score 13       Review of Systems   Complete ROS negative aside noted in HPI        Objective    BP (!) 152/106   Pulse 78   Temp 96.8  F (36  C) (Tympanic)   Resp 16   Wt 81.2 kg (179 lb)   LMP 07/23/2022 (Approximate)   SpO2 99%   BMI 33.01 kg/m    Body mass index is 33.01 kg/m .  Physical Exam   GENERAL: healthy, alert and no distress  RESP: lungs clear to auscultation - no rales, rhonchi or wheezes  CV: regular rate and rhythm, normal S1 S2, no S3 or S4, no murmur, click or rub, no peripheral edema and peripheral pulses strong  MS: no gross musculoskeletal defects noted, no edema    Results for orders placed or performed in visit on 07/25/22 (from the past 24 hour(s))   Urine Drugs of Abuse Screen Panel 13   Result Value Ref Range    Cannabinoids (21-nbn-0-carboxy-9-THC) Not Detected Not Detected, Indeterminate    Phencyclidine Not Detected Not Detected, Indeterminate    Cocaine (Benzoylecgonine) Detected (A) Not Detected, Indeterminate    Methamphetamine (d-Methamphetamine) Not Detected Not Detected, Indeterminate    Opiates (Morphine) Not Detected Not Detected, Indeterminate    Amphetamine (d-Amphetamine) Not Detected Not Detected, Indeterminate    Benzodiazepines (Nordiazepam) Not Detected Not Detected, Indeterminate    Tricyclic Antidepressants (Desipramine) Not Detected Not Detected, Indeterminate    Methadone Not Detected Not Detected, Indeterminate    Barbiturates (Butalbital) Not Detected Not Detected, Indeterminate    Oxycodone Not Detected Not Detected, Indeterminate    Propoxyphene (Norpropoxyphene) Not Detected Not Detected, Indeterminate    Buprenorphine Not Detected Not Detected, Indeterminate       ----- Service Performed and Documented by Resident or Fellow ------

## 2022-08-08 ENCOUNTER — TELEPHONE (OUTPATIENT)
Dept: PSYCHOLOGY | Facility: CLINIC | Age: 47
End: 2022-08-08

## 2022-08-08 NOTE — TELEPHONE ENCOUNTER
10:14am - placed outreach call when Mattie did not arrive for our scheduled visit at clinic today.  No answer and no opportunity to leave message after letting it ring for a long time.  Will try another outreach in the near future and send letter if I am not successful in reaching her.    Leeanna Khan, PhD, LP

## 2022-08-22 ENCOUNTER — OFFICE VISIT (OUTPATIENT)
Dept: FAMILY MEDICINE | Facility: CLINIC | Age: 47
End: 2022-08-22
Payer: MEDICAID

## 2022-08-22 VITALS
SYSTOLIC BLOOD PRESSURE: 137 MMHG | RESPIRATION RATE: 16 BRPM | HEART RATE: 70 BPM | TEMPERATURE: 98 F | BODY MASS INDEX: 32.93 KG/M2 | OXYGEN SATURATION: 99 % | DIASTOLIC BLOOD PRESSURE: 87 MMHG | WEIGHT: 178.6 LBS

## 2022-08-22 DIAGNOSIS — N39.46 MIXED STRESS AND URGE URINARY INCONTINENCE: Primary | ICD-10-CM

## 2022-08-22 DIAGNOSIS — Z02.89 ENCOUNTER FOR COMPLETION OF FORM WITH PATIENT: ICD-10-CM

## 2022-08-22 DIAGNOSIS — Z12.11 SCREEN FOR COLON CANCER: ICD-10-CM

## 2022-08-22 DIAGNOSIS — Z59.9 FINANCIAL DIFFICULTIES: ICD-10-CM

## 2022-08-22 DIAGNOSIS — Z59.19: ICD-10-CM

## 2022-08-22 DIAGNOSIS — R05.9 COUGH: ICD-10-CM

## 2022-08-22 LAB
ALBUMIN SERPL BCG-MCNC: 4 G/DL (ref 3.5–5.2)
ALP SERPL-CCNC: 79 U/L (ref 35–104)
ALT SERPL W P-5'-P-CCNC: 14 U/L (ref 10–35)
ANION GAP SERPL CALCULATED.3IONS-SCNC: 9 MMOL/L (ref 7–15)
AST SERPL W P-5'-P-CCNC: 26 U/L (ref 10–35)
BASOPHILS # BLD AUTO: 0 10E3/UL (ref 0–0.2)
BASOPHILS NFR BLD AUTO: 1 %
BILIRUB SERPL-MCNC: 0.2 MG/DL
BUN SERPL-MCNC: 12.8 MG/DL (ref 6–20)
CALCIUM SERPL-MCNC: 9.3 MG/DL (ref 8.6–10)
CHLORIDE SERPL-SCNC: 105 MMOL/L (ref 98–107)
CREAT SERPL-MCNC: 0.93 MG/DL (ref 0.51–0.95)
DEPRECATED HCO3 PLAS-SCNC: 27 MMOL/L (ref 22–29)
EOSINOPHIL # BLD AUTO: 0 10E3/UL (ref 0–0.7)
EOSINOPHIL NFR BLD AUTO: 0 %
ERYTHROCYTE [DISTWIDTH] IN BLOOD BY AUTOMATED COUNT: 15.6 % (ref 10–15)
GFR SERPL CREATININE-BSD FRML MDRD: 76 ML/MIN/1.73M2
GLUCOSE SERPL-MCNC: 97 MG/DL (ref 70–99)
HCT VFR BLD AUTO: 40.7 % (ref 35–47)
HGB BLD-MCNC: 12.3 G/DL (ref 11.7–15.7)
IMM GRANULOCYTES # BLD: 0 10E3/UL
IMM GRANULOCYTES NFR BLD: 0 %
LYMPHOCYTES # BLD AUTO: 2.1 10E3/UL (ref 0.8–5.3)
LYMPHOCYTES NFR BLD AUTO: 33 %
MCH RBC QN AUTO: 24.1 PG (ref 26.5–33)
MCHC RBC AUTO-ENTMCNC: 30.2 G/DL (ref 31.5–36.5)
MCV RBC AUTO: 80 FL (ref 78–100)
MONOCYTES # BLD AUTO: 0.5 10E3/UL (ref 0–1.3)
MONOCYTES NFR BLD AUTO: 8 %
NEUTROPHILS # BLD AUTO: 3.7 10E3/UL (ref 1.6–8.3)
NEUTROPHILS NFR BLD AUTO: 59 %
PLATELET # BLD AUTO: 366 10E3/UL (ref 150–450)
POTASSIUM SERPL-SCNC: 4.8 MMOL/L (ref 3.4–5.3)
PROT SERPL-MCNC: 7.8 G/DL (ref 6.4–8.3)
RBC # BLD AUTO: 5.1 10E6/UL (ref 3.8–5.2)
SODIUM SERPL-SCNC: 141 MMOL/L (ref 136–145)
WBC # BLD AUTO: 6.4 10E3/UL (ref 4–11)

## 2022-08-22 PROCEDURE — 99214 OFFICE O/P EST MOD 30 MIN: CPT | Mod: GC | Performed by: STUDENT IN AN ORGANIZED HEALTH CARE EDUCATION/TRAINING PROGRAM

## 2022-08-22 PROCEDURE — 85025 COMPLETE CBC W/AUTO DIFF WBC: CPT | Performed by: STUDENT IN AN ORGANIZED HEALTH CARE EDUCATION/TRAINING PROGRAM

## 2022-08-22 PROCEDURE — 80053 COMPREHEN METABOLIC PANEL: CPT | Performed by: STUDENT IN AN ORGANIZED HEALTH CARE EDUCATION/TRAINING PROGRAM

## 2022-08-22 PROCEDURE — 36415 COLL VENOUS BLD VENIPUNCTURE: CPT | Performed by: STUDENT IN AN ORGANIZED HEALTH CARE EDUCATION/TRAINING PROGRAM

## 2022-08-22 RX ORDER — DEXTROMETHORPHAN POLISTIREX 30 MG/5ML
60 SUSPENSION ORAL 2 TIMES DAILY
Qty: 148 ML | Refills: 1 | Status: SHIPPED | OUTPATIENT
Start: 2022-08-22 | End: 2023-12-27

## 2022-08-22 SDOH — ECONOMIC STABILITY - INCOME SECURITY: PROBLEM RELATED TO HOUSING AND ECONOMIC CIRCUMSTANCES, UNSPECIFIED: Z59.9

## 2022-08-22 SDOH — ECONOMIC STABILITY - HOUSING INSECURITY: OTHER INADEQUATE HOUSING: Z59.19

## 2022-08-22 NOTE — PROGRESS NOTES
Assessment & Plan     Mixed stress and urge urinary incontinence  Poor housing  Financial difficulties  This is a longstanding problem, and although to stable, patient's current housing conditions and lack of appropriate sanitation is causing her understandable distress.  She does not have a toilet in the room that she rents.  The cost of renting the room is also very high for this patient who has documented mental illness and only receives income in the form of government assistance.  Due to her chronic incontinence and lack of appropriate housing, she requires the use of a bedside commode.  Order for this was placed.  Routing to RN for assistance with order/suggestions for Depends.  - Commode Chair Order for DME - ONLY FOR DME    Cough  Persistent but stable.  No hemoptysis.  She will come back if no improvement in the next 2 weeks.  - Comprehensive metabolic panel; Future  - CBC with Platelets & Differential; Future  - dextromethorphan (DELSYM) 30 MG/5ML liquid; Take 10 mLs (60 mg) by mouth 2 times daily  - Comprehensive metabolic panel  - CBC with Platelets & Differential    Encounter for completion of form with patient  I completed the form for a special diet that she brought with her.  Due to her underlying food sensitivities and poor financial situation, she requires delivery of meals that are gluten-free, high in protein, and lactose-free.    Screen for colon cancer  I discussed with patient that colonoscopy prep requires access to a nearby bathroom and that not having access to a bathroom would only add to her distress.  She will return for a separate visit to discuss colonoscopy and prep.        Staffed with Dr. Jennifer Wright MD  PGY3  [facility]    Samina Phelps is a 46 year old with urge incontinence and bipolar 1 disorder who is presenting for the following health issues:  other (Form From [facility] special diet )      HPI   She is frustrated because she  was supposed to be moving into a group home   Feels like crying.  She is paying rent where she is living and can't afford that and only has 6 days left. She does not want to talk to  today.  She brought a form with her for recertification for special diet.    Cough with urinary incontinence:  Came here 1 month ago.  She got Tylenol and ibuprofen and adult Depends. The urine leaks outside the depends. Has had to clean up urine and stool on floor due to incontinence and her portable commode breaking.  She has had a persistent cough that is especially bad at night.    Needs colonoscopy: Patient had questions about a colonoscopy after PCS follow protocol to add colonoscopy to orders.    Review of Systems   Respiratory: Positive for cough.    Gastrointestinal: Positive for diarrhea. Negative for hematochezia.   Genitourinary: Positive for enuresis, frequency and urgency.   Musculoskeletal: Positive for arthralgias, back pain and gait problem.   Allergic/Immunologic: Positive for food allergies.   Psychiatric/Behavioral: Positive for sleep disturbance.            Objective    /87 (BP Location: Left arm, Patient Position: Sitting, Cuff Size: Adult Regular)   Pulse 70   Temp 98  F (36.7  C) (Oral)   Resp 16   Wt 81 kg (178 lb 9.6 oz)   LMP 07/23/2022 (Approximate)   SpO2 99%   BMI 32.93 kg/m    Body mass index is 32.93 kg/m .  Physical Exam  Vitals reviewed.        Constitutional: 46-year-old female here by herself.  She is in mild distress due to cough and stress related to housing, finances, and access to appropriate sanitation.     Respiratory:  +COUGH.  No respiratory distress, normal breath sounds, no rales, no wheezing.     :  No malodor     Integument:  Warm, dry, no rash.     Neurologic:  Alert & oriented, ambulatory.  Has difficulty with undersatnding and communicating written and spoken health information.      Psych:  Affect anxious, tearful, Mood congruent                      .  ..

## 2022-08-25 ASSESSMENT — ENCOUNTER SYMPTOMS
FREQUENCY: 1
COUGH: 1
BACK PAIN: 1
DIARRHEA: 1
ARTHRALGIAS: 1
HEMATOCHEZIA: 0
SLEEP DISTURBANCE: 1

## 2022-08-25 NOTE — PROGRESS NOTES
Preceptor Attestation:    I discussed the patient with the resident and evaluated the patient in person. I have verified the content of the note, which accurately reflects my assessment of the patient and the plan of care.   Supervising Physician:  Justice Rodriguez MD.

## 2022-09-14 ENCOUNTER — ANCILLARY PROCEDURE (OUTPATIENT)
Dept: GENERAL RADIOLOGY | Facility: CLINIC | Age: 47
End: 2022-09-14
Attending: STUDENT IN AN ORGANIZED HEALTH CARE EDUCATION/TRAINING PROGRAM
Payer: MEDICAID

## 2022-09-14 ENCOUNTER — OFFICE VISIT (OUTPATIENT)
Dept: FAMILY MEDICINE | Facility: CLINIC | Age: 47
End: 2022-09-14
Payer: MEDICAID

## 2022-09-14 VITALS
DIASTOLIC BLOOD PRESSURE: 97 MMHG | SYSTOLIC BLOOD PRESSURE: 146 MMHG | TEMPERATURE: 98.5 F | BODY MASS INDEX: 32.34 KG/M2 | RESPIRATION RATE: 16 BRPM | OXYGEN SATURATION: 100 % | WEIGHT: 175.4 LBS | HEART RATE: 90 BPM

## 2022-09-14 DIAGNOSIS — Z12.31 VISIT FOR SCREENING MAMMOGRAM: ICD-10-CM

## 2022-09-14 DIAGNOSIS — Z86.39 HISTORY OF HYPOTHYROIDISM: ICD-10-CM

## 2022-09-14 DIAGNOSIS — M25.561 ACUTE PAIN OF RIGHT KNEE: ICD-10-CM

## 2022-09-14 DIAGNOSIS — R73.09 ELEVATED HEMOGLOBIN A1C: ICD-10-CM

## 2022-09-14 DIAGNOSIS — M76.891 ENTHESOPATHY OF RIGHT KNEE REGION: ICD-10-CM

## 2022-09-14 DIAGNOSIS — K21.9 GASTROESOPHAGEAL REFLUX DISEASE, UNSPECIFIED WHETHER ESOPHAGITIS PRESENT: ICD-10-CM

## 2022-09-14 DIAGNOSIS — L30.8 OTHER ECZEMA: ICD-10-CM

## 2022-09-14 DIAGNOSIS — L74.4 ANHIDROSIS: ICD-10-CM

## 2022-09-14 DIAGNOSIS — N39.46 MIXED STRESS AND URGE URINARY INCONTINENCE: ICD-10-CM

## 2022-09-14 DIAGNOSIS — L29.9 GENERALIZED PRURITUS: ICD-10-CM

## 2022-09-14 DIAGNOSIS — K58.2 IRRITABLE BOWEL SYNDROME WITH BOTH CONSTIPATION AND DIARRHEA: Primary | ICD-10-CM

## 2022-09-14 LAB
HBA1C MFR BLD: 6 % (ref 0–5.6)
T4 FREE SERPL-MCNC: 1.05 NG/DL (ref 0.9–1.7)
TSH SERPL DL<=0.005 MIU/L-ACNC: 4.47 UIU/ML (ref 0.3–4.2)

## 2022-09-14 PROCEDURE — 84439 ASSAY OF FREE THYROXINE: CPT | Performed by: STUDENT IN AN ORGANIZED HEALTH CARE EDUCATION/TRAINING PROGRAM

## 2022-09-14 PROCEDURE — 83036 HEMOGLOBIN GLYCOSYLATED A1C: CPT | Performed by: STUDENT IN AN ORGANIZED HEALTH CARE EDUCATION/TRAINING PROGRAM

## 2022-09-14 PROCEDURE — 73562 X-RAY EXAM OF KNEE 3: CPT | Mod: TC | Performed by: RADIOLOGY

## 2022-09-14 PROCEDURE — 99214 OFFICE O/P EST MOD 30 MIN: CPT | Mod: GC | Performed by: STUDENT IN AN ORGANIZED HEALTH CARE EDUCATION/TRAINING PROGRAM

## 2022-09-14 PROCEDURE — 36415 COLL VENOUS BLD VENIPUNCTURE: CPT | Performed by: STUDENT IN AN ORGANIZED HEALTH CARE EDUCATION/TRAINING PROGRAM

## 2022-09-14 PROCEDURE — 84443 ASSAY THYROID STIM HORMONE: CPT | Performed by: STUDENT IN AN ORGANIZED HEALTH CARE EDUCATION/TRAINING PROGRAM

## 2022-09-14 RX ORDER — CLOBETASOL PROPIONATE 0.5 MG/G
OINTMENT TOPICAL 2 TIMES DAILY
Qty: 30 G | Refills: 0 | Status: SHIPPED | OUTPATIENT
Start: 2022-09-14 | End: 2023-01-11

## 2022-09-14 RX ORDER — DIPHENHYDRAMINE HCL 25 MG
25 TABLET ORAL EVERY 6 HOURS PRN
Qty: 90 TABLET | Refills: 1 | Status: SHIPPED | OUTPATIENT
Start: 2022-09-14

## 2022-09-14 RX ORDER — AMMONIUM LACTATE 12 G/100G
LOTION TOPICAL 2 TIMES DAILY
Qty: 225 G | Refills: 4 | Status: SHIPPED | OUTPATIENT
Start: 2022-09-14 | End: 2024-07-19

## 2022-09-14 RX ORDER — FAMOTIDINE 20 MG/1
20 TABLET, FILM COATED ORAL 2 TIMES DAILY
Qty: 60 TABLET | Refills: 1 | Status: SHIPPED | OUTPATIENT
Start: 2022-09-14 | End: 2023-01-15

## 2022-09-14 RX ORDER — CHOLECALCIFEROL (VITAMIN D3) 125 MCG
9000 CAPSULE ORAL
Qty: 90 TABLET | Refills: 3 | Status: SHIPPED | OUTPATIENT
Start: 2022-09-14 | End: 2024-07-19

## 2022-09-14 NOTE — PATIENT INSTRUCTIONS
Thank you for coming to [facility] for your care.  It was a pleasure to take care of you!    Here is what we discussed:  Soak your feet, then apply clobetasol to areas that itch, and then apply lac-hydrin lotion to to your dry skin  Take benadryl as needed for itching  Take famotidine 2 times per day for acid reflux  Make an appointment with PT for pelvic floor therapy  You will get a call about a mammogram  See a nutritionist for irritable bowel syndrome to learn what foods to buy and eat  We checked your a1c and thyroid tests  If covered by your insurance, take 3 tablets of lactase with the first bite of dairy (cheese, milk, ice cream, yogurt)    Please call our clinic ([phone]) or send a message via [employer] with any questions or concerns!    Dr. Reny Wright

## 2022-09-14 NOTE — PROGRESS NOTES
Assessment & Plan     Irritable bowel syndrome with both constipation and diarrhea  Persistent issues with diarrhea and constipation.  Discussed nutrition referral for education about best foods to minimize symptoms.  She does seem to eat a fair amount of dairy.  If covered by insurance, recommend lactase.  Otherwise, may benefit from education about which foods to avoid.  Nutrition referral has been placed.  - lactase (LACTAID) 3000 UNIT tablet; Take 3 tablets (9,000 Units) by mouth 3 times daily (with meals)  - Nutrition Referral; Future    Enthesopathy of right knee  X-ray of her right knee showed enthesopathy of the extensor mechanism as well as well-corticated chronic ossific densities in the expected location of proximal patella tendon.  We reviewed 4 times daily diclofenac gel.  Her kidney function is normal.  Considered naproxen, though she has significant GI symptoms that may preclude its use.  - XR Knee Right 3 Views; Future    Visit for screening mammogram  Placed referral and directed to referral coordinator to arrange today.  No known family history.  - MA SCREENING DIGITAL BILAT - Future  (s+30); Future    Generalized pruritus  Suspect this could be allergic.  Discussed Benadryl.  We will follow-up at an upcoming visit.  - diphenhydrAMINE (BENADRYL) 25 MG tablet; Take 1 tablet (25 mg) by mouth every 6 hours as needed for itching or allergies    Other eczema  Stable.  Has generalized itching but recently normal LFTs.  Benadryl as above.  Amlactin daily.  Clobetasol PRN to small areas.   - clobetasol (TEMOVATE) 0.05 % external ointment; Apply topically 2 times daily  - ammonium lactate (LAC-HYDRIN) 12 % external lotion; Apply topically 2 times daily    Mixed stress and urge urinary incontinence  Persistent issue for this patient with significant impact on her quality of life.  She continues to have leakage around Depends briefs.  She has never had pelvic floor therapy in the past.  Briefly discussed  what to expect.  She is interested and would like to proceed.   - Physical Therapy Referral; Future    Elevated hemoglobin A1c  Slightly improved from ~1 year ago.  Nutrition referral as above.  She seems motivated to learn healthier diet.  - Hemoglobin A1c; Future  - Hemoglobin A1c    History of hypothyroidism  TSH 4.47.  Not currently taking levothyroxine.  Recheck in 3-6 months.    - TSH with free T4 reflex; Future  - TSH with free T4 reflex  - T4 free    Gastroesophageal reflux disease, unspecified whether esophagitis present  Trial famotidine.  Consider H.pylori testing in the future if persists.   - famotidine (PEPCID) 20 MG tablet; Take 1 tablet (20 mg) by mouth 2 times daily      Staffed with Dr. Dejan Wright MD  [facility]    Samina Phelps is a 46 year old female with history that includes irritable bowel syndrome with both constipation and diarrhea, Bipolar disorder, Hidradenitis suppurativa, obesity, seizure disorder, iron deficiency anemia, vitamin D deficiency and urinary and fecal incontinence presenting for the following health issues:  other (Did fall from the bus 4 months ago and has been bothering her when ever she outs pressure on her right knee./Pt wants her a1c checked because she wants to know if she is becoming diabetic)      HPI     Right knee pain:  Fell on it a few months ago while she was working at [employer].  Pain has been persistent and gets worse when she kneels.    Acid reflux, chronic: Unclear if triggered by certain foods or if it started baseline.    Housing:  Moved to [address] [place] [address].  So far this seems to be a better living arrangement than her last.    Urine and bowel urgency:  Got a commode now.  Still having problems with diarrhea and urgency.      Diet: Gets a special diet covered but she has to buy her own food.  She doesn' t know what to buy and what is good for her that won't irritate her stomach.  She has a  combination of diarrhea and constipation.  Seems to have most sensitivity with dairy, possibly wheat.  She would like to eat a higher protein diet, but is not sure what foods are best.    Current Outpatient Medications   Medication     ammonium lactate (LAC-HYDRIN) 12 % external lotion     capsaicin (ZOSTRIX) 0.025 % CREA cream     cetirizine (ZYRTEC) 10 MG tablet     clobetasol (TEMOVATE) 0.05 % external ointment     ClonazePAM (KLONOPIN PO)     cyclobenzaprine (FLEXERIL) 5 MG tablet     desoximetasone (TOPICORT) 0.25 % external cream     dextromethorphan (DELSYM) 30 MG/5ML liquid     dextromethorphan-guaiFENesin (TUSSIN DM)  MG/5ML liquid     diclofenac (VOLTAREN) 1 % topical gel     diclofenac (VOLTAREN) 1 % topical gel     diclofenac (VOLTAREN) 50 MG EC tablet     diphenhydrAMINE (BENADRYL) 25 MG tablet     doxycycline monohydrate (ADOXA) 100 MG tablet     DULoxetine (CYMBALTA) 30 MG capsule     DULoxetine (CYMBALTA) 30 MG capsule     famotidine (PEPCID) 20 MG tablet     ferrous sulfate (FEROSUL) 325 (65 Fe) MG tablet     fluticasone furoate 27.5 MCG/SPRAY nasal spray     gabapentin (NEURONTIN) 300 MG capsule     ketotifen (ZADITOR) 0.025 % ophthalmic solution     lactase (LACTAID) 3000 UNIT tablet     lamoTRIgine (LAMICTAL) 150 MG tablet     levothyroxine (SYNTHROID/LEVOTHROID) 25 MCG tablet     loratadine (CLARITIN) 10 MG tablet     losartan (COZAAR) 100 MG tablet     naproxen (NAPROSYN) 500 MG tablet     oxybutynin ER (DITROPAN XL) 10 MG 24 hr tablet     prazosin (MINIPRESS) 5 MG capsule     Prenatal Vit-Fe Fumarate-FA (PRENATAL MULTIVITAMIN W/IRON) 27-0.8 MG tablet     QUEtiapine (SEROQUEL) 100 MG tablet     QUEtiapine (SEROQUEL) 200 MG tablet     QUEtiapine (SEROQUEL) 25 MG tablet     simvastatin (ZOCOR) 20 MG tablet     topiramate (TOPAMAX) 100 MG tablet     topiramate (TOPAMAX) 50 MG tablet     triamcinolone (ARISTOCORT HP) 0.5 % external cream     valACYclovir (VALTREX) 500 MG tablet     white  petrolatum external gel     acetaminophen (TYLENOL) 325 MG tablet     amLODIPine (NORVASC) 10 MG tablet     amLODIPine (NORVASC) 5 MG tablet     No current facility-administered medications for this visit.     Patient Active Problem List   Diagnosis     Bipolar disorder (H)     Obesity     Cellulitis of axilla, left     Iron deficiency anemia     Seizure disorder (H)     Hidradenitis suppurativa     Urgency incontinence     Chronic pain syndrome     Acne vulgaris     Tinea capitis     Fissure in skin     Depression     Hypertension     Subclinical hypothyroidism     Vitamin D deficiency     Ganglion cyst of right foot     Hypertrophy of bone     Morbid obesity (H)     Anhidrosis     Pes planovalgus     Other eczema           Review of Systems   Constitutional, HEENT, cardiovascular, pulmonary, gi and gu systems are negative, except as otherwise noted.      Objective    BP (!) 146/97 (BP Location: Left arm, Patient Position: Sitting, Cuff Size: Adult Regular)   Pulse 90   Temp 98.5  F (36.9  C) (Oral)   Resp 16   Wt 79.6 kg (175 lb 6.4 oz)   LMP 07/23/2022 (Approximate)   SpO2 100%   BMI 32.34 kg/m    Body mass index is 32.34 kg/m .  Physical Exam  Constitutional:       Appearance: Normal appearance. She is overweight. She is not toxic-appearing.   HENT:      Head: Normocephalic.   Eyes:      General: Lids are normal.      Conjunctiva/sclera: Conjunctivae normal.   Cardiovascular:      Rate and Rhythm: Normal rate and regular rhythm.   Abdominal:      Palpations: Abdomen is soft.      Tenderness: There is no abdominal tenderness.   Musculoskeletal:      Right knee: No swelling, effusion or bony tenderness. Tenderness (patella) present.      Left knee: No swelling, effusion or bony tenderness.   Neurological:      Mental Status: She is alert.   Psychiatric:         Behavior: Behavior is cooperative.        Results for orders placed or performed in visit on 09/14/22   XR Knee Right 3 Views     Status: None     Narrative    EXAM: XR KNEE RIGHT 3 VIEWS  LOCATION: [facility]  DATE/TIME: 9/14/2022 11:20 AM    INDICATION: sharp pain pain in superior lateral area of knee with no joint pain  COMPARISON: None.      Impression    IMPRESSION: Enthesopathy of the extensor mechanism as well as well-corticated ossific densities in the expected location of proximal patella tendon, chronic. Joint spaces are maintained. No acute fracture or effusion.   Results for orders placed or performed in visit on 09/14/22   TSH with free T4 reflex     Status: Abnormal   Result Value Ref Range    TSH 4.47 (H) 0.30 - 4.20 uIU/mL   Hemoglobin A1c     Status: Abnormal   Result Value Ref Range    Hemoglobin A1C 6.0 (H) 0.0 - 5.6 %   T4 free     Status: Normal   Result Value Ref Range    Free T4 1.05 0.90 - 1.70 ng/dL     XR Knee Right 3 Views    Result Date: 9/14/2022  EXAM: XR KNEE RIGHT 3 VIEWS LOCATION: [facility] DATE/TIME: 9/14/2022 11:20 AM INDICATION: sharp pain pain in superior lateral area of knee with no joint pain COMPARISON: None.     IMPRESSION: Enthesopathy of the extensor mechanism as well as well-corticated ossific densities in the expected location of proximal patella tendon, chronic. Joint spaces are maintained. No acute fracture or effusion.    ----- Service Performed and Documented by Resident or Fellow ------

## 2022-09-14 NOTE — LETTER
September 16, 2022      Mattie Pierre  [address] [place] [id number]        Dear ,    We are writing to inform you of your test results.    The thyroid tests were within normal range.     The blood sugar test, A1c, showed that you have prediabetes.  We will talk about starting a medication for this at our next visit on October 11.  For now, no additional testing is needed.     Thank you!!       Resulted Orders   TSH with free T4 reflex   Result Value Ref Range    TSH 4.47 (H) 0.30 - 4.20 uIU/mL   Hemoglobin A1c   Result Value Ref Range    Hemoglobin A1C 6.0 (H) 0.0 - 5.6 %      Comment:      Normal <5.7%   Prediabetes 5.7-6.4%    Diabetes 6.5% or higher     Note: Adopted from ADA consensus guidelines.   T4 free   Result Value Ref Range    Free T4 1.05 0.90 - 1.70 ng/dL       If you have any questions or concerns, please call the clinic at the number listed above.       Sincerely,      Justo Wylie, DO

## 2022-09-15 ENCOUNTER — VIRTUAL VISIT (OUTPATIENT)
Dept: GASTROENTEROLOGY | Facility: CLINIC | Age: 47
End: 2022-09-15
Attending: STUDENT IN AN ORGANIZED HEALTH CARE EDUCATION/TRAINING PROGRAM
Payer: MEDICAID

## 2022-09-15 VITALS — BODY MASS INDEX: 32.08 KG/M2 | WEIGHT: 174 LBS

## 2022-09-15 DIAGNOSIS — K58.2 IRRITABLE BOWEL SYNDROME WITH BOTH CONSTIPATION AND DIARRHEA: Primary | ICD-10-CM

## 2022-09-15 PROCEDURE — 97802 MEDICAL NUTRITION INDIV IN: CPT | Mod: GT | Performed by: DIETITIAN, REGISTERED

## 2022-09-15 ASSESSMENT — PAIN SCALES - GENERAL: PAINLEVEL: EXTREME PAIN (9)

## 2022-09-15 NOTE — PROGRESS NOTES
Mattie is a 46 year old who is being evaluated via a billable video visit.      How would you like to obtain your AVS? MyChart  If the video visit is dropped, the invitation should be resent by: Text to cell phone: [phone]  Will anyone else be joining your video visit? No    Video-Visit Details    Video Start Time: 10:30 AM    Type of service:  Video Visit    Video End Time:11:02 AM (ended call at this time)    Started visit as video but d/t pt not at home, converted to phone visit after 10 minutes for more privacy.    Originating Location (pt. Location): Home    Distant Location (provider location):  [facility] [place]     Platform used for Video Visit: [employer] and converted to phone visit    [facility] Outpatient Medical Nutrition Therapy      Time Spent:  32 minutes  Session Type:  Initial   Referring Physician:  Justo Wylie DO  Reason for RD Visit:   IBS with both constipation and diarrhea     Nutrition Assessment:  Patient is a 46 year old female with history that includes irritable bowel syndrome with both constipation and diarrhea, Bipolar disorder, Hidradenitis suppurative, obesity, seizure disorder, iron deficiency anemia, vitamin D deficiency and urinary and fecal incontinence. She complains of having issues with urgency to have a bowel movement and has found that dairy is a trigger for her urgency and causes loose stools. She does not have a consistent eating pattern and will eat when hungry and it can be whatever she feels like eating at the time. She doesn't always want to cook food so may just have some snacks. She used to drink a lot of pop but decreased and now drinks when she has a craving. Initially in conversation, she reported having 20 oz bottle per day of mountain dew or other non cola soda but later she said it is not everyday. She does not tolerate darker pops as they cause loose urgent stools as well as coffee.    Patient Active Problem List  "  Diagnosis     Bipolar disorder (H)     Obesity     Cellulitis of axilla, left     Iron deficiency anemia     Seizure disorder (H)     Hidradenitis suppurativa     Urgency incontinence     Chronic pain syndrome     Acne vulgaris     Tinea capitis     Fissure in skin     Depression     Hypertension     Subclinical hypothyroidism     Vitamin D deficiency     Ganglion cyst of right foot     Hypertrophy of bone     Morbid obesity (H)     Anhidrosis     Pes planovalgus     Other eczema     Height:   Ht Readings from Last 1 Encounters:   04/25/22 1.568 m (5' 1.75\")     Weight:  Wt Readings from Last 10 Encounters:   09/15/22 78.9 kg (174 lb)   09/14/22 79.6 kg (175 lb 6.4 oz)   08/22/22 81 kg (178 lb 9.6 oz)   07/25/22 81.2 kg (179 lb)   06/29/22 80.7 kg (178 lb)   06/24/22 80.8 kg (178 lb 3.2 oz)   04/25/22 84.4 kg (186 lb)   03/29/22 86.5 kg (190 lb 12.8 oz)   02/14/22 89.7 kg (197 lb 12.8 oz)   12/21/21 90.9 kg (200 lb 6.4 oz)        BMI: Estimated body mass index is 32.08 kg/m  as calculated from the following:    Height as of 4/25/22: 1.568 m (5' 1.75\").    Weight as of this encounter: 78.9 kg (174 lb).    Diet Recall:  (some usual/recent meals/snacks/beverages): ice cream, cola pop, coffee, creamer causes looser stool and has to run to bathroom. Also does not tolerate lactose. Eats meals but not specific schedule or times  Meal Food    Breakfast Cereal/Cheerios with lactose free milk   Lunch Varies, might snack on chips, cheese sometimes lopez beans and rice or yesterday had take out chinese food shrimp, fried rice,    Dinner May skip or if feels like cooking: chicken or takes chinese food or fried chicken, fries   Snacks Chips, pork skins   Beverages 40-60 oz water, not daily occas krista aid, juice, used to a lot of pop but now less initially reported some daily ~20 oz mountain dew, pineapple,  Punch pop but later in conversation reported not daily. Sometimes has  sparkling sf ice drink   Alcohol Intake none "     Frequency of eating/taking out meals: variable~1-2 times/month     Labs:   Last Comprehensive Metabolic Panel:  Sodium   Date Value Ref Range Status   08/22/2022 141 136 - 145 mmol/L Final   01/04/2019 143 136 - 145 mmol/L Final     Potassium   Date Value Ref Range Status   08/22/2022 4.8 3.4 - 5.3 mmol/L Final   10/18/2021 4.3 3.5 - 5.0 mmol/L Final   01/04/2019 4.0 3.5 - 5.0 mmol/L Final     Chloride   Date Value Ref Range Status   08/22/2022 105 98 - 107 mmol/L Final   10/18/2021 104 98 - 107 mmol/L Final   01/04/2019 105 98 - 107 mmol/L Final     Carbon Dioxide   Date Value Ref Range Status   08/23/2018 22.1 20.0 - 32.0 mmol/L Final     Carbon Dioxide (CO2)   Date Value Ref Range Status   08/22/2022 27 22 - 29 mmol/L Final   10/18/2021 27 22 - 31 mmol/L Final     Anion Gap   Date Value Ref Range Status   08/22/2022 9 7 - 15 mmol/L Final   10/18/2021 10 5 - 18 mmol/L Final     Glucose   Date Value Ref Range Status   08/22/2022 97 70 - 99 mg/dL Final   10/18/2021 101 70 - 125 mg/dL Final   01/04/2019 94 70 - 125 mg/dL Final     Urea Nitrogen   Date Value Ref Range Status   08/22/2022 12.8 6.0 - 20.0 mg/dL Final   10/18/2021 12 8 - 22 mg/dL Final   01/04/2019 11 8 - 22 mg/dL Final     Creatinine   Date Value Ref Range Status   08/22/2022 0.93 0.51 - 0.95 mg/dL Final   01/04/2019 0.89 0.60 - 1.10 mg/dL Final     GFR Estimate   Date Value Ref Range Status   08/22/2022 76 >60 mL/min/1.73m2 Final     Comment:     Effective December 21, 2021 eGFRcr in adults is calculated using the 2021 CKD-EPI creatinine equation which includes age and gender (Leonor fiore al., NEJM, DOI: 10.1056/GCJIyd7935450)   01/04/2019 >60 >60 mL/min/1.73m2 Final     Calcium   Date Value Ref Range Status   08/22/2022 9.3 8.6 - 10.0 mg/dL Final   01/04/2019 9.3 8.5 - 10.5 mg/dL Final     CBC RESULTS:   Recent Labs   Lab Test 08/22/22  1021   WBC 6.4   RBC 5.10   HGB 12.3   HCT 40.7   MCV 80   MCH 24.1*   MCHC 30.2*   RDW 15.6*           Pertinent Medications/vitamin and mineral supplements:      Current Outpatient Medications   Medication     ammonium lactate (LAC-HYDRIN) 12 % external lotion     capsaicin (ZOSTRIX) 0.025 % CREA cream     cetirizine (ZYRTEC) 10 MG tablet     clobetasol (TEMOVATE) 0.05 % external ointment     ClonazePAM (KLONOPIN PO)     cyclobenzaprine (FLEXERIL) 5 MG tablet     desoximetasone (TOPICORT) 0.25 % external cream     dextromethorphan (DELSYM) 30 MG/5ML liquid     dextromethorphan-guaiFENesin (TUSSIN DM)  MG/5ML liquid     diclofenac (VOLTAREN) 1 % topical gel     diclofenac (VOLTAREN) 1 % topical gel     diclofenac (VOLTAREN) 50 MG EC tablet     diphenhydrAMINE (BENADRYL) 25 MG tablet     doxycycline monohydrate (ADOXA) 100 MG tablet     DULoxetine (CYMBALTA) 30 MG capsule     DULoxetine (CYMBALTA) 30 MG capsule     famotidine (PEPCID) 20 MG tablet     ferrous sulfate (FEROSUL) 325 (65 Fe) MG tablet     fluticasone furoate 27.5 MCG/SPRAY nasal spray     gabapentin (NEURONTIN) 300 MG capsule     ketotifen (ZADITOR) 0.025 % ophthalmic solution     lactase (LACTAID) 3000 UNIT tablet     lamoTRIgine (LAMICTAL) 150 MG tablet     levothyroxine (SYNTHROID/LEVOTHROID) 25 MCG tablet     loratadine (CLARITIN) 10 MG tablet     losartan (COZAAR) 100 MG tablet     naproxen (NAPROSYN) 500 MG tablet     oxybutynin ER (DITROPAN XL) 10 MG 24 hr tablet     prazosin (MINIPRESS) 5 MG capsule     Prenatal Vit-Fe Fumarate-FA (PRENATAL MULTIVITAMIN W/IRON) 27-0.8 MG tablet     QUEtiapine (SEROQUEL) 100 MG tablet     QUEtiapine (SEROQUEL) 200 MG tablet     QUEtiapine (SEROQUEL) 25 MG tablet     simvastatin (ZOCOR) 20 MG tablet     topiramate (TOPAMAX) 100 MG tablet     topiramate (TOPAMAX) 50 MG tablet     triamcinolone (ARISTOCORT HP) 0.5 % external cream     valACYclovir (VALTREX) 500 MG tablet     white petrolatum external gel     acetaminophen (TYLENOL) 325 MG tablet     amLODIPine (NORVASC) 10 MG tablet     amLODIPine  (NORVASC) 5 MG tablet     No current facility-administered medications for this visit.       Food Allergies:  NKFA  Food intolerances: coffee, creamer, lactose, darker pops such as cola.    Estimated Nutrition Needs based on ideal body weight of 50 k-1500 calories (25-30 kcals/kg), 50-60g protein (1-1.2g/kg), ~1 ml/kcal or total fluids per MD.     MALNUTRITION:  % Weight Loss:  None noted  % Intake:  No decreased intake noted  Subcutaneous Fat Loss:  None observed  Muscle Loss:  None observed  Fluid Retention:  None noted    Malnutrition Diagnosis: Patient does not meet two of the above criteria necessary for diagnosing malnutrition  In Context of:  Chronic illness or disease    Nutrition Prescription: Plate method, consistent eating pattern, lower lactose diet    Nutrition Intervention      Provided diet education for IBS and explained that smaller more frequent meals may be better tolerated than fewer larger meals and also eating meals and snacks on a consistent eating pattern can be helpful. Since she tends to eat a variable pattern from day to day without consistency and may skip or snack or then eat a large meal. Recommended aiming for at least 3 meals per day consistently. Discussed the plate method meal plan and mailing out handouts with visual of plate method. Encouraged her to aim for making half her plate vegetables, 1/4 plate lean protein and the other 1/4 plan healthy CHO choice and reviewed these (as well as send out handouts with info). Additionally recommended avoiding sweetened drinks and juice as these can contribute to looser stools as well as other added sugars. Recommended avoiding pop, sweetened krista aid and continue drinking water and okay for some sugar free krista aid. Also since she does not tolerate lactose containing foods such as carballo, ranch, blue cheese dressing, milk, recommended continuing lower lactose diet as she is trying to do.    Patient verbalized understanding of  education provided. See Goals below.     Educational Materials Provided:  NCM: lactose controlled nutrition therapy, lactose free tips and FV: creating a healthy plate, plate method, CHOs, Protein sources and wt mgmt tips    Goals:      1. Aim for eating 3 meals per day, no skipping.    2. Eat these meals on a consistent eating pattern.    3. Can use the Plate method plan for general guidance on getting balanced meals which is as follows.      --Use a smaller size plate at meals such as a salad size plate, not a large dinner plate.     --Make half of your plate non starchy vegetables (some examples include: broccoli, cauliflower, asparagus, lettuce, tomato, brussels sprouts, peppers, cucumbers, cabbage, spinach, kale)     --Make 1/4 of your plate lean protein sources at a meal (fish, chicken breast, turkey breast, pork tenderloin, lean cuts of beef (93% lean), black beans or kidney beans or lopez beans, eggs, egg whites, tuna fish, tofu).     --Make the other 1/4 of your plate healthy carbohydrate choices which includes whole grain starches, grains, starchy vegetables and fruit. Some examples include quinoa, brown rice, barley, wild rice, whole grain tortilla or starchy vegetables (potatoes, sweet potatoes, winter squash, peas, corn).     Include a small amount of a healthy fat serving at each meal (some healthy fat examples include avocados, olive oil, avocado oil, vegetable oils, flaxseeds, giovani seeds, nuts (keep nut portions small though such as 1/4 cup serving), natural peanut butter).    4.  Avoid pop and sweetened drinks (such as krista aid) and also avoid juice. These can cause looser stools and diarrhea.    5.  Continue lower lactose/lower dairy food diet.     6.  Continue to eat a lower lactose/ lactose free diet:    -Continue using lactose free milk.    -Avoid ice cream, regular milk, yogurt (choose lactose free options, lactose free milk, lactose free yogurt or non dairy lower sugar yogurts).    -Limit  cheese and if having any, then limit to having 1-2 ounces (or 1-2 slices) maximum per day of a hard, aged cheese such as swiss or aged cheddar (as these are lower in lactose).    -Low Fat cottage cheese is lower in lactose too, so you could have some cottage cheese as well.    -If having a salad, can use some oil and vinegar or italian dressing instead of ranch or blue cheese dressing.    -If having some tuna fish, you could add a small amount such as 1 tablespoon of italian dressing to it instead of mayonnaise.  (Or can add a very small amount of mayonnaise to your tuna fish (such as 1-2 teaspoons if tolerated).    Here are some links to handouts. I will also mail these out to you along with some others:    My Plate   [email or web address]    Create a Plate (How to Build A Healthy Meal)  [email or web address]    Protein Sources   [email or web address]     Carbohydrates  [email or web address]     Daily Food and Exercise Log  [email or web address]     Tips for Weight Loss and Weight Management  [email or web address]    Nutrition Monitoring and Evaluation: Will monitor adherence to nutrition recommendations at future RD visits.     Further Medical Nutrition Therapy:  Follow-up in 1-2 months    Patient was encouraged to call/contact RD with any further questions.    Jolie Carrillo, MS, RD, LD

## 2022-09-15 NOTE — PATIENT INSTRUCTIONS
It was nice meeting you today. Below are the nutrition recommendations we discussed at your visit.    Please let me know if you have any additional questions.    Nutrition Recommendations    Aim for eating 3 meals per day, no skipping.    Eat these meals on a consistent eating pattern.    Can use the Plate method plan for general guidance on getting balanced meals which is as follows.      --Use a smaller size plate at meals such as a salad size plate, not a large dinner plate.     --Make half of your plate non starchy vegetables (some examples include: broccoli, cauliflower, asparagus, lettuce, tomato, brussels sprouts, peppers, cucumbers, cabbage, spinach, kale)     --Make 1/4 of your plate lean protein sources at a meal (fish, chicken breast, turkey breast, pork tenderloin, lean cuts of beef (93% lean), black beans or kidney beans or lopez beans, eggs, egg whites, tuna fish, tofu).     --Make the other 1/4 of your plate healthy carbohydrate choices which includes whole grain starches, grains, starchy vegetables and fruit. Some examples include quinoa, brown rice, barley, wild rice, whole grain tortilla or starchy vegetables (potatoes, sweet potatoes, winter squash, peas, corn).     Include a small amount of a healthy fat serving at each meal (some healthy fat examples include avocados, olive oil, avocado oil, vegetable oils, flaxseeds, giovani seeds, nuts (keep nut portions small though such as 1/4 cup serving), natural peanut butter).    4.  Avoid pop and sweetened drinks (such as krista aid) and also avoid juice. These can cause looser stools and diarrhea.    5.  Continue lower lactose/lower dairy food diet.     6.  Continue to eat a lower lactose/ lactose free diet:    -Continue using lactose free milk.    -Avoid ice cream, regular milk, yogurt (choose lactose free options, lactose free milk, lactose free yogurt or non dairy lower sugar yogurts).    -Limit cheese and if having any, then limit to having 1-2 ounces  (or 1-2 slices) maximum per day of a hard, aged cheese such as swiss or aged cheddar (as these are lower in lactose).    -Low Fat cottage cheese is lower in lactose too, so you could have some cottage cheese as well.    -If having a salad, can use some oil and vinegar or italian dressing instead of ranch or blue cheese dressing.    -If having some tuna fish, you could add a small amount such as 1 tablespoon of italian dressing to it instead of mayonnaise.  (Or can add a very small amount of mayonnaise to your tuna fish (such as 1-2 teaspoons if tolerated).    Here are some links to handouts. I will also mail these out to you along with some others:    My Plate   [email or web address]    Create a Plate (How to Build A Healthy Meal)  [email or web address]    Protein Sources   [email or web address]     Carbohydrates  [email or web address]     Daily Food and Exercise Log  [email or web address]     Tips for Weight Loss and Weight Management  [email or web address]    Follow up in 1-2 months.    If you would like to schedule a follow up appointment with Jolie Carrillo, Registered Dietitian, please call [phone].    Thank you,    Jolie Carrillo, MS, RD, LD

## 2022-09-15 NOTE — LETTER
9/15/2022         RE: Mattie Pierre  [address] [place] [id number]        Dear Colleague,    Thank you for referring your patient, Mattie Pierre, to the [facility] [place]. Please see a copy of my visit note below.    [facility] Outpatient Medical Nutrition Therapy      Time Spent:  32 minutes  Session Type:  Initial   Referring Physician:  Justo Wylie DO  Reason for RD Visit:   IBS with both constipation and diarrhea     Nutrition Assessment:  Patient is a 46 year old female with history that includes irritable bowel syndrome with both constipation and diarrhea, Bipolar disorder, Hidradenitis suppurative, obesity, seizure disorder, iron deficiency anemia, vitamin D deficiency and urinary and fecal incontinence. She complains of having issues with urgency to have a bowel movement and has found that dairy is a trigger for her urgency and causes loose stools. She does not have a consistent eating pattern and will eat when hungry and it can be whatever she feels like eating at the time. She doesn't always want to cook food so may just have some snacks. She used to drink a lot of pop but decreased and now drinks when she has a craving. Initially in conversation, she reported having 20 oz bottle per day of mountain dew or other non cola soda but later she said it is not everyday. She does not tolerate darker pops as they cause loose urgent stools as well as coffee.    Patient Active Problem List   Diagnosis     Bipolar disorder (H)     Obesity     Cellulitis of axilla, left     Iron deficiency anemia     Seizure disorder (H)     Hidradenitis suppurativa     Urgency incontinence     Chronic pain syndrome     Acne vulgaris     Tinea capitis     Fissure in skin     Depression     Hypertension     Subclinical hypothyroidism     Vitamin D deficiency     Ganglion cyst of right foot     Hypertrophy of bone     Morbid obesity (H)     Anhidrosis     Pes  "planovalgus     Other eczema     Height:   Ht Readings from Last 1 Encounters:   04/25/22 1.568 m (5' 1.75\")     Weight:  Wt Readings from Last 10 Encounters:   09/15/22 78.9 kg (174 lb)   09/14/22 79.6 kg (175 lb 6.4 oz)   08/22/22 81 kg (178 lb 9.6 oz)   07/25/22 81.2 kg (179 lb)   06/29/22 80.7 kg (178 lb)   06/24/22 80.8 kg (178 lb 3.2 oz)   04/25/22 84.4 kg (186 lb)   03/29/22 86.5 kg (190 lb 12.8 oz)   02/14/22 89.7 kg (197 lb 12.8 oz)   12/21/21 90.9 kg (200 lb 6.4 oz)        BMI: Estimated body mass index is 32.08 kg/m  as calculated from the following:    Height as of 4/25/22: 1.568 m (5' 1.75\").    Weight as of this encounter: 78.9 kg (174 lb).    Diet Recall:  (some usual/recent meals/snacks/beverages): ice cream, cola pop, coffee, creamer causes looser stool and has to run to bathroom. Also does not tolerate lactose. Eats meals but not specific schedule or times  Meal Food    Breakfast Cereal/Cheerios with lactose free milk   Lunch Varies, might snack on chips, cheese sometimes lopez beans and rice or yesterday had take out chinese food shrimp, fried rice,    Dinner May skip or if feels like cooking: chicken or takes chinese food or fried chicken, fries   Snacks Chips, pork skins   Beverages 40-60 oz water, not daily occas krista aid, juice, used to a lot of pop but now less initially reported some daily ~20 oz mountain dew, pineapple,  Punch pop but later in conversation reported not daily. Sometimes has  sparkling sf ice drink   Alcohol Intake none     Frequency of eating/taking out meals: variable~1-2 times/month     Labs:   Last Comprehensive Metabolic Panel:  Sodium   Date Value Ref Range Status   08/22/2022 141 136 - 145 mmol/L Final   01/04/2019 143 136 - 145 mmol/L Final     Potassium   Date Value Ref Range Status   08/22/2022 4.8 3.4 - 5.3 mmol/L Final   10/18/2021 4.3 3.5 - 5.0 mmol/L Final   01/04/2019 4.0 3.5 - 5.0 mmol/L Final     Chloride   Date Value Ref Range Status   08/22/2022 105 98 - " 107 mmol/L Final   10/18/2021 104 98 - 107 mmol/L Final   01/04/2019 105 98 - 107 mmol/L Final     Carbon Dioxide   Date Value Ref Range Status   08/23/2018 22.1 20.0 - 32.0 mmol/L Final     Carbon Dioxide (CO2)   Date Value Ref Range Status   08/22/2022 27 22 - 29 mmol/L Final   10/18/2021 27 22 - 31 mmol/L Final     Anion Gap   Date Value Ref Range Status   08/22/2022 9 7 - 15 mmol/L Final   10/18/2021 10 5 - 18 mmol/L Final     Glucose   Date Value Ref Range Status   08/22/2022 97 70 - 99 mg/dL Final   10/18/2021 101 70 - 125 mg/dL Final   01/04/2019 94 70 - 125 mg/dL Final     Urea Nitrogen   Date Value Ref Range Status   08/22/2022 12.8 6.0 - 20.0 mg/dL Final   10/18/2021 12 8 - 22 mg/dL Final   01/04/2019 11 8 - 22 mg/dL Final     Creatinine   Date Value Ref Range Status   08/22/2022 0.93 0.51 - 0.95 mg/dL Final   01/04/2019 0.89 0.60 - 1.10 mg/dL Final     GFR Estimate   Date Value Ref Range Status   08/22/2022 76 >60 mL/min/1.73m2 Final     Comment:     Effective December 21, 2021 eGFRcr in adults is calculated using the 2021 CKD-EPI creatinine equation which includes age and gender (Leonor et al., NE, DOI: 10.1056/WWGAgy8616119)   01/04/2019 >60 >60 mL/min/1.73m2 Final     Calcium   Date Value Ref Range Status   08/22/2022 9.3 8.6 - 10.0 mg/dL Final   01/04/2019 9.3 8.5 - 10.5 mg/dL Final     CBC RESULTS:   Recent Labs   Lab Test 08/22/22  1021   WBC 6.4   RBC 5.10   HGB 12.3   HCT 40.7   MCV 80   MCH 24.1*   MCHC 30.2*   RDW 15.6*          Pertinent Medications/vitamin and mineral supplements:      Current Outpatient Medications   Medication     ammonium lactate (LAC-HYDRIN) 12 % external lotion     capsaicin (ZOSTRIX) 0.025 % CREA cream     cetirizine (ZYRTEC) 10 MG tablet     clobetasol (TEMOVATE) 0.05 % external ointment     ClonazePAM (KLONOPIN PO)     cyclobenzaprine (FLEXERIL) 5 MG tablet     desoximetasone (TOPICORT) 0.25 % external cream     dextromethorphan (DELSYM) 30 MG/5ML liquid      dextromethorphan-guaiFENesin (TUSSIN DM)  MG/5ML liquid     diclofenac (VOLTAREN) 1 % topical gel     diclofenac (VOLTAREN) 1 % topical gel     diclofenac (VOLTAREN) 50 MG EC tablet     diphenhydrAMINE (BENADRYL) 25 MG tablet     doxycycline monohydrate (ADOXA) 100 MG tablet     DULoxetine (CYMBALTA) 30 MG capsule     DULoxetine (CYMBALTA) 30 MG capsule     famotidine (PEPCID) 20 MG tablet     ferrous sulfate (FEROSUL) 325 (65 Fe) MG tablet     fluticasone furoate 27.5 MCG/SPRAY nasal spray     gabapentin (NEURONTIN) 300 MG capsule     ketotifen (ZADITOR) 0.025 % ophthalmic solution     lactase (LACTAID) 3000 UNIT tablet     lamoTRIgine (LAMICTAL) 150 MG tablet     levothyroxine (SYNTHROID/LEVOTHROID) 25 MCG tablet     loratadine (CLARITIN) 10 MG tablet     losartan (COZAAR) 100 MG tablet     naproxen (NAPROSYN) 500 MG tablet     oxybutynin ER (DITROPAN XL) 10 MG 24 hr tablet     prazosin (MINIPRESS) 5 MG capsule     Prenatal Vit-Fe Fumarate-FA (PRENATAL MULTIVITAMIN W/IRON) 27-0.8 MG tablet     QUEtiapine (SEROQUEL) 100 MG tablet     QUEtiapine (SEROQUEL) 200 MG tablet     QUEtiapine (SEROQUEL) 25 MG tablet     simvastatin (ZOCOR) 20 MG tablet     topiramate (TOPAMAX) 100 MG tablet     topiramate (TOPAMAX) 50 MG tablet     triamcinolone (ARISTOCORT HP) 0.5 % external cream     valACYclovir (VALTREX) 500 MG tablet     white petrolatum external gel     acetaminophen (TYLENOL) 325 MG tablet     amLODIPine (NORVASC) 10 MG tablet     amLODIPine (NORVASC) 5 MG tablet     No current facility-administered medications for this visit.       Food Allergies:  NKFA  Food intolerances: coffee, creamer, lactose, darker pops such as cola.    Estimated Nutrition Needs based on ideal body weight of 50 k-1500 calories (25-30 kcals/kg), 50-60g protein (1-1.2g/kg), ~1 ml/kcal or total fluids per MD.     MALNUTRITION:  % Weight Loss:  None noted  % Intake:  No decreased intake noted  Subcutaneous Fat Loss:  None  observed  Muscle Loss:  None observed  Fluid Retention:  None noted    Malnutrition Diagnosis: Patient does not meet two of the above criteria necessary for diagnosing malnutrition  In Context of:  Chronic illness or disease    Nutrition Prescription: Plate method, consistent eating pattern, lower lactose diet    Nutrition Intervention      Provided diet education for IBS and explained that smaller more frequent meals may be better tolerated than fewer larger meals and also eating meals and snacks on a consistent eating pattern can be helpful. Since she tends to eat a variable pattern from day to day without consistency and may skip or snack or then eat a large meal. Recommended aiming for at least 3 meals per day consistently. Discussed the plate method meal plan and mailing out handouts with visual of plate method. Encouraged her to aim for making half her plate vegetables, 1/4 plate lean protein and the other 1/4 plan healthy CHO choice and reviewed these (as well as send out handouts with info). Additionally recommended avoiding sweetened drinks and juice as these can contribute to looser stools as well as other added sugars. Recommended avoiding pop, sweetened krista aid and continue drinking water and okay for some sugar free krista aid. Also since she does not tolerate lactose containing foods such as carballo, ranch, blue cheese dressing, milk, recommended continuing lower lactose diet as she is trying to do.    Patient verbalized understanding of education provided. See Goals below.     Educational Materials Provided:  NCM: lactose controlled nutrition therapy, lactose free tips and FV: creating a healthy plate, plate method, CHOs, Protein sources and wt mgmt tips    Goals:      1. Aim for eating 3 meals per day, no skipping.    2. Eat these meals on a consistent eating pattern.    3. Can use the Plate method plan for general guidance on getting balanced meals which is as follows.      --Use a smaller size plate at  meals such as a salad size plate, not a large dinner plate.     --Make half of your plate non starchy vegetables (some examples include: broccoli, cauliflower, asparagus, lettuce, tomato, brussels sprouts, peppers, cucumbers, cabbage, spinach, kale)     --Make 1/4 of your plate lean protein sources at a meal (fish, chicken breast, turkey breast, pork tenderloin, lean cuts of beef (93% lean), black beans or kidney beans or lopez beans, eggs, egg whites, tuna fish, tofu).     --Make the other 1/4 of your plate healthy carbohydrate choices which includes whole grain starches, grains, starchy vegetables and fruit. Some examples include quinoa, brown rice, barley, wild rice, whole grain tortilla or starchy vegetables (potatoes, sweet potatoes, winter squash, peas, corn).     Include a small amount of a healthy fat serving at each meal (some healthy fat examples include avocados, olive oil, avocado oil, vegetable oils, flaxseeds, giovani seeds, nuts (keep nut portions small though such as 1/4 cup serving), natural peanut butter).    4.  Avoid pop and sweetened drinks (such as krista aid) and also avoid juice. These can cause looser stools and diarrhea.    5.  Continue lower lactose/lower dairy food diet.     6.  Continue to eat a lower lactose/ lactose free diet:    -Continue using lactose free milk.    -Avoid ice cream, regular milk, yogurt (choose lactose free options, lactose free milk, lactose free yogurt or non dairy lower sugar yogurts).    -Limit cheese and if having any, then limit to having 1-2 ounces (or 1-2 slices) maximum per day of a hard, aged cheese such as swiss or aged cheddar (as these are lower in lactose).    -Low Fat cottage cheese is lower in lactose too, so you could have some cottage cheese as well.    -If having a salad, can use some oil and vinegar or italian dressing instead of ranch or blue cheese dressing.    -If having some tuna fish, you could add a small amount such as 1 tablespoon of italian  dressing to it instead of mayonnaise.  (Or can add a very small amount of mayonnaise to your tuna fish (such as 1-2 teaspoons if tolerated).    Here are some links to handouts. I will also mail these out to you along with some others:    My Plate   [email or web address]    Create a Plate (How to Build A Healthy Meal)  [email or web address]    Protein Sources   [email or web address]     Carbohydrates  [email or web address]     Daily Food and Exercise Log  [email or web address]     Tips for Weight Loss and Weight Management  [email or web address]    Nutrition Monitoring and Evaluation: Will monitor adherence to nutrition recommendations at future RD visits.     Further Medical Nutrition Therapy:  Follow-up in 1-2 months    Patient was encouraged to call/contact RD with any further questions.      Jolie Carrillo, MS, RD, LD

## 2022-09-19 ENCOUNTER — TELEPHONE (OUTPATIENT)
Dept: FAMILY MEDICINE | Facility: CLINIC | Age: 47
End: 2022-09-19

## 2022-09-19 NOTE — TELEPHONE ENCOUNTER
[facility] phone call message- general phone call:    Reason for call: the Pt called back and would like a call back for her results     Return call needed: Yes    OK to leave a message on voice mail? Yes    Primary language: English      needed? No    Call taken on September 19, 2022 at 9:59 AM by Sunday Lemos

## 2022-09-20 ENCOUNTER — ANCILLARY PROCEDURE (OUTPATIENT)
Dept: MAMMOGRAPHY | Facility: CLINIC | Age: 47
End: 2022-09-20
Attending: STUDENT IN AN ORGANIZED HEALTH CARE EDUCATION/TRAINING PROGRAM
Payer: MEDICAID

## 2022-09-20 DIAGNOSIS — Z12.31 VISIT FOR SCREENING MAMMOGRAM: ICD-10-CM

## 2022-09-20 PROCEDURE — 77063 BREAST TOMOSYNTHESIS BI: CPT | Mod: GC | Performed by: RADIOLOGY

## 2022-09-20 PROCEDURE — 77067 SCR MAMMO BI INCL CAD: CPT | Mod: GC | Performed by: RADIOLOGY

## 2022-09-27 ENCOUNTER — ANCILLARY PROCEDURE (OUTPATIENT)
Dept: MAMMOGRAPHY | Facility: CLINIC | Age: 47
End: 2022-09-27
Attending: STUDENT IN AN ORGANIZED HEALTH CARE EDUCATION/TRAINING PROGRAM
Payer: MEDICAID

## 2022-09-27 DIAGNOSIS — R92.8 ABNORMAL MAMMOGRAM OF LEFT BREAST: ICD-10-CM

## 2022-09-27 PROCEDURE — 77065 DX MAMMO INCL CAD UNI: CPT | Mod: LT | Performed by: RADIOLOGY

## 2022-09-27 PROCEDURE — G0279 TOMOSYNTHESIS, MAMMO: HCPCS | Mod: LT | Performed by: RADIOLOGY

## 2022-10-11 ENCOUNTER — MEDICAL CORRESPONDENCE (OUTPATIENT)
Dept: HEALTH INFORMATION MANAGEMENT | Facility: CLINIC | Age: 47
End: 2022-10-11

## 2022-10-11 ENCOUNTER — OFFICE VISIT (OUTPATIENT)
Dept: FAMILY MEDICINE | Facility: CLINIC | Age: 47
End: 2022-10-11
Payer: MEDICAID

## 2022-10-11 VITALS
RESPIRATION RATE: 16 BRPM | OXYGEN SATURATION: 98 % | DIASTOLIC BLOOD PRESSURE: 78 MMHG | HEART RATE: 76 BPM | TEMPERATURE: 98.2 F | BODY MASS INDEX: 31.76 KG/M2 | WEIGHT: 172.6 LBS | SYSTOLIC BLOOD PRESSURE: 115 MMHG | HEIGHT: 62 IN

## 2022-10-11 DIAGNOSIS — R15.2 INCONTINENCE OF FECES WITH FECAL URGENCY: ICD-10-CM

## 2022-10-11 DIAGNOSIS — F31.31 BIPOLAR AFFECTIVE DISORDER, CURRENTLY DEPRESSED, MILD (H): ICD-10-CM

## 2022-10-11 DIAGNOSIS — Z72.51 UNPROTECTED SEX: ICD-10-CM

## 2022-10-11 DIAGNOSIS — R15.9 INCONTINENCE OF FECES WITH FECAL URGENCY: ICD-10-CM

## 2022-10-11 DIAGNOSIS — G40.909 SEIZURE DISORDER (H): Primary | ICD-10-CM

## 2022-10-11 DIAGNOSIS — F31.70 BIPOLAR DISORDER IN FULL REMISSION, MOST RECENT EPISODE UNSPECIFIED TYPE (H): ICD-10-CM

## 2022-10-11 PROCEDURE — 87591 N.GONORRHOEAE DNA AMP PROB: CPT | Performed by: STUDENT IN AN ORGANIZED HEALTH CARE EDUCATION/TRAINING PROGRAM

## 2022-10-11 PROCEDURE — 99214 OFFICE O/P EST MOD 30 MIN: CPT | Mod: GC | Performed by: STUDENT IN AN ORGANIZED HEALTH CARE EDUCATION/TRAINING PROGRAM

## 2022-10-11 PROCEDURE — 99207 E-CONSULT TO BEHAVIORAL HEALTH (ADULT OUTPT PROVIDER TO SPECIALIST WRITTEN QUESTION & RESPONSE): CPT | Performed by: STUDENT IN AN ORGANIZED HEALTH CARE EDUCATION/TRAINING PROGRAM

## 2022-10-11 NOTE — PROGRESS NOTES
Assessment & Plan   Bipolar disorder in full remission, most recent episode unspecified type (H)  Seizure disorder (H)  Does not follow with neurology currently, but she is on high doses of several neuropsychiatric medications.  Per note from Radha Arevalo, patient has been I [facility] for psychiatric condition and substance use.  Does not have a psychiatrist.  Discussed with social work and behavioral health staff.  See SW note associated with this encounter.  ROIs signed today.  Will place appropriate referrals once it is clear what services, if any, patient currently receives.  Placed E-consult to psychiatry for review of patient's medications that could be contributing to headache, fatigue, GI upset.  Placed U tox to be collected  Before next visit.   - E consult to psychiatry  - Adult Neurology  Referral; Future  - topiramate (TOPAMAX) 100 MG tablet; Take 3 tablets (300 mg) by mouth At Bedtime  - UTox    Incontinence of feces with fecal urgency  Had a visit with nutrition.  Unable to fully follow recommendations due to financial difficulty and cognitive difficulties.  Fecal urgency and abdominal cramps persist.  She would like to meet with a GI specialist.  Referral for this has been placed.   - Adult GI  Referral - Consult Only; Future    Unprotected sex  - Neisseria gonorrhoeae PCR - Clinic Collect  - Chlamydia trachomatis PCR; Future  - Syphilis Screen [place] - [facility]; Future  - HIV Ag/Ab Screen [place]; Future    Staffed with Dr. Rodriguez.    Reny Wright MD  [facility]    Samina Phelps is a 46 year old current everyday smoker with a history of seizure disorder, bipolar 1 disorder, PTSD, MDD, TRUE, GERD, HTN, hypothyroidism, history of cocaine use disorder, presenting for the following health issues:  other (IBS /DM check /Still having pain on knees)      HPI      Headaches:  Wakes up with a slight headache.  Takes Tylenol and ibuprofen, not much help from that.   Sleep is the only thing that helps. The headache is in behind her forehead and eyes. Hasn't been eating as well as she used to.  Feels like she gains weight easily. Drinks a lot of water. Doesn't feel that she's dehydrated.  It's been over a few weeks of recurrent headaches.  No nausea.  No vomiting.  Diarrhea/loose stools at baseline.      Current medications:  Topiramate 300 mg   Lamotrigine 150 mg 2 times daily  Seroquel 300 mg at night  Prazosin 5 mg  Not taking Cymbalta, didn't think it was helping  Past Psychiatric Medications: Fluoxetine, olanzapine, lurasidone, Depakote, Abilify (weight gain), klonopin, alprazolam, Wellbutrin, Rixulti      She's been having memory issues, including forgetting the name of her boyfriend.     Mood symptoms:  Not currently seeing a psychiatrist or therapist.  Review of chart showed that she has seen Radha Arevalo for bipolar disorder and PTSD at [facility].      Seizure disorder:  Pt is not able to tell me much about her seizure history.  Medications as above.  Does not have a neurologist.  Would like a referral.     Housing issues:  Social Security disability for seizure disorder, CADI waiver, [facility] worker, home services were in place in the past but she could not tell me what services she has now.  It seems that she might have lost some services after moving to a different county.     Psychiatric History: Previously treated and [facility], the [facility] while homeless, outpatient counseling in the past. Diagnosis of anger/anxiety, bipolar affective disorder, and pseudoseizures. Has been in skilled nursing in the past for psychiatric and substance use related offenses. She has been an adult foster care. She states she has been in a psychiatric betts but is unable to provide additional information. Multiple records can be reviewed on care everywhere.  Mattie has a CADI waiver and will continue utilize services of the  worker and a PCA. Contact info is  "SUDHIR Mendoza; - [facility]; Direct Line: [phone];Fax: [phone]     Review of Systems   Constitutional, HEENT, cardiovascular, pulmonary, gi and gu systems are negative, except as otherwise noted.      Objective    /78 (BP Location: Left arm, Patient Position: Sitting, Cuff Size: Adult Regular)   Pulse 76   Temp 98.2  F (36.8  C) (Oral)   Resp 16   Ht 1.57 m (5' 1.81\")   Wt 78.3 kg (172 lb 9.6 oz)   SpO2 98%   BMI 31.76 kg/m    Body mass index is 31.76 kg/m .  Physical Exam  Constitutional:       Appearance: She is ill-appearing. She is not toxic-appearing.   Eyes:      General: Gaze aligned appropriately.   Neurological:      Mental Status: She is alert. Mental status is at baseline.      Cranial Nerves: No dysarthria or facial asymmetry.      Motor: No weakness.      Gait: Gait is intact.   Psychiatric:         Mood and Affect: Mood is anxious and depressed. Affect is flat.         Speech: Speech normal.         Thought Content: Thought content normal.         Cognition and Memory: Memory is impaired.          No results found for this or any previous visit (from the past 24 hour(s)).    ----- Service Performed and Documented by Resident or Fellow ------            "
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
## 2022-10-12 ENCOUNTER — TELEPHONE (OUTPATIENT)
Dept: FAMILY MEDICINE | Facility: CLINIC | Age: 47
End: 2022-10-12

## 2022-10-12 LAB — N GONORRHOEA DNA SPEC QL NAA+PROBE: NEGATIVE

## 2022-10-12 NOTE — TELEPHONE ENCOUNTER
"Following up on mental health referral made by Dr. Wright 10/11 for psychiatry and therapy. See SW note from Dr. Wright's office visit on 10/11 for more social context.     Patient had been seen at [facility] for both therapy and psychiatry in the past.     [facility]  [address], [address]  [place] [address]  [phone]    Called [facility] to follow up,  informed that patient had been seen once for therapy by Dr. José Abdul earlier in 2022, but that she was attended one visit. They completed the diagnostic assessment but had no further follow up.      Patient was scheduled with Dr. Aleisha Stephen  In 2021 for psychiatry, but patient never attended a visit with her. Has had at least 2 referrals to [facility] for psychiatry but has never connected with a provider.     Current CADI worker is Sloan Howell with [facility] ([phone]). SW has VM out to Sloan and is waiting a call back. To THELMA's understanding, the main service being provided through the CADI waiver is independent living support housing with [facility] in [place]. This is not a group home but there is a staff member named Reynold there. Not sure if the CADI waiver is assisting with rent, as patient states she is behind on rent and trying to get Emergency Assistance to help cover it.     Called patient to discuss restarting therapy at [facility]. Had 11 minute conversation in which patient stated she does not ever want to return to [facility] as they are \"fools.\" States she got a call from someone from a therapy office today, but it was clarified that this was for physical therapy.    Did express willingness to start therapy with a different agency. Prefers a female therapist and likes morning appointments.     THELMA will reach out to providers tomorrow starting with [facility] to get patient scheduled for therapy intake. Psychiatry was not discussed.     Routing to Dr. Khan and " Dr. Wright for FYI.     SVETLANA Jarvis

## 2022-10-13 NOTE — TELEPHONE ENCOUNTER
[facility] [place]  [address]   [address]  [place] [address]  [phone] (for appointments)  Fax: [phone]  Called [facility] and reached representative who informed that all [facility] locations are scheduling out into February 2023.   [email or web address]  [address] [place] [address].  [address]  [place], [place] [address]  [phone]  Fax [phone]  Called, they do have openings for intake for therapy. Facilitated 3 way call with patient and completed demographics. Scheduled:     Monday 10/24/22 at 10:30am   morena Vargas   [email or web address]   [facility], [address]  [place], [place] [address]  They will send pre-visit paperwork to patient via email. This needs to be completed 24 hours before the appointment.   Cancellation requires 24 hour notice.     Ride set up with [employer] ([phone]) with pickup 9:30-10:00am and will call return.     THELMA faxed mental health referral, problem list, face sheet, and office visit notes to [facility].      SVETLANA Jarvis

## 2022-10-14 RX ORDER — HYDROXYZINE PAMOATE 50 MG/1
CAPSULE ORAL
COMMUNITY
Start: 2022-04-04 | End: 2023-01-11

## 2022-10-14 RX ORDER — TOPIRAMATE 100 MG/1
300 TABLET, FILM COATED ORAL AT BEDTIME
Qty: 180 TABLET | Refills: 0 | Status: SHIPPED | OUTPATIENT
Start: 2022-10-14 | End: 2023-11-10

## 2022-10-14 RX ORDER — IBUPROFEN 100 MG/5ML
400 SUSPENSION, ORAL (FINAL DOSE FORM) ORAL
COMMUNITY
Start: 2022-04-27 | End: 2023-01-11

## 2022-10-14 RX ORDER — ACETAMINOPHEN 160 MG/5ML
20.3 SUSPENSION ORAL
COMMUNITY
Start: 2022-08-02 | End: 2023-01-11

## 2022-10-14 NOTE — TELEPHONE ENCOUNTER
Social Work Note:    Data and Intervention: Called MERISSA Landa  with [facility] ([phone]) to collaborate and determine which services patient is getting and how we can help support her.     Sloan will call patient for verbal release and call SW back later today, in addition to signed MARIA DEL CARMEN that was faxed earlier today.     Assessment and Plan: Follow up regarding best resources and plan of care.     SVETLANA Jarvis

## 2022-10-17 ENCOUNTER — TELEPHONE (OUTPATIENT)
Dept: FAMILY MEDICINE | Facility: CLINIC | Age: 47
End: 2022-10-17

## 2022-10-17 ENCOUNTER — E-CONSULT (OUTPATIENT)
Dept: PSYCHIATRY | Facility: CLINIC | Age: 47
End: 2022-10-17
Payer: MEDICAID

## 2022-10-17 PROCEDURE — 99451 NTRPROF PH1/NTRNET/EHR 5/>: CPT | Performed by: PSYCHIATRY & NEUROLOGY

## 2022-10-17 NOTE — TELEPHONE ENCOUNTER
"Called patient to inform regarding medical cab rides. Had 12 minute conversation.     States she is doing okay. When asked about eating and sleeping, informed that she has not been eating the best, and did not get good sleep last night due to an \"episode\" in which she was shaking uncontrollably on the right side of her body. Thinks it might have been some kind of mild seizure. Lasted about 8 minutes and then stopped and she went back to sleep. Happened again later in the night but she doesn't know for how long. Boyfriend was present for both events but did not know what to do or how to help her. Thinks is related to her prescription for Topomax that Dr. Christiansen started her on 10/11/22. She has been taking this nightly.     Would like Dr. Wright to know that this happened, but does not need a call back, Will talk about it with Dr. Wright during next PCP visit Monday 10/25.     Land[facility] will not take her anymore to medical appointments. Cab set up for 10/25/22 visit. Ride will be with [employer] ([phone]) with pickup 8:00-8:30am and will call return.     Routing to PCP.     SVETLANA Jarvis   "

## 2022-10-17 NOTE — PROGRESS NOTES
"    10/17/2022     E-Consult has been accepted.    Interprofessional consultation requested by:  Justice Rodriguez MD      Clinical Question/Purpose: MY CLINICAL QUESTION IS: Please review patient's medications and offer assessment and recommendations for dose adjustments that might help with headache, memory issues, fatigue.  Patient also has fecal incontinence.  Please recommend adjustments to medications, if any, to help with GI symptoms.     Patient assessment and information reviewed: chart review    Recommendations:  I am glad that referrals to therapy, psychiatry and neurology were all made.  I am also glad that there is a  trying to help coordinate many of her many social issues.  I also agree with checking a utox to make sure that cocaine or other drugs is not an issue.   I feel that one is the most important and is most likely the reason for her headaches, fatigue and diarrhea.  This is her food insecurity and most likely less than healthy diet.  I suspect, looking at the medications, that this would be the biggest culprit for those side effects.  While any of the medications could cause diarrhea and fatigue, they usually don't cause headaches (topamax is used to treat chronic headaches)  There could be a lowering of blood pressure with the prazosin, causing a headache in the morning.  One option is to decrease the prazosin dose to 3 or 4 mg to see if this helps and still treats the nightmares (it is not completely clear her reason for being on prazosin, but I am assuming it is for PTSD nightmares).      Topamax could easily cause one to struggle with simple tasks, short term memory or otherwise making someone feel foggy/\"not with it.\"  I would hesitate to decrease this until neurology chimes in for seizure coverage.  Topamax has no mood stability qualities, that would be the lamictal but they both may be helping prevent seizures.  Topamax may also be there to try to curb weight gain with " the seroquel.      Seroquel can help with anxiety, mood stability and depression.  This is not a high dose and most likely is very helpful for her mental health symptoms.  It could cause fatigue, but would be unusual for headaches or diarrhea.  You could try changing it to seroquel xr which gives more steady state dosing through the day which can often help with side effects.      Several other thoughts about her med list.  In the note, there were only a few meds listed, but her medication list is very long.  There are a few that could be problematic for side effects.  One is iron.  That is often a culprit for diarrhea and headaches (usually more nausea than diarrhea) but this should be looked at.  Also gabapentin may be another medication that can cause fatigue.  It is excellent for anxiety but in combo with topamax, can cause more memory issues than topamax alone.  This may not be an issue if she is not taking it.  Gabapentin, unless the patient feels is very helpful for anxiety, would be a good one to taper off.    Overall, until we know there is no drug use, consistent food, thyroid is stable (looks like she was on synthroid, not sure if she is taking or possibly taking too much?) and a more stable social situation, I am not sure making too many changes to the medications will be helpful and could cause more risks for her mental health.  While there may be side effects of the medications, there are many unknowns to be able to say it is the medications and she is high risk for both recurrence of seizures and mental health symptoms.  Other than the aforementioned prazosin and gabapentin, I would not touch the medications.  I think the next step would be decreasing topamax which is the one most likely to cause the memory issues, but I think this should be done in conjunction with neuro.  Maybe lamictal would be enough to cover seizures and mood?      Thank you for the consult.        The recommendations provided in  this E-Consult are based on a review of clinical data pertinent to the clinical question presented, without a review of the patient's complete medical record or, the benefit of a comprehensive in-person or virtual patient evaluation. This consultation should not replace the clinical judgement and evaluation of the provider ordering this E-Consult. Any new clinical issues, or changes in patient status since the filing of this E-Consult will need to be taken into account when assessing these recommendations. Please contact me if you have further questions.    My total time spent reviewing clinical information and formulating assessment was 25 minutes.        Jese Conrad MD

## 2022-10-19 ENCOUNTER — TELEPHONE (OUTPATIENT)
Dept: FAMILY MEDICINE | Facility: CLINIC | Age: 47
End: 2022-10-19

## 2022-10-19 NOTE — TELEPHONE ENCOUNTER
"X-ray result of knee pt states she continues to have \"stickening\" feeling in her knee and would like to know what can be done next to find out what is causing her knee problem. Patient has an appt for 10/25     Patient states she is in the ER at this time regarding swelling and drainage from a boil under her rt arm pit     Note routed to   /BTRROBINSON        "

## 2022-10-19 NOTE — TELEPHONE ENCOUNTER
[facility] phone call message- patient requesting results:    Test: Lab and X-ray    Date of test: 9/14/2022    Additional Comments: The Pt would like a call back    OK to leave a message on voice mail? Yes    Primary language: English      needed? No    Call taken on October 19, 2022 at 8:59 AM by Sunday Lemos

## 2022-10-21 NOTE — TELEPHONE ENCOUNTER
REFERRAL INFORMATION:    Referring Provider:  Dr. Justice Rodriguez     Referring Clinic:  [facility]    Reason for Visit/Diagnosis: Incontinence of feces with fecal urgency      FUTURE VISIT INFORMATION:    Appointment Date: 11/14/2022    Appointment Time: 8 AM      NOTES STATUS DETAILS   OFFICE NOTE from Referring Provider Internal 10/11/2022, 9/14/2022 Office visit with Dr. Reny Wright     OFFICE NOTE from Other Specialist Internal 9/15/2022 Office visit with Jolie Carrillo RD ([facility])      HOSPITAL DISCHARGE SUMMARY/  ED VISITS N/A    OPERATIVE REPORT N/A    MEDICATION LIST Internal         ENDOSCOPY  N/A    COLONOSCOPY N/A    ERCP N/A    EUS N/A    STOOL TESTING N/A    PERTINENT LABS Care Everywhere    PATHOLOGY REPORTS (RELATED) N/A    IMAGING (CT, MRI, EGD, MRCP, Small Bowel Follow Through/SBT, MR/CT Enterography) N/A

## 2022-10-25 ENCOUNTER — TRANSFERRED RECORDS (OUTPATIENT)
Dept: HEALTH INFORMATION MANAGEMENT | Facility: CLINIC | Age: 47
End: 2022-10-25

## 2022-10-25 LAB
ALT SERPL-CCNC: 12 IU/L (ref 0–32)
AST SERPL-CCNC: 18 IU/L (ref 0–40)
CREATININE (EXTERNAL): 0.91 MG/DL (ref 0.57–1)
GFR ESTIMATED (EXTERNAL): 78 ML/MIN/1.73
GLUCOSE (EXTERNAL): 112 MG/DL (ref 70–99)
POTASSIUM (EXTERNAL): 4.4 MMOL/L (ref 3.5–5.2)
TSH SERPL-ACNC: 2.98 UIU/ML (ref 0.45–4.5)

## 2022-10-26 ENCOUNTER — TELEPHONE (OUTPATIENT)
Dept: FAMILY MEDICINE | Facility: CLINIC | Age: 47
End: 2022-10-26

## 2022-10-26 NOTE — TELEPHONE ENCOUNTER
[facility] phone call message- general phone call:    Reason for call: [place] the [place] regarding her diet. She would like to speak with a nurse regarding this.    Return call needed: Yes    OK to leave a message on voice mail? Yes    Primary language: English      needed? No    Call taken on October 26, 2022 at 10:13 AM by Ananda Casarez

## 2022-10-31 ENCOUNTER — TRANSFERRED RECORDS (OUTPATIENT)
Dept: HEALTH INFORMATION MANAGEMENT | Facility: CLINIC | Age: 47
End: 2022-10-31

## 2022-11-07 ENCOUNTER — TELEPHONE (OUTPATIENT)
Dept: GASTROENTEROLOGY | Facility: CLINIC | Age: 47
End: 2022-11-07

## 2022-11-07 NOTE — TELEPHONE ENCOUNTER
Called to remind patient of their upcoming appointment with our GI clinic, on Monday 11/14/2022 at 7:45AM with Vivian Stevens. This appointment is scheduled as an in-person appt. Please arrive 15 minutes early to check in for your appointment. , if your appointment is virtual (video or telephone) you need to be in [place] for the visit. To reschedule or cancel patient to call [phone].    Delia Pulliam MA
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
## 2022-11-14 ENCOUNTER — PRE VISIT (OUTPATIENT)
Dept: GASTROENTEROLOGY | Facility: CLINIC | Age: 47
End: 2022-11-14

## 2022-11-14 ENCOUNTER — OFFICE VISIT (OUTPATIENT)
Dept: GASTROENTEROLOGY | Facility: CLINIC | Age: 47
End: 2022-11-14
Attending: FAMILY MEDICINE
Payer: MEDICAID

## 2022-11-14 ENCOUNTER — TELEPHONE (OUTPATIENT)
Dept: GASTROENTEROLOGY | Facility: CLINIC | Age: 47
End: 2022-11-14

## 2022-11-14 VITALS
WEIGHT: 167.2 LBS | OXYGEN SATURATION: 100 % | DIASTOLIC BLOOD PRESSURE: 79 MMHG | HEIGHT: 61 IN | BODY MASS INDEX: 31.57 KG/M2 | SYSTOLIC BLOOD PRESSURE: 130 MMHG | HEART RATE: 74 BPM

## 2022-11-14 DIAGNOSIS — R15.2 INCONTINENCE OF FECES WITH FECAL URGENCY: ICD-10-CM

## 2022-11-14 DIAGNOSIS — K59.00 CONSTIPATION, UNSPECIFIED CONSTIPATION TYPE: Primary | ICD-10-CM

## 2022-11-14 DIAGNOSIS — R15.9 INCONTINENCE OF FECES WITH FECAL URGENCY: ICD-10-CM

## 2022-11-14 PROCEDURE — 99205 OFFICE O/P NEW HI 60 MIN: CPT | Performed by: DIETITIAN, REGISTERED

## 2022-11-14 RX ORDER — POLYETHYLENE GLYCOL 3350 17 G/17G
1 POWDER, FOR SOLUTION ORAL DAILY
Qty: 238 G | Refills: 4 | Status: SHIPPED | OUTPATIENT
Start: 2022-11-14 | End: 2024-04-02

## 2022-11-14 RX ORDER — DICYCLOMINE HYDROCHLORIDE 10 MG/1
10 CAPSULE ORAL
Qty: 120 CAPSULE | Refills: 3 | Status: CANCELLED | OUTPATIENT
Start: 2022-11-14

## 2022-11-14 ASSESSMENT — PAIN SCALES - GENERAL: PAINLEVEL: EXTREME PAIN (9)
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
## 2022-11-14 NOTE — PATIENT INSTRUCTIONS
It was a pleasure taking care of you today.  I've included a brief summary of our discussion and care plan from today's visit below.  Please review this information with your primary care provider.  ______________________________________________________________________    My recommendations are summarized as follows:  --  ONE Miralax bottle (238 g) and TWO 32 once Gatorade (64 ounces). Mix the two. Drink mixture over the course of 3-4 hours.  You should experience loose bowel movements that are watery in consistency when complete.  -- The day after the cleanout, immediately resume Miralax 3 caps full daily x 1 week, then go to 2 caps full per day ongoing.  -- This is not a stimulant laxative and is safe to take on a daily basis.  You can titrate this dose (increase or decrease) based on stool frequency and consistency.     -- Once you have moved to twice daily dosing of Miralax then add in the soluble fiber supplementation.  -- Recommend soluble fiber supplementation on a daily basis (Citrucel).  Start with 1 tablespoon per day and if tolerated, may increase up to 2-3 tablespoons per day.  You may experience some bloating with initiation of fiber supplementation that will improve over the first month.  A good fiber trial to evaluate the effect is 3-6 months.    -- Schedule Colonoscopy and upper endoscopy    -- Famotidine (Pepcid) 1-2 times per day for reflux    -- please see scheduling information provided below     Return to GI Clinic in 3 months to review your progress.    ______________________________________________________________________    How do I schedule labs, imaging studies, or procedures that were ordered in clinic today?     Labs: To schedule lab appointment at the [facility], use my chart or call [phone]. If you have a [place] lab closer to home where you are regularly seen you can give them a call.     Procedures: If a colonoscopy, upper endoscopy, breath test, esophageal  manometry, or pH impedence was ordered today, our endoscopy team will call you to schedule this. If you have not heard from our endoscopy team within a week, please call [phone] to schedule.     Imaging Studies: If you were scheduled for a CT scan, X-ray, MRI, ultrasound, HIDA scan or other imaging study, please call [phone] to have this scheduled.     Referral: If a referral to another specialty was ordered, expect a phone call or follow instructions above. If you have not heard from anyone regarding your referral in a week, please call our clinic to check the status.     Who do I call with any questions after my visit?  Please be in touch if there are any further questions that arise following today's visit.  There are multiple ways to contact your gastroenterology care team.      During business hours, you may reach a Gastroenterology nurse at [phone]    To schedule or reschedule an appointment, please call [phone].     You can always send a secure message through [employer].  [employer] messages are answered by your nurse or doctor typically within 24 hours.  Please allow extra time on weekends and holidays.      For urgent/emergent questions after business hours, you may reach the on-call GI Fellow by contacting the [facility]  at [phone].     How will I get the results of any tests ordered?    You will receive all of your results.  If you have signed up for [employer], any tests ordered at your visit will be available to you after your provider reviews them.  Typically this takes 1-2 weeks.  If there are urgent results that require a change in your care plan, your provider or nurse will call you to discuss the next steps.      What is [employer]?  [employer] is a secure way for you to access all of your healthcare records from the [facility].  It is a web based computer program, so you can sign on to it from any location.  It also allows you to send secure messages  to your care team.  I recommend signing up for [employer] access if you have not already done so and are comfortable with using a computer.      How to I schedule a follow-up visit?  If you did not schedule a follow-up visit today, please call [phone] to schedule a follow-up office visit.      Sincerely,    April Stevens PA-C  Division of Gastroenterology, Hepatology & Nutrition  [facility]

## 2022-11-14 NOTE — NURSING NOTE
"Chief Complaint   Patient presents with     New Patient     Incontinence of feces       Vitals:    11/14/22 0713   BP: 130/79   BP Location: Left arm   Patient Position: Sitting   Cuff Size: Adult Regular   Pulse: 74   SpO2: 100%   Weight: 75.8 kg (167 lb 3.2 oz)   Height: 1.549 m (5' 1\")       Body mass index is 31.59 kg/m .                          Trina Dominguez, EMT    "

## 2022-11-16 ENCOUNTER — TELEPHONE (OUTPATIENT)
Dept: FAMILY MEDICINE | Facility: CLINIC | Age: 47
End: 2022-11-16

## 2022-11-16 NOTE — LETTER
November 16, 2022      Mattie Pierre  [address] [place] [address]        Dear Mattie,  I am one of the Care Coordinators at [facility]. I have been trying to reach you via phone. However, my attempts have not been successful. I am responding to your message regarding transportation to your upcoming appointment on: 11/21/2022 with Dr. Wright.    I have arranged transportation to both of your upcoming appointments     11/21/2022 @ 4:30pm  Traveling to:  [address] [place] [address]. [place] [address]     between:  3:30 pm - 4:00 pm  Return trip: will call [phone]      11/25/2022 @ 9:05 am  Traveling to:  [facility]   [address] [place] [address] [address] [place] [address]     time: between:   8:05 am - 8:30am  Return trip: will call [phone]        Sincerely,            Henrry Sanchez Sr.  Social Work  Care Coordination  [facility] - [address] [address]  [email or web address]   Office: [phone]  Direct: [phone]  [facility]

## 2022-11-16 NOTE — TELEPHONE ENCOUNTER
11/16/2022: Care Coordination    Ms. Pierre call CC: requesting transportation for an upcoming appointment with Dr. Wright. CC: Called [employer] at:[phone] to schedule the ride for:    11/21/2022 @ 4:30pm  Traveling to:  [address] [place] [address]. [place] [address]     between:  3:30 pm - 4:00 pm  Return trip: will call [phone]      11/25/2022 @ 9:05 am  Traveling to:  [facility]   [address] [place] [address] [address] [address]     time: between:   8:05 am - 8:30am  Return trip: will call [phone]    Ms. Pierre has been notified via voice message and a letter.          November 16, 2022      Mattie Pierre  [address] [place] [address]        Dear Mattie,  I am one of the Care Coordinators at [facility]. I have been trying to reach you via phone. However, my attempts have not been successful. I am responding to your message regarding transportation to your upcoming appointment on: 11/21/2022 with Dr. Wright.    I have arranged transportation to both of your upcoming appointments     11/21/2022 @ 4:30pm  Traveling to:  [address] [place] [address]. [place] [address]     between:  3:30 pm - 4:00 pm  Return trip: will call [phone]      11/25/2022 @ 9:05 am  Traveling to:  [facility]   [address] [place] [address] [address] [address]     time: between:   8:05 am - 8:30am    Return trip: will call [phone]        Sincerely,        Henrry Sanchez Sr.  Social Work  Care Coordination  [facility] - [address] [address]  [email or web address]   Office: [phone]  Direct: [phone]  [facility]

## 2022-11-22 ENCOUNTER — HOSPITAL ENCOUNTER (OUTPATIENT)
Facility: AMBULATORY SURGERY CENTER | Age: 47
End: 2022-11-22
Attending: INTERNAL MEDICINE | Admitting: INTERNAL MEDICINE
Payer: MEDICAID

## 2022-11-22 ENCOUNTER — TELEPHONE (OUTPATIENT)
Dept: GASTROENTEROLOGY | Facility: CLINIC | Age: 47
End: 2022-11-22

## 2022-11-22 NOTE — TELEPHONE ENCOUNTER
Screening Questions  BLUE  KIND OF PREP RED  LOCATION [review exclusion criteria] GREEN  SEDATION TYPE        n Are you active on mychart?       [place] Ordering/Referring Provider?        Medicaid What type of coverage do you have?      n Have you had a positive covid test in the last 14 days?     31.6 1. BMI  [BMI 40+ - review exclusion criteria]    y  2. Are you able to give consent for your medical care? [IF NO,RN REVIEW]        n  3. Are you taking any prescription pain medications on a routine schedule?      n  3a. EXTENDED PREP What kind of prescription?     n 4. Do you have any chemical dependencies such as alcohol, street drugs, or methadone?    n 5. Do you have any history of post-traumatic stress syndrome, severe anxiety or history of psychosis?      **If yes 3- 5 , please schedule with MAC sedation.**          IF YES TO ANY 6 - 10 - HOSPITAL SETTING ONLY.     n 6.   Do you need assistance transferring?     n 7.   Have you had a heart or lung transplant?    n 8.   Are you currently on dialysis?   n 9.   Do you use daily home oxygen?   n 10. Do you take nitroglycerin?   10a. n If yes, how often?     11. [FEMALES]  n Are you currently pregnant?    11a. n If yes, how many weeks? [ Greater than 12 weeks, OR NEEDED]    n 12. Do you have Pulmonary Hypertension? *NEED PAC APPT AT [facility]*     n 13. [review exclusion criteria]  Do you have any implantable devices in your body (pacemaker, defib, LVAD)?    n 14. In the past 6 months, have you had any heart related issues including cardiomyopathy or heart attack?     14a. n If yes, did it require cardiac stenting if so when?     n 15. Have you had a stroke or Transient ischemic attack (TIA - aka  mini stroke ) within 6 months?      n 16. Do you have mod to severe Obstructive Sleep Apnea?  [Hospital only - Ok at [place]]    n 17. Do you have SEVERE AND UNCONTROLLED asthma? *NEED PAC APPT AT [facility]*     n 18. Are you currently taking any blood thinners?     18a. If  "yes, inform patient to \"follow up w/ ordering provider for bridging instructions.\"    n 19. Do you take the medication Phentermine?    19a. If yes, \"Hold for 7 days before procedure.  Please consult your prescribing provider if you have questions about holding this medication.\"     n  20. Do you have chronic kidney disease?      n  21. Do you have a diagnosis of diabetes?     n  22. On a regular basis do you go 3-5 days between bowel movements?      23. Preferred LOCAL Pharmacy for Pre Prescription    [ LIST ONLY ONE PHARMACY]          [id number] - [place], [place] - [address] AT [facility] [address]        - CLOSING REMINDERS -    Informed patient they will need an adult    Cannot take any type of public or medical transportation alone    Conscious Sedation- Needs  for 6 hours after the procedure       MAC/General-Needs  for 24 hours after procedure    Pre-Procedure Covid test to be completed [[facility] PCR Testing Required]    Confirmed Nurse will call to complete assessment       - SCHEDULING DETAILS -     Babar  Surgeon    2/6  Date of Procedure  Upper and Lower Endoscopy [EGD and Colonoscopy]  Type of Procedure Scheduled  [facility] [place] [facility]   [facility]-If you answer yes to questions #8, #20, #21Which Colonoscopy Prep was Sent?     mod Sedation Type     n PAC / Pre-op Required         Additional comments:            "

## 2022-12-13 NOTE — TELEPHONE ENCOUNTER
RECORDS RECEIVED FROM: Internal   REASON FOR VISIT: headache   Date of Appt: 2.27.23   NOTES (FOR ALL VISITS) STATUS DETAILS   OFFICE NOTE from referring provider Internal 10.11.22 JOSUE Hall [facility]    Dr. Wright-1/31/2023   IMAGING  (FOR ALL VISITS)     MRI (HEAD, NECK, SPINE) PACS  MR Brain-2/14/2023   CT (HEAD, NECK, SPINE)  *no images in two years

## 2023-01-03 ENCOUNTER — TELEPHONE (OUTPATIENT)
Dept: FAMILY MEDICINE | Facility: CLINIC | Age: 48
End: 2023-01-03

## 2023-01-03 NOTE — TELEPHONE ENCOUNTER
2023 Care Coordination    CC: arranged transportation for an upcoming appointment on:2023 at 10:20 am. Transportation will pick Ms. Pierre up around 9:20 am. She will need to activate the will call service when ready to return home by [phone].     CC: left Ms. Pierre a voice message and sent an e-mail with the appointment and trip details.          Henrry Sanchez Sr.  Social Work  Care Coordination  [address] [address]  [email or web address]   Office: [phone]  Direct: [phone]  [facility]

## 2023-01-11 ENCOUNTER — TELEPHONE (OUTPATIENT)
Dept: FAMILY MEDICINE | Facility: CLINIC | Age: 48
End: 2023-01-11

## 2023-01-11 ENCOUNTER — OFFICE VISIT (OUTPATIENT)
Dept: FAMILY MEDICINE | Facility: CLINIC | Age: 48
End: 2023-01-11
Payer: MEDICAID

## 2023-01-11 VITALS
DIASTOLIC BLOOD PRESSURE: 96 MMHG | SYSTOLIC BLOOD PRESSURE: 143 MMHG | RESPIRATION RATE: 14 BRPM | BODY MASS INDEX: 32.57 KG/M2 | HEART RATE: 78 BPM | TEMPERATURE: 98 F | OXYGEN SATURATION: 100 % | WEIGHT: 172.4 LBS

## 2023-01-11 DIAGNOSIS — F31.31 BIPOLAR AFFECTIVE DISORDER, CURRENTLY DEPRESSED, MILD (H): ICD-10-CM

## 2023-01-11 DIAGNOSIS — G89.4 CHRONIC PAIN SYNDROME: ICD-10-CM

## 2023-01-11 DIAGNOSIS — G47.9 SLEEPING DIFFICULTY: ICD-10-CM

## 2023-01-11 DIAGNOSIS — G40.909 SEIZURE DISORDER (H): ICD-10-CM

## 2023-01-11 DIAGNOSIS — M54.6 CHRONIC THORACIC BACK PAIN, UNSPECIFIED BACK PAIN LATERALITY: ICD-10-CM

## 2023-01-11 DIAGNOSIS — L30.0 NUMMULAR DERMATITIS: Primary | ICD-10-CM

## 2023-01-11 DIAGNOSIS — G89.29 CHRONIC THORACIC BACK PAIN, UNSPECIFIED BACK PAIN LATERALITY: ICD-10-CM

## 2023-01-11 DIAGNOSIS — M62.838 MUSCLE SPASM: ICD-10-CM

## 2023-01-11 DIAGNOSIS — Z86.69 HISTORY OF IMPACTED EAR WAX: ICD-10-CM

## 2023-01-11 DIAGNOSIS — K21.9 GASTROESOPHAGEAL REFLUX DISEASE, UNSPECIFIED WHETHER ESOPHAGITIS PRESENT: ICD-10-CM

## 2023-01-11 DIAGNOSIS — Z59.19 HOUSING UNSATISFACTORY: ICD-10-CM

## 2023-01-11 DIAGNOSIS — I10 PRIMARY HYPERTENSION: ICD-10-CM

## 2023-01-11 PROCEDURE — 99214 OFFICE O/P EST MOD 30 MIN: CPT | Mod: GC | Performed by: STUDENT IN AN ORGANIZED HEALTH CARE EDUCATION/TRAINING PROGRAM

## 2023-01-11 SDOH — ECONOMIC STABILITY - HOUSING INSECURITY: OTHER INADEQUATE HOUSING: Z59.19

## 2023-01-11 ASSESSMENT — ANXIETY QUESTIONNAIRES
GAD7 TOTAL SCORE: 18
GAD7 TOTAL SCORE: 18
3. WORRYING TOO MUCH ABOUT DIFFERENT THINGS: MORE THAN HALF THE DAYS
6. BECOMING EASILY ANNOYED OR IRRITABLE: MORE THAN HALF THE DAYS
7. FEELING AFRAID AS IF SOMETHING AWFUL MIGHT HAPPEN: NEARLY EVERY DAY
2. NOT BEING ABLE TO STOP OR CONTROL WORRYING: NEARLY EVERY DAY
1. FEELING NERVOUS, ANXIOUS, OR ON EDGE: NEARLY EVERY DAY
5. BEING SO RESTLESS THAT IT IS HARD TO SIT STILL: MORE THAN HALF THE DAYS

## 2023-01-11 ASSESSMENT — PATIENT HEALTH QUESTIONNAIRE - PHQ9
5. POOR APPETITE OR OVEREATING: NEARLY EVERY DAY
SUM OF ALL RESPONSES TO PHQ QUESTIONS 1-9: 22

## 2023-01-11 NOTE — TELEPHONE ENCOUNTER
2023: Care Coordination    CC: spoke with the patient today during her office visit with Dr. Wright. Patient has been working with  Ewelina ALMONTE. She had concerns regarding CADI waiver as well as housing concerns. Patient desires to move into another unit in the group home that she is currently living that would provide her with more privacy and needed a letter drafted by Dr. Wright that will allow this to happen. CC: updated  whom will contact her when she returns to the office on .     CC: scheduled patient for a follow up visit with Dr. Wright on:2023 at:9:00 am. Transportation will arrive for  at:8:30 am. When ready to return home  The patient will need to activate the will call service by [phone].  Patient has been notified of the appointment via phone and by mail.        Henrry Sanchez Sr.  Social Work  Care Coordination  [address] [address]  [email or web address]   Office: [phone]  Direct: [phone]  [facility]

## 2023-01-11 NOTE — LETTER
2023      Mattie Pierre  [address] [place] [address]        Dear Mattie,  I am one of the Care Coordinators at [facility]. Thank you for taking time out of your day to allow me to schedule your follow up appointment with Dr. Reny Wright on: 2023 at 9:00 am. Transportation will arrive to pick you up at:8:30 am. When you are ready to return home you will need to activate the will call service by [phone]. If you have questions regarding the appointment or if you need additional medical rides scheduled please contact the clinic at:[phone]      Sincerely,      Henrry Sanchez Sr.  Social Work  Care Coordination  [address] [address]  [email or web address]   Office: [phone]  Direct: [phone]  [facility]

## 2023-01-11 NOTE — LETTER
January 11, 2023      Mattie Pierre  [address] [place] [address]        To Whom It May Concern:    Mattie Pierre  was seen on 01/11/23.  Due to her medical condition, she requires her own independent apartment.  Please assist her with appropriate housing.      If you have any questions, please do not hesitate to reach out at the phone or fax numbers above.         Sincerely,        Reny Wright MD

## 2023-01-11 NOTE — PROGRESS NOTES
Assessment & Plan       Nummular dermatitis  Reviewed with patient at length.  She was requesting a referral to dermatology, but has already been seen by Dr. Li 1 year ago.    Plan:  Referral coordinator to contact patient to assist with scheduling a follow-up visit with Dr. Li for consideration of light therapy.  SW to assist with transportation, if patient is in need of transportation.   - white petrolatum external gel; Apply to hands and feet twice daily  - clobetasol (TEMOVATE) 0.05 % external ointment; Apply topically 2 times daily    Bipolar affective disorder, currently depressed, mild (H)  E-consult from psychiatry reviewed as below.  Medication list updated.  She had a psychotherapy appointment made and transportation arranged but did not attend.  I am concerned that her memory difficulties and social situation impede her ability to get the care she needs.  Will postpone psychiatry referral since her mood can be explained by her being out of her medications, many of which were reordered today.  Seroquel was changed to the XR formulation. Prazosin was restarted.   - prazosin (MINIPRESS) 5 MG capsule; Take 1 capsule (5 mg) by mouth At Bedtime  - QUEtiapine ER (SEROQUEL XR) 200 MG 24 hr tablet; Take 1 tablet (200 mg) by mouth At Bedtime    Seizure disorder (H)  Stable.  Patient was not aware of an upcoming visit with neurology.    Plan:  Referral coodinator to remind patient of appt.  SW to assist with transportation if needed.   - lamoTRIgine (LAMICTAL) 150 MG tablet; Take 1 tablet (150 mg) by mouth 2 times daily    Primary hypertension  Losartan restarted today.  No acute concerns.   - losartan (COZAAR) 100 MG tablet; Take 1 tablet (100 mg) by mouth daily    Chronic thoracic back pain, unspecified back pain laterality  Muscle spasm  Cyclobenzaprine had some efficacy in the past, so this was reordered.  She would benefit from PT, but coordination of care has been difficult.  She has Tylenol and  Aleve at home and takes PRN.   - cyclobenzaprine (FLEXMID) 7.5 MG tablet; Take 1 tablet (7.5 mg) by mouth 2 times daily as needed for muscle spasms      Chronic pain syndrome  Restart duloxetine today.    - DULoxetine (CYMBALTA) 30 MG capsule; Take 1 capsule (30 mg) by mouth daily    Gastroesophageal reflux disease, unspecified whether esophagitis present  Reviewed note from GI 11/10/22.  Discussed colonoscopy and that it may be best to postpone it until she is able to understand and tolerate the prep.  Will ask referral coordinator to assist with this.   - famotidine (PEPCID) 20 MG tablet; Take 1 tablet (20 mg) by mouth 2 times daily    Sleeping difficulty  Add hydroxyzine.  This could help with itching as well.    - hydrOXYzine (VISTARIL) 50 MG capsule; Take 1 capsule (50 mg) by mouth 2 times daily as needed for itching or other (sleep)    History of impacted ear wax  Declined irrigation of ear today.  Requested ear drops to help with wax.  - carbamide peroxide (DEBROX) 6.5 % otic solution; Place 5 drops into both ears 2 times daily    Housing unsatisfactory  Discussed with SW, who met with patient.  I also provided a letter of support of more appropriate housing for Mattie.  She would benefit from having help with medications and food and full independent living may not be the safest or best option for her.  She was previously getting services in [facility] and there seems to be an interruption in these since she now moved to [facility].  She has a new .  She feels safe in her home but does not like the noise level and not having her own bathroom.  She has incontinence of both feces and urine and has been using a commode in her room but sometimes is incontinent in the hallway when trying to get to the bathroom.  This is really embarrassing for her.  It is unclear what services are provided at the group home.  Transportation has been a barrier to care and group homes typically provide assistance  with transportation.  It is unclear why this hasn't happened.   Plan:  SW called the group home and spoke with one of the employees.  Manager is currently on vacation.  SW to follow up.  Patient needs her own bathroom due to incontinence issues, but also needs assistance with transportation and medications.         Review of external notes as documented elsewhere in note  Assessment requiring an independent historian(s) - home health care staff  Diagnosis or treatment significantly limited by social determinants of health - mental illness, financial instability, smoking, family conflict, history of trauma, poor health literacy  Prescription drug management        Staffed with Dr. Wylie.     Reny Wright MD  PGY-3  [facility]           ______________________________________________________    Subjective     HPI  Chief Complaint   Patient presents with     Referral     To derm if needed      Nicotine Dependence     Medication Refill     For all her meds      Headache     Daily      MOOD CHANGES     Her anger is getting really bad      Nummular dermatitis  Longstanding history of this with recalcitrance to topical steroids.  Reviewed note from Dermatology/Dr. Li 01/21/2022.  Light treatments were to be next step and if insurance doesn't cover, systemic medications with methotrexate or Dupixent.  Dermatology submitted petition to insurance for nbUVB and recommended clobetasol ointment BID, but she has not had any follow-up since 1 year ago.  Recently she has only been using Amlactin.    I updated her medical history and diagnoses to reflect her current problem and that she is already established with dermatology. She has been asking for a referral to dermatology, and we discussed that it would be best to keep care with a provider with whom she is already established.    Seizure disorder/Bipolar disorder  Mattie continues to take lamotrigine 150 mg 2 times daily.  It is unclear whether this  "is for seizures or bipolar disorder and she does not have a neurologist with whom she follows.  Referral for this was placed at an earlier visit and she has an appointment with neurology coming up in February, of which she wasn't aware.       Mood   -Referral was placed to therapy last visit and she had a visit and transportation arranged by our SW for 10/24/22.  Does not appear that patient attended appointment or if she knew about it.    -Mattie continues to struggle with depressed mood.  She has also been \"losing it\" with people who live in her group home.  The noise level bothers her.    -She has been out of Seroquel and would like that refilled today.  Psychiatry recommended changing seroquel to XR today, which I did with the 200 mg dose.   -Also has been out of Prazosin and hydroxyzine.     -She has been out of duloxetine and I reordered that today.     Headache, fatigue  Headaches are daily and are chronic.  Reviewed psychiatry E-consult 10/17/22.  Recommended Utox, assistance with food insecurity.  She has not been taking prazosin and still has headaches so that is a less likely culprit.  She is not taking gabapentin and does not like it.  This was not reordered.     Memory difficulties  Ongoing.  She has not been able to keep track of appointments.  She used to have a nurse that came weekly, but not anymore.  She has not been taking Topamax    Housing concerns  Lives in group home but it is loud and she does not have a private bathroom.  She would like her own apartment and there is a unit in the same building that is available.  She is requesting a letter from me to help her get that apartment.      Social concerns  -Per review of chart, pt had home services in the past but does not currently.  -Her relationship with her children is strained.  She does not have custody of her children but maintains contact.  Her son told her that he wished she were dead.  Her daughter has said similar things.  She loves " her children but cannot provide for them  -She has a boyfriend who has been staying with her. She feels safe in that relationship.       Preventive care  -Due for colonoscopy and has this scheduled in February.  She does not have a private bathroom and struggles with incontinence.  We discussed that this might not be the best time for a colonoscopy.  She's had some GI troubles but no recent weight loss.    -Needs preventive visit  -UTD on vaccines      The visit was interrupted by her mother, who dropped off a [employer] gift for her during our encounter and subsequently left.          Objective     BP (!) 143/96 (BP Location: Right arm, Patient Position: Sitting, Cuff Size: Adult Regular)   Pulse 78   Temp 98  F (36.7  C) (Oral)   Resp 14   Wt 78.2 kg (172 lb 6.4 oz)   LMP 07/23/2022 (Approximate)   SpO2 100%   Breastfeeding No   BMI 32.57 kg/m        Physical Exam      Gen:  Appears stated age.  Casually groomed.   HEENT:    Eyes: EOM grossly intact.  Conjunctiva clear.   Ears:  External ears normal.  Nose:  No rhinorrhea.  Neck: No cervical lymphadenopathy.  CV:  RRR.  No appreciable murmur  Resp:  Nonlabored respirations.    Skin:  Focused examination of hands, feet, ankles,  was performed. The bilateral medial ankles have poorly demarcated lichenified scaly plaques. The remainder of the skin exam is significant for dry scaly skin on exposed areas of the body.  Extr: No edema.    Neuropsych:  Casually groomed.  Mood appears normal.  Affect congruent.  No obvious focal neurologic deficits or gait abnormalities.       Transferred Records on 10/25/2022   Component Date Value Ref Range Status     TSH (External) 10/25/2022 2.980  0.450 - 4.500 uIU/mL Final     AST (External) 10/25/2022 18  0 - 40 IU/L Final     ALT (External) 10/25/2022 12  0 - 32 IU/L Final     Creatinine (External) 10/25/2022 0.91  0.57 - 1.00 mg/dL Final     GFR Estimated (External) 10/25/2022 78  >59 mL/min/1.73 Final     Glucose  (External) 10/25/2022 112 (A)  70 - 99 mg/dL Final     Potassium (External) 10/25/2022 4.4  3.5 - 5.2 mmol/L Final     No results found for any visits on 01/11/23.             ----- Service Performed and Documented by Resident or Fellow ------

## 2023-01-11 NOTE — Clinical Note
Hi Lizzie and Ewelina! Lizzie, could you please call Mattie and assist her with 1) canceling the colonoscopy (she will need to reschedule this in the future, but she is unlikely to be able to complete the prep as needed right now), 2) scheduling an appointment with Dr. Li of dermatology for nummular dermatitis for which she was seen by him before, 3) assist with rescheduling/scheduling the psychotherapy visit she was supposed to have in October per note on the Specialty Comments, and 4) remind of location of neurology appt? It is not showing up on chart review but printed that she has one coming up in early February.   Ewelina, could you please help Mattie with rides to these?  She also has a new  and Henrry took down the number.  She used to have a nurse come to her home (which would be great for her), but does not anymore.     Lastly, could you please encourage her to write down the dates of her appointments? She's really struggling with memory issues.  Thank you both!

## 2023-01-11 NOTE — PATIENT INSTRUCTIONS
Thank you for coming to [facility] for your care.  It was a pleasure to take care of you!    Here is what we discussed:  For your skin/ankle, apply the clobetasol ointment after shower or bath and put vaseline on top to seal it in.  Then wrap in bandage.  Do this 2 times per day.   For your ears, apply 5 drops to each ear 2 times a day (or at bedtime) and they should slowly unplug.  Come back to clinic to rinse them out if that doesn't work.   For sleep:  Take hydroxyzine 50 mg (this also helps with itching and anxiety), duloxetine, cyclobenzaprine (muscle relaxant), prazosin (for nightmares), losartan (for blood pressure), and lamotrigine  I placed a referral for neurology to talk about your seizure medicine and headaches  Make a follow-up appointment with Dr. Li at the [facility]  Follow up in 2-4 weeks to talk about your medications and ankles    Please call our clinic ([phone]) or send a message via [employer] with any questions or concerns!    Dr. Reny Wright

## 2023-01-15 RX ORDER — PETROLATUM,WHITE
OINTMENT IN PACKET (GRAM) TOPICAL
Qty: 390 G | Refills: 3 | Status: SHIPPED | OUTPATIENT
Start: 2023-01-15 | End: 2024-07-24

## 2023-01-15 RX ORDER — DULOXETIN HYDROCHLORIDE 30 MG/1
30 CAPSULE, DELAYED RELEASE ORAL DAILY
Qty: 90 CAPSULE | Refills: 3 | Status: SHIPPED | OUTPATIENT
Start: 2023-01-15 | End: 2023-12-27

## 2023-01-15 RX ORDER — PRAZOSIN HYDROCHLORIDE 5 MG/1
5 CAPSULE ORAL AT BEDTIME
Qty: 30 CAPSULE | Refills: 3 | Status: SHIPPED | OUTPATIENT
Start: 2023-01-15

## 2023-01-15 RX ORDER — CYCLOBENZAPRINE HYDROCHLORIDE 7.5 MG/1
7.5 TABLET, FILM COATED ORAL 2 TIMES DAILY PRN
Qty: 60 TABLET | Refills: 1 | Status: SHIPPED | OUTPATIENT
Start: 2023-01-15 | End: 2024-07-19

## 2023-01-15 RX ORDER — LOSARTAN POTASSIUM 100 MG/1
100 TABLET ORAL DAILY
Qty: 90 TABLET | Refills: 1 | Status: SHIPPED | OUTPATIENT
Start: 2023-01-15 | End: 2023-12-27

## 2023-01-15 RX ORDER — QUETIAPINE 200 MG/1
200 TABLET, FILM COATED, EXTENDED RELEASE ORAL AT BEDTIME
Qty: 90 TABLET | Refills: 3 | Status: SHIPPED | OUTPATIENT
Start: 2023-01-15 | End: 2023-12-27

## 2023-01-15 RX ORDER — LAMOTRIGINE 150 MG/1
150 TABLET ORAL 2 TIMES DAILY
Qty: 180 TABLET | Refills: 3 | Status: SHIPPED | OUTPATIENT
Start: 2023-01-15

## 2023-01-15 RX ORDER — CLOBETASOL PROPIONATE 0.5 MG/G
OINTMENT TOPICAL 2 TIMES DAILY
Qty: 60 G | Refills: 1 | Status: SHIPPED | OUTPATIENT
Start: 2023-01-15 | End: 2023-04-11

## 2023-01-15 RX ORDER — FAMOTIDINE 20 MG/1
20 TABLET, FILM COATED ORAL 2 TIMES DAILY
Qty: 60 TABLET | Refills: 1 | Status: SHIPPED | OUTPATIENT
Start: 2023-01-15 | End: 2024-07-19

## 2023-01-15 RX ORDER — HYDROXYZINE PAMOATE 50 MG/1
50 CAPSULE ORAL 2 TIMES DAILY PRN
Qty: 90 CAPSULE | Refills: 3 | Status: SHIPPED | OUTPATIENT
Start: 2023-01-15

## 2023-01-16 ENCOUNTER — TELEPHONE (OUTPATIENT)
Dept: FAMILY MEDICINE | Facility: CLINIC | Age: 48
End: 2023-01-16

## 2023-01-16 ENCOUNTER — TELEPHONE (OUTPATIENT)
Dept: GASTROENTEROLOGY | Facility: CLINIC | Age: 48
End: 2023-01-16

## 2023-01-16 ENCOUNTER — TELEPHONE (OUTPATIENT)
Dept: FAMILY MEDICINE | Facility: CLINIC | Age: 48
End: 2023-01-16
Payer: MEDICAID

## 2023-01-16 NOTE — TELEPHONE ENCOUNTER
Caller: Mattie  Reason for Reschedule/Cancellation (please be detailed, any staff messages or encounters to note?): Patient not ready to do prep and will call nback to reschedule      Prior to reschedule please review:    Ordering Provider:Hilda    Sedation per order:MAC    Does patient have any ASC Exclusions, please identify?: no      Notes on Cancelled Procedure:    Procedure:Upper and Lower Endoscopy [EGD and Colonoscopy]     Date: 2/6/23    [facility]; [address] [place] [address], 5th Floor, [place] [address]    Surgeon: Babar        Rescheduled: No

## 2023-01-16 NOTE — TELEPHONE ENCOUNTER
Outreached to pt to assist with multiple needs per provider.     1. Cancel scheduled Colonoscopy per pt. Pt will let provider know when she is ready. ([facility] cancelled appt.)  2. Schedule a Dermatology f/up appt with Emory Sanders for nummular dermatitis. ([facility] attemptted to schedule a f/up appt with ,  declined and will send a note to Dr. Li's RN to contact pt for an appt)   3. R/s psychotherapy appt with [facility]. ([facility] do not have any long-term care provider for Therapy at the moment. Pt is placed on the wait list for care)  4. Need scheduled Neurology appt information. ([facility] will mail out appt information)      Dr. Wright,     Pt states you were going to prescribe her some ear drops, muscle relaxer, and large wrap around bandages for both of her ankles ?      Please advise.     Lizzie Hodge

## 2023-01-17 ENCOUNTER — DOCUMENTATION ONLY (OUTPATIENT)
Dept: FAMILY MEDICINE | Facility: CLINIC | Age: 48
End: 2023-01-17
Payer: MEDICAID

## 2023-01-20 ENCOUNTER — TELEPHONE (OUTPATIENT)
Dept: FAMILY MEDICINE | Facility: CLINIC | Age: 48
End: 2023-01-20
Payer: MEDICAID

## 2023-01-23 ENCOUNTER — TELEPHONE (OUTPATIENT)
Dept: FAMILY MEDICINE | Facility: CLINIC | Age: 48
End: 2023-01-23
Payer: MEDICAID

## 2023-01-23 NOTE — TELEPHONE ENCOUNTER
1/23/2023: Care Coordination    CC: called [employer] to schedule transportation for an upcoming in person office visit on:1/31/2023 for a 9:00 am appointment with Dr. Reny Wright. The ride was scheduled and the patient was notified.           Henrry Sanchez Sr.  Social Work  Care Coordination  [address] [address]  [email or web address]   Office: [phone]  Direct: [phone]  [facility]

## 2023-01-30 NOTE — PROGRESS NOTES
Assessment & Plan   Encounter Date: Jan 31, 2023      Headache, chronic daily  Chronic persistent headaches lasting greater than 6 months associated with some memory difficulties.  We are unable to do a Eunice in clinic today.  She has follow-up with neurology at the end of February.  Could consider Eunice at that visit.  She is very itchy and may need additional support in order to be still enough for the MRI of her brain.  We discussed that she should call our clinic to review medications to take prior to the MRI.  - Urine Drugs of Abuse Screen Panel 13; Future  - MR Brain w Contrast; Future  - Urine Drugs of Abuse Screen Panel 13    Dysuria  - Neisseria Gonorrhoeae PCR  - Chlamydia trachomatis PCR      Chronic rhinitis  She was previously taking Zyrtec, which was discontinued for unclear reasons.  We resumed this today.  I placed a referral to an allergy clinic for consideration of allergy testing and possible allergy injections.  I discussed dust mite prevention.  I discussed stopping antihistamines 5 days before her visit.  Patient requested a humidifier.  DME order was provided.  I think it would be more beneficial to keep a dry environment in light of possible allergies.  - cetirizine (ZYRTEC) 10 MG tablet; Take 1 tablet (10 mg) by mouth daily  - Adult Allergy/Asthma Referral; Future  - Miscellaneous Order for DME - ONLY FOR DME    History of hypothyroidism  Last TSH 2.98 on 10/25/2022.  Biopsy of the thyroid nodule in 2021 was benign.  No acute issues.    Anxiety  Patient has trouble sitting still.  Recommended that she take Ativan 1 hour prior to MRI in addition to cetirizine and hydroxyzine.  She will send us a [employer] message or give the clinic a call if she has questions.    Nummular dermatitis  Limited efficacy with clobetasol 0.05% ointment used twice daily.  I reviewed the dermatology note with her from January 2022.  She has an upcoming visit in April 2023.  Last year, she was considered for light  therapy.  I encouraged her to reach out to her dermatologist with questions.  In the meantime, continue twice daily clobetasol.    Sleeping difficulty  We recently resumed her prazosin, duloxetine and quetiapine.  She takes hydroxyzine as needed.  She no longer takes topiramate.    Cocaine abuse  Hx cocaine use.  UDS positive for cocaine today. Will follow up at future visit.     Food insecurity  Patient met with our , who provided her a list of community food shelves.  Transportation is a major barrier to access to good food for her.  She plans to walk over to [facility] [place], [place]-door to our clinic, for a meal after our visit.            Follow-Up:   1-2 weeks with Ozzy xiao to review medications  Patient was asked to bring all of her medications to her next visit.    Reny Wright MD PGY-3   [facility]   Staffed with Dr. Rodriguez.        ______________________________________________________    Subjective     HPI  Chief Complaint   Patient presents with     Follow Up     Burning on feet. Pt is still using cream but isn't working and follow up on foot from last visit. Getting worse  Body aches that she is still having. Still having difficulty getting ut of bed       CC: Follow Up (Burning on feet. Pt is still using cream but isn't working and follow up on foot from last visit. Getting worse/Body aches that she is still having. Still having difficulty getting ut of bed)    HPI:  Ms. Mattie Pierre is a(n) 47 year old female who presents today as a return patient for :      Nummular dermatitis  Longstanding history of this with recalcitrance to topical steroids.  Reviewed note from Dermatology/Dr. Li 01/21/2022.  Light treatments were to be next step and if insurance doesn't cover, systemic medications with methotrexate or Dupixent.  Dermatology submitted petition to insurance for nbUVB and recommended clobetasol ointment BID, but she has not had any follow-up since 1 year  ago.  Recently she has only been using Amlactin.    I updated her medical history and diagnoses to reflect her current problem and that she is already established with dermatology. She has been asking for a referral to dermatology, and we discussed that it would be best to keep care with a provider with whom she is already established.  01/31/23: She has been using the steroid ointment 2 times daily with emollient over it without  Much relief.  I provided her with gauze and bandage today and discussed bleach soaks and skin care.       Pruritis:  Worst on her back.  Lasts all day long.  No bug bites.  If she leaves her clothes on too long, she gets very itchy.  Not taking allergy medicine.   01/31/23 : Start cetirizine 10 mg daily in AM.  Take hydroxyzine daily at bedtime.  Allergy referral was ordered.  She has a dermatology appointment in April 2023.    Alopecia: Had a patch of hair loss midline and subsequently attempted to shave her head but did not have the correct clippers.  It did seem to help her itching somewhat.    History of thyroid nodules: Had a FNA in 2021 that was benign.  TSH in September 2022 was only slightly elevated.    Elevated blood pressure: Not currently on any antihypertensives.    Headaches: Persistent and getting more severe.  She has not had an MRI before.  Headaches are daily and are chronic.  Reviewed psychiatry E-consult 10/17/22.  Recommended Utox, assistance with food insecurity.  She has not been taking prazosin and still has headaches so that is a less likely culprit.  She is not taking gabapentin and does not like it.  This was not reordered.     Cognitive difficulties: Reports progressive issues with memory.  She has a number of medications that could be affecting her daily function.  She has a neurology referral at the end of February.  She used to have a home nurse but no longer has that assistance with medications.      Seizure disorder/Bipolar disorder: Mattie continues to  "take lamotrigine 150 mg 2 times daily.  It is unclear whether this is for seizures or bipolar disorder and she does not have a neurologist with whom she follows.  Referral for this was placed at an earlier visit and she has an appointment with neurology coming up in February, of which she wasn't aware.         Mood   -Referral was placed to therapy last visit and she had a visit and transportation arranged by our SW for 10/24/22.  Does not appear that patient attended appointment or if she knew about it.    -Mattie continues to struggle with depressed mood.  She has also been \"losing it\" with people who live in her group home.  The noise level bothers her.    -She has been out of Seroquel and would like that refilled today.  Psychiatry recommended changing seroquel to XR today, which I did with the 200 mg dose.   -Also has been out of Prazosin and hydroxyzine.     -She has been out of duloxetine and I reordered that today.           Housing concerns  Lives in group home but it is loud and she does not have a private bathroom.  She would like her own apartment and there is a unit in the same building that is available.  She is requesting a letter from me to help her get that apartment.       Social concerns  -Per review of chart, pt had home services in the past but does not currently.  -Her relationship with her children is strained.  She does not have custody of her children but maintains contact.  Her son told her that he wished she were dead.  Her daughter has said similar things.  She loves her children but cannot provide for them  -She has a boyfriend who has been staying with her. She feels safe in that relationship.   -Today she reports food insecurity.  She has no food at home.  We provided her with a list of community food shelves.  She also plans to walk over to [facility] for a meal after her visit        Preventive care  -Due for colonoscopy and has this scheduled in February.  She does not have a private " bathroom and struggles with incontinence.  We discussed that this might not be the best time for a colonoscopy.     Plan: Colonoscopy reviewed today  -Needs preventive visit  -UTD on vaccines  -UTD mammogram 9/27/2022.  Needs annual mammogram       Patient is otherwise feeling well, without additional concerns.    Labs & Imaging Reviewed:  Ultrasound and biopsy of thyroid from 11/2021  Additional imaging reviewed as above  Medications:  Current Outpatient Medications   Medication     ammonium lactate (LAC-HYDRIN) 12 % external lotion     cetirizine (ZYRTEC) 10 MG tablet     clobetasol (TEMOVATE) 0.05 % external ointment     ClonazePAM (KLONOPIN PO)     cyclobenzaprine (FLEXMID) 7.5 MG tablet     dextromethorphan (DELSYM) 30 MG/5ML liquid     diclofenac (VOLTAREN) 1 % topical gel     diphenhydrAMINE (BENADRYL) 25 MG tablet     doxycycline monohydrate (ADOXA) 100 MG tablet     DULoxetine (CYMBALTA) 30 MG capsule     famotidine (PEPCID) 20 MG tablet     hydrOXYzine (VISTARIL) 50 MG capsule     lactase (LACTAID) 3000 UNIT tablet     lamoTRIgine (LAMICTAL) 150 MG tablet     levothyroxine (SYNTHROID/LEVOTHROID) 25 MCG tablet     loratadine (CLARITIN) 10 MG tablet     losartan (COZAAR) 100 MG tablet     methylcellulose (CITRUCEL) powder     naproxen (NAPROSYN) 500 MG tablet     oxybutynin ER (DITROPAN XL) 10 MG 24 hr tablet     polyethylene glycol (MIRALAX) 17 GM/Dose powder     prazosin (MINIPRESS) 5 MG capsule     QUEtiapine (SEROQUEL) 200 MG tablet     QUEtiapine (SEROQUEL) 25 MG tablet     simvastatin (ZOCOR) 20 MG tablet     topiramate (TOPAMAX) 100 MG tablet     topiramate (TOPAMAX) 50 MG tablet     valACYclovir (VALTREX) 500 MG tablet     white petrolatum external gel     carbamide peroxide (DEBROX) 6.5 % otic solution     ketotifen (ZADITOR) 0.025 % ophthalmic solution     Prenatal Vit-Fe Fumarate-FA (PRENATAL MULTIVITAMIN W/IRON) 27-0.8 MG tablet     QUEtiapine ER (SEROQUEL XR) 200 MG 24 hr tablet     No current  facility-administered medications for this visit.      Past Medical History:   Patient Active Problem List   Diagnosis     Bipolar disorder (H)     Obesity     Cellulitis of axilla, left     Iron deficiency anemia     Seizure disorder (H)     Hidradenitis suppurativa     Urgency incontinence     Chronic pain syndrome     Acne vulgaris     Tinea capitis     Fissure in skin     Depression     Hypertension     Subclinical hypothyroidism     Vitamin D deficiency     Ganglion cyst of right foot     Hypertrophy of bone     Morbid obesity (H)     Anhidrosis     Pes planovalgus     Other eczema     Past Medical History:   Diagnosis Date     Anemia      Arthritis      COPD (chronic obstructive pulmonary disease) (H)      Depression      Depressive disorder      Diabetes (H)      History of blood transfusion      Hypertension      Obesity      Seizures (H)      Thyroid disease               Objective     BP (!) 163/105 (BP Location: Right arm, Patient Position: Sitting, Cuff Size: Adult Regular)   Pulse 63   Temp 97.9  F (36.6  C) (Oral)   Resp 16   Wt 78.4 kg (172 lb 12.8 oz)   LMP 07/23/2022 (Approximate)   SpO2 99%   BMI 32.65 kg/m      Physical Exam  Gen:  Appears stated age.  Casually groomed.     SKIN: Focused examination of head, feet, hands was performed.. No obvious rashes or bruises.   - There is decreased hair density on the frontal and midfrontal scalp.   Plaque, bilateral on the medial aspects of both feet below the medial malleoli with crusting.  No warmth or obvirous signs of infection  - There is xerosis of the hands bilaterally. .   - No other lesions of concern on areas examined.    Extr: No edema.  Neuropsych:  Mood appears depressed.  Affect flat..  No obvious focal neurologic deficits or gait abnormalities.       Transferred Records on 10/25/2022   Component Date Value Ref Range Status     TSH (External) 10/25/2022 2.980  0.450 - 4.500 uIU/mL Final     AST (External) 10/25/2022 18  0 - 40 IU/L  Final     ALT (External) 10/25/2022 12  0 - 32 IU/L Final     Creatinine (External) 10/25/2022 0.91  0.57 - 1.00 mg/dL Final     GFR Estimated (External) 10/25/2022 78  >59 mL/min/1.73 Final     Glucose (External) 10/25/2022 112 (A)  70 - 99 mg/dL Final     Potassium (External) 10/25/2022 4.4  3.5 - 5.2 mmol/L Final     Results for orders placed or performed in visit on 01/31/23   Urine Drugs of Abuse Screen Panel 13     Status: Abnormal   Result Value Ref Range    Cannabinoids (60-oti-7-carboxy-9-THC) Not Detected Not Detected, Indeterminate    Phencyclidine Not Detected Not Detected, Indeterminate    Cocaine (Benzoylecgonine) Detected (A) Not Detected, Indeterminate    Methamphetamine (d-Methamphetamine) Not Detected Not Detected, Indeterminate    Opiates (Morphine) Not Detected Not Detected, Indeterminate    Amphetamine (d-Amphetamine) Not Detected Not Detected, Indeterminate    Benzodiazepines (Nordiazepam) Not Detected Not Detected, Indeterminate    Tricyclic Antidepressants (Desipramine) Not Detected Not Detected, Indeterminate    Methadone Not Detected Not Detected, Indeterminate    Barbiturates (Butalbital) Not Detected Not Detected, Indeterminate    Oxycodone Not Detected Not Detected, Indeterminate    Propoxyphene (Norpropoxyphene) Not Detected Not Detected, Indeterminate    Buprenorphine Not Detected Not Detected, Indeterminate     Results for orders placed or performed in visit on 01/31/23 (from the past 24 hour(s))   Urine Drugs of Abuse Screen Panel 13   Result Value Ref Range    Cannabinoids (53-sch-8-carboxy-9-THC) Not Detected Not Detected, Indeterminate    Phencyclidine Not Detected Not Detected, Indeterminate    Cocaine (Benzoylecgonine) Detected (A) Not Detected, Indeterminate    Methamphetamine (d-Methamphetamine) Not Detected Not Detected, Indeterminate    Opiates (Morphine) Not Detected Not Detected, Indeterminate    Amphetamine (d-Amphetamine) Not Detected Not Detected, Indeterminate     Benzodiazepines (Nordiazepam) Not Detected Not Detected, Indeterminate    Tricyclic Antidepressants (Desipramine) Not Detected Not Detected, Indeterminate    Methadone Not Detected Not Detected, Indeterminate    Barbiturates (Butalbital) Not Detected Not Detected, Indeterminate    Oxycodone Not Detected Not Detected, Indeterminate    Propoxyphene (Norpropoxyphene) Not Detected Not Detected, Indeterminate    Buprenorphine Not Detected Not Detected, Indeterminate              ----- Service Performed and Documented by Resident or Fellow ------

## 2023-01-31 ENCOUNTER — TELEPHONE (OUTPATIENT)
Dept: FAMILY MEDICINE | Facility: CLINIC | Age: 48
End: 2023-01-31

## 2023-01-31 ENCOUNTER — OFFICE VISIT (OUTPATIENT)
Dept: FAMILY MEDICINE | Facility: CLINIC | Age: 48
End: 2023-01-31
Payer: MEDICAID

## 2023-01-31 VITALS
BODY MASS INDEX: 32.65 KG/M2 | WEIGHT: 172.8 LBS | OXYGEN SATURATION: 99 % | HEART RATE: 63 BPM | SYSTOLIC BLOOD PRESSURE: 163 MMHG | TEMPERATURE: 97.9 F | RESPIRATION RATE: 16 BRPM | DIASTOLIC BLOOD PRESSURE: 105 MMHG

## 2023-01-31 DIAGNOSIS — F31.31 BIPOLAR AFFECTIVE DISORDER, CURRENTLY DEPRESSED, MILD (H): ICD-10-CM

## 2023-01-31 DIAGNOSIS — Z12.11 SCREEN FOR COLON CANCER: ICD-10-CM

## 2023-01-31 DIAGNOSIS — J31.0 CHRONIC RHINITIS: ICD-10-CM

## 2023-01-31 DIAGNOSIS — Z86.39 HISTORY OF HYPOTHYROIDISM: ICD-10-CM

## 2023-01-31 DIAGNOSIS — R30.0 DYSURIA: ICD-10-CM

## 2023-01-31 DIAGNOSIS — R51.9 HEADACHE, CHRONIC DAILY: Primary | ICD-10-CM

## 2023-01-31 DIAGNOSIS — F14.10 COCAINE ABUSE (H): ICD-10-CM

## 2023-01-31 DIAGNOSIS — G47.9 SLEEPING DIFFICULTY: ICD-10-CM

## 2023-01-31 DIAGNOSIS — F41.9 ANXIETY: ICD-10-CM

## 2023-01-31 DIAGNOSIS — L30.0 NUMMULAR DERMATITIS: ICD-10-CM

## 2023-01-31 DIAGNOSIS — Z59.41 FOOD INSECURITY: ICD-10-CM

## 2023-01-31 PROBLEM — E66.01 MORBID OBESITY (H): Status: RESOLVED | Noted: 2021-11-18 | Resolved: 2023-01-31

## 2023-01-31 PROCEDURE — 87591 N.GONORRHOEAE DNA AMP PROB: CPT | Performed by: STUDENT IN AN ORGANIZED HEALTH CARE EDUCATION/TRAINING PROGRAM

## 2023-01-31 PROCEDURE — 99214 OFFICE O/P EST MOD 30 MIN: CPT | Mod: GC | Performed by: STUDENT IN AN ORGANIZED HEALTH CARE EDUCATION/TRAINING PROGRAM

## 2023-01-31 PROCEDURE — 87491 CHLMYD TRACH DNA AMP PROBE: CPT | Performed by: STUDENT IN AN ORGANIZED HEALTH CARE EDUCATION/TRAINING PROGRAM

## 2023-01-31 PROCEDURE — 80306 DRUG TEST PRSMV INSTRMNT: CPT | Performed by: STUDENT IN AN ORGANIZED HEALTH CARE EDUCATION/TRAINING PROGRAM

## 2023-01-31 RX ORDER — LORAZEPAM 1 MG/1
1 TABLET ORAL ONCE
Qty: 1 TABLET | Refills: 0 | Status: SHIPPED | OUTPATIENT
Start: 2023-01-31 | End: 2023-01-31

## 2023-01-31 RX ORDER — CETIRIZINE HYDROCHLORIDE 10 MG/1
10 TABLET ORAL DAILY
Qty: 90 TABLET | Refills: 3 | Status: SHIPPED | OUTPATIENT
Start: 2023-01-31 | End: 2023-09-12

## 2023-01-31 RX ORDER — QUETIAPINE FUMARATE 25 MG/1
TABLET, FILM COATED ORAL
Qty: 60 TABLET | Refills: 3 | Status: SHIPPED | OUTPATIENT
Start: 2023-01-31 | End: 2023-12-27

## 2023-01-31 SDOH — ECONOMIC STABILITY - FOOD INSECURITY: FOOD INSECURITY: Z59.41

## 2023-01-31 NOTE — TELEPHONE ENCOUNTER
1/31/23: Care Coordination     CC: arranged transportation for two upcoming in person appointments Ms. Pierre has on: 2/10/2023 @ 9:20 am here at: [facility] located at: [address] [place] [address]. [place] [address]. [employer] will pick the patient up around: 8:20 am. The patient has another appointment on: 2/27/23 @ 1:15 pm. Traveling to: [address] [place] [address].  time is around 12:15pm. For both rides, the patient will need to call: [phone] when ready to return home. The patient has been notified of both appointments and transportation.       Henrry Sanchez Sr.  Social Work  [place] [address]  [email or web address]   Office: [phone]  Direct: [phone]  [facility]

## 2023-01-31 NOTE — PATIENT INSTRUCTIONS
START TAKING CETIRIZINE 10 MG EVERYDAY for allergies    Take hydroxyzine at bedtime every day    Call or send a [email or web address] message tonight before your MRI to discuss medication    Continue daily bleach bath soaks for your feet followed by application of the steroid ointment and Vaseline 2 times every day until you see the dermatologist

## 2023-02-01 LAB
C TRACH DNA SPEC QL NAA+PROBE: NEGATIVE
N GONORRHOEA DNA SPEC QL NAA+PROBE: NEGATIVE

## 2023-02-08 DIAGNOSIS — J31.0 CHRONIC RHINITIS: Primary | ICD-10-CM

## 2023-02-10 ENCOUNTER — OFFICE VISIT (OUTPATIENT)
Dept: FAMILY MEDICINE | Facility: CLINIC | Age: 48
End: 2023-02-10
Payer: MEDICAID

## 2023-02-10 VITALS
DIASTOLIC BLOOD PRESSURE: 98 MMHG | TEMPERATURE: 98.3 F | HEART RATE: 80 BPM | RESPIRATION RATE: 16 BRPM | OXYGEN SATURATION: 100 % | BODY MASS INDEX: 32.8 KG/M2 | WEIGHT: 173.6 LBS | SYSTOLIC BLOOD PRESSURE: 146 MMHG

## 2023-02-10 DIAGNOSIS — L30.0 NUMMULAR ECZEMA: Primary | ICD-10-CM

## 2023-02-10 PROCEDURE — 99213 OFFICE O/P EST LOW 20 MIN: CPT | Mod: GC

## 2023-02-10 NOTE — PROGRESS NOTES
Preceptor Attestation:    I discussed the patient with the resident and evaluated the patient in person. I have verified the content of the note, which accurately reflects my assessment of the patient and the plan of care.   Supervising Physician:  Sunday Hurley MD.

## 2023-02-10 NOTE — PROGRESS NOTES
Assessment & Plan     Nummular eczema  Patient in for follow-up of her nummular eczema.  Currently taking clobetasol, hydroxyzine, Benadryl.  Has not been hydrating well.  Educated patient on hydration.  Also discussed attempting to call her dermatology clinic to see if she can get an sooner.  Patient declined at this time.  She she will follow-up as needed.    Return in about 4 weeks (around 3/10/2023) for Follow up w Dr. Wright.    Hany Castillo [facility]    Samina Phelps is a 47 year old presenting for the following health issues:  Musculoskeletal Problem (Follow up on foot but pt states it gets worse.), Refill Request (Iron pill ), and Headache (Constant)    HPI   Mattie is in today for follow-up of her eczema.  Reporting no improvement despite using Benadryl, hydroxyzine, clobetasol.  Reports that it is worsening, more pruritic, larger.  Reports she has had dermatology follow-up in roughly 2 months duration.  States that she has been taking all of the medications, but has not been hydrating her skin very well.    Review of Systems   CONSTITUTIONAL: NEGATIVE for fever, chills, change in weight  ENT/MOUTH: NEGATIVE for ear, mouth and throat problems  RESP: NEGATIVE for significant cough or SOB  CV: NEGATIVE for chest pain, palpitations or peripheral edema      Objective    BP (!) 146/98   Pulse 80   Temp 98.3  F (36.8  C) (Oral)   Resp 16   Wt 78.7 kg (173 lb 9.6 oz)   LMP 07/23/2022 (Approximate)   SpO2 100%   BMI 32.80 kg/m    Body mass index is 32.8 kg/m .  Physical Exam   GENERAL: healthy, alert and no distress  NECK: no adenopathy, no asymmetry, masses, or scars and thyroid normal to palpation  RESP: lungs clear to auscultation - no rales, rhonchi or wheezes  CV: regular rate and rhythm, normal S1 S2, no S3 or S4, no murmur, click or rub, no peripheral edema and peripheral pulses strong  ABDOMEN: soft, nontender, no hepatosplenomegaly, no masses and bowel sounds  normal  MS: no gross musculoskeletal defects noted, no edema  SKIN: Large eczematous patches bilateral lower extremities    ----- Service Performed and Documented by Resident or Fellow ------

## 2023-02-14 ENCOUNTER — ANCILLARY ORDERS (OUTPATIENT)
Dept: FAMILY MEDICINE | Facility: CLINIC | Age: 48
End: 2023-02-14

## 2023-02-14 ENCOUNTER — HOSPITAL ENCOUNTER (OUTPATIENT)
Dept: MRI IMAGING | Facility: HOSPITAL | Age: 48
Discharge: HOME | End: 2023-02-14
Attending: FAMILY MEDICINE | Admitting: FAMILY MEDICINE
Payer: MEDICAID

## 2023-02-14 DIAGNOSIS — R51.9 HEADACHE, CHRONIC DAILY: ICD-10-CM

## 2023-02-14 PROCEDURE — 70551 MRI BRAIN STEM W/O DYE: CPT

## 2023-02-15 ENCOUNTER — TELEPHONE (OUTPATIENT)
Dept: FAMILY MEDICINE | Facility: CLINIC | Age: 48
End: 2023-02-15

## 2023-02-15 NOTE — TELEPHONE ENCOUNTER
[facility] phone call message- patient requesting results:    Test: Lab and MRI    Date of test: [id number]    Additional Comments: pt would like some help understanding her results.    OK to leave a message on voice mail? Yes    Primary language: English      needed? No    Call taken on February 15, 2023 at 10:01 AM by Ananda Casarez

## 2023-02-27 ENCOUNTER — PRE VISIT (OUTPATIENT)
Dept: NEUROLOGY | Facility: CLINIC | Age: 48
End: 2023-02-27

## 2023-03-13 ENCOUNTER — TELEPHONE (OUTPATIENT)
Dept: FAMILY MEDICINE | Facility: CLINIC | Age: 48
End: 2023-03-13
Payer: MEDICAID

## 2023-03-13 NOTE — TELEPHONE ENCOUNTER
Medical Transportation Coordination    Appt Date: 03/21/2023  Arrival Time: 1045AM  Appt Location & Address: [address] [address]    Patient's Phone Number: [phone]  Patient's Pick-up Address: [address] [place] [address]. [place] [address]    Tranportation Name & Phone Number: TBD  Estimated Pick-up Time: 920-940AM    Roundtrip?  Yes, will call for return ride  Patient Notified?  Yes    -----------------------------------------------------------------------------------------------    Medical Transportation Coordination    Appt Date: 03/22/23  Arrival Time: 940AM  Appt Location & Address: [facility]    Patient's Phone Number: [phone]  Patient's Pick-up Address: [address] [place] [address]    Tranportation Name & Phone Number: TBD  Estimated Pick-up Time: 8AM-830AM    Roundtrip?  YES, will call for return ride  Patient Notified?  Yes             Lizzie Hodge

## 2023-03-20 ENCOUNTER — TELEPHONE (OUTPATIENT)
Dept: FAMILY MEDICINE | Facility: CLINIC | Age: 48
End: 2023-03-20
Payer: MEDICAID

## 2023-03-20 NOTE — TELEPHONE ENCOUNTER
Medical Transportation Coordination     Appt Date: 4/11/2023  Arrival Time:   Appt Location & Address: [address] [place] [address]. [place] [address]     Patient's Phone Number: [phone]  Patient's Pick-up Address: [address] [place] [address]     Transportation Name & Phone Number: TBD  Estimated Pick-up Time: 9:00 am - 9:30 am     Roundtrip: By activating the will-call service   Patient Notified?  Yes            Henrry Sanchez Sr.  Social Work  Care Coordination  [address] [address]  [email or web address]   Office: [phone]  Direct: [phone]  [facility]

## 2023-03-21 ENCOUNTER — LAB (OUTPATIENT)
Dept: LAB | Facility: CLINIC | Age: 48
End: 2023-03-21
Payer: MEDICAID

## 2023-03-21 ENCOUNTER — OFFICE VISIT (OUTPATIENT)
Dept: NEUROLOGY | Facility: CLINIC | Age: 48
End: 2023-03-21
Payer: MEDICAID

## 2023-03-21 VITALS
BODY MASS INDEX: 33.46 KG/M2 | WEIGHT: 177.1 LBS | HEART RATE: 79 BPM | SYSTOLIC BLOOD PRESSURE: 145 MMHG | DIASTOLIC BLOOD PRESSURE: 83 MMHG | OXYGEN SATURATION: 99 %

## 2023-03-21 DIAGNOSIS — R29.898 LEG WEAKNESS, BILATERAL: ICD-10-CM

## 2023-03-21 DIAGNOSIS — R51.9 HEADACHE DISORDER: ICD-10-CM

## 2023-03-21 DIAGNOSIS — G40.909 SEIZURE DISORDER (H): Primary | ICD-10-CM

## 2023-03-21 LAB
CRP SERPL-MCNC: 7.98 MG/L
ERYTHROCYTE [SEDIMENTATION RATE] IN BLOOD BY WESTERGREN METHOD: 29 MM/HR (ref 0–20)

## 2023-03-21 PROCEDURE — 85652 RBC SED RATE AUTOMATED: CPT | Performed by: PATHOLOGY

## 2023-03-21 PROCEDURE — 99204 OFFICE O/P NEW MOD 45 MIN: CPT | Performed by: NURSE PRACTITIONER

## 2023-03-21 PROCEDURE — 86618 LYME DISEASE ANTIBODY: CPT | Performed by: NURSE PRACTITIONER

## 2023-03-21 PROCEDURE — 86140 C-REACTIVE PROTEIN: CPT | Performed by: PATHOLOGY

## 2023-03-21 PROCEDURE — 36415 COLL VENOUS BLD VENIPUNCTURE: CPT | Performed by: PATHOLOGY

## 2023-03-21 ASSESSMENT — PAIN SCALES - GENERAL: PAINLEVEL: WORST PAIN (10)

## 2023-03-21 NOTE — PATIENT INSTRUCTIONS
Plan:  General neurology referral for history of seizures, legs weakness -right thigh weakness   Headache work up -  Lyme Abs, ESR, CRP  Updated eye exam -last eye exam 2-3 years ago   Follow up after labs

## 2023-03-21 NOTE — LETTER
3/21/2023       RE: Mattie Pierre  [address] [facility] [address]     Dear Colleague,    Thank you for referring your patient, Mattie Pierre, to the [facility]UROLOGY [facility] [place] at [facility]. Please see a copy of my visit note below.    ASSESSMENT AND PLAN:  Headaches for about 4-5 years. Unknown family history of headaches. History of seizures since childhood and last seizure last year per patient and last neurology visit for seizures at  in 2008. History of head injury when was 10  Headaches possibly multifactorial and appear to be with migrainous features. . Brain MRI without a contrast -white matter changes with diff -chronic microangiopathic ischemic change but other neuro immune disorders in the differntials-demyelinating, Lyme. Patient uses a walker due to legs giving up.   Patient recalls that recalls a tick when she was little  Recommended headache work up given patient's complex history-history of drug abuse, smoking  Plan:  General neurology referral for history of seizures, legs weakness -right thigh weakness   Headache work up -  Lyme Abs, ESR, CRP  Updated eye exam -last eye exam 2-3 years ago   Follow up after labs      Subjective   Mattie is a 47 year old presenting for the following health issues:  Consult (New headache)  History of IBS, depression, hospitalization for SI in 2021, knee pain and uses a walker    HPI   Headaches for a while -4-5 years. Headaches come and go and more than 15 per month. Headaches lasting all day. Headaches are frontal and pulsing. Worse when people talk or screaming. Light sensitivity with headaches. Patient has to be in the dark. No nausea but underlined heart burn.   Patient takes ibuprofen daily and switches up with 2 tab extra strength tylenol. Took naproxen but it did not work.    Unknown family history of headaches  Reports History of seizures since childhood and last seizure last year  per patient seizures start when upset and anxiety.   Reports chills  Denies vision changes    Saw Neurology at [facility] in 2004 -unable to upload neurology note  [facility] in 2008 by Dr Jimenez  for seizure disorder. Reviewed note -patient was on depakote.     Reviewed [facility] for any other neurology notes but no other available with last note in 2008    Mood is Ok today per patient and denies SIs.     Smokes cigarettes and calms patient's nerves sometimes     Data reviewed  EXAM: MR BRAIN W/O CONTRAST  LOCATION: [facility]  DATE/TIME: 2/14/2023 6:36 PM     INDICATION:  Headache, chronic daily  COMPARISON: None.  TECHNIQUE: Routine multiplanar multisequence head MRI without intravenous contrast.     FINDINGS:  INTRACRANIAL CONTENTS: No acute or subacute infarct. No mass, acute hemorrhage, or extra-axial fluid collections. Moderate patchy and confluent nonspecific T2/FLAIR hyperintensities within the cerebral white matter. Normal ventricles and sulci. Normal   position of the cerebellar tonsils.      SELLA: No abnormality accounting for technique.     OSSEOUS STRUCTURES/SOFT TISSUES: Normal marrow signal. The major intracranial vascular flow voids are maintained.      ORBITS: No abnormality accounting for technique.      SINUSES/MASTOIDS: No paranasal sinus mucosal disease. No middle ear or mastoid effusion.                                                                       IMPRESSION:  1.  No acute intracranial process. Specifically, no acute hemorrhage, mass, mass effect, abnormal extra-axial fluid collection or acute/subacute infarct.  2.  There are moderate, advanced for age confluent periventricular, subcortical and deep white matter T2/Flair signal abnormalities. These findings are most often seen in the setting of chronic microangiopathic ischemic change, however other processes   such as demyelination (including multiple sclerosis), Lyme disease, or even  autoimmune vasculitis could have a similar appearance.               Objective    BP (!) 145/83 (BP Location: Right arm, Patient Position: Sitting, Cuff Size: Adult Regular)   Pulse 79   Wt 80.3 kg (177 lb 1.6 oz)   LMP 07/23/2022 (Approximate)   SpO2 99%   BMI 33.46 kg/m    Body mass index is 33.46 kg/m .  Physical Exam   Rocking back and forth in the chair, good eye contact and answers questions appropriately  No apparent spasticity in the lower extremities  no clonuses, DTRs decreased but symmetrical, CN2-12 intactright thigh seems weaker with flexion but poor afford due to pain, no weakness in UE or left LE  uses walker with walking    I discussed all my recommendations with Mattie Pierre who verbalizes understanding and comfortable with the plan.  All of patient's questions were answered from the best of my knowledge.  Patient is in agreement with the plan.     49 minutes spent on the date of the encounter doing face to face, chart  review, exam, results review,  meds review, treatment plan, documentation and further activities as noted above    KIRA Jain, CNP [facility]  Headache certified  [facility]

## 2023-03-21 NOTE — PROGRESS NOTES
"  Assessment & Plan   Encounter Date: Mar 22, 2023    Chronic pain syndrome  Negative today.  - Urine Drugs of Abuse Screen Panel 13    Hidradenitis suppurativa  Discussed treatment, side effects of doxycycline, not taking it with calcium, photosensitivity.  Encouraged her to ask questions at her upcoming dermatology appointment.  - doxycycline hyclate (VIBRAMYCIN) 100 MG capsule; Take 1 capsule (100 mg) by mouth 2 times daily for 90 days  - chlorhexidine (HIBICLENS) 4 % liquid; Apply topically daily as needed for wound care    Intertrigo  - nystatin (MYCOSTATIN) 000491 UNIT/GM external powder; Apply topically 2 times daily as needed for other (rash and itchiness)          Reny Wright MD PGY-3   [facility]   Staffed with Dr. Gaston.        ______________________________________________________    Subjective       CC: Recheck Medication (Med check (medication hasnt worked for her)/Boil for the past 2 weeks on right butt cheek )    HPI:  Ms. Mattie Pierre is a(n) 47 year old female who is well-known to me and presents today as a return patient for:    \"Boil\" on right buttock:  Started 2 weeks ago and  drained on its own several days ago.  She has been on doxycycline in the past but not currently.  No fevers.  She did have CRP and ESR checked by neurology yesterday, and both were slightly elevated.  She has a history of hidradenitis but no recent exacerbations.      PERTINENT HISTORY (complex pt)-------------------------------------  Nummular dermatitis  Longstanding history of this with recalcitrance to topical steroids.  Reviewed note from Dermatology/Dr. Li 01/21/2022.  Light treatments were to be next step and if insurance doesn't cover, systemic medications with methotrexate or Dupixent.  Dermatology submitted petition to insurance for nbUVB and recommended clobetasol ointment BID, but she has not had any follow-up since 1 year ago.  01/31/23: She has been using the steroid " ointment 2 times daily with emollient over it without  Much relief.  I provided her with gauze and bandage today and discussed gentle bleach soaks and skin care.  03/22/23: Worse and more painful.  Has an upcoming appt with Dr. Li on 4/11/23.  Encouraged her to keep.  She will contact our clinic or  to arrange transportation.  Skin on her ankles looks very thin and is very tender to light touch today.        Pruritis    -Worst on her back.  Lasts all day long.  No bug bites.  If she leaves her clothes on too long, she gets very itchy.  Not taking allergy medicine.   01/31/23 : Start cetirizine 10 mg daily in AM.  Take hydroxyzine daily at bedtime.  Allergy referral was ordered.  She has a dermatology appointment in April 2023.  03/22/23: Allergy appointment on 5/9/2023     Alopecia:   -Had a patch of hair loss midline and subsequently attempted to shave her head but did not have the correct clippers.  It did seem to help her itching somewhat.     History of thyroid nodules: Had a FNA in 2021 that was benign.  TSH in September 2022 was only slightly elevated.     Elevated blood pressure:   -Not currently on any antihypertensives.  -TSH 2.98 on 10/25/2022     Headaches:   -Persistent   -MRI 2/14/2023 showed subcortical and deep white matter abnormalities suggestive of chronic microangiopathic ischemic changes versus demyelination versus Lyme versus autoimmune vasculitis  -Psychiatry E-consult 10/17/22:  Recommended Utox, assistance with food insecurity.  She has not been taking prazosin and still has headaches so that is a less likely culprit.  No longer takes gabapentin  03/22/23: Saw neurology on 3/21/2023.  The note is not yet complete.  Had CRP, ESR, Lyme serologies ordered.  We will review when available.        Cognitive difficulties:   -Progressive issues with memory  -Positive Utox for cocaine 1/31/23  -on several medications that could be affecting her daily function, which we have been adjusting  -used  "to have a home nurse but no longer has that assistance with medications.  3/22/23: Await neurology recommendations        Seizure disorder/Bipolar disorder:   - Lamotrigine 150 mg 2 times daily  - unclear whether this is for seizures or bipolar disorder and she does not have a neurologist   - saw neurology yesterday  - needs to establish care with Psychiatry        Mood   -Referral was placed to therapy last visit and she had a visit and transportation arranged by our SW for 10/24/22.  Does not appear that patient attended appointment or if she knew about it.    -Mattie continues to struggle with depressed mood.  She has also been \"losing it\" with people who live in her group home.  The noise level bothers her.    -She has been out of Seroquel and would like that refilled today.  Psychiatry recommended changing seroquel to XR, which I did with the 200 mg dose.   -Also has been out of Prazosin and hydroxyzine.     -She has been out of duloxetine and I reordered that at her last visit           Housing concerns  Lives in group home but it is loud and she does not have a private bathroom.  She would like her own apartment and there is a unit in the same building that is available.  She is requesting a letter from me to help her get that apartment.         Social concerns  -Per review of chart, pt had home services in the past but does not currently.  -Her relationship with her children is strained.  She does not have custody of her children but maintains contact.  Her son told her that he wished she were dead.  Her daughter has said similar things.  She loves her children but cannot provide for them  -She has a boyfriend who has been staying with her. She feels safe in that relationship.   -Today she reports food insecurity.  She has no food at home.  We provided her with a list of community food shelves.  She also plans to walk over to [facility] for a meal after her visit        Preventive care  -Due for colonoscopy " and has this scheduled in February.  She does not have a private bathroom and struggles with incontinence.  We discussed that this might not be the best time for a colonoscopy.                Plan: Colonoscopy reviewed today  -Needs preventive visit  -UTD on vaccines  -UTD mammogram 9/27/2022.  Needs annual mammogram         Patient is otherwise feeling well, without additional concerns.        Labs Reviewed:    Lab on 03/21/2023   Component Date Value Ref Range Status     Erythrocyte Sedimentation Rate 03/21/2023 29 (H)  0 - 20 mm/hr Final     CRP Inflammation 03/21/2023 7.98 (H)  <5.00 mg/L Final       Medications:  Current Outpatient Medications   Medication     ammonium lactate (LAC-HYDRIN) 12 % external lotion     cetirizine (ZYRTEC) 10 MG tablet     cetirizine (ZYRTEC) 10 MG tablet     clobetasol (TEMOVATE) 0.05 % external ointment     ClonazePAM (KLONOPIN PO)     cyclobenzaprine (FLEXMID) 7.5 MG tablet     dextromethorphan (DELSYM) 30 MG/5ML liquid     diclofenac (VOLTAREN) 1 % topical gel     diphenhydrAMINE (BENADRYL) 25 MG tablet     doxycycline monohydrate (ADOXA) 100 MG tablet     DULoxetine (CYMBALTA) 30 MG capsule     famotidine (PEPCID) 20 MG tablet     hydrOXYzine (VISTARIL) 50 MG capsule     lactase (LACTAID) 3000 UNIT tablet     lamoTRIgine (LAMICTAL) 150 MG tablet     levothyroxine (SYNTHROID/LEVOTHROID) 25 MCG tablet     loratadine (CLARITIN) 10 MG tablet     losartan (COZAAR) 100 MG tablet     methylcellulose (CITRUCEL) powder     naproxen (NAPROSYN) 500 MG tablet     oxybutynin ER (DITROPAN XL) 10 MG 24 hr tablet     polyethylene glycol (MIRALAX) 17 GM/Dose powder     prazosin (MINIPRESS) 5 MG capsule     QUEtiapine (SEROQUEL) 25 MG tablet     QUEtiapine ER (SEROQUEL XR) 200 MG 24 hr tablet     simvastatin (ZOCOR) 20 MG tablet     topiramate (TOPAMAX) 100 MG tablet     topiramate (TOPAMAX) 50 MG tablet     valACYclovir (VALTREX) 500 MG tablet     white petrolatum external gel     No current  facility-administered medications for this visit.      Past Medical History:   Patient Active Problem List   Diagnosis     Bipolar disorder (H)     Obesity     Cellulitis of axilla, left     Iron deficiency anemia     Seizure disorder (H)     Hidradenitis suppurativa     Urgency incontinence     Chronic pain syndrome     Acne vulgaris     Tinea capitis     Fissure in skin     Depression     Hypertension     Subclinical hypothyroidism     Vitamin D deficiency     Ganglion cyst of right foot     Hypertrophy of bone     Anhidrosis     Pes planovalgus     Other eczema     Past Medical History:   Diagnosis Date     Anemia      Arthritis      COPD (chronic obstructive pulmonary disease) (H)      Depression      Depressive disorder      Diabetes (H)      History of blood transfusion      Hypertension      Obesity      Seizures (H)      Thyroid disease               Objective     BP (!) 146/94 (BP Location: Left arm, Patient Position: Sitting, Cuff Size: Adult Regular)   Pulse 76   Temp 97.7  F (36.5  C) (Oral)   Resp 16   Wt 81 kg (178 lb 9.6 oz)   LMP 07/23/2022 (Approximate)   SpO2 99%   BMI 33.75 kg/m      Pertinent Physical Exam Findings    Gen:  Appears stated age.  Casually groomed.   SKIN: Painful swelling with surrounding fluctuance on right buttock that appears to have drained and is healing.  Similar smaller lesion in right inframammary fold.   Psych: At baseline.       Results for orders placed or performed in visit on 03/22/23   Urine Drugs of Abuse Screen Panel 13     Status: Normal   Result Value Ref Range    Cannabinoids (35-haa-8-carboxy-9-THC) Not Detected Not Detected, Indeterminate    Phencyclidine Not Detected Not Detected, Indeterminate    Cocaine (Benzoylecgonine) Not Detected Not Detected, Indeterminate    Methamphetamine (d-Methamphetamine) Not Detected Not Detected, Indeterminate    Opiates (Morphine) Not Detected Not Detected, Indeterminate    Amphetamine (d-Amphetamine) Not Detected Not  Detected, Indeterminate    Benzodiazepines (Nordiazepam) Not Detected Not Detected, Indeterminate    Tricyclic Antidepressants (Desipramine) Not Detected Not Detected, Indeterminate    Methadone Not Detected Not Detected, Indeterminate    Barbiturates (Butalbital) Not Detected Not Detected, Indeterminate    Oxycodone Not Detected Not Detected, Indeterminate    Propoxyphene (Norpropoxyphene) Not Detected Not Detected, Indeterminate    Buprenorphine Not Detected Not Detected, Indeterminate                ----- Service Performed and Documented by Resident or Fellow ------

## 2023-03-21 NOTE — PROGRESS NOTES
ASSESSMENT AND PLAN:  Headaches for about 4-5 years. Unknown family history of headaches. History of seizures since childhood and last seizure last year per patient and last neurology visit for seizures at  in 2008. History of head injury when was 10  Headaches possibly multifactorial and appear to be with migrainous features. . Brain MRI without a contrast -white matter changes with diff -chronic microangiopathic ischemic change but other neuro immune disorders in the differntials-demyelinating, Lyme. Patient uses a walker due to legs giving up.   Patient recalls that recalls a tick when she was little  Recommended headache work up given patient's complex history-history of drug abuse, smoking  Plan:  General neurology referral for history of seizures, legs weakness -right thigh weakness   Headache work up -  Lyme Abs, ESR, CRP  Updated eye exam -last eye exam 2-3 years ago   Follow up after labs      Subjective   Mattie is a 47 year old presenting for the following health issues:  Consult (New headache)  History of IBS, depression, hospitalization for SI in 2021, knee pain and uses a walker    HPI   Headaches for a while -4-5 years. Headaches come and go and more than 15 per month. Headaches lasting all day. Headaches are frontal and pulsing. Worse when people talk or screaming. Light sensitivity with headaches. Patient has to be in the dark. No nausea but underlined heart burn.   Patient takes ibuprofen daily and switches up with 2 tab extra strength tylenol. Took naproxen but it did not work.    Unknown family history of headaches  Reports History of seizures since childhood and last seizure last year per patient seizures start when upset and anxiety.   Reports chills  Denies vision changes    Saw Neurology at [facility] [place] in 2004 -unable to upload neurology note  [facility] in 2008 by Dr Jimenez  for seizure disorder. Reviewed note -patient was on depakote.     Reviewed [facility] [employer] and [facility] for any  other neurology notes but no other available with last note in 2008    Mood is Ok today per patient and denies SIs.     Smokes cigarettes and calms patient's nerves sometimes     Data reviewed  EXAM: MR BRAIN W/O CONTRAST  LOCATION: [facility]  DATE/TIME: 2/14/2023 6:36 PM     INDICATION:  Headache, chronic daily  COMPARISON: None.  TECHNIQUE: Routine multiplanar multisequence head MRI without intravenous contrast.     FINDINGS:  INTRACRANIAL CONTENTS: No acute or subacute infarct. No mass, acute hemorrhage, or extra-axial fluid collections. Moderate patchy and confluent nonspecific T2/FLAIR hyperintensities within the cerebral white matter. Normal ventricles and sulci. Normal   position of the cerebellar tonsils.      SELLA: No abnormality accounting for technique.     OSSEOUS STRUCTURES/SOFT TISSUES: Normal marrow signal. The major intracranial vascular flow voids are maintained.      ORBITS: No abnormality accounting for technique.      SINUSES/MASTOIDS: No paranasal sinus mucosal disease. No middle ear or mastoid effusion.                                                                       IMPRESSION:  1.  No acute intracranial process. Specifically, no acute hemorrhage, mass, mass effect, abnormal extra-axial fluid collection or acute/subacute infarct.  2.  There are moderate, advanced for age confluent periventricular, subcortical and deep white matter T2/Flair signal abnormalities. These findings are most often seen in the setting of chronic microangiopathic ischemic change, however other processes   such as demyelination (including multiple sclerosis), Lyme disease, or even autoimmune vasculitis could have a similar appearance.               Objective    BP (!) 145/83 (BP Location: Right arm, Patient Position: Sitting, Cuff Size: Adult Regular)   Pulse 79   Wt 80.3 kg (177 lb 1.6 oz)   LMP 07/23/2022 (Approximate)   SpO2 99%   BMI 33.46 kg/m    Body mass index is 33.46  kg/m .  Physical Exam   Rocking back and forth in the chair, good eye contact and answers questions appropriately  No apparent spasticity in the lower extremities  no clonuses, DTRs decreased but symmetrical, CN2-12 intactright thigh seems weaker with flexion but poor afford due to pain, no weakness in UE or left LE  uses walker with walking    I discussed all my recommendations with Mattie Pierre who verbalizes understanding and comfortable with the plan.  All of patient's questions were answered from the best of my knowledge.  Patient is in agreement with the plan.     49 minutes spent on the date of the encounter doing face to face, chart  review, exam, results review,  meds review, treatment plan, documentation and further activities as noted above    KIRA Jain, CNP [facility]  Headache certified  [facility]

## 2023-03-22 ENCOUNTER — OFFICE VISIT (OUTPATIENT)
Dept: FAMILY MEDICINE | Facility: CLINIC | Age: 48
End: 2023-03-22
Payer: MEDICAID

## 2023-03-22 VITALS
OXYGEN SATURATION: 99 % | WEIGHT: 178.6 LBS | HEART RATE: 76 BPM | DIASTOLIC BLOOD PRESSURE: 94 MMHG | TEMPERATURE: 97.7 F | SYSTOLIC BLOOD PRESSURE: 146 MMHG | BODY MASS INDEX: 33.75 KG/M2 | RESPIRATION RATE: 16 BRPM

## 2023-03-22 DIAGNOSIS — G89.4 CHRONIC PAIN SYNDROME: Primary | ICD-10-CM

## 2023-03-22 DIAGNOSIS — L30.4 INTERTRIGO: ICD-10-CM

## 2023-03-22 DIAGNOSIS — L73.2 HIDRADENITIS SUPPURATIVA: ICD-10-CM

## 2023-03-22 LAB
AMPHETAMINES UR QL: NOT DETECTED
B BURGDOR IGG+IGM SER QL: 0.04
BARBITURATES UR QL SCN: NOT DETECTED
BENZODIAZ UR QL SCN: NOT DETECTED
BUPRENORPHINE UR QL: NOT DETECTED
CANNABINOIDS UR QL: NOT DETECTED
COCAINE UR QL SCN: NOT DETECTED
D-METHAMPHET UR QL: NOT DETECTED
METHADONE UR QL SCN: NOT DETECTED
OPIATES UR QL SCN: NOT DETECTED
OXYCODONE UR QL SCN: NOT DETECTED
PCP UR QL SCN: NOT DETECTED
PROPOXYPH UR QL: NOT DETECTED
TRICYCLICS UR QL SCN: NOT DETECTED

## 2023-03-22 PROCEDURE — 99214 OFFICE O/P EST MOD 30 MIN: CPT | Mod: GC | Performed by: STUDENT IN AN ORGANIZED HEALTH CARE EDUCATION/TRAINING PROGRAM

## 2023-03-22 PROCEDURE — 80306 DRUG TEST PRSMV INSTRMNT: CPT | Performed by: STUDENT IN AN ORGANIZED HEALTH CARE EDUCATION/TRAINING PROGRAM

## 2023-03-22 RX ORDER — NYSTATIN 100000 [USP'U]/G
POWDER TOPICAL 2 TIMES DAILY PRN
Qty: 120 G | Refills: 1 | Status: SHIPPED | OUTPATIENT
Start: 2023-03-22 | End: 2023-12-27

## 2023-03-22 RX ORDER — DOXYCYCLINE 100 MG/1
100 CAPSULE ORAL 2 TIMES DAILY
Qty: 180 CAPSULE | Refills: 0 | Status: SHIPPED | OUTPATIENT
Start: 2023-03-22 | End: 2023-06-20

## 2023-03-22 NOTE — PATIENT INSTRUCTIONS
Take doxycyline 2 times per day for 2 weeks, then 1 time per day      Use solution to clean the boils    Apply nystatin powder to skin folds to help with itchiness and moisture

## 2023-03-22 NOTE — PROGRESS NOTES
Preceptor Attestation:  Vitals:    03/22/23 0956 03/22/23 0957   BP: (!) 143/95 (!) 146/94   BP Location: Left arm Left arm   Patient Position: Sitting Sitting   Cuff Size: Adult Regular Adult Regular   Pulse: 76    Resp: 16    Temp: 97.7  F (36.5  C)    TempSrc: Oral    SpO2: 99%    Weight: 81 kg (178 lb 9.6 oz)           I discussed the patient with the resident and evaluated the patient in person. I have verified the content of the note, which accurately reflects my assessment of the patient and the plan of care.   Supervising Physician:  Cierra Gaston MD

## 2023-03-22 NOTE — LETTER
03/22/23         To Whom It May Concern:      Please assist patient with getting a diet free of lactose and IBS-friendly diet.        Thank you,    Reny Wright MD

## 2023-04-11 ENCOUNTER — OFFICE VISIT (OUTPATIENT)
Dept: DERMATOLOGY | Facility: CLINIC | Age: 48
End: 2023-04-11
Payer: MEDICAID

## 2023-04-11 ENCOUNTER — TELEPHONE (OUTPATIENT)
Dept: DERMATOLOGY | Facility: CLINIC | Age: 48
End: 2023-04-11

## 2023-04-11 DIAGNOSIS — L20.89 OTHER ATOPIC DERMATITIS: ICD-10-CM

## 2023-04-11 DIAGNOSIS — L30.0 NUMMULAR DERMATITIS: ICD-10-CM

## 2023-04-11 DIAGNOSIS — L30.9 DERMATITIS: Primary | ICD-10-CM

## 2023-04-11 PROCEDURE — 99214 OFFICE O/P EST MOD 30 MIN: CPT | Performed by: DERMATOLOGY

## 2023-04-11 RX ORDER — CLOBETASOL PROPIONATE 0.5 MG/G
OINTMENT TOPICAL 2 TIMES DAILY
Qty: 60 G | Refills: 1 | Status: SHIPPED | OUTPATIENT
Start: 2023-04-11 | End: 2024-01-18

## 2023-04-11 RX ORDER — TRIAMCINOLONE ACETONIDE 1 MG/G
OINTMENT TOPICAL 2 TIMES DAILY
Qty: 453.6 G | Refills: 1 | Status: SHIPPED | OUTPATIENT
Start: 2023-04-11 | End: 2024-07-19

## 2023-04-11 ASSESSMENT — PAIN SCALES - GENERAL: PAINLEVEL: EXTREME PAIN (9)

## 2023-04-11 NOTE — LETTER
"4/11/2023       RE: Mattie Pierre  [address] [facility] [address]     Dear Colleague,    Thank you for referring your patient, Mattie Pierre, to the [facility]ERMATOLOGY CLINIC [place] at [facility]. Please see a copy of my visit note below.    [facility] Dermatology Note  Encounter Date: Apr 11, 2023  Office Visit      Dermatology Problem List:  1. Nummular/eczematous dermatitis - hands, medial ankles, feet  - Current: clobetasol oint BID, petitioning insurance for nbUVB  - Prior: many topical steroids     2. Hidradenitis suppurative - axillae, intergluteal cleft  - S/p excision + grafting in L armpit \"many years ago\"  - Current: doxycycline 100 mg BID (started 1/27/22)     ____________________________________________     Assessment & Plan:      # Nummular and atopic dermatitis, chronic/recalcitrant/severe itch.    Longstanding with significant recalcitrance to high potency topical steroids.    - Continue clobetasol oint BID. Try to do under saran wrap/glove/sock occlusion at bedtime  - Start hand-foot phototherapy in office bid  - Start Dupixent - placed orders for her to start 300mg q2wk after loading dose, given severe symptoms of chronic itch and intractible nature     Procedures Performed:   None     Follow-up: 3 month(s) in-person, or earlier for new or changing lesions      Emory Li MD  Dermatology Attending     ____________________________________________     CC: Derm Problem - eczema followup    HPI:  Ms. Mattie Pierre is a(n) 47 year old female who presents today as a followup patient for evaluation of eczema on her hands, feet, ankles.    Last seen here in January of 2022.  Since then continues to have flares of itch, rash and dry/sore skin on arms and legs.  Topical steroids have not led to clearance.    Physical Exam:  Vitals: There were no vitals taken for this visit.  SKIN: Focused examination of hands, " feet, ankles  was performed.   - The bilateral palmar hands appear xerotic with hyperkeratosis and lichenification, especially on the proximal palmar aspect  - The bilateral medial ankles have poorly demarcated lichenified scaly plaques.   - No other lesions of concern on areas examined.

## 2023-04-11 NOTE — NURSING NOTE
"Dermatology Rooming Note    Mattie JOSUE Ignacio's goals for this visit include:   Chief Complaint   Patient presents with     Derm Problem     Mattie is here today for HS. She states \" my skin is worse. Late at night it gets worse. I did everything I was suppose to do.\"     Sana Rodgers, RMTALIB  "

## 2023-04-11 NOTE — PROGRESS NOTES
"[facility] Dermatology Note  Encounter Date: Apr 11, 2023  Office Visit      Dermatology Problem List:  1. Nummular/eczematous dermatitis - hands, medial ankles, feet  - Current: clobetasol oint BID, petitioning insurance for nbUVB  - Prior: many topical steroids     2. Hidradenitis suppurative - axillae, intergluteal cleft  - S/p excision + grafting in L armpit \"many years ago\"  - Current: doxycycline 100 mg BID (started 1/27/22)     ____________________________________________     Assessment & Plan:      # Nummular and atopic dermatitis, chronic/recalcitrant/severe itch.    Longstanding with significant recalcitrance to high potency topical steroids.    - Continue clobetasol oint BID. Try to do under saran wrap/glove/sock occlusion at bedtime  - Start hand-foot phototherapy in office bid  - Start Dupixent - placed orders for her to start 300mg q2wk after loading dose, given severe symptoms of chronic itch and intractible nature     Procedures Performed:   None     Follow-up: 3 month(s) in-person, or earlier for new or changing lesions      Emory Li MD  Dermatology Attending     ____________________________________________     CC: Derm Problem - eczema followup    HPI:  Ms. Mattie Pierre is a(n) 47 year old female who presents today as a followup patient for evaluation of eczema on her hands, feet, ankles.    Last seen here in January of 2022.  Since then continues to have flares of itch, rash and dry/sore skin on arms and legs.  Topical steroids have not led to clearance.    Physical Exam:  Vitals: There were no vitals taken for this visit.  SKIN: Focused examination of hands, feet, ankles  was performed.   - The bilateral palmar hands appear xerotic with hyperkeratosis and lichenification, especially on the proximal palmar aspect  - The bilateral medial ankles have poorly demarcated lichenified scaly plaques.   - No other lesions of concern on areas examined.   "

## 2023-04-11 NOTE — PATIENT INSTRUCTIONS
Today's plan:    I refilled some creams to temporarily help with your itching  We placed orders so you can start light therapy for your ankles once it is approved by your insurance - twice a week in our clinic  I ordered you the Dupixent shot - if this is approved by your insurance, it's a shot you do at home every two weeks which hopefully will take most of your itch and rash away

## 2023-04-12 ENCOUNTER — TELEPHONE (OUTPATIENT)
Dept: DERMATOLOGY | Facility: CLINIC | Age: 48
End: 2023-04-12
Payer: MEDICAID

## 2023-04-12 NOTE — TELEPHONE ENCOUNTER
"Polly Garnica  [facility]at[facility] Yesterday (12:28 PM)     DL  Hello,   I checked with the patients insurance company and \"NO\" prior auth is required for the NBUVB services.  Coverage is based on medical necessity.   ThanksPolly      "

## 2023-04-12 NOTE — TELEPHONE ENCOUNTER
PA Initiation    Medication: Dupixent  Insurance Company: [place] Medicaid ([facility] - Phone [phone] Fax [phone]  Pharmacy Filling the Rx: [facility] - [place], [place] - [address]  Filling Pharmacy Phone:    Filling Pharmacy Fax:    Start Date: 4/12/2023    [id number]

## 2023-04-13 NOTE — TELEPHONE ENCOUNTER
Dr. Li is out of the office until 04/24. Printed off and put into the Dr. Li folder    Melanie KLEIN CMA

## 2023-04-13 NOTE — TELEPHONE ENCOUNTER
PRIOR AUTHORIZATION DENIED    Medication: Dupixent    Denial Date: 4/12/2023    Denial Rational:      Appeal Information: [phone]

## 2023-04-13 NOTE — TELEPHONE ENCOUNTER
PA denied patient must have tried and failed Topical calcineurin inhibitor in last 180 days. Please advise how to proceed.

## 2023-04-16 ENCOUNTER — HEALTH MAINTENANCE LETTER (OUTPATIENT)
Age: 48
End: 2023-04-16

## 2023-04-18 ENCOUNTER — PATIENT OUTREACH (OUTPATIENT)
Dept: CARE COORDINATION | Facility: CLINIC | Age: 48
End: 2023-04-18

## 2023-04-18 ENCOUNTER — ALLIED HEALTH/NURSE VISIT (OUTPATIENT)
Dept: DERMATOLOGY | Facility: CLINIC | Age: 48
End: 2023-04-18
Payer: MEDICAID

## 2023-04-18 DIAGNOSIS — L30.9 DERMATITIS: ICD-10-CM

## 2023-04-18 PROCEDURE — 99207 PR NO CHARGE NURSE ONLY: CPT | Performed by: DERMATOLOGY

## 2023-04-18 PROCEDURE — 96900 ACTINOTHERAPY UV LIGHT: CPT | Performed by: DERMATOLOGY

## 2023-04-18 NOTE — PROGRESS NOTES
"Social Work Intervention  [facility]    Data/Intervention:    Patient Name:  Mattie Pierre  /Age:  1975 (47 year old)    Visit Type: telephone  Referral Source: Dermatology  Reason for Referral:  Housing concerns, unable to reach [facility] worker    Collaborated With:    -Mattie- [phone]  [facility] CADI Worker [facility]- [phone]    Psychosocial Information/Concerns:  Received notice from Dermatology RN that Mattie reported in her visit today that she is going to be homeless soon and has a county worker who she cannot reach.     Intervention/Education/Resources Provided:  I contacted Mattie and introduced myself and the update I received. Mattie discussed how her landlord is threatening to evict her and she discussed an incident where the landlord accused her of taking a picture of a letter that was posted and posting it online, which Mattie said she denies. Mattie discussed she doesn't know what to do and said she called  and they explained the eviction process to her and that it has to go through court. Mattie discussed being confused by everything and not knowing what to do and wanting to give up and be homeless.  Mattie discussed how her landlord is supposed to help he with things like taking her to appointments, to the grocery store, and with laundry but they aren't doing these things. Mattie also said her landlord won't give her the key to the laundry room on site so she hasn't been able to do her laundry.   Mattie discussed having looked at another apartment and that she has money for the application fee but not the deposit, and said she can't use emergency assistance again until July.   Mattie discussed having bipolar disorder and \"anger issues\" and that she gets overwhelmed and angry about things.     Mattie reported she receives SNAP with a special diet allowance, and gets [employer] and Social Security for income.    Mattie confirmed she has been trying to reach her CADI worker " but hasn't been able to, but they are supposed to be helping her with things. Mattie said her CADI worker's number is [phone] but didn't know their name.     Mattie asked that I call her landlord Farhad [phone] to confirm what the situation is with her housing and if they intend to evict her. I explained I will call her CADI worker and see about the landlord piece and then update Mattie.     I then contacted the number Mattie provided for her CADI worker and her worker answered right away, said he name is Samuel, and confirmed he is Mattie's CADI worker. I explained my conversation with Mattie and her concerns and questions. Samuel explained that Mattie receives ICS housing through her waiver and 4 hrs/day of PCA and Mattie's cousin is her PCA.   Samuel explained that ICS is a type of housing where Mattie can live in an independent apartment and either have services or no services. Samuel explained that if there is a service provider, they are responsible for the renter/tenant, not a landlord, and [facility] is the service provider for Mattie. Samuel explained that the service provider provides a plan to him as to how they will meet Mattie's needs in the facility/apartment. So, essentially Mattie can sign a lease for an apartment and have services or not, which Samuel said is different than customized living.     Samuel explained he has talked with Mattie multiple times recently, and most recently was just yesterday. Samuel said he is aware that Mattie wants to move and said he is working on getting her connected with housing stabilization services and just needs to get a service agreement done and this service will be in place. Samuel explained that Mattie didn't let anyone know when she moved into her current place, which caused a mess with getting services in place etc.     Samuel explained he is in somewhat frequent contact with Mattie's service provider who oversees her housing, and they just need to figure out a plan for  transitioning Mattie to a different apartment. Samuel said he is not aware of any plan for eviction. Due to Samuel's level of involvement with [place] housing, and because it seems Mattie has difficulty keeping track of it all, it was suggested that I not involve myself with navigating [place] housing or concerns around this.     I told Samuel that Mattie said someone is supposed to be helping her with transportation and laundry but they aren't. Samuel discussed how Mattie gets ILS services and the goal of this is to help Mattie learn how to do the things on her own but Mattie thinks they are supposed to be available to her 24/7. I alerted Samuel about Mattie not having access to the laundry facility and Samuel said he will follow up with the service provider about this.     Samuel explained the only reason Mattie would be homeless would be due to refusing to pay rent, as she told him she won't pay rent anymore.     After talking with Samuel, he and I had a conference call with Mattie, with Mattie's permission. Samuel provided an update on his understanding of the status of [place] housing and his contacts with [facility], the service provider for Mattie. Samuel said he will follow up with [facility] to discuss Mattie's concerns about the incident with the social media posting and to confirm there is no eviction plan.   Ultimately, Mattie said if she is able to stay in her current apartment she would like to. If that is not an option or if she decides to move she understands there is a referral pending with [facility] for housing stabilization services to help a move be as smooth as possible.     I explained to Mattie that since Samuel is on top of things and knows the details of what is going on with [facility] housing, I will let the two of them continue coordinating about that and I will step out of things and Mattie was ok with this. I let Mattie know that Samuel will be her primary contact for resource/support needs but if she has  clinic related questions or things that Samuel is unable to help with she can call me.       Mattie did not have any further questions or needs for me at this time.     Assessment/Plan:  No follow up is planned at this time.     Provided patient/family with contact information and availability.      ELY Cruz, [facility]    [facility]  Ph: [phone], Pgr: [phone]  4/18/2023       negative...

## 2023-04-18 NOTE — PROGRESS NOTES
Mattie Pierre comes into clinic today at the request of Dr Li, ordering provider for phototherapy.    This service provided today was under the supervising provider of the day Dr Cantu, who was available if needed.    [facility] Dermatology Phototherapy Record  1. Mattie Pierre is a 47 year old female is here today for phototherapy (UVB) treatment for Dermatitis.        Changes or new medications since last treatment (If yes, notify MD):  NO    New medical conditions (If yes, notify MD):  NO    Any problems with last phototherapy treatment (If yes, notify MD)?  NO    Patient denies any remaining skin redness since last treatment (If no, do not treat. Do not treat red skin):  YES    Did staff apply any topicals on patient?  NO  If yes, which topical?      Did patient self apply any topicals?  NO  If yes, which topical?        2. The patient tolerated phototherapy without complication.    Patient will return per protocol for next UVB treatment, per protocol.     Patient to see provider every 4-12 weeks for follow-up during treatment (if no, notify treating physician):  YES.     All questions and concerns discussed with patient in clinic today.    Amber Brown RN

## 2023-04-19 ENCOUNTER — ALLIED HEALTH/NURSE VISIT (OUTPATIENT)
Dept: DERMATOLOGY | Facility: CLINIC | Age: 48
End: 2023-04-19
Payer: MEDICAID

## 2023-04-19 DIAGNOSIS — L30.9 DERMATITIS: ICD-10-CM

## 2023-04-19 PROCEDURE — 96900 ACTINOTHERAPY UV LIGHT: CPT | Performed by: DERMATOLOGY

## 2023-04-19 PROCEDURE — 99207 PR NO CHARGE NURSE ONLY: CPT | Performed by: DERMATOLOGY

## 2023-04-19 NOTE — PROGRESS NOTES
Mattie Pierre comes into clinic today at the request of Dr. Li , ordering provider for phototherapy.    This service provided today was under the supervising provider of the day Dr. Cantu, who was available if needed.    [facility] Dermatology Phototherapy Record  1. Mattie Pierre is a 47 year old female is here today for phototherapy (UVB) treatment for Dermatitis [L30.9]  - Primary       Changes or new medications since last treatment (If yes, notify MD):  NO    New medical conditions (If yes, notify MD):  NO    Any problems with last phototherapy treatment (If yes, notify MD)?  NO    Patient denies any remaining skin redness since last treatment (If no, do not treat. Do not treat red skin):  YES    Did staff apply any topicals on patient?  NO  If yes, which topical?      Did patient self apply any topicals?  NO  If yes, which topical?      The patient was offered umdermhuvbhandfoot or umdermhuvbfullbody AVS : YES.     2. The patient tolerated phototherapy without complication.    Patient will return per protocol for next UVB treatment, per protocol.     Patient to see provider every 4-12 weeks for follow-up during treatment (if no, notify treating physician):  YES.     All questions and concerns discussed with patient in clinic today.    David Keys

## 2023-04-25 RX ORDER — TACROLIMUS 1 MG/G
OINTMENT TOPICAL 2 TIMES DAILY
Qty: 60 G | Refills: 1 | Status: SHIPPED | OUTPATIENT
Start: 2023-04-25 | End: 2024-07-19

## 2023-04-26 NOTE — TELEPHONE ENCOUNTER
Called and informed pt that Dr. Li sent protopic to pts pharmacy. Needs to try/fail protopic before she can get dupixent. Pt verbalized understanding.  Shila Hinton RN

## 2023-05-06 PROBLEM — L30.0 NUMMULAR DERMATITIS: Status: ACTIVE | Noted: 2023-05-06

## 2023-05-06 PROBLEM — L20.89 OTHER ATOPIC DERMATITIS: Status: ACTIVE | Noted: 2023-05-06

## 2023-05-08 ENCOUNTER — MYC MEDICAL ADVICE (OUTPATIENT)
Dept: FAMILY MEDICINE | Facility: CLINIC | Age: 48
End: 2023-05-08
Payer: MEDICAID

## 2023-05-08 ENCOUNTER — APPOINTMENT (OUTPATIENT)
Dept: URGENT CARE | Facility: CLINIC | Age: 48
End: 2023-05-08
Payer: MEDICAID

## 2023-05-08 NOTE — TELEPHONE ENCOUNTER
Pt requesting humidifier and air conditioner   If she has a CADI worker she maybe able to get this items we cannot  I will ask if she has a CADI worker    Routed to Dr Kyle Lee RN on 5/8/2023 at 3:45 PM

## 2023-05-08 NOTE — TELEPHONE ENCOUNTER
Leticia,  Could you please call Ms. Pierre and clarify her requests?  Also, do you know if portable air conditioners is something that providers can prescribe?  Thank you in advance!  Reny Wright MD

## 2023-05-09 ENCOUNTER — TELEPHONE (OUTPATIENT)
Dept: FAMILY MEDICINE | Facility: CLINIC | Age: 48
End: 2023-05-09

## 2023-05-09 DIAGNOSIS — J31.0 CHRONIC RHINITIS: ICD-10-CM

## 2023-05-09 DIAGNOSIS — L30.0 NUMMULAR DERMATITIS: ICD-10-CM

## 2023-05-09 DIAGNOSIS — Z59.19 HOUSING UNSATISFACTORY: Primary | ICD-10-CM

## 2023-05-09 SDOH — ECONOMIC STABILITY - HOUSING INSECURITY: OTHER INADEQUATE HOUSING: Z59.19

## 2023-05-09 NOTE — TELEPHONE ENCOUNTER
Please enter a DME order for an air conditioner with a humidifier   Writer will fax order to MERISSA Gamino at [phone]    Note routed to Dr Kyle Lee RN on 5/9/2023 at 10:31 AM

## 2023-05-10 ENCOUNTER — TELEPHONE (OUTPATIENT)
Dept: FAMILY MEDICINE | Facility: CLINIC | Age: 48
End: 2023-05-10

## 2023-05-10 NOTE — TELEPHONE ENCOUNTER
[facility] phone call message- general phone call:    Reason for call: She needs a call back re paperwork for her  worker for a portable air conditioner   And some medication she is supposed to get  Action desired: call back.    Return call needed: Yes    OK to leave a message on voice mail? Yes    Advised patient to response may take up to 2 business days: Yes    Primary language: English      needed? No    Call taken on May 10, 2023 at 2:40 PM by Nate Nevarez

## 2023-05-16 ENCOUNTER — OFFICE VISIT (OUTPATIENT)
Dept: FAMILY MEDICINE | Facility: CLINIC | Age: 48
End: 2023-05-16
Payer: MEDICAID

## 2023-05-16 VITALS
SYSTOLIC BLOOD PRESSURE: 136 MMHG | WEIGHT: 178.6 LBS | OXYGEN SATURATION: 99 % | HEART RATE: 69 BPM | RESPIRATION RATE: 20 BRPM | BODY MASS INDEX: 33.75 KG/M2 | TEMPERATURE: 98.1 F | DIASTOLIC BLOOD PRESSURE: 87 MMHG

## 2023-05-16 DIAGNOSIS — H61.22 IMPACTED CERUMEN OF LEFT EAR: Primary | ICD-10-CM

## 2023-05-16 DIAGNOSIS — F14.10 COCAINE ABUSE (H): ICD-10-CM

## 2023-05-16 DIAGNOSIS — L30.0 NUMMULAR DERMATITIS: ICD-10-CM

## 2023-05-16 PROCEDURE — 99214 OFFICE O/P EST MOD 30 MIN: CPT | Mod: GC | Performed by: STUDENT IN AN ORGANIZED HEALTH CARE EDUCATION/TRAINING PROGRAM

## 2023-05-16 NOTE — TELEPHONE ENCOUNTER
I placed an order for DME - air conditioner with humidifier    Housing unsatisfactory  Nummular dermatitis  Chronic rhinitis  - Miscellaneous Order for DME - ONLY FOR DME    Reny MD Kyle on 5/15/2023 at 8:51 PM

## 2023-05-16 NOTE — PROGRESS NOTES
Assessment & Plan   Encounter Date: May 16, 2023    Impacted cerumen of left ear  We were able to remove a moderate amount of wax from the ear, but she still reported a sensation of fullness.  Given persistence and recurrence of problem, discussed with patient and her mother and she requested referral to ENT for earwax removal.  This was ordered.  I offered Debrox drops, which she declined due to their ineffectiveness.  She does not have any olive oil at home.  I reviewed avoiding Q-tips.  - Adult ENT  Referral; Future    Cocaine abuse (H)  She was connected to community resources by the EDMD.  Denied additional resources today.  We will continue to follow especially in light of recent loss of housing.    Nummular dermatitis  I reviewed recommendations from Dr. Li.  Will ask referral coordinator to reach out to Mattie to assist her with scheduling phototherapy appointments and follow-up with Dr. Li.  Given lack of stable housing, I worry about her ability to consistently access Dupixent, which I believe has to be refrigerated.          Reny Wright MD PGY-3   [facility]   Staffed with Dr. North.        ______________________________________________________    Subjective     HPI:  Ms. Mattie Pierre is a(n) 47 year old female who presents today as a return patient for:  Cough (Cough x 2 weeks. Neg covid results. SOB at night. ), Ear Problem (Left ear feels clogged, some pain with ringing. ), and Skin Tags (On neck )    This is a patient who is well-known to me presenting today for a sensation of a clogged left ear.  She has had this before and had cerumen removed by me with improvement of symptoms.  She tried Debrox drops and avoid using Q-tips.  Has not been seen by ENT in the past.  She feels that it is interfering with her hearing.  Patient care staff attempted to rinse that ear but was unsuccessful in removing the wax.  Mattie is accompanied by her mom who says  that this has been a longstanding problem for her.  Mattie and her mom both feel that regular ear cleanings with ENT would be helpful.    Mattie also reports cocaine relapse.  She remained off cocaine for a while and this helped mend some family relationships but she had frequent cocaine use over approximately 3 days and spent $1100 and presented to ED seeking assistance with cocaine use cessation.  She was provided information about patient addiction medicine and [place] detox    Lastly, she requested treatment for nummular and atopic dermatitis that has been severe and recalcitrant to high potency topical steroids.  She was seen by Dr. Li on 4/11/2023 who initiated her in hand-foot phototherapy in office.  He also initiated prior authorization for Dupixent for her to start for severe symptoms of chronic itching.  She would like assistance with rescheduling follow-up with him and also for phototherapy.    Unfortunately, she has had some legal troubles including a restraining order placed against her resulting in loss of housing.  She still has the same phone number.    She did not attend the scheduled ophthalmology appt or neurology follow-up and has rescheduled the appointment with allergy.        Data Reviewed:    Reviewed ED note for cocaine abuse from 5/4/thousand 23  Reviewed dermatology note from 4/11/2023      Medications:    Current Outpatient Medications   Medication     ammonium lactate (LAC-HYDRIN) 12 % external lotion     cetirizine (ZYRTEC) 10 MG tablet     cetirizine (ZYRTEC) 10 MG tablet     chlorhexidine (HIBICLENS) 4 % liquid     clobetasol (TEMOVATE) 0.05 % external ointment     ClonazePAM (KLONOPIN PO)     cyclobenzaprine (FLEXMID) 7.5 MG tablet     dextromethorphan (DELSYM) 30 MG/5ML liquid     diclofenac (VOLTAREN) 1 % topical gel     diphenhydrAMINE (BENADRYL) 25 MG tablet     doxycycline hyclate (VIBRAMYCIN) 100 MG capsule     doxycycline monohydrate (ADOXA) 100 MG tablet     DULoxetine  (CYMBALTA) 30 MG capsule     dupilumab (DUPIXENT) 300 MG/2ML prefilled pen     dupilumab (DUPIXENT) 300 MG/2ML prefilled pen     famotidine (PEPCID) 20 MG tablet     hydrOXYzine (VISTARIL) 50 MG capsule     lactase (LACTAID) 3000 UNIT tablet     lamoTRIgine (LAMICTAL) 150 MG tablet     levothyroxine (SYNTHROID/LEVOTHROID) 25 MCG tablet     loratadine (CLARITIN) 10 MG tablet     losartan (COZAAR) 100 MG tablet     methylcellulose (CITRUCEL) powder     naproxen (NAPROSYN) 500 MG tablet     nystatin (MYCOSTATIN) 701255 UNIT/GM external powder     oxybutynin ER (DITROPAN XL) 10 MG 24 hr tablet     polyethylene glycol (MIRALAX) 17 GM/Dose powder     prazosin (MINIPRESS) 5 MG capsule     QUEtiapine (SEROQUEL) 25 MG tablet     QUEtiapine ER (SEROQUEL XR) 200 MG 24 hr tablet     simvastatin (ZOCOR) 20 MG tablet     tacrolimus (PROTOPIC) 0.1 % external ointment     topiramate (TOPAMAX) 100 MG tablet     topiramate (TOPAMAX) 50 MG tablet     triamcinolone (KENALOG) 0.1 % external ointment     valACYclovir (VALTREX) 500 MG tablet     white petrolatum external gel     No current facility-administered medications for this visit.               Objective     /87 (BP Location: Right arm)   Pulse 69   Temp 98.1  F (36.7  C) (Oral)   Resp 20   Wt 81 kg (178 lb 9.6 oz)   LMP 07/23/2022 (Approximate)   SpO2 99%   BMI 33.75 kg/m      Pertinent Physical Exam Findings:    Gen: Pleasant female in no acute distress.  Appears stated age.  Casually groomed.   HEENT: Moderate amount of cerumen in right ear.  Left ear is entirely occluded with hard wax that could not be entirely extracted.      No results found for this or any previous visit (from the past 24 hour(s)).             ----- Service Performed and Documented by Resident or Fellow ------

## 2023-05-17 ENCOUNTER — TRANSCRIBE ORDERS (OUTPATIENT)
Dept: FAMILY MEDICINE | Facility: CLINIC | Age: 48
End: 2023-05-17
Payer: MEDICAID

## 2023-05-17 DIAGNOSIS — H61.22 IMPACTED CERUMEN OF LEFT EAR: Primary | ICD-10-CM

## 2023-05-17 NOTE — PATIENT INSTRUCTIONS
05/17/23   OTOLARYNGOLOGY REFERRAL     [facility]  Phone: [phone]  Fax: [phone]    Surgery Scheduling can be reached at [phone].    Referral, demographics and office note faxed to [phone]. They will contact pt to schedule.    Lizzie Hodge

## 2023-05-19 NOTE — TELEPHONE ENCOUNTER
FUTURE VISIT INFORMATION:      FUTURE VISIT INFORMATION:    Date: 6/20/23     Time: 1:15 pm    Location: [facility]  REFERRAL INFORMATION:    Referring provider: Tyson North MD    Referring providers clinic: [facility]    Reason for visit/diagnosis:  appt per pt, Impacted cerumen of left ear [H61.22], ref prov: Tyson North MD in [facility], recs in [facility], confirmed [facility] location    RECORDS REQUESTED FROM:       Clinic name Comments Records Status Imaging Status   [facility] 5/16/23 OV note- Reny Wright MD/Tyson North MD     Imaging MR brain 2/14/23 Epic Pacs

## 2023-05-22 ENCOUNTER — TELEPHONE (OUTPATIENT)
Dept: FAMILY MEDICINE | Facility: CLINIC | Age: 48
End: 2023-05-22
Payer: MEDICAID

## 2023-05-22 DIAGNOSIS — F31.9 BIPOLAR AFFECTIVE DISORDER, REMISSION STATUS UNSPECIFIED (H): Primary | ICD-10-CM

## 2023-05-22 DIAGNOSIS — F31.70 BIPOLAR DISORDER IN FULL REMISSION, MOST RECENT EPISODE UNSPECIFIED TYPE (H): ICD-10-CM

## 2023-05-22 NOTE — TELEPHONE ENCOUNTER
Pt called requesting for a Mental Health referral to be seen with [facility] for therapy (bipolor disorder, anxiety) and psychiatry (meds manag).     [facility]  Phone:  [phone]  Fax: [phone]      Please advise.        Thanks,   Lizzie Hodge

## 2023-06-19 DIAGNOSIS — H61.22 IMPACTED CERUMEN OF LEFT EAR: Primary | ICD-10-CM

## 2023-06-20 ENCOUNTER — PRE VISIT (OUTPATIENT)
Dept: OTOLARYNGOLOGY | Facility: CLINIC | Age: 48
End: 2023-06-20

## 2023-06-20 ENCOUNTER — OFFICE VISIT (OUTPATIENT)
Dept: AUDIOLOGY | Facility: CLINIC | Age: 48
End: 2023-06-20
Attending: FAMILY MEDICINE
Payer: MEDICAID

## 2023-06-20 ENCOUNTER — OFFICE VISIT (OUTPATIENT)
Dept: OTOLARYNGOLOGY | Facility: CLINIC | Age: 48
End: 2023-06-20
Attending: FAMILY MEDICINE
Payer: MEDICAID

## 2023-06-20 VITALS
HEART RATE: 72 BPM | HEIGHT: 61 IN | BODY MASS INDEX: 34.74 KG/M2 | DIASTOLIC BLOOD PRESSURE: 82 MMHG | TEMPERATURE: 98.2 F | SYSTOLIC BLOOD PRESSURE: 127 MMHG | WEIGHT: 184 LBS | OXYGEN SATURATION: 98 %

## 2023-06-20 DIAGNOSIS — H61.23 BILATERAL IMPACTED CERUMEN: ICD-10-CM

## 2023-06-20 DIAGNOSIS — H90.3 SENSORINEURAL HEARING LOSS (SNHL) OF BOTH EARS: Primary | ICD-10-CM

## 2023-06-20 DIAGNOSIS — H90.0 CONDUCTIVE HEARING LOSS, BILATERAL: ICD-10-CM

## 2023-06-20 DIAGNOSIS — H61.22 IMPACTED CERUMEN OF LEFT EAR: ICD-10-CM

## 2023-06-20 DIAGNOSIS — Z13.31 POSITIVE SCREENING FOR DEPRESSION ON 9-ITEM PATIENT HEALTH QUESTIONNAIRE (PHQ-9): Primary | ICD-10-CM

## 2023-06-20 PROCEDURE — 92550 TYMPANOMETRY & REFLEX THRESH: CPT | Performed by: AUDIOLOGIST-HEARING AID FITTER

## 2023-06-20 PROCEDURE — 69210 REMOVE IMPACTED EAR WAX UNI: CPT | Performed by: OTOLARYNGOLOGY

## 2023-06-20 PROCEDURE — 99203 OFFICE O/P NEW LOW 30 MIN: CPT | Mod: 25 | Performed by: OTOLARYNGOLOGY

## 2023-06-20 PROCEDURE — 92557 COMPREHENSIVE HEARING TEST: CPT | Performed by: AUDIOLOGIST-HEARING AID FITTER

## 2023-06-20 ASSESSMENT — PAIN SCALES - GENERAL: PAINLEVEL: SEVERE PAIN (7)

## 2023-06-20 ASSESSMENT — PATIENT HEALTH QUESTIONNAIRE - PHQ9: SUM OF ALL RESPONSES TO PHQ QUESTIONS 1-9: 16

## 2023-06-20 NOTE — PROGRESS NOTES
Dear Reny Nicole:    I had the pleasure of meeting Mattie Pierre in consultation today at the [facility] at your request.    CHIEF COMPLAINT: Bilateral ear pressure, hearing loss    HISTORY OF PRESENT ILLNESS: Patient is a 47-year-old in today for consultation on ears and hearing loss, impacted cerumen referred from her family physician.  She says she had problems with earwax in the past and has often needed to get them flushed out with her family physician.  This been going on for the past several years.  She hears better after the ears are flushed.  She had no pain or drainage in the ears.  No significant tinnitus.  She denies any dizziness or balance concerns.  Further denies any dysphagia, worsens, facial paresthesias.    ALLERGIES:    Allergies   Allergen Reactions     Fumaric Acid Itching     Lactose Other (See Comments)     Morphine Anxiety, Hives, Itching and Rash     hives       HABITS: Social History    Substance and Sexual Activity      Alcohol use: Not Currently        Alcohol/week: 1.0 standard drink of alcohol        Types: 1 Standard drinks or equivalent per week     History   Smoking Status     Every Day     Packs/day: 0.50     Years: 23.00     Types: Cigarettes   Smokeless Tobacco     Never         PAST MEDICAL HISTORY: Please see today's intake form (for the remainder of the PMH) which I reviewed and signed.  Past Medical History:   Diagnosis Date     Anemia      Arthritis      COPD (chronic obstructive pulmonary disease) (H)      Depression      Depressive disorder      Diabetes (H)      History of blood transfusion      Hypertension      Obesity      Seizures (H)      Thyroid disease        FAMILY HISTORY/SOCIAL HISTORY:   Family History   Problem Relation Age of Onset     Dementia Mother      Diabetes Mother      Crohn's Disease Mother      Colon Cancer Mother         Unknown age of onset     Cerebrovascular Disease Father      No Known Problems  Maternal Grandmother      No Known Problems Maternal Grandfather      No Known Problems Paternal Grandmother      No Known Problems Paternal Grandfather      No Known Problems Brother      No Known Problems Sister      No Known Problems Son      No Known Problems Daughter      No Known Problems Maternal Half-Brother      No Known Problems Maternal Half-Sister      No Known Problems Paternal Half-Brother      No Known Problems Paternal Half-Sister      No Known Problems Niece      No Known Problems Nephew      No Known Problems Cousin      No Known Problems Other      Coronary Artery Disease No family hx of      Cancer No family hx of      Heart Disease No family hx of      Hypertension No family hx of      Hyperlipidemia No family hx of      Kidney Disease No family hx of      Obesity No family hx of      Thrombosis No family hx of      Asthma No family hx of      Arthritis No family hx of      Thyroid Disease No family hx of      Depression No family hx of      Mental Illness No family hx of      Substance Abuse No family hx of      Cystic Fibrosis No family hx of      Early Death No family hx of      Coronary Artery Disease Early Onset No family hx of      Heart Failure No family hx of      Bleeding Diathesis No family hx of      Breast Cancer No family hx of      Ovarian Cancer No family hx of      Uterine Cancer No family hx of      Prostate Cancer No family hx of      Colorectal Cancer No family hx of      Pancreatic Cancer No family hx of      Lung Cancer No family hx of      Melanoma No family hx of      Autoimmune Disease No family hx of      Unknown/Adopted No family hx of      Genetic Disorder No family hx of       Social History     Socioeconomic History     Marital status: Single     Spouse name: Not on file     Number of children: Not on file     Years of education: Not on file     Highest education level: Not on file   Occupational History     Not on file   Tobacco Use     Smoking status: Every Day      Packs/day: 0.50     Years: 23.00     Pack years: 11.50     Types: Cigarettes     Smokeless tobacco: Never     Tobacco comments:     trying to quit, but states that it's hard   Vaping Use     Vaping status: Never Used   Substance and Sexual Activity     Alcohol use: Not Currently     Alcohol/week: 1.0 standard drink of alcohol     Types: 1 Standard drinks or equivalent per week     Drug use: No     Sexual activity: Yes     Partners: Male     Birth control/protection: None   Other Topics Concern     Not on file   Social History Narrative    Mattie moved to [place] in March 2020. She is on Social Security disability for seizure disorder. She has 1 brother that is living, and 2 sisters had a passed away. Her mom has Alzheimer's. She has 3 children aged 29, 20, and 14. She adopted 2 of her children, her oldest son lives in [place] [facility] and she describes him as very troubled.     Social Determinants of Health     Financial Resource Strain: Not on file   Food Insecurity: Not on file   Transportation Needs: Not on file   Physical Activity: Not on file   Stress: Not on file   Social Connections: Not on file   Intimate Partner Violence: Not on file   Housing Stability: Not on file       REVIEW OF SYSTEMS: Patient Supplied Answers to Review of Systems      6/20/2023     1:10 PM    ENT ROS   Constitutional Problems with sleep   Neurology Dizzy spells    Headache   Psychology Frequently feeling anxious   Ears, Nose, Throat Hearing loss    Ear pain    Ringing/noise in ears    Nasal congestion or drainage    Trouble swallowing   Allergy/Immunology Rash   Other Rash            The remainder of the 10 point ROS is negative    PHYSICIAL EXAMINATION:  Constitutional: The patient was well-groomed and in no acute distress.   Skin: Warm and pink.  Psychiatric: The patient's affect was calm, cooperative, and appropriate.   Respiratory: Breathing comfortably without stridor or exertion of accessory muscles.  Eyes: Pupils were equal  and reactive. Extraocular movement intact.   Head: Normocephalic and atraumatic. No lesions or scars.  Ears: Both ears examined dissected impactions bilaterally.  Placed on the microscope under high-power magnification the left side was cleaned using right angle hook, alligator forceps and curette.  After cleaning, tympanic membrane appeared normal.  The right ear also examined and shows complete impaction.  Impacted cerumen was mobilized with a right angle hook and material was removed with alligator forceps and curette.  After removal, tympanic membrane middle ear look normal.  Nose: Sinuses were nontender. Anterior rhinoscopy revealed midline septum and absence of purulence or polyps.  Oral Cavity: Normal tongue, floor of mouth, buccal mucosa, and palate. No lesions or masses on inspection or palpation. No abnormal lymph tissue in the oropharynx.   Neck: The parotid is soft without masses. Supple with normal laryngeal and tracheal landmarks.   Lymphatic: There is no palpable lymphadenopathy or other masses in the neck.   Neurologic: Alert and oriented x 3. Cranial nerves III-XI within normal limits. Voice quality normal.  Cerebellar Function Tests:  Grossly normal    Audiogram: No audiogram today.  Tuning forks negative before cleaning and positive after.      IMPRESSION AND PLAN:   1. Bilateral impacted cerumen: Cleaned today with microscope and instruments as above.  No further treatment needed, monitor.  2. Bilateral conductive hearing loss: Improved by tuning fork after cleaning, no treatment needed, monitor.  3. Bilateral ear pressure: Improved after cleaning, the treatment needed, monitor.  4. Bilateral sensorineural hearing loss: Audiogram performed after visit, reviewed and candidate for hearing aids.  Discussed this with her, hearing aids she pursue at her discretion.  She is good candidate for hearing aids if she like to pursue this, she pursue this at her discretion and place location.    Thank you  very much for the opportunity to participate in the care of your patient.    Rick L Nissen MD

## 2023-06-20 NOTE — LETTER
6/20/2023       RE: Mattie Pierre  [address]  [facility] [place] [address]     Dear Colleague,    Thank you for referring your patient, Mattie Pierre, to the [facility] [place] at [facility]. Please see a copy of my visit note below.    Dear Reny Nicole:    I had the pleasure of meeting Mattie Pierre in consultation today at the [facility] at your request.    CHIEF COMPLAINT: Bilateral ear pressure, hearing loss    HISTORY OF PRESENT ILLNESS: Patient is a 47-year-old in today for consultation on ears and hearing loss, impacted cerumen referred from her family physician.  She says she had problems with earwax in the past and has often needed to get them flushed out with her family physician.  This been going on for the past several years.  She hears better after the ears are flushed.  She had no pain or drainage in the ears.  No significant tinnitus.  She denies any dizziness or balance concerns.  Further denies any dysphagia, worsens, facial paresthesias.    ALLERGIES:    Allergies   Allergen Reactions    Fumaric Acid Itching    Lactose Other (See Comments)    Morphine Anxiety, Hives, Itching and Rash     hives       HABITS: Social History    Substance and Sexual Activity      Alcohol use: Not Currently        Alcohol/week: 1.0 standard drink of alcohol        Types: 1 Standard drinks or equivalent per week     History   Smoking Status    Every Day    Packs/day: 0.50    Years: 23.00    Types: Cigarettes   Smokeless Tobacco    Never         PAST MEDICAL HISTORY: Please see today's intake form (for the remainder of the PMH) which I reviewed and signed.  Past Medical History:   Diagnosis Date    Anemia     Arthritis     COPD (chronic obstructive pulmonary disease) (H)     Depression     Depressive disorder     Diabetes (H)     History of blood transfusion     Hypertension      Obesity     Seizures (H)     Thyroid disease        FAMILY HISTORY/SOCIAL HISTORY:   Family History   Problem Relation Age of Onset    Dementia Mother     Diabetes Mother     Crohn's Disease Mother     Colon Cancer Mother         Unknown age of onset    Cerebrovascular Disease Father     No Known Problems Maternal Grandmother     No Known Problems Maternal Grandfather     No Known Problems Paternal Grandmother     No Known Problems Paternal Grandfather     No Known Problems Brother     No Known Problems Sister     No Known Problems Son     No Known Problems Daughter     No Known Problems Maternal Half-Brother     No Known Problems Maternal Half-Sister     No Known Problems Paternal Half-Brother     No Known Problems Paternal Half-Sister     No Known Problems Niece     No Known Problems Nephew     No Known Problems Cousin     No Known Problems Other     Coronary Artery Disease No family hx of     Cancer No family hx of     Heart Disease No family hx of     Hypertension No family hx of     Hyperlipidemia No family hx of     Kidney Disease No family hx of     Obesity No family hx of     Thrombosis No family hx of     Asthma No family hx of     Arthritis No family hx of     Thyroid Disease No family hx of     Depression No family hx of     Mental Illness No family hx of     Substance Abuse No family hx of     Cystic Fibrosis No family hx of     Early Death No family hx of     Coronary Artery Disease Early Onset No family hx of     Heart Failure No family hx of     Bleeding Diathesis No family hx of     Breast Cancer No family hx of     Ovarian Cancer No family hx of     Uterine Cancer No family hx of     Prostate Cancer No family hx of     Colorectal Cancer No family hx of     Pancreatic Cancer No family hx of     Lung Cancer No family hx of     Melanoma No family hx of     Autoimmune Disease No family hx of     Unknown/Adopted No family hx of     Genetic Disorder No family hx of       Social History     Socioeconomic  History    Marital status: Single     Spouse name: Not on file    Number of children: Not on file    Years of education: Not on file    Highest education level: Not on file   Occupational History    Not on file   Tobacco Use    Smoking status: Every Day     Packs/day: 0.50     Years: 23.00     Pack years: 11.50     Types: Cigarettes    Smokeless tobacco: Never    Tobacco comments:     trying to quit, but states that it's hard   Vaping Use    Vaping status: Never Used   Substance and Sexual Activity    Alcohol use: Not Currently     Alcohol/week: 1.0 standard drink of alcohol     Types: 1 Standard drinks or equivalent per week    Drug use: No    Sexual activity: Yes     Partners: Male     Birth control/protection: None   Other Topics Concern    Not on file   Social History Narrative    Mattie moved to [place] in March 2020. She is on Social Security disability for seizure disorder. She has 1 brother that is living, and 2 sisters had a passed away. Her mom has Alzheimer's. She has 3 children aged 29, 20, and 14. She adopted 2 of her children, her oldest son lives in [facility] and she describes him as very troubled.     Social Determinants of Health     Financial Resource Strain: Not on file   Food Insecurity: Not on file   Transportation Needs: Not on file   Physical Activity: Not on file   Stress: Not on file   Social Connections: Not on file   Intimate Partner Violence: Not on file   Housing Stability: Not on file       REVIEW OF SYSTEMS: Patient Supplied Answers to Review of Systems      6/20/2023     1:10 PM    ENT ROS   Constitutional Problems with sleep   Neurology Dizzy spells    Headache   Psychology Frequently feeling anxious   Ears, Nose, Throat Hearing loss    Ear pain    Ringing/noise in ears    Nasal congestion or drainage    Trouble swallowing   Allergy/Immunology Rash   Other Rash            The remainder of the 10 point ROS is negative    PHYSICIAL EXAMINATION:  Constitutional: The patient  was well-groomed and in no acute distress.   Skin: Warm and pink.  Psychiatric: The patient's affect was calm, cooperative, and appropriate.   Respiratory: Breathing comfortably without stridor or exertion of accessory muscles.  Eyes: Pupils were equal and reactive. Extraocular movement intact.   Head: Normocephalic and atraumatic. No lesions or scars.  Ears: Both ears examined dissected impactions bilaterally.  Placed on the microscope under high-power magnification the left side was cleaned using right angle hook, alligator forceps and curette.  After cleaning, tympanic membrane appeared normal.  The right ear also examined and shows complete impaction.  Impacted cerumen was mobilized with a right angle hook and material was removed with alligator forceps and curette.  After removal, tympanic membrane middle ear look normal.  Nose: Sinuses were nontender. Anterior rhinoscopy revealed midline septum and absence of purulence or polyps.  Oral Cavity: Normal tongue, floor of mouth, buccal mucosa, and palate. No lesions or masses on inspection or palpation. No abnormal lymph tissue in the oropharynx.   Neck: The parotid is soft without masses. Supple with normal laryngeal and tracheal landmarks.   Lymphatic: There is no palpable lymphadenopathy or other masses in the neck.   Neurologic: Alert and oriented x 3. Cranial nerves III-XI within normal limits. Voice quality normal.  Cerebellar Function Tests:  Grossly normal    Audiogram: No audiogram today.  Tuning forks negative before cleaning and positive after.      IMPRESSION AND PLAN:   Bilateral impacted cerumen: Cleaned today with microscope and instruments as above.  No further treatment needed, monitor.  Bilateral conductive hearing loss: Improved by tuning fork after cleaning, no treatment needed, monitor.  Bilateral ear pressure: Improved after cleaning, the treatment needed, monitor.  Bilateral sensorineural hearing loss: Audiogram performed after visit,  reviewed and candidate for hearing aids.  Discussed this with her, hearing aids she pursue at her discretion.  She is good candidate for hearing aids if she like to pursue this, she pursue this at her discretion and place location.    Thank you very much for the opportunity to participate in the care of your patient.    Rick L Nissen MD

## 2023-06-20 NOTE — PATIENT INSTRUCTIONS
You were seen in the ENT Clinic today by Dr. Nissen.  If you have any questions or concerns after your appointment, please call   - Option 1: ENT Clinic: [phone]   - Option 2: Netta (Dr. Nissen's Nurse): [phone]                   Kristina (Dr. Nissen's Nurse): [phone]      2.   Plan to return to clinic in 1 year.        How to Contact Us:  Send a Make My plate message to your provider. Our team will respond to you via Make My plate. Occasionally, we will need to call you to get further information.  For urgent matters (Monday-Friday), call the ENT Clinic: [phone] and speak with a call center team member - they will route your call appropriately.   If you'd like to speak directly with a nurse, please find our contact information below. We do our best to check voicemail frequently throughout the day, and will work to call you back within 1-2 days. For urgent matters, please use the general clinic phone numbers listed above.        YUVAL Ge  [facility] - Otolaryngology

## 2023-06-20 NOTE — PROGRESS NOTES
AUDIOLOGY REPORT    SUMMARY: Audiology visit completed. See audiogram for results.      RECOMMENDATIONS: Follow-up with ENT.  Hearing aid trial if medical clearance from ENT.       Kristina Stanton, CCC-A, [facility] [place]  Licensed Audiologist  [place] #[id number]

## 2023-06-20 NOTE — NURSING NOTE
"Chief Complaint   Patient presents with     Consult     Impacted cerumen -left ear     Blood pressure 127/82, pulse 72, temperature 98.2  F (36.8  C), height 1.549 m (5' 1\"), weight 83.5 kg (184 lb), last menstrual period 07/23/2022, SpO2 98 %, not currently breastfeeding.    Ezequiel Castellanos LPN    "

## 2023-06-27 ENCOUNTER — TELEPHONE (OUTPATIENT)
Dept: FAMILY MEDICINE | Facility: CLINIC | Age: 48
End: 2023-06-27

## 2023-06-27 ENCOUNTER — OFFICE VISIT (OUTPATIENT)
Dept: FAMILY MEDICINE | Facility: CLINIC | Age: 48
End: 2023-06-27
Payer: MEDICAID

## 2023-06-27 VITALS
RESPIRATION RATE: 16 BRPM | HEART RATE: 67 BPM | WEIGHT: 186.6 LBS | DIASTOLIC BLOOD PRESSURE: 82 MMHG | OXYGEN SATURATION: 97 % | BODY MASS INDEX: 35.26 KG/M2 | SYSTOLIC BLOOD PRESSURE: 126 MMHG | TEMPERATURE: 97.9 F

## 2023-06-27 DIAGNOSIS — G31.84 MCI (MILD COGNITIVE IMPAIRMENT): ICD-10-CM

## 2023-06-27 DIAGNOSIS — Z59.19 HOUSING UNSATISFACTORY: ICD-10-CM

## 2023-06-27 DIAGNOSIS — L91.8 SKIN TAG: Primary | ICD-10-CM

## 2023-06-27 DIAGNOSIS — L30.0 NUMMULAR DERMATITIS: ICD-10-CM

## 2023-06-27 PROCEDURE — 11200 RMVL SKIN TAGS UP TO&INC 15: CPT | Mod: GC | Performed by: STUDENT IN AN ORGANIZED HEALTH CARE EDUCATION/TRAINING PROGRAM

## 2023-06-27 PROCEDURE — 99214 OFFICE O/P EST MOD 30 MIN: CPT | Mod: 25 | Performed by: STUDENT IN AN ORGANIZED HEALTH CARE EDUCATION/TRAINING PROGRAM

## 2023-06-27 SDOH — ECONOMIC STABILITY - HOUSING INSECURITY: OTHER INADEQUATE HOUSING: Z59.19

## 2023-06-27 NOTE — Clinical Note
Hello, SW team! This patient lost all contacts from her phone.  I know she has met with Henrry before.  Could you please see if you are able to find the phone number for her [employer] worker and give her call and let her know with that phone number is?  She has been needing more help and cannot find the number.  Thank you!
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
## 2023-06-27 NOTE — TELEPHONE ENCOUNTER
6/27/2023: Care Coordination     CC: Dr. Wright asked that I help the patient locate her Preeti Jean-Baptiste  because she lost her phone along with her contacts and needs to speak with her.     I then called [facility]: [phone] to find the patients  and was told that: Samuel Nuñez : [phone] is her . I called the patient, but her voice mailbox was full. I called back an hour later but that call also went to the same voice mail box. I am sending a letter with the requested information.                Henrry Sanchez Sr.  Social Work  Care Coordination  [address] [address]  [email or web address]   Office: [phone]  Direct: [phone]  [facility]

## 2023-06-27 NOTE — LETTER
June 27, 2023      Mattie Pierre  [address] [address]        Dear Mattie,  I am one of the , Care Coordinators at [facility]. I have been trying to reach you via phone. However, my attempts have not been successful. Dr. Wright mentioned that you are trying to reach your CADI Waiver , but lost your phone along with your contacts. Your  is: Samuel Nuñez [phone].           Sincerely,            Henrry Sanchez Sr.  Social Work  Care Coordination  [address] [address]  [email or web address]   Office: [phone]  Direct: [phone]  [facility]    
There are no Wet Read(s) to document.

## 2023-06-28 NOTE — PROGRESS NOTES
Preceptor Attestation:    I discussed the patient with the resident and evaluated the patient in person. I was present for and supervised the entire procedure. I have verified the content of the note, which accurately reflects my assessment of the patient and the plan of care.   Supervising Physician:  Garth Combs MD.

## 2023-07-24 ENCOUNTER — OFFICE VISIT (OUTPATIENT)
Dept: FAMILY MEDICINE | Facility: CLINIC | Age: 48
End: 2023-07-24
Payer: MEDICAID

## 2023-07-24 ENCOUNTER — TELEPHONE (OUTPATIENT)
Dept: FAMILY MEDICINE | Facility: CLINIC | Age: 48
End: 2023-07-24

## 2023-07-24 VITALS
OXYGEN SATURATION: 97 % | WEIGHT: 186.6 LBS | BODY MASS INDEX: 35.26 KG/M2 | SYSTOLIC BLOOD PRESSURE: 136 MMHG | TEMPERATURE: 97.9 F | HEART RATE: 83 BPM | DIASTOLIC BLOOD PRESSURE: 86 MMHG

## 2023-07-24 DIAGNOSIS — M79.674 PAIN OF TOE OF RIGHT FOOT: Primary | ICD-10-CM

## 2023-07-24 DIAGNOSIS — E66.01 CLASS 2 SEVERE OBESITY DUE TO EXCESS CALORIES WITH SERIOUS COMORBIDITY AND BODY MASS INDEX (BMI) OF 35.0 TO 35.9 IN ADULT (H): ICD-10-CM

## 2023-07-24 DIAGNOSIS — E66.812 CLASS 2 SEVERE OBESITY DUE TO EXCESS CALORIES WITH SERIOUS COMORBIDITY AND BODY MASS INDEX (BMI) OF 35.0 TO 35.9 IN ADULT (H): ICD-10-CM

## 2023-07-24 DIAGNOSIS — L30.0 NUMMULAR DERMATITIS: ICD-10-CM

## 2023-07-24 DIAGNOSIS — G89.4 CHRONIC PAIN SYNDROME: ICD-10-CM

## 2023-07-24 PROCEDURE — 99214 OFFICE O/P EST MOD 30 MIN: CPT | Mod: GC

## 2023-07-24 RX ORDER — TRAMADOL HYDROCHLORIDE 50 MG/1
50 TABLET ORAL EVERY 6 HOURS PRN
Qty: 6 TABLET | Refills: 0 | Status: SHIPPED | OUTPATIENT
Start: 2023-07-24 | End: 2023-07-27

## 2023-07-24 RX ORDER — TRAMADOL HYDROCHLORIDE 50 MG/1
50 TABLET ORAL EVERY 6 HOURS PRN
Qty: 6 TABLET | Refills: 0 | Status: CANCELLED | OUTPATIENT
Start: 2023-07-24 | End: 2023-07-27

## 2023-07-24 NOTE — PROGRESS NOTES
Assessment & Plan     Pain of toe of right foot  Patient with mechanical fall 4 days prior.  She is able to ambulate without difficulty but reports moderate pain of her right third toe.  ROM and sensation intact, low concern for fracture- will defer on imaging for now. Patient has tried conservative pain management at home with OTC analgesics, heat, and ice. Patient states tramadol has worked well for acute pain in the past. Per chart review, last prescribed years ago so low concern for abuse.  Patient has Klonopin listed in chart,though  last prescribed in 2016, and is adamant that she does not currently have any of this medication or take this medication.  Provider reviewed risks of taking benzodiazepine and opioid medication simultaneously including risk of death via respiratory depression. Patient expresses understanding and that she will not use both medications at the same time.  Narcan prescription also filled and provider explained use and indication of Narcan for respiratory depression, additional information provided in AVS as well. Reviewed risks again with patient's mother in the room with her permission. Recommend follow-up with new PCP (Dr. Yousif) to establish pain plan and perform medication reconciliation.  - traMADol (ULTRAM) 50 MG tablet  Dispense: 6 tablet; Refill: 0  - naloxone (NARCAN) 4 MG/0.1ML nasal spray  Dispense: 0.2 mL; Refill: 1    Class 2 severe obesity due to excess calories with serious comorbidity and body mass index (BMI) of 35.0 to 35.9 in adult (H)  - Referred to PCP to discuss diet and exercise plan    Chronic pain syndrome  Long standing problem for patient. Relatively stable though admits that pain could be more optimally controlled. She experiences gait instability as a result that could be contributing to recent mechanical fall. Patient states her 4- wheel walker is broken and she needs a replacement, DME order provided as below.   - Walker Order for DME - ONLY FOR  "DME    Nummular dermatitis  Patient follows with dermatology (Dr. Li) for this problem. States she was supposed to be seen last week but her transportation did not arrive at the correct location to pick her up. Patient requested contact info for dermatology clinic today. Provider put this information in the AVS and informed patient she could call clinic and speak to social work team about setting up transportation once she has next visit scheduled. They plan to treat her extremities with light therapy and Dupixent injections. She is still unsure about where to get this medication.   - Derm clinic contact info provided in AVS       36 minutes spent by me on the date of the encounter doing chart review, history and exam, documentation and further activities per the note.       Nicotine/Tobacco Cessation:  She reports that she has been smoking cigarettes. She has a 11.50 pack-year smoking history. She has never used smokeless tobacco.  Nicotine/Tobacco Cessation Plan:   Information offered: Patient not interested at this time      BMI:   Estimated body mass index is 35.26 kg/m  as calculated from the following:    Height as of 6/20/23: 1.549 m (5' 1\").    Weight as of this encounter: 84.6 kg (186 lb 9.6 oz).   Weight management plan: Patient was referred to their PCP to discuss a diet and exercise plan.      Return for Routine preventive with your PCP .    Shila Godwin, DO PGY-2  [facility]    I discussed this patient with attending physician Dr. Dumont who agrees with my assessment and plan.     Samina Phelps is a 47 year old, presenting for the following health issues:  Follow Up (Itching all over body. ) and Dme (Current walker broke. Requesting for a new walker. )      7/24/2023     8:50 AM   Additional Questions   Roomed by mary   Accompanied by self     HPI     Patient reports she fell 4 days ago and hurt her 3rd toe on the right foot.  She was taking her cat outside at the time and " was wearing flimsy slippers.  Patient did not lose consciousness or hit her head.  She states she does occasionally fall from time to time with minor injuries due to her gait instability.  She has been able to ambulate without difficulty but does endorse significant pain of her toe.  She has tried Tylenol, heat, ice, and soaking her foot in warm water- she has found these interventions somewhat helpful but ultimately not adequate to control her pain.  She states she has found tramadol helpful for relieving her pain in the past, though per chart review tramadol has not been prescribed for several years (2014).     She has a history of chronic pain syndrome for which she is prescribed Cymbalta.  Klonopin was also on her medication list but it appears she has not been prescribed it since 2016.  Patient states that she does not take this medication and has not for a long time.  She was previously prescribed a four-wheel walker to help with her gait instability and chronic pain.  Patient reports that her walker is broken currently and that she needs a replacement.    Lastly, patient is concerned about her otitis of bilateral ankles for which she has previously seen dermatology.  She states she was supposed to see the dermatologist last week however her ride did not show up to her home address pick her up for the appointment.  She is requesting contact information for the dermatology office to reschedule.  Per their note, they plan to treat her with light therapy and Dupixent injections.      Review of Systems   Constitutional, HEENT, cardiovascular, pulmonary, gi and gu systems are negative, except as otherwise noted.      Objective    /86 (BP Location: Left arm, Patient Position: Sitting, Cuff Size: Adult Large)   Pulse 83   Temp 97.9  F (36.6  C) (Oral)   Wt 84.6 kg (186 lb 9.6 oz)   LMP 07/23/2022 (Approximate)   SpO2 97%   BMI 35.26 kg/m    Body mass index is 35.26 kg/m .  Physical Exam   GENERAL:  healthy, alert and no distress  EYES: Eyes grossly normal to inspection, PERRL and conjunctivae and sclerae normal  RESP: lungs clear to auscultation - no rales, rhonchi or wheezes  MS: RLE exam shows erythema over the 3rd toe with normal ROM and intact sensation and LLE exam shows no erythema with intact ROM and strength  SKIN: bilateral medial ankles with lichenified scaly plaques     ----- Service Performed and Documented by Resident or Fellow ------

## 2023-07-24 NOTE — TELEPHONE ENCOUNTER
Prior Authorization Retail Medication Request    Medication/Dose: Naloxone HCI 4MG/0.1 liquid   ICD code (if different than what is on RX):  Pain of toe of right foot [M79.674]   Previously Tried and Failed:  Fluticasone Furoate 27.5 MCG/SPRAY Nasal Suspension   Rationale:      Insurance Name:  [place]ICAID [place]   Insurance ID:  [id number]       Pharmacy Information (if different than what is on RX)  Name:  [facility]   Phone:  [phone]

## 2023-07-24 NOTE — TELEPHONE ENCOUNTER
Naloxone HCI 4MG/0.1 liquid- Not covered by insurance. Do you want to you start a PA?    Joanne Watts, RMA 3:08 PM July 24, 2023

## 2023-07-24 NOTE — PATIENT INSTRUCTIONS
Follow up with your dermatologist: If you would like to schedule an appointment with Dr. Li, please call [phone].

## 2023-07-27 NOTE — TELEPHONE ENCOUNTER
Central Prior Authorization Team - Phone: [phone]     PA Initiation    Medication: NALOXONE HCL 4 MG/0.1ML NA LIQD  Insurance Company: [place] Medicaid ([facility]) - Phone [phone] Fax [phone]  Pharmacy Filling the Rx: [id number] [address] [facility] [address] [facility] [address]  Filling Pharmacy Phone: [phone]  Filling Pharmacy Fax:    Start Date: 7/27/2023

## 2023-07-27 NOTE — TELEPHONE ENCOUNTER
Central Prior Authorization Team - Phone: [phone]     Prior Authorization Not Needed per Insurance- brand Narcan preferred    Medication: NALOXONE HCL 4 MG/0.1ML NA LIQD  Insurance Company: [place] Medicaid ([facility]) - Phone [phone] Fax [phone]  Expected CoPay:      Pharmacy Filling the Rx: [facility] #[id number] - [place] [address] [address] [facility] [address]  Pharmacy Notified: Yes  Patient Notified: Yes pharmacy will notify when ready

## 2023-08-03 ENCOUNTER — TELEPHONE (OUTPATIENT)
Dept: FAMILY MEDICINE | Facility: CLINIC | Age: 48
End: 2023-08-03
Payer: MEDICAID

## 2023-08-03 NOTE — TELEPHONE ENCOUNTER
8/3/23: Care Coordination    CC: Ms. Pierre called requested transportation for an upcoming in person clinic visit on: 8/18/2023 @ 1:30 pm. [facility] [phone] will provide the ride for this appointment. Customer Service reported that Ms. Pierre will receive a call with the pick time. CC: suggested that she be ready at least an hour before her appointment. I explained this information to Mattie.      Henrry Sanchez Sr.   Care Coordination  [address] [address]  [email or web address]   Office: [phone]  Direct: [phone]  [facility]

## 2023-08-11 NOTE — TELEPHONE ENCOUNTER
Routing to ordering provider for review.     PA denied- Please close encounter unless you would like to appeal.     Joanne Watts, RMA 2:37 PM August 11, 2023

## 2023-08-22 ENCOUNTER — OFFICE VISIT (OUTPATIENT)
Dept: FAMILY MEDICINE | Facility: CLINIC | Age: 48
End: 2023-08-22
Payer: MEDICAID

## 2023-08-22 VITALS
RESPIRATION RATE: 16 BRPM | BODY MASS INDEX: 34.62 KG/M2 | OXYGEN SATURATION: 99 % | TEMPERATURE: 98.5 F | SYSTOLIC BLOOD PRESSURE: 148 MMHG | HEART RATE: 75 BPM | WEIGHT: 183.2 LBS | DIASTOLIC BLOOD PRESSURE: 94 MMHG

## 2023-08-22 DIAGNOSIS — E73.9 LACTOSE INTOLERANCE: Primary | ICD-10-CM

## 2023-08-22 DIAGNOSIS — Z02.9 ENCOUNTERS FOR ADMINISTRATIVE PURPOSE: ICD-10-CM

## 2023-08-22 PROCEDURE — 99212 OFFICE O/P EST SF 10 MIN: CPT | Mod: GC

## 2023-08-22 NOTE — PROGRESS NOTES
Assessment & Plan     Lactose intolerance  Encounters for administrative purpose  -Discussed with patient that I can complete her form for her diet for [facility].  Forms completed and faxed to number listed.  Diet restriction includes lactose-free and recommended 80 g of protein a day.  Discussed with patient that if she has any concerns regarding this form that she can contact our clinic for additional help.  Patient verbalized clear understanding and agreement to treatment plan and had no further questions or concerns at this time.  Patient's diet previously recommended gluten-free, however after discussing with the patient she has no current gluten restriction in her diet and it does appear that there was a family history of Crohn's disease in her mother however there does not appear to be any documentation of a personal history of Crohn's disease in this patient.        Return if symptoms worsen or fail to improve.    Kade Yousif DO  [employer] SARABJIT Phelps is a 47 year old, presenting for the following health issues:  Forms (Follow up from itchiness. Nothing has helped /Fill special diet form )      HPI     -Patient presents today with a primary concern regarding needing a form filled out for special diet.  She states that she has a history of lactose intolerance and after eating ice cream or dairy she will have several loose bowel movements as well as some bloating and gas.  She states that she has chronic skin issues and sees a dermatologist for these and has associated itching as well.  She states the most important aspect of her visit today is that she gets her dietary forms filled out and faxed over to the appropriate location.  She reports no additional concerns at this time and will follow-up if concerns arise.    Review of Systems   Constitutional, HEENT, cardiovascular, pulmonary, gi and gu systems are negative, except as otherwise noted.  ROS reviewed, see  HPI for pertinent positives and negatives.  Kade Yousif DO        Objective    BP (!) 148/94 (BP Location: Left arm, Patient Position: Sitting, Cuff Size: Adult Regular)   Pulse 75   Temp 98.5  F (36.9  C) (Oral)   Resp 16   Wt 83.1 kg (183 lb 3.2 oz)   LMP 07/23/2022 (Approximate)   SpO2 99%   BMI 34.62 kg/m    Body mass index is 34.62 kg/m .  Physical Exam   GENERAL: healthy, alert and no distress  EYES: Eyes grossly normal to inspection, PERRL and conjunctivae and sclerae normal  RESP: lungs clear to auscultation - no rales, rhonchi or wheezes  ABDOMEN: soft, nontender, no hepatosplenomegaly, no masses and bowel sounds normal  MS: no gross musculoskeletal defects noted, no edema  PSYCH: mentation appears normal, affect normal/bright      ----- Service Performed and Documented by Resident or Fellow ------      Precepted with Dr. Justice Yousif DO

## 2023-09-12 ENCOUNTER — OFFICE VISIT (OUTPATIENT)
Dept: ALLERGY | Facility: CLINIC | Age: 48
End: 2023-09-12
Attending: FAMILY MEDICINE
Payer: MEDICAID

## 2023-09-12 VITALS
OXYGEN SATURATION: 98 % | BODY MASS INDEX: 34.97 KG/M2 | WEIGHT: 185.1 LBS | RESPIRATION RATE: 20 BRPM | HEART RATE: 87 BPM

## 2023-09-12 DIAGNOSIS — L20.89 OTHER ATOPIC DERMATITIS: Primary | ICD-10-CM

## 2023-09-12 DIAGNOSIS — J31.0 CHRONIC RHINITIS: ICD-10-CM

## 2023-09-12 PROCEDURE — 99243 OFF/OP CNSLTJ NEW/EST LOW 30: CPT | Mod: 25 | Performed by: ALLERGY & IMMUNOLOGY

## 2023-09-12 PROCEDURE — 95004 PERQ TESTS W/ALRGNC XTRCS: CPT | Performed by: ALLERGY & IMMUNOLOGY

## 2023-09-12 NOTE — Clinical Note
I saw this patient in the allergy clinic today for rhinitis. Allergy testing was negative, but she stated she never started her Dupixent as she moved and wasn't sure who to contact and get her Dupixent.  Can your team reach out and assist?  She is very confused on how to obtain this medication.  Rachana Nuñez MD

## 2023-09-12 NOTE — PATIENT INSTRUCTIONS
Allergy testing negative    Discontinue Cetirizine     I will check on Dupixent with dermatology

## 2023-09-12 NOTE — LETTER
9/12/2023         RE: Mattie Pierre  [address]  [facility] [address]        Dear Colleague,    Thank you for referring your patient, Mattie Pierre, to the [facility]. Please see a copy of my visit note below.          Subjective  Mattie is a 47 year old, presenting for the following health issues:  Allergy Consult (Itchy eyes, watery eyes, congestion. Trouble breathing at night.)    HPI     Chief complaint: Allergy concerns      History of present illness: This is a pleasant 47-year-old woman was asked to see for evaluation by Dr. Rodriguez in regards to allergies.  Patient states that she has significant eczema on her hands and feet.  In review of the record she has been on multiple topical creams and sees dermatology.  In fact she was prescribed Dupixent after undergoing light therapy that failed to work.  However, she notes that she would had to move and she was not able to obtain Dupixent so she never started it.  She does note symptoms of allergies that occur throughout the year, itchy eyes watery eyes and runny nose.  She has been prescribed cetirizine as well as fluticasone nasal spray.  She does not think these helped.  No history of sinus infections.  She has no history of asthma but she does note some shortness of breath at times.  She thinks she might have food allergy as she has bloating and stomach upset with certain foods.  No hives, swelling or shortness of breath with eating.  She states her eczema occurs year-round she has not been able to figure out any specific trigger.    Past medical history: History of seizure disorder, chronic pain, eczema, hypertension, bipolar, depression, history of cocaine abuse    Social history: She is current smoker and states she smokes multiple cigarettes per day but she is trying to quit, she does have a cat that she had for the last 2 years, she lives in a home that might have some mold damage, unfinished  basement but central air    Family history: Positive for allergies      Review of Systems   Constitutional, HEENT, cardiovascular, pulmonary, gi and gu systems are negative, except as otherwise noted.  Positive for diffuse body aches      Objective   Pulse 87   Resp 20   Wt 84 kg (185 lb 1.6 oz)   LMP 07/23/2022 (Approximate)   SpO2 98%   BMI 34.97 kg/m    Body mass index is 34.97 kg/m .  Physical Exam   Gen: Pleasant female not in acute distress  HEENT: Eyes no erythema of the bulbar or palpebral conjunctiva, no edema. Ears: TMs well visualized, no effusions.  Fair amount of cerumen in both ear canals nose: No congestion, mucosa normal. Mouth: Throat clear, no lip or tongue edema.   Cardiac: Regular rate and rhythm, no murmurs, rubs or gallops  Respiratory: Clear to auscultation bilaterally, no adventitious breath sounds  Lymph: No visible supraclavicular or cervical lymphadenopathy  Skin: Peeling and small blistering of the palmar surfaces of both hands, cracked peeling skin on both ankles  Psych: Alert and oriented times 3      At today s visit the patient/parent and I engaged in an informed consent discussion about allergy testing.  We discussed skin testing, blood testing,  and the alternative of not undergoing any testing. The patient/ parent has a preference for skin testing. We then discussed the risks and benefits of skin testing.  The patient/parent understands skin testing risks can include, but are not limited to, urticaria, angioedema, shortness of breath, and severe anaphylaxis.  The benefits include, but are not limited, to evaluation for allergens causing symptoms.  After answering the patients/parents questions they have agreed to proceed with skin testing.         30 percutaneous test were undertaken to the environmental skin test panel.  Positive histamine control with a negative allergy skin test.  Please see scanned photograph.    Impression report and plan:  1.  Rhinitis  2.  Eczema    I  will send a message to her dermatologist to see if they can follow-up on her Dupixent as she has large lesions today on her hands and feet that look very uncomfortable.  No areas of infection.  Allergy testing was negative.  Recommended stopping cetirizine as I do not think this is likely helping.  Could consider nasal rinses to help with some of her runny nose.  Follow as needed.            Again, thank you for allowing me to participate in the care of your patient.        Sincerely,        Rachana VIRK MD

## 2023-09-12 NOTE — PROGRESS NOTES
Samina Phelps is a 47 year old, presenting for the following health issues:  Allergy Consult (Itchy eyes, watery eyes, congestion. Trouble breathing at night.)    HPI     Chief complaint: Allergy concerns      History of present illness: This is a pleasant 47-year-old woman was asked to see for evaluation by Dr. Rodriguez in regards to allergies.  Patient states that she has significant eczema on her hands and feet.  In review of the record she has been on multiple topical creams and sees dermatology.  In fact she was prescribed Dupixent after undergoing light therapy that failed to work.  However, she notes that she would had to move and she was not able to obtain Dupixent so she never started it.  She does note symptoms of allergies that occur throughout the year, itchy eyes watery eyes and runny nose.  She has been prescribed cetirizine as well as fluticasone nasal spray.  She does not think these helped.  No history of sinus infections.  She has no history of asthma but she does note some shortness of breath at times.  She thinks she might have food allergy as she has bloating and stomach upset with certain foods.  No hives, swelling or shortness of breath with eating.  She states her eczema occurs year-round she has not been able to figure out any specific trigger.    Past medical history: History of seizure disorder, chronic pain, eczema, hypertension, bipolar, depression, history of cocaine abuse    Social history: She is current smoker and states she smokes multiple cigarettes per day but she is trying to quit, she does have a cat that she had for the last 2 years, she lives in a home that might have some mold damage, unfinished basement but central air    Family history: Positive for allergies      Review of Systems   Constitutional, HEENT, cardiovascular, pulmonary, gi and gu systems are negative, except as otherwise noted.  Positive for diffuse body aches      Objective    Pulse 87   Resp 20    Wt 84 kg (185 lb 1.6 oz)   LMP 07/23/2022 (Approximate)   SpO2 98%   BMI 34.97 kg/m    Body mass index is 34.97 kg/m .  Physical Exam   Gen: Pleasant female not in acute distress  HEENT: Eyes no erythema of the bulbar or palpebral conjunctiva, no edema. Ears: TMs well visualized, no effusions.  Fair amount of cerumen in both ear canals nose: No congestion, mucosa normal. Mouth: Throat clear, no lip or tongue edema.   Cardiac: Regular rate and rhythm, no murmurs, rubs or gallops  Respiratory: Clear to auscultation bilaterally, no adventitious breath sounds  Lymph: No visible supraclavicular or cervical lymphadenopathy  Skin: Peeling and small blistering of the palmar surfaces of both hands, cracked peeling skin on both ankles  Psych: Alert and oriented times 3      At today s visit the patient/parent and I engaged in an informed consent discussion about allergy testing.  We discussed skin testing, blood testing,  and the alternative of not undergoing any testing. The patient/ parent has a preference for skin testing. We then discussed the risks and benefits of skin testing.  The patient/parent understands skin testing risks can include, but are not limited to, urticaria, angioedema, shortness of breath, and severe anaphylaxis.  The benefits include, but are not limited, to evaluation for allergens causing symptoms.  After answering the patients/parents questions they have agreed to proceed with skin testing.         30 percutaneous test were undertaken to the environmental skin test panel.  Positive histamine control with a negative allergy skin test.  Please see scanned photograph.    Impression report and plan:  1.  Rhinitis  2.  Eczema    I will send a message to her dermatologist to see if they can follow-up on her Dupixent as she has large lesions today on her hands and feet that look very uncomfortable.  No areas of infection.  Allergy testing was negative.  Recommended stopping cetirizine as I do not think  this is likely helping.  Could consider nasal rinses to help with some of her runny nose.  Follow as needed.

## 2023-09-14 ENCOUNTER — OFFICE VISIT (OUTPATIENT)
Dept: AUDIOLOGY | Facility: CLINIC | Age: 48
End: 2023-09-14
Payer: MEDICAID

## 2023-09-14 DIAGNOSIS — H90.3 SENSORINEURAL HEARING LOSS (SNHL) OF BOTH EARS: Primary | ICD-10-CM

## 2023-09-14 PROCEDURE — 92591 PR HEARING AID EXAM BINAURAL: CPT | Performed by: AUDIOLOGIST

## 2023-09-20 NOTE — PROGRESS NOTES
AUDIOLOGY REPORT    SUBJECTIVE:   Mattie Pierre is a 47 year old female was seen in the Audiology Clinic at  [facility] on 9/14/23 to discuss concerns with hearing and functional communication difficulties. Mattie has been seen previously on 6/20/23, and results revealed a mild to severe sensorineural hearing loss.  The patient was medically evaluated and determined to be cleared for binaural hearing aids by Rick Nissen. Mattie notes difficulty with communication in a variety of listening situations.    OBJECTIVE:  Patient is a hearing aid candidate. Patient would like to move forward with a hearing aid evaluation today. Therefore, the patient was presented with different options for amplification to help aid in communication. Discussed styles, levels of technology and monaural vs. binaural fitting.     The hearing aid(s) mutually chosen were:  Binaural: Phonak L70-R  COLOR: Sand beige  BATTERY SIZE: rechargeable  EARMOLD/TIPS: Small closed  CANAL/ LENGTH: 1s      ASSESSMENT:   Reviewed purchase information and warranty information with patient. The 45 day trial period was explained to patient. The patient was given a copy of the [place] [employer] consumer brochure on purchasing hearing instruments. Patient risk factors have been provided to the patient in writing prior to the sale of the hearing aid per FDA regulation. The risk factors are also available in the User Instructional Booklet to be presented on the day of the hearing aid fitting. Hearing aid(s) ordered. Hearing aid evaluation completed.    PLAN:   Mattie is scheduled to return in 2-3 weeks for a hearing aid fitting and programming. Purchase agreement will be completed on that date. Please contact this clinic with any questions or concerns.      Herb Hernandez, [facility]  Licensed Audiologist  [place] License #[id number]

## 2023-09-22 ENCOUNTER — OFFICE VISIT (OUTPATIENT)
Dept: NEUROLOGY | Facility: CLINIC | Age: 48
End: 2023-09-22
Attending: NURSE PRACTITIONER
Payer: MEDICAID

## 2023-09-22 ENCOUNTER — ANCILLARY ORDERS (OUTPATIENT)
Dept: NEUROLOGY | Facility: CLINIC | Age: 48
End: 2023-09-22

## 2023-09-22 VITALS
TEMPERATURE: 97.3 F | BODY MASS INDEX: 35.34 KG/M2 | SYSTOLIC BLOOD PRESSURE: 144 MMHG | HEIGHT: 61 IN | HEART RATE: 76 BPM | DIASTOLIC BLOOD PRESSURE: 97 MMHG | WEIGHT: 187.2 LBS

## 2023-09-22 DIAGNOSIS — F19.20 CHEMICAL DEPENDENCY (H): Primary | ICD-10-CM

## 2023-09-22 DIAGNOSIS — G40.909 SEIZURE DISORDER (H): ICD-10-CM

## 2023-09-22 DIAGNOSIS — R29.898 LEG WEAKNESS, BILATERAL: ICD-10-CM

## 2023-09-22 DIAGNOSIS — G47.00 INSOMNIA, UNSPECIFIED TYPE: ICD-10-CM

## 2023-09-22 NOTE — PROGRESS NOTES
[facility] Epilepsy Care History and Physical       Patient:  Mattie Pierre  :  1975   Age:  47 year old   Today's Office Visit:  2023    Referring Provider:    KIRA Sky CNP  [id number]  [place] [address]    History of Present Illness:  Mattie Pierre is 47 year old ambidextrous, predominantly uses right hand. She is a poor historian and does not recall her history.  She presents with recurrent seizure-like spells.  Mattie states she has spells at night where she might have tongue biting and wakes up in the morning and states that her tongue is chewed up.  This can happen up to 4 times a month.  She states that she has had grand mal seizures since childhood and has taken many medications such as Depakote which cause weight gain and Dilantin.  She also has chronic daily headaches right now.  Topiramate is on her medication list, however she does not take this medication.  She does have a bed partner with her boyfriend however she states he usually sleeps through the spells and not able to describe further which she does during her spells.  She is not sure if she has shaking or hypermotor movements and states she does fall off of the bed.   She last used cocaine three months ago (2023), she goes [employer], her boyfriend motivates her to stay sober,   Spell/Seizure type 1: Wakes up with tongue bite and falls off beds. She is not able to describe more.   Epilepsy Risk Factors:  Patient has no history of encephalitis/meningitis, no history of stroke, no history of tumor, She has history of hit her head as child with loss of consciousness, possible traumatic brain injury.  The patient had a normal birth and delivery.  No developmental delays noted.  No febrile seizures.  No family members with epilepsy.   She had special education.   Precipitating factors:   Sleep Deprivation and Stress  Current Outpatient Medications   Medication Sig Dispense Refill     ammonium lactate (LAC-HYDRIN) 12 % external lotion Apply topically 2 times daily 225 g 4    chlorhexidine (HIBICLENS) 4 % liquid Apply topically daily as needed for wound care 946 mL 3    clobetasol (TEMOVATE) 0.05 % external ointment Apply topically 2 times daily To feet and ankles, cover with saran wrap at bedtime 60 g 1    ClonazePAM (KLONOPIN PO)       cyclobenzaprine (FLEXMID) 7.5 MG tablet Take 1 tablet (7.5 mg) by mouth 2 times daily as needed for muscle spasms 60 tablet 1    dextromethorphan (DELSYM) 30 MG/5ML liquid Take 10 mLs (60 mg) by mouth 2 times daily 148 mL 1    diclofenac (VOLTAREN) 1 % topical gel Apply 4 g topically 4 times daily 350 g 0    diphenhydrAMINE (BENADRYL) 25 MG tablet Take 1 tablet (25 mg) by mouth every 6 hours as needed for itching or allergies 90 tablet 1    doxycycline monohydrate (ADOXA) 100 MG tablet Take 1 tablet (100 mg) by mouth 2 times daily 180 tablet 0    DULoxetine (CYMBALTA) 30 MG capsule Take 1 capsule (30 mg) by mouth daily 90 capsule 3    dupilumab (DUPIXENT) 300 MG/2ML prefilled pen Inject 4 mLs (600 mg) Subcutaneous See Admin Instructions for 1 dose 4 mL 0    dupilumab (DUPIXENT) 300 MG/2ML prefilled pen Inject 2 mLs (300 mg) Subcutaneous every 14 days 4 mL 2    famotidine (PEPCID) 20 MG tablet Take 1 tablet (20 mg) by mouth 2 times daily 60 tablet 1    hydrOXYzine (VISTARIL) 50 MG capsule Take 1 capsule (50 mg) by mouth 2 times daily as needed for itching or other (sleep) 90 capsule 3    lactase (LACTAID) 3000 UNIT tablet Take 3 tablets (9,000 Units) by mouth 3 times daily (with meals) 90 tablet 3    lamoTRIgine (LAMICTAL) 150 MG tablet Take 1 tablet (150 mg) by mouth 2 times daily 180 tablet 3    levothyroxine (SYNTHROID/LEVOTHROID) 25 MCG tablet Take 1 tablet (25 mcg) by mouth daily 30 tablet 0    loratadine (CLARITIN) 10 MG tablet Take 1 tablet (10 mg) by mouth daily 30 tablet 11    losartan (COZAAR) 100 MG tablet Take 1 tablet (100 mg) by mouth daily 90 tablet  1    methylcellulose (CITRUCEL) powder Take 2 g by mouth daily Start with 1 tablespoon per day and if tolerated, may increase up to 2-3 tablespoons per day. 454 g 3    naloxone (NARCAN) 4 MG/0.1ML nasal spray Spray 1 spray (4 mg) into one nostril alternating nostrils as needed for opioid reversal every 2-3 minutes until assistance arrives 0.2 mL 1    naproxen (NAPROSYN) 500 MG tablet Take 1 tablet (500 mg) by mouth 2 times daily as needed for moderate pain 30 tablet 1    nystatin (MYCOSTATIN) 801266 UNIT/GM external powder Apply topically 2 times daily as needed for other (rash and itchiness) 120 g 1    oxybutynin ER (DITROPAN XL) 10 MG 24 hr tablet Take 1 tablet (10 mg) by mouth daily 90 tablet 3    polyethylene glycol (MIRALAX) 17 GM/Dose powder Take 17 g (1 capful) by mouth daily 238 g 4    prazosin (MINIPRESS) 5 MG capsule Take 1 capsule (5 mg) by mouth At Bedtime 30 capsule 3    QUEtiapine (SEROQUEL) 25 MG tablet TAKE 2 TABLETS(15 MG) BY MOUTH EVERY MORNING 60 tablet 3    QUEtiapine ER (SEROQUEL XR) 200 MG 24 hr tablet Take 1 tablet (200 mg) by mouth At Bedtime 90 tablet 3    simvastatin (ZOCOR) 20 MG tablet Take 1 tablet (20 mg) by mouth At Bedtime 30 tablet 11    tacrolimus (PROTOPIC) 0.1 % external ointment Apply topically 2 times daily To areas of eczema 60 g 1    topiramate (TOPAMAX) 100 MG tablet Take 3 tablets (300 mg) by mouth At Bedtime 180 tablet 0    topiramate (TOPAMAX) 50 MG tablet Take 1 tablet (50 mg) by mouth At Bedtime 60 tablet 1    triamcinolone (KENALOG) 0.1 % external ointment Apply topically 2 times daily To areas of itch on back 453.6 g 1    valACYclovir (VALTREX) 500 MG tablet Take 2 tablets (1,000 mg) by mouth 3 times daily 42 tablet 0    white petrolatum external gel Apply to hands and feet twice daily 390 g 3     Past AEDs:    Depakote (valproic acid) and Phenytoin      9/22/2023     9:17 AM   AED - ANTIEPILEPTIC DRUGS   clonazePAM  No sig       lamoTRIgine 150 mg BID PO       Topiramate 50 mg At Bedtime PO        300 mg At Bedtime PO          Medication marked as long-term    Patient-reported medication    Multiple values from one day are sorted in reverse-chronological order     Past Medical History:   Diagnosis Date    Anemia     Arthritis     COPD (chronic obstructive pulmonary disease) (H)     Depression     Depressive disorder     Diabetes (H)     History of blood transfusion     Hypertension     Obesity     Seizures (H)     Thyroid disease       Past Surgical History:   Procedure Laterality Date    AMPUTATE FOOT Left 2016    Procedure: PARTIAL PHALANGECTOMY 2ND TOE LEFT FOOT, EXOSTECTOMY LEFT GREAT TOE;  Surgeon: Mustapha Pollard DPM;  Location: [facility];  Service:      SECTION      EYE SURGERY      GYN SURGERY           NO HISTORY OF SURGERY       Family History   Problem Relation Age of Onset    Dementia Mother     Diabetes Mother     Crohn's Disease Mother     Colon Cancer Mother         Unknown age of onset    Cerebrovascular Disease Father     No Known Problems Maternal Grandmother     No Known Problems Maternal Grandfather     No Known Problems Paternal Grandmother     No Known Problems Paternal Grandfather     No Known Problems Brother     No Known Problems Sister     No Known Problems Son     No Known Problems Daughter     No Known Problems Maternal Half-Brother     No Known Problems Maternal Half-Sister     No Known Problems Paternal Half-Brother     No Known Problems Paternal Half-Sister     No Known Problems Niece     No Known Problems Nephew     No Known Problems Cousin     No Known Problems Other     Coronary Artery Disease No family hx of     Cancer No family hx of     Heart Disease No family hx of     Hypertension No family hx of     Hyperlipidemia No family hx of     Kidney Disease No family hx of     Obesity No family hx of     Thrombosis No family hx of     Asthma No family hx of     Arthritis No family hx of     Thyroid  "Disease No family hx of     Depression No family hx of     Mental Illness No family hx of     Substance Abuse No family hx of     Cystic Fibrosis No family hx of     Early Death No family hx of     Coronary Artery Disease Early Onset No family hx of     Heart Failure No family hx of     Bleeding Diathesis No family hx of     Breast Cancer No family hx of     Ovarian Cancer No family hx of     Uterine Cancer No family hx of     Prostate Cancer No family hx of     Colorectal Cancer No family hx of     Pancreatic Cancer No family hx of     Lung Cancer No family hx of     Melanoma No family hx of     Autoimmune Disease No family hx of     Unknown/Adopted No family hx of     Genetic Disorder No family hx of       Psycho-Social History: Mattie Pierre currently lives boyfriend, highest level of education 12th grade, she gets social security, she does not work. Patient currently has feelings of depression, passive thoughts thoughts of self-harm, no active plans to hurt themselves, patient does have anxiety about seizures.. Does not smoke, rare alcohol use, no recreational drug use. We reviewed importance of mental and emotional wellbeing and impact on health.   Driving:  Currently patient is:  Not driving.  Previous Evaluations for Epilepsy:   EEG: none  MRI of Brain: 2/14/2023: IMPRESSION:  1.  No acute intracranial process. Specifically, no acute hemorrhage, mass, mass effect, abnormal extra-axial fluid collection or acute/subacute infarct.  2.  There are moderate, advanced for age confluent periventricular, subcortical and deep white matter T2/Flair signal abnormalities. These findings are most often seen in the setting of chronic microangiopathic ischemic change, however other processes   such as demyelination (including multiple sclerosis), Lyme disease, or even autoimmune vasculitis could have a similar appearance.     Exam:    BP (!) 144/97   Pulse 76   Temp 97.3  F (36.3  C) (Temporal)   Ht 5' 1\" (154.9 cm)   " Wt 187 lb 3.2 oz (84.9 kg)   LMP 07/23/2022 (Approximate)   BMI 35.37 kg/m       Wt Readings from Last 5 Encounters:   09/22/23 187 lb 3.2 oz (84.9 kg)   09/12/23 185 lb 1.6 oz (84 kg)   08/22/23 183 lb 3.2 oz (83.1 kg)   07/24/23 186 lb 9.6 oz (84.6 kg)   06/27/23 186 lb 9.6 oz (84.6 kg)       Alert, orientated, speech is fluent, pupils are equal, round, dysconjugate gaze with right eye extrophia when left eye is midline,  face asymmetric with right lower side being weaker and less movements compared to left side, dysmetria with finger no pronator drip, equal  strength, normal to light touch with no sensory deficits noted, decrease right knee reflex compared to left side, finger to nose normal, no focal deficits noted. Wide based gait is stable.       Impression:    Seizure like spells (night time tongue biting)   Headaches (followed by Desean in headache clinic)   Chemical Dependency (last used cocaine 6/2023)     Discussion:   Mattie is a 47-year-old ambidextrous handed female, predominantly uses her right hand she presents with recurrent nighttime spells where she wakes up and has bitten her tongue.  There are concerns that these may potentially be seizures.  She does have a childhood history of grand mal seizures.  She is not on an antiepileptic drug at this time. She is currently not on an antiseizure medication.  It would be helpful to complete a ambulatory EEG for 48 hours to evaluate if she has any interictal or ictal abnormalities that may warrant further intervention.  She states she has chronic daily headaches and this is managed by [facility] and [facility] clinic.  Other considerations tongue biting at night could be grinding her teeth or sleep disorders or seizures.  We can order a sleep study also to make sure that she does not have a sleep disorder which would potentially present as nocturnal seizures.  Between the ambulatory EEG and the sleep disorder testing I hope that we are able to  better delineate if she needs further intervention with an antiseizure medication and clarify her diagnosis.  She is currently not driving, she should avoid driving.  More so I asked her to see a therapist to work on her cocaine sobriety, she states she last used cocaine in June 2023.  It seems she has a very supportive boyfriend and she goes to [employer] which has been helpful for her.  An additional layer of chemical dependency therapy will help her maintain long sobriety.  I explained this plan of care to her and she is agreeable with both test and therapy.       Plan:     48 hour EEG to evaluate night time tongue biting spells   Sleep disorder  to evaluate night time tongue biting spells, insomnia, poor sleep   No antiepileptic drug at this time   Mental health consult for chemical dependency (sober since June 2023)  Follow up with Rachana 6 months    The patient was advised to maintain proper seizure precautions. [place] regulations on driving were reviewed with the patient. The patient clearly understands that she/he is prohibited from operating a motor vehicle within 3 months following any seizure or other episode with sudden unconsciousness or inability to sit up, and that it is required to report most recent seizure to the DMV within 30 days after the event.    Patient was advised to avoid any activities that might lead to self-injury or injury of others, within 3 months following any seizure with impaired awareness or impaired motor control such activities include but are not limited to operating power tools, operating firearms, climbing ladders/trees/exposure to heights from which he might fall. Patient should not operate power tools or heavy machinery and equipment.  Patient was advised not to swim alone.  Patient should not bathe in any form of tub, such as bathtub, jacuzzi, or hot tub unless there is a responsible adult close by to provide assistance in case she has a seizure and drowns. Patient  should not work on hot surfaces such stoves, ovens, or with scalding hot water.         I spent 50 minutes in total today to provide comprehensive  medical care.   I spent 8 minutes writing the note and placing orders.   I spent 5 minutes  reviewing the chart.     The rest of the time was spent with the patient in face to face interview. During this time key medical decisions were made with review of medical chart prior to visit, visit with patient, counseling/education, and post visit work, including documentation of note on the day of visit. I addressed all questions the patient/caregiver raised in regards to epilepsy or related medical questions.       Cheryle Mojica MD   Epilepsy Staff

## 2023-09-22 NOTE — PATIENT INSTRUCTIONS
"Discussion:   Mattie is a 47-year-old ambidextrous handed female, predominantly uses her right hand she presents with recurrent nighttime spells where she wakes up and has bitten her tongue.  There are concerns that these may potentially be seizures.  She does have a childhood history of grand mal seizures.  She is not on an antiepileptic drug at this time. She is currently not on an antiseizure medication.  It would be helpful to complete a ambulatory EEG for 48 hours to evaluate if she has any interictal or ictal abnormalities that may warrant further intervention.  She states she has chronic daily headaches and this is managed by [facility] and headache clinic.  Other considerations tongue biting at night could be grinding her teeth or sleep disorders or seizures.  We can order a sleep study also to make sure that she does not have a sleep disorder which would potentially present as nocturnal seizures.  Between the ambulatory EEG and the sleep disorder testing I hope that we are able to better delineate if she needs further intervention with an antiseizure medication and clarify her diagnosis.  She is currently not driving, she should avoid driving.  More so I asked her to see a therapist to work on her cocaine sobriety, she states she last used cocaine in June 2023.  It seems she has a very supportive boyfriend and she goes to [employer] which has been helpful for her.  An additional layer of chemical dependency therapy will help her maintain long sobriety.  I explained this plan of care to her and she is agreeable with both test and therapy.     BP (!) 144/97   Pulse 76   Temp 97.3  F (36.3  C) (Temporal)   Ht 5' 1\" (154.9 cm)   Wt 187 lb 3.2 oz (84.9 kg)   LMP 07/23/2022 (Approximate)   BMI 35.37 kg/m       Plan:     48 hour EEG to evaluate night time tongue biting spells   Sleep disorder  to evaluate night time tongue biting spells, insomnia, poor sleep   No antiepileptic drug at this time   Mental " health consult for chemical dependency (sober since June 2023)  Follow up with Rachana 3 months    AMBULATORY EEG  I ordered an ambulatory EEG on this visit.  EEG test will allow us to look for seizure like activity. An ambulatory EEG is a take-home EEG.  You will have electrodes put on your scalp at the clinic and a head box that you can wear as a bag.  You will have to come to clinic on subsequent days to have EEG data downloaded and electrodes re-glued.     ambulatory EEG options: 48 hour EEG. Day 1 come to [facility] and get EEG put on head and you may go home. Day 2: Come to clinic to have EEG downloaded. Day 3: Come to clinic to remove EEG.      You have to schedule an appointment for each visit. If you have a target spell or seizure press the event button and document what you felt or experienced. Please schedule this study at your convience. It is best to schedule a follow-up in person visit to review your EEG results and clinical implications.  If you are not able to come into the clinic for an appointment, I am happy to review your results over the phone.  Kindly, schedule the most appropriate phone visit for you and your schedule after the EEG is completed. If you have MyChart, I may also send you the results via [employer] messaging if appropriate.     If patient has a target spell of night time tongue biting, alex record spell on video. Make sure to obtain consent for video recording. Review this video with Yunior at next visit. At the onset ask patient to remember a word, follow a simple command (i.e point to the ceiling or point to the door). After the spell is completed ask to recall the word that was given.     How to Contact [facility] Epilepsy Care:   Send my chart message on EPIC to Dr. Mojica OR Call [facility] [phone] to speak with staff for assistance     If you need paper work completed please contact our office and allow 2-3 weeks for turnaround time, if you do not hear from us, please send  [employer] message again.     The patient was advised to maintain proper seizure precautions. [place] regulations on driving were reviewed with the patient. The patient clearly understands that she/he is prohibited from operating a motor vehicle within 3 months following any seizure or other episode with sudden unconsciousness or inability to sit up, and that it is required to report most recent seizure to the DMV within 30 days after the event.      Patient was advised to avoid any activities that might lead to self-injury or injury of others, within 3 months following any seizure with impaired awareness or impaired motor control such activities include but are not limited to operating power tools, operating firearms, climbing ladders/trees/exposure to heights from which he might fall. Patient should not operate power tools or heavy machinery and equipment.  Patient was advised not to swim alone.  Patient should not bathe in any form of tub, such as bathtub, jacuzzi, or hot tub unless there is a responsible adult close by to provide assistance in case she has a seizure and drowns. Patient should not work on hot surfaces such stoves, ovens, or with scalding hot water.    Cheryle Mojica MD

## 2023-09-22 NOTE — LETTER
2023       RE: Mattie Pierre  : 1975   MRN: [id number]        Dear Colleague,    Thank you for referring your patient, Mattie Pierre, to the [facility] [place] EPILEPSY CARE at [facility]. Please see a copy of my visit note below.    [facility] History and Physical       Patient:  Mattie Pierre  :  1975   Age:  47 year old   Today's Office Visit:  2023    Referring Provider:    KIRA Sky CNP  [id number]  [place] [address]    History of Present Illness:  Mattie Pierre is 47 year old ambidextrous, predominantly uses right hand. She is a poor historian and does not recall her history.  She presents with recurrent seizure-like spells.  Mattie states she has spells at night where she might have tongue biting and wakes up in the morning and states that her tongue is chewed up.  This can happen up to 4 times a month.  She states that she has had grand mal seizures since childhood and has taken many medications such as Depakote which cause weight gain and Dilantin.  She also has chronic daily headaches right now.  Topiramate is on her medication list, however she does not take this medication.  She does have a bed partner with her boyfriend however she states he usually sleeps through the spells and not able to describe further which she does during her spells.  She is not sure if she has shaking or hypermotor movements and states she does fall off of the bed.   She last used cocaine three months ago (2023), she goes [employer], her boyfriend motivates her to stay sober,   Spell/Seizure type 1: Wakes up with tongue bite and falls off beds. She is not able to describe more.   Epilepsy Risk Factors:  Patient has no history of encephalitis/meningitis, no history of stroke, no history of tumor, She has history of hit her head as child with loss of consciousness, possible traumatic brain  injury.  The patient had a normal birth and delivery.  No developmental delays noted.  No febrile seizures.  No family members with epilepsy.   She had special education.   Precipitating factors:   Sleep Deprivation and Stress  Current Outpatient Medications   Medication Sig Dispense Refill    ammonium lactate (LAC-HYDRIN) 12 % external lotion Apply topically 2 times daily 225 g 4    chlorhexidine (HIBICLENS) 4 % liquid Apply topically daily as needed for wound care 946 mL 3    clobetasol (TEMOVATE) 0.05 % external ointment Apply topically 2 times daily To feet and ankles, cover with saran wrap at bedtime 60 g 1    ClonazePAM (KLONOPIN PO)       cyclobenzaprine (FLEXMID) 7.5 MG tablet Take 1 tablet (7.5 mg) by mouth 2 times daily as needed for muscle spasms 60 tablet 1    dextromethorphan (DELSYM) 30 MG/5ML liquid Take 10 mLs (60 mg) by mouth 2 times daily 148 mL 1    diclofenac (VOLTAREN) 1 % topical gel Apply 4 g topically 4 times daily 350 g 0    diphenhydrAMINE (BENADRYL) 25 MG tablet Take 1 tablet (25 mg) by mouth every 6 hours as needed for itching or allergies 90 tablet 1    doxycycline monohydrate (ADOXA) 100 MG tablet Take 1 tablet (100 mg) by mouth 2 times daily 180 tablet 0    DULoxetine (CYMBALTA) 30 MG capsule Take 1 capsule (30 mg) by mouth daily 90 capsule 3    dupilumab (DUPIXENT) 300 MG/2ML prefilled pen Inject 4 mLs (600 mg) Subcutaneous See Admin Instructions for 1 dose 4 mL 0    dupilumab (DUPIXENT) 300 MG/2ML prefilled pen Inject 2 mLs (300 mg) Subcutaneous every 14 days 4 mL 2    famotidine (PEPCID) 20 MG tablet Take 1 tablet (20 mg) by mouth 2 times daily 60 tablet 1    hydrOXYzine (VISTARIL) 50 MG capsule Take 1 capsule (50 mg) by mouth 2 times daily as needed for itching or other (sleep) 90 capsule 3    lactase (LACTAID) 3000 UNIT tablet Take 3 tablets (9,000 Units) by mouth 3 times daily (with meals) 90 tablet 3    lamoTRIgine (LAMICTAL) 150 MG tablet Take 1 tablet (150 mg) by mouth 2 times  daily 180 tablet 3    levothyroxine (SYNTHROID/LEVOTHROID) 25 MCG tablet Take 1 tablet (25 mcg) by mouth daily 30 tablet 0    loratadine (CLARITIN) 10 MG tablet Take 1 tablet (10 mg) by mouth daily 30 tablet 11    losartan (COZAAR) 100 MG tablet Take 1 tablet (100 mg) by mouth daily 90 tablet 1    methylcellulose (CITRUCEL) powder Take 2 g by mouth daily Start with 1 tablespoon per day and if tolerated, may increase up to 2-3 tablespoons per day. 454 g 3    naloxone (NARCAN) 4 MG/0.1ML nasal spray Spray 1 spray (4 mg) into one nostril alternating nostrils as needed for opioid reversal every 2-3 minutes until assistance arrives 0.2 mL 1    naproxen (NAPROSYN) 500 MG tablet Take 1 tablet (500 mg) by mouth 2 times daily as needed for moderate pain 30 tablet 1    nystatin (MYCOSTATIN) 261087 UNIT/GM external powder Apply topically 2 times daily as needed for other (rash and itchiness) 120 g 1    oxybutynin ER (DITROPAN XL) 10 MG 24 hr tablet Take 1 tablet (10 mg) by mouth daily 90 tablet 3    polyethylene glycol (MIRALAX) 17 GM/Dose powder Take 17 g (1 capful) by mouth daily 238 g 4    prazosin (MINIPRESS) 5 MG capsule Take 1 capsule (5 mg) by mouth At Bedtime 30 capsule 3    QUEtiapine (SEROQUEL) 25 MG tablet TAKE 2 TABLETS(15 MG) BY MOUTH EVERY MORNING 60 tablet 3    QUEtiapine ER (SEROQUEL XR) 200 MG 24 hr tablet Take 1 tablet (200 mg) by mouth At Bedtime 90 tablet 3    simvastatin (ZOCOR) 20 MG tablet Take 1 tablet (20 mg) by mouth At Bedtime 30 tablet 11    tacrolimus (PROTOPIC) 0.1 % external ointment Apply topically 2 times daily To areas of eczema 60 g 1    topiramate (TOPAMAX) 100 MG tablet Take 3 tablets (300 mg) by mouth At Bedtime 180 tablet 0    topiramate (TOPAMAX) 50 MG tablet Take 1 tablet (50 mg) by mouth At Bedtime 60 tablet 1    triamcinolone (KENALOG) 0.1 % external ointment Apply topically 2 times daily To areas of itch on back 453.6 g 1    valACYclovir (VALTREX) 500 MG tablet Take 2 tablets (1,000  mg) by mouth 3 times daily 42 tablet 0    white petrolatum external gel Apply to hands and feet twice daily 390 g 3     Past AEDs:    Depakote (valproic acid) and Phenytoin      2023     9:17 AM   AED - ANTIEPILEPTIC DRUGS   clonazePAM  No sig       lamoTRIgine 150 mg BID PO      Topiramate 50 mg At Bedtime PO        300 mg At Bedtime PO          Medication marked as long-term    Patient-reported medication    Multiple values from one day are sorted in reverse-chronological order     Past Medical History:   Diagnosis Date    Anemia     Arthritis     COPD (chronic obstructive pulmonary disease) (H)     Depression     Depressive disorder     Diabetes (H)     History of blood transfusion     Hypertension     Obesity     Seizures (H)     Thyroid disease       Past Surgical History:   Procedure Laterality Date    AMPUTATE FOOT Left 2016    Procedure: PARTIAL PHALANGECTOMY 2ND TOE LEFT FOOT, EXOSTECTOMY LEFT GREAT TOE;  Surgeon: Mustapha Pollard DPM;  Location: [facility];  Service:      SECTION      EYE SURGERY      GYN SURGERY           NO HISTORY OF SURGERY       Family History   Problem Relation Age of Onset    Dementia Mother     Diabetes Mother     Crohn's Disease Mother     Colon Cancer Mother         Unknown age of onset    Cerebrovascular Disease Father     No Known Problems Maternal Grandmother     No Known Problems Maternal Grandfather     No Known Problems Paternal Grandmother     No Known Problems Paternal Grandfather     No Known Problems Brother     No Known Problems Sister     No Known Problems Son     No Known Problems Daughter     No Known Problems Maternal Half-Brother     No Known Problems Maternal Half-Sister     No Known Problems Paternal Half-Brother     No Known Problems Paternal Half-Sister     No Known Problems Niece     No Known Problems Nephew     No Known Problems Cousin     No Known Problems Other     Coronary Artery Disease No family hx of     Cancer No  family hx of     Heart Disease No family hx of     Hypertension No family hx of     Hyperlipidemia No family hx of     Kidney Disease No family hx of     Obesity No family hx of     Thrombosis No family hx of     Asthma No family hx of     Arthritis No family hx of     Thyroid Disease No family hx of     Depression No family hx of     Mental Illness No family hx of     Substance Abuse No family hx of     Cystic Fibrosis No family hx of     Early Death No family hx of     Coronary Artery Disease Early Onset No family hx of     Heart Failure No family hx of     Bleeding Diathesis No family hx of     Breast Cancer No family hx of     Ovarian Cancer No family hx of     Uterine Cancer No family hx of     Prostate Cancer No family hx of     Colorectal Cancer No family hx of     Pancreatic Cancer No family hx of     Lung Cancer No family hx of     Melanoma No family hx of     Autoimmune Disease No family hx of     Unknown/Adopted No family hx of     Genetic Disorder No family hx of       Psycho-Social History: Mattie Pierre currently lives boyfriend, highest level of education 12th grade, she gets social security, she does not work. Patient currently has feelings of depression, passive thoughts thoughts of self-harm, no active plans to hurt themselves, patient does have anxiety about seizures.. Does not smoke, rare alcohol use, no recreational drug use. We reviewed importance of mental and emotional wellbeing and impact on health.   Driving:  Currently patient is:  Not driving.  Previous Evaluations for Epilepsy:   EEG: none  MRI of Brain: 2/14/2023: IMPRESSION:  1.  No acute intracranial process. Specifically, no acute hemorrhage, mass, mass effect, abnormal extra-axial fluid collection or acute/subacute infarct.  2.  There are moderate, advanced for age confluent periventricular, subcortical and deep white matter T2/Flair signal abnormalities. These findings are most often seen in the setting of chronic  "microangiopathic ischemic change, however other processes   such as demyelination (including multiple sclerosis), Lyme disease, or even autoimmune vasculitis could have a similar appearance.     Exam:    BP (!) 144/97   Pulse 76   Temp 97.3  F (36.3  C) (Temporal)   Ht 5' 1\" (154.9 cm)   Wt 187 lb 3.2 oz (84.9 kg)   LMP 07/23/2022 (Approximate)   BMI 35.37 kg/m       Wt Readings from Last 5 Encounters:   09/22/23 187 lb 3.2 oz (84.9 kg)   09/12/23 185 lb 1.6 oz (84 kg)   08/22/23 183 lb 3.2 oz (83.1 kg)   07/24/23 186 lb 9.6 oz (84.6 kg)   06/27/23 186 lb 9.6 oz (84.6 kg)       Alert, orientated, speech is fluent, pupils are equal, round, dysconjugate gaze with right eye extrophia when left eye is midline,  face asymmetric with right lower side being weaker and less movements compared to left side, dysmetria with finger no pronator drip, equal  strength, normal to light touch with no sensory deficits noted, decrease right knee reflex compared to left side, finger to nose normal, no focal deficits noted. Wide based gait is stable.       Impression:    Seizure like spells (night time tongue biting)   Headaches (followed by Desean in headache clinic)   Chemical Dependency (last used cocaine 6/2023)     Discussion:   Mattie is a 47-year-old ambidextrous handed female, predominantly uses her right hand she presents with recurrent nighttime spells where she wakes up and has bitten her tongue.  There are concerns that these may potentially be seizures.  She does have a childhood history of grand mal seizures.  She is not on an antiepileptic drug at this time. She is currently not on an antiseizure medication.  It would be helpful to complete a ambulatory EEG for 48 hours to evaluate if she has any interictal or ictal abnormalities that may warrant further intervention.  She states she has chronic daily headaches and this is managed by [facility] and headache clinic.  Other considerations tongue biting at " night could be grinding her teeth or sleep disorders or seizures.  We can order a sleep study also to make sure that she does not have a sleep disorder which would potentially present as nocturnal seizures.  Between the ambulatory EEG and the sleep disorder testing I hope that we are able to better delineate if she needs further intervention with an antiseizure medication and clarify her diagnosis.  She is currently not driving, she should avoid driving.  More so I asked her to see a therapist to work on her cocaine sobriety, she states she last used cocaine in June 2023.  It seems she has a very supportive boyfriend and she goes to [employer] which has been helpful for her.  An additional layer of chemical dependency therapy will help her maintain long sobriety.  I explained this plan of care to her and she is agreeable with both test and therapy.       Plan:     48 hour EEG to evaluate night time tongue biting spells   Sleep disorder  to evaluate night time tongue biting spells, insomnia, poor sleep   No antiepileptic drug at this time   Mental health consult for chemical dependency (sober since June 2023)  Follow up with Rachana 6 months    The patient was advised to maintain proper seizure precautions. [place] regulations on driving were reviewed with the patient. The patient clearly understands that she/he is prohibited from operating a motor vehicle within 3 months following any seizure or other episode with sudden unconsciousness or inability to sit up, and that it is required to report most recent seizure to the DMV within 30 days after the event.    Patient was advised to avoid any activities that might lead to self-injury or injury of others, within 3 months following any seizure with impaired awareness or impaired motor control such activities include but are not limited to operating power tools, operating firearms, climbing ladders/trees/exposure to heights from which he might fall. Patient should not  operate power tools or heavy machinery and equipment.  Patient was advised not to swim alone.  Patient should not bathe in any form of tub, such as bathtub, jacuzzi, or hot tub unless there is a responsible adult close by to provide assistance in case she has a seizure and drowns. Patient should not work on hot surfaces such stoves, ovens, or with scalding hot water.         I spent 50 minutes in total today to provide comprehensive  medical care.   I spent 8 minutes writing the note and placing orders.   I spent 5 minutes  reviewing the chart.     The rest of the time was spent with the patient in face to face interview. During this time key medical decisions were made with review of medical chart prior to visit, visit with patient, counseling/education, and post visit work, including documentation of note on the day of visit. I addressed all questions the patient/caregiver raised in regards to epilepsy or related medical questions.         Again, thank you for allowing me to participate in the care of your patient.      Sincerely,    Cheryle Mojica MD

## 2023-09-26 ENCOUNTER — OFFICE VISIT (OUTPATIENT)
Dept: FAMILY MEDICINE | Facility: CLINIC | Age: 48
End: 2023-09-26
Payer: MEDICAID

## 2023-09-26 VITALS
DIASTOLIC BLOOD PRESSURE: 88 MMHG | RESPIRATION RATE: 20 BRPM | TEMPERATURE: 97.5 F | SYSTOLIC BLOOD PRESSURE: 132 MMHG | HEART RATE: 87 BPM | BODY MASS INDEX: 34.34 KG/M2 | OXYGEN SATURATION: 100 % | WEIGHT: 186.6 LBS | HEIGHT: 62 IN

## 2023-09-26 DIAGNOSIS — R40.0 DAYTIME SLEEPINESS: ICD-10-CM

## 2023-09-26 DIAGNOSIS — Z91.89 AT RISK FOR SLEEP APNEA: Primary | ICD-10-CM

## 2023-09-26 PROCEDURE — 99213 OFFICE O/P EST LOW 20 MIN: CPT | Mod: GC

## 2023-09-26 NOTE — PATIENT INSTRUCTIONS
-Thank you for your visit today I have placed an order for sleep study.  I recommend you schedule a follow-up visit to address your chronic pain and/or memory issues.  It would be a good idea to schedule a routine preventative visit as well so we can focus on wellness initiatives    How to Contact [facility] Epilepsy Care:   Send my chart message on EPIC to Dr. Mojica OR Call [facility] [phone] to speak with staff for assistance.

## 2023-09-26 NOTE — PROGRESS NOTES
Assessment & Plan     At risk for sleep apnea  Daytime sleepiness  -I discussed with patient that given her concerns of excessive daytime sleepiness and the fact that she is reported to snore during her sleep that she is at risk for sleep apnea.  I recommend we proceed with a sleep study referral.  Patient verbalized clear understanding and agreement to this treatment plan and had no further questions or concerns at this time.  - Sleep Study Referral    History of seizure like spells  -Reported recent concerns for seizure-like spell.  Patient was evaluated by [place] epilepsy care on 9/22/2023 following this episode. I provided patient with epilepsy care phone number in case additional concerns arise.  Per chart review she is scheduled for a follow-up in approximately 3 months.    Return in about 1 month (around 10/26/2023) for Routine preventive, Follow up.    Kade Yousif [facility]    Samina Phelps is a 47 year old, presenting for the following health issues:  Other (Don't remember)      9/26/2023    11:01 AM   Additional Questions   Roomed by mackenzie   Accompanied by self       HPI   Patient presents today unsure exactly the reason for her visit, but she believes it is related to sleep apnea.  Per chart review it appears concerns were raised during last hospitalization regarding possibility of sleep apnea.  Patient states she has been struggling with insomnia for some time.  Today she woke up at 2 AM and had difficulty falling asleep.  She reports excessive daytime sleepiness.  She has been told that she snores in the past.  She reports some occasional headaches as well as some chronic back pain but denies fever, chills, nausea, vomiting, shortness of breath, chest pain, changes in bowel or bladder patterns.  Like to schedule a follow-up visit to discuss her chronic pain as well as some memory issues.  She reports no additional concerns at this time.      Review of Systems  "  Constitutional, HEENT, cardiovascular, pulmonary, gi and gu systems are negative, except as otherwise noted.  -ROS reviewed, see HPI for pertinent positives and negatives.  Kade Yousif DO        Objective    /88   Pulse 87   Temp 97.5  F (36.4  C) (Oral)   Resp 20   Ht 1.562 m (5' 1.5\")   Wt 84.6 kg (186 lb 9.6 oz)   LMP 07/23/2022 (Approximate)   SpO2 100%   BMI 34.69 kg/m    Body mass index is 34.69 kg/m .  Physical Exam   Constitutional: awake, alert, cooperative, no apparent distress  Eyes: Lids and lashes normal, pupils equal, round, extra ocular muscles intact, sclera clear, conjunctiva normal  ENT: Normocephalic, without obvious abnormality, atraumatic, external ears without lesions, oral pharynx with moist mucous membranes  Respiratory: No increased work of breathing, good air exchange, clear to auscultation bilaterally, no crackles or wheezing  Cardiovascular: Normal apical impulse, regular rate and rhythm, normal S1 and S2, and no murmur noted  Skin: normal skin color, texture, turgor  Musculoskeletal: There is no redness, warmth, or swelling of the joints.    Neurologic: Awake, alert, oriented to name, place, time, patient somewhat forgetful  Neuropsychiatric: General: normal, calm and normal eye contact    ----- Service Performed and Documented by Resident or Fellow ------        Precpeted with Dr. Alvin DO      "

## 2023-10-25 ENCOUNTER — TRANSFERRED RECORDS (OUTPATIENT)
Dept: HEALTH INFORMATION MANAGEMENT | Facility: CLINIC | Age: 48
End: 2023-10-25

## 2023-10-25 ENCOUNTER — OFFICE VISIT (OUTPATIENT)
Dept: AUDIOLOGY | Facility: CLINIC | Age: 48
End: 2023-10-25
Payer: MEDICAID

## 2023-10-25 DIAGNOSIS — H90.3 SENSORINEURAL HEARING LOSS (SNHL) OF BOTH EARS: Primary | ICD-10-CM

## 2023-10-25 PROCEDURE — V5160 DISPENSING FEE BINAURAL: HCPCS | Performed by: AUDIOLOGIST

## 2023-10-25 PROCEDURE — V5020 CONFORMITY EVALUATION: HCPCS | Performed by: AUDIOLOGIST

## 2023-10-25 PROCEDURE — V5261 HEARING AID, DIGIT, BIN, BTE: HCPCS | Mod: NU | Performed by: AUDIOLOGIST

## 2023-10-25 NOTE — PROGRESS NOTES
AUDIOLOGY REPORT  SUBJECTIVE:   Mattie Pierre is a 47 year old female who was seen in the Audiology Clinic at the [facility] for a fitting of binaural amplification. Previous results have revealed a bilateral mild to severe sensorineural hearing loss. The patient was given medical clearance to pursue amplification by Rick Nissen, MD.  OBJECTIVE:   The hearing aid conformity evaluation was completed.The hearing aids were placed and they provided a good fit. Real-ear-probe-microphone measurements were completed on the [email or web address] system and were a good match to NAL-NL1 target with soft sounds audible, moderate sounds comfortable, and loud sounds below discomfort. UCLs are verified through maximum power output measures and demonstrate appropriate limiting of loud inputs. Note, patient could not tolerate the probe martha in her right ear, therefore the right hearing aid was programmed using simulated real ear measurements. Mattie was oriented to proper hearing aid use, care, cleaning (no water, dry brush), batteries (size: BATTERY SIZE: rechargeable, insertion/removal, toxicity, low-battery signal), aid insertion/removal, user booklet, warranty information, storage cases, and other hearing aid details. The patient confirmed understanding of hearing aid use and care, and showed proper insertion of hearing aid and batteries while in the office today.Mattie reported good volume and sound quality today.   EAR(S) FIT: Bilateral  HEARING AID MODEL NAME: Phonak Audeo L50-R  HEARING AID STYLE: -in-the-ear behind-the-ear  EARMOLDS/TIP: Small closed  SERIAL NUMBERS: Right: [id number] Left: [id number]  WARRANTY END DATE: 12/19/26    ASSESSMENT:   Binaural Phonak Audeo L50-R hearing aid(s) were fit today. Verification measures were performed. Mattie signed the Hearing Aid Purchase Agreement and was given a copy, as well as details on her hearing aids. Patient was counseled that exact  out of pocket amounts cannot be determined for hearing aid claims being sent to insurance. Any insurance coverage information presented to the patient is an estimate only, and is not a guarantee of payment. Patient has been advised to check with their own insurance.    PLAN:  Mattie will return for follow-up in 2-3 weeks for a hearing aid review appointment. Please call this clinic with questions regarding today s appointment.    Herb Hernandez, [facility]  Licensed Audiologist  [place] License #[id number]

## 2023-11-01 ENCOUNTER — ANCILLARY PROCEDURE (OUTPATIENT)
Dept: NEUROLOGY | Facility: CLINIC | Age: 48
End: 2023-11-01
Attending: PSYCHIATRY & NEUROLOGY
Payer: MEDICAID

## 2023-11-01 DIAGNOSIS — G40.909 SEIZURE DISORDER (H): ICD-10-CM

## 2023-11-03 ENCOUNTER — HOSPITAL ENCOUNTER (EMERGENCY)
Facility: CLINIC | Age: 48
Discharge: HOME | End: 2023-11-03
Attending: FAMILY MEDICINE | Admitting: FAMILY MEDICINE
Payer: MEDICAID

## 2023-11-03 ENCOUNTER — APPOINTMENT (OUTPATIENT)
Dept: CT IMAGING | Facility: CLINIC | Age: 48
End: 2023-11-03
Payer: MEDICAID

## 2023-11-03 VITALS
OXYGEN SATURATION: 100 % | RESPIRATION RATE: 16 BRPM | TEMPERATURE: 98.3 F | DIASTOLIC BLOOD PRESSURE: 97 MMHG | SYSTOLIC BLOOD PRESSURE: 154 MMHG | HEART RATE: 72 BPM

## 2023-11-03 DIAGNOSIS — L20.89 OTHER ATOPIC DERMATITIS: ICD-10-CM

## 2023-11-03 DIAGNOSIS — Z76.89 ENCOUNTER FOR ASSESSMENT OF ALCOHOL AND DRUG USE: ICD-10-CM

## 2023-11-03 DIAGNOSIS — R40.4 TRANSIENT ALTERATION OF AWARENESS: ICD-10-CM

## 2023-11-03 DIAGNOSIS — L73.2 HIDRADENITIS SUPPURATIVA: ICD-10-CM

## 2023-11-03 LAB
ANION GAP SERPL CALCULATED.3IONS-SCNC: 13 MMOL/L (ref 7–15)
ATRIAL RATE - MUSE: 79 BPM
BASOPHILS # BLD AUTO: 0 10E3/UL (ref 0–0.2)
BASOPHILS NFR BLD AUTO: 0 %
BUN SERPL-MCNC: 19 MG/DL (ref 6–20)
CALCIUM SERPL-MCNC: 9.4 MG/DL (ref 8.6–10)
CHLORIDE SERPL-SCNC: 103 MMOL/L (ref 98–107)
CREAT SERPL-MCNC: 1.25 MG/DL (ref 0.51–0.95)
DEPRECATED HCO3 PLAS-SCNC: 26 MMOL/L (ref 22–29)
DIASTOLIC BLOOD PRESSURE - MUSE: NORMAL MMHG
EGFRCR SERPLBLD CKD-EPI 2021: 53 ML/MIN/1.73M2
EOSINOPHIL # BLD AUTO: 0 10E3/UL (ref 0–0.7)
EOSINOPHIL NFR BLD AUTO: 0 %
ERYTHROCYTE [DISTWIDTH] IN BLOOD BY AUTOMATED COUNT: 15.5 % (ref 10–15)
ETHANOL SERPL-MCNC: <0.01 G/DL
GLUCOSE SERPL-MCNC: 101 MG/DL (ref 70–99)
HCT VFR BLD AUTO: 42.7 % (ref 35–47)
HGB BLD-MCNC: 12.7 G/DL (ref 11.7–15.7)
HOLD SPECIMEN: NORMAL
IMM GRANULOCYTES # BLD: 0.1 10E3/UL
IMM GRANULOCYTES NFR BLD: 1 %
INTERPRETATION ECG - MUSE: NORMAL
LACTATE SERPL-SCNC: 0.9 MMOL/L (ref 0.7–2)
LYMPHOCYTES # BLD AUTO: 2.3 10E3/UL (ref 0.8–5.3)
LYMPHOCYTES NFR BLD AUTO: 24 %
MCH RBC QN AUTO: 24.6 PG (ref 26.5–33)
MCHC RBC AUTO-ENTMCNC: 29.7 G/DL (ref 31.5–36.5)
MCV RBC AUTO: 83 FL (ref 78–100)
MONOCYTES # BLD AUTO: 0.7 10E3/UL (ref 0–1.3)
MONOCYTES NFR BLD AUTO: 7 %
NEUTROPHILS # BLD AUTO: 6.6 10E3/UL (ref 1.6–8.3)
NEUTROPHILS NFR BLD AUTO: 68 %
NRBC # BLD AUTO: 0 10E3/UL
NRBC BLD AUTO-RTO: 0 /100
P AXIS - MUSE: 54 DEGREES
PLATELET # BLD AUTO: 382 10E3/UL (ref 150–450)
POTASSIUM SERPL-SCNC: 4.1 MMOL/L (ref 3.4–5.3)
PR INTERVAL - MUSE: 132 MS
PROLACTIN SERPL 3RD IS-MCNC: 10 NG/ML (ref 5–23)
QRS DURATION - MUSE: 90 MS
QT - MUSE: 400 MS
QTC - MUSE: 458 MS
R AXIS - MUSE: -22 DEGREES
RBC # BLD AUTO: 5.16 10E6/UL (ref 3.8–5.2)
SODIUM SERPL-SCNC: 142 MMOL/L (ref 135–145)
SYSTOLIC BLOOD PRESSURE - MUSE: NORMAL MMHG
T AXIS - MUSE: 65 DEGREES
TROPONIN T SERPL HS-MCNC: 11 NG/L
VENTRICULAR RATE- MUSE: 79 BPM
WBC # BLD AUTO: 9.7 10E3/UL (ref 4–11)

## 2023-11-03 PROCEDURE — 93010 ELECTROCARDIOGRAM REPORT: CPT | Performed by: FAMILY MEDICINE

## 2023-11-03 PROCEDURE — 80048 BASIC METABOLIC PNL TOTAL CA: CPT

## 2023-11-03 PROCEDURE — 93005 ELECTROCARDIOGRAM TRACING: CPT | Performed by: FAMILY MEDICINE

## 2023-11-03 PROCEDURE — 84146 ASSAY OF PROLACTIN: CPT

## 2023-11-03 PROCEDURE — 83605 ASSAY OF LACTIC ACID: CPT

## 2023-11-03 PROCEDURE — 84484 ASSAY OF TROPONIN QUANT: CPT

## 2023-11-03 PROCEDURE — 36415 COLL VENOUS BLD VENIPUNCTURE: CPT

## 2023-11-03 PROCEDURE — 70450 CT HEAD/BRAIN W/O DYE: CPT | Mod: 26 | Performed by: RADIOLOGY

## 2023-11-03 PROCEDURE — 99284 EMERGENCY DEPT VISIT MOD MDM: CPT | Mod: 25 | Performed by: FAMILY MEDICINE

## 2023-11-03 PROCEDURE — 82077 ASSAY SPEC XCP UR&BREATH IA: CPT

## 2023-11-03 PROCEDURE — 99285 EMERGENCY DEPT VISIT HI MDM: CPT | Mod: 25 | Performed by: FAMILY MEDICINE

## 2023-11-03 PROCEDURE — 70450 CT HEAD/BRAIN W/O DYE: CPT

## 2023-11-03 PROCEDURE — 85004 AUTOMATED DIFF WBC COUNT: CPT

## 2023-11-03 ASSESSMENT — ACTIVITIES OF DAILY LIVING (ADL)
ADLS_ACUITY_SCORE: 35
ADLS_ACUITY_SCORE: 35

## 2023-11-03 NOTE — ED TRIAGE NOTES
"Triage Assessment & Note:        Patient presents with: Pt BIBA (H433) from clinic when found sleeping/ without appt. Pt notes that she hasn't been able to sleep for the past 4 days and used \"crack\" this AM. EMS called to bring pt to ED for evaluation. Pt recently had EEG done and was negative.     Home Treatments/Remedies: Home medications    Febrile / Afebrile: afebrile     Duration of C/o: n/a    Senait Eid RN  November 3, 2023            "

## 2023-11-03 NOTE — ED PROVIDER NOTES
"ED Provider Note  [facility]      History     Chief Complaint   Patient presents with    Drug / Alcohol Assessment     The history is provided by the patient. The history is limited by the condition of the patient. No  was used.     Mattie Pierre is a 47 year old female who presents to the ED by EMS for drug evaluation vs seizure. Past medical history significant for sensorineural hearing loss, COPD, depression, bipolar disorder, diabetes, hypertension, seizures, and substance use disorder. She presents by EMS after she was found sleeping outside a clinic where she was not scheduled to be seen. On arrival, she requires sternal rub to awaken but soon falls back asleep. She is oriented to person and place. She states she is at the hospital because \"I need to stop drugs.\" She reports last methamphetamine use this morning. She denies any other illicit drug use or alcohol use. She denies headache, chest pain, shortness of breath, abdominal pain, nausea, vomiting. She has a lesion to her right axilla that has the appearance of hidradenitis suppurativa with some minimal drainage and no acute abscess. She has glue in her hair, appearance of recent EEG.     Past Medical History  Past Medical History:   Diagnosis Date    Anemia     Arthritis     COPD (chronic obstructive pulmonary disease) (H)     Depression     Depressive disorder     Diabetes (H)     History of blood transfusion     Hypertension     Obesity     Seizures (H)     Thyroid disease      Past Surgical History:   Procedure Laterality Date    AMPUTATE FOOT Left 2016    Procedure: PARTIAL PHALANGECTOMY 2ND TOE LEFT FOOT, EXOSTECTOMY LEFT GREAT TOE;  Surgeon: Mustapha Pollard DPM;  Location: [facility];  Service:      SECTION      EYE SURGERY      GYN SURGERY           NO HISTORY OF SURGERY       ammonium lactate (LAC-HYDRIN) 12 % external lotion  chlorhexidine (HIBICLENS) 4 % " liquid  clobetasol (TEMOVATE) 0.05 % external ointment  ClonazePAM (KLONOPIN PO)  cyclobenzaprine (FLEXMID) 7.5 MG tablet  dextromethorphan (DELSYM) 30 MG/5ML liquid  diclofenac (VOLTAREN) 1 % topical gel  diphenhydrAMINE (BENADRYL) 25 MG tablet  doxycycline monohydrate (ADOXA) 100 MG tablet  DULoxetine (CYMBALTA) 30 MG capsule  dupilumab (DUPIXENT) 300 MG/2ML prefilled pen  dupilumab (DUPIXENT) 300 MG/2ML prefilled pen  famotidine (PEPCID) 20 MG tablet  hydrOXYzine (VISTARIL) 50 MG capsule  lactase (LACTAID) 3000 UNIT tablet  lamoTRIgine (LAMICTAL) 150 MG tablet  levothyroxine (SYNTHROID/LEVOTHROID) 25 MCG tablet  loratadine (CLARITIN) 10 MG tablet  losartan (COZAAR) 100 MG tablet  methylcellulose (CITRUCEL) powder  naloxone (NARCAN) 4 MG/0.1ML nasal spray  naproxen (NAPROSYN) 500 MG tablet  nystatin (MYCOSTATIN) 345267 UNIT/GM external powder  oxybutynin ER (DITROPAN XL) 10 MG 24 hr tablet  polyethylene glycol (MIRALAX) 17 GM/Dose powder  prazosin (MINIPRESS) 5 MG capsule  QUEtiapine (SEROQUEL) 25 MG tablet  QUEtiapine ER (SEROQUEL XR) 200 MG 24 hr tablet  simvastatin (ZOCOR) 20 MG tablet  tacrolimus (PROTOPIC) 0.1 % external ointment  topiramate (TOPAMAX) 100 MG tablet  topiramate (TOPAMAX) 50 MG tablet  triamcinolone (KENALOG) 0.1 % external ointment  valACYclovir (VALTREX) 500 MG tablet  white petrolatum external gel      Allergies   Allergen Reactions    Fumaric Acid Itching    Lactose Other (See Comments)    Morphine Anxiety, Hives, Itching and Rash     hives     Family History  Family History   Problem Relation Age of Onset    Dementia Mother     Diabetes Mother     Crohn's Disease Mother     Colon Cancer Mother         Unknown age of onset    Cerebrovascular Disease Father     No Known Problems Maternal Grandmother     No Known Problems Maternal Grandfather     No Known Problems Paternal Grandmother     No Known Problems Paternal Grandfather     No Known Problems Brother     No Known Problems Sister     No  Known Problems Son     No Known Problems Daughter     No Known Problems Maternal Half-Brother     No Known Problems Maternal Half-Sister     No Known Problems Paternal Half-Brother     No Known Problems Paternal Half-Sister     No Known Problems Niece     No Known Problems Nephew     No Known Problems Cousin     No Known Problems Other     Coronary Artery Disease No family hx of     Cancer No family hx of     Heart Disease No family hx of     Hypertension No family hx of     Hyperlipidemia No family hx of     Kidney Disease No family hx of     Obesity No family hx of     Thrombosis No family hx of     Asthma No family hx of     Arthritis No family hx of     Thyroid Disease No family hx of     Depression No family hx of     Mental Illness No family hx of     Substance Abuse No family hx of     Cystic Fibrosis No family hx of     Early Death No family hx of     Coronary Artery Disease Early Onset No family hx of     Heart Failure No family hx of     Bleeding Diathesis No family hx of     Breast Cancer No family hx of     Ovarian Cancer No family hx of     Uterine Cancer No family hx of     Prostate Cancer No family hx of     Colorectal Cancer No family hx of     Pancreatic Cancer No family hx of     Lung Cancer No family hx of     Melanoma No family hx of     Autoimmune Disease No family hx of     Unknown/Adopted No family hx of     Genetic Disorder No family hx of      Social History   Social History     Tobacco Use    Smoking status: Every Day     Packs/day: 0.50     Years: 23.00     Additional pack years: 0.00     Total pack years: 11.50     Types: Cigarettes    Smokeless tobacco: Never    Tobacco comments:     trying to quit, but states that it's hard   Vaping Use    Vaping Use: Never used   Substance Use Topics    Alcohol use: Not Currently     Alcohol/week: 1.0 standard drink of alcohol     Types: 1 Standard drinks or equivalent per week    Drug use: No      Past medical history, past surgical history,  medications, allergies, family history, and social history were reviewed with the patient. No additional pertinent items.      A medically appropriate review of systems was performed with pertinent positives and negatives noted in the HPI, and all other systems negative.    Physical Exam   BP: (!) 145/94  Pulse: 78  Temp: 98.3  F (36.8  C)  Resp: 20  SpO2: 100 %  Physical Exam  Exam conducted with a chaperone present.   Constitutional:       General: She is sleeping. She is not in acute distress.     Appearance: She is not ill-appearing, toxic-appearing or diaphoretic.   HENT:      Mouth/Throat:      Mouth: Mucous membranes are moist.      Tongue: No lesions.      Pharynx: Oropharynx is clear.   Eyes:      General:         Right eye: No discharge.         Left eye: No discharge.      Conjunctiva/sclera:      Right eye: Right conjunctiva is injected. No exudate.     Left eye: Left conjunctiva is injected. No exudate.     Pupils: Pupils are equal, round, and reactive to light.   Cardiovascular:      Rate and Rhythm: Normal rate and regular rhythm. No extrasystoles are present.  Pulmonary:      Effort: Pulmonary effort is normal.      Breath sounds: Normal breath sounds. No stridor, decreased air movement or transmitted upper airway sounds.   Chest:       Abdominal:      General: Bowel sounds are normal.      Palpations: Abdomen is soft.      Tenderness: There is no abdominal tenderness.   Neurological:      General: No focal deficit present.      Mental Status: She is easily aroused.   Psychiatric:         Speech: Speech is slurred.         Behavior: Behavior is cooperative.       ED Course, Procedures, & Data      Procedures            EKG Interpretation:      Interpreted by Rebeca Davidson PA-C  Time reviewed: 1321  Symptoms at time of EKG: decreased responsiveness    Rhythm: normal sinus   Rate: Normal  Axis: Normal  Ectopy: none  Conduction: normal  ST Segments/ T Waves: No ST-T wave changes  Q Waves:  none  Comparison to prior: Unchanged from 06/08/15    Clinical Impression: normal EKG         Results for orders placed or performed during the hospital encounter of 11/03/23   CT Head w/o Contrast     Status: None    Narrative    EXAM: CT HEAD W/O CONTRAST  11/3/2023 1:53 PM     HISTORY:  postictal vs intoxicated       COMPARISON:  Brain MRI 2/14/2023.    TECHNIQUE: Using multidetector thin collimation helical acquisition  technique, axial, coronal and sagittal CT images from the skull base  to the vertex were obtained without intravenous contrast.   (topogram) image(s) also obtained and reviewed.    FINDINGS:  No acute intracranial hemorrhage, mass effect, or midline shift. No  acute loss of gray-white matter differentiation in the cerebral  hemispheres. Ventricles are proportionate to the cerebral sulci. Clear  basal cisterns. Moderate leukoaraiosis, unchanged.    The bony calvaria and the bones of the skull base are normal. Small  right maxillary sinus mucus retention cyst. The mastoid air cells are  clear. Grossly normal orbits.       Impression    IMPRESSION:   1. No acute intracranial pathology.   2. Unchanged moderate leukoaraiosis. Differential considerations are  broad and include sequelae of early chronic small vessel ischemic  disease, other underlying vasculopathy, demyelinating disease, or old  postinfectious/post inflammatory change.    SIERRA HUERTA MD         SYSTEM ID:  [id number]   Lactic acid whole blood     Status: Normal   Result Value Ref Range    Lactic Acid 0.9 0.7 - 2.0 mmol/L   Prolactin     Status: Normal   Result Value Ref Range    Prolactin 10 5 - 23 ng/mL   Troponin T, High Sensitivity     Status: Normal   Result Value Ref Range    Troponin T, High Sensitivity 11 <=14 ng/L   Ethyl Alcohol Level     Status: Normal   Result Value Ref Range    Alcohol ethyl <0.01 <=0.01 g/dL   Basic metabolic panel     Status: Abnormal   Result Value Ref Range    Sodium 142 135 - 145 mmol/L     Potassium 4.1 3.4 - 5.3 mmol/L    Chloride 103 98 - 107 mmol/L    Carbon Dioxide (CO2) 26 22 - 29 mmol/L    Anion Gap 13 7 - 15 mmol/L    Urea Nitrogen 19.0 6.0 - 20.0 mg/dL    Creatinine 1.25 (H) 0.51 - 0.95 mg/dL    GFR Estimate 53 (L) >60 mL/min/1.73m2    Calcium 9.4 8.6 - 10.0 mg/dL    Glucose 101 (H) 70 - 99 mg/dL   CBC with platelets and differential     Status: Abnormal   Result Value Ref Range    WBC Count 9.7 4.0 - 11.0 10e3/uL    RBC Count 5.16 3.80 - 5.20 10e6/uL    Hemoglobin 12.7 11.7 - 15.7 g/dL    Hematocrit 42.7 35.0 - 47.0 %    MCV 83 78 - 100 fL    MCH 24.6 (L) 26.5 - 33.0 pg    MCHC 29.7 (L) 31.5 - 36.5 g/dL    RDW 15.5 (H) 10.0 - 15.0 %    Platelet Count 382 150 - 450 10e3/uL    % Neutrophils 68 %    % Lymphocytes 24 %    % Monocytes 7 %    % Eosinophils 0 %    % Basophils 0 %    % Immature Granulocytes 1 %    NRBCs per 100 WBC 0 <1 /100    Absolute Neutrophils 6.6 1.6 - 8.3 10e3/uL    Absolute Lymphocytes 2.3 0.8 - 5.3 10e3/uL    Absolute Monocytes 0.7 0.0 - 1.3 10e3/uL    Absolute Eosinophils 0.0 0.0 - 0.7 10e3/uL    Absolute Basophils 0.0 0.0 - 0.2 10e3/uL    Absolute Immature Granulocytes 0.1 <=0.4 10e3/uL    Absolute NRBCs 0.0 10e3/uL   Extra Tube (Justice Draw)     Status: None    Narrative    The following orders were created for panel order Extra Tube (Justice Draw).  Procedure                               Abnormality         Status                     ---------                               -----------         ------                     Extra Red Top Tube[[id number]]                               Final result                 Please view results for these tests on the individual orders.   Extra Red Top Tube     Status: None   Result Value Ref Range    Hold Specimen JIC    Extra Tube (Justice Draw)     Status: None    Narrative    The following orders were created for panel order Extra Tube (Justice Draw).  Procedure                               Abnormality         Status                      ---------                               -----------         ------                     Extra Green Top (Lithium...[[id number]]                      Final result               Extra Purple Top Tube[[id number]]                            Final result                 Please view results for these tests on the individual orders.   Extra Green Top (Lithium Heparin) Tube     Status: None   Result Value Ref Range    Hold Specimen JIC    Extra Purple Top Tube     Status: None   Result Value Ref Range    Hold Specimen JIC    EKG 12 lead     Status: None   Result Value Ref Range    Systolic Blood Pressure  mmHg    Diastolic Blood Pressure  mmHg    Ventricular Rate 79 BPM    Atrial Rate 79 BPM    CT Interval 132 ms    QRS Duration 90 ms     ms    QTc 458 ms    P Axis 54 degrees    R AXIS -22 degrees    T Axis 65 degrees    Interpretation ECG       Sinus rhythm  Possible Left atrial enlargement  T wave abnormality, consider lateral ischemia  Abnormal ECG  Unconfirmed report - interpretation of this ECG is computer generated - see medical record for final interpretation  Confirmed by - EMERGENCY ROOM, PHYSICIAN ([id number]),  CHARLOTTE BRYAN ([id number]) on 11/3/2023 3:15:58 PM     CBC with platelets differential     Status: Abnormal    Narrative    The following orders were created for panel order CBC with platelets differential.  Procedure                               Abnormality         Status                     ---------                               -----------         ------                     CBC with platelets and d...[[id number]]  Abnormal            Final result                 Please view results for these tests on the individual orders.     Medications - No data to display    Labs Ordered and Resulted from Time of ED Arrival to Time of ED Departure   BASIC METABOLIC PANEL - Abnormal       Result Value    Sodium 142      Potassium 4.1      Chloride 103      Carbon Dioxide (CO2) 26      Anion Gap 13      Urea  Nitrogen 19.0      Creatinine 1.25 (*)     GFR Estimate 53 (*)     Calcium 9.4      Glucose 101 (*)    CBC WITH PLATELETS AND DIFFERENTIAL - Abnormal    WBC Count 9.7      RBC Count 5.16      Hemoglobin 12.7      Hematocrit 42.7      MCV 83      MCH 24.6 (*)     MCHC 29.7 (*)     RDW 15.5 (*)     Platelet Count 382      % Neutrophils 68      % Lymphocytes 24      % Monocytes 7      % Eosinophils 0      % Basophils 0      % Immature Granulocytes 1      NRBCs per 100 WBC 0      Absolute Neutrophils 6.6      Absolute Lymphocytes 2.3      Absolute Monocytes 0.7      Absolute Eosinophils 0.0      Absolute Basophils 0.0      Absolute Immature Granulocytes 0.1      Absolute NRBCs 0.0     LACTIC ACID WHOLE BLOOD - Normal    Lactic Acid 0.9     PROLACTIN - Normal    Prolactin 10     TROPONIN T, HIGH SENSITIVITY - Normal    Troponin T, High Sensitivity 11     ETHYL ALCOHOL LEVEL - Normal    Alcohol ethyl <0.01       CT Head w/o Contrast   Final Result   IMPRESSION:    1. No acute intracranial pathology.    2. Unchanged moderate leukoaraiosis. Differential considerations are   broad and include sequelae of early chronic small vessel ischemic   disease, other underlying vasculopathy, demyelinating disease, or old   postinfectious/post inflammatory change.      SIERRA HUERTA MD            SYSTEM ID:  [id number]             Critical care was not performed.         The patient's evaluation involved:      --    ED Attending Physician Attestation    I Emerson Fabian MD, cared for this patient with the Advanced Practice Provider (REINALDO). I have performed a history and physical examination of the patient independent of the REINALDO. I reviewed the REINALDO's documentation above and agree with the documented findings and plan of care. I personally provided a substantive portion of the care for this patient, including the complete Medical Decision Making. Please see the REINALDO's documentation for full details.    Summary of HPI, PE, ED Course    Patient is a 47 year old female evaluated in the emergency department for altered mental status. Exam and ED course notable for ct neg with neg labs patient improved over time appears more drug related vs seizure post ictal. After the completion of care in the emergency department, the patient was discharged.    Critical Care & Procedures  Not applicable.        Medical Decision Making  The patient's presentation was of high complexity (an acute health issue posing potential threat to life or bodily function).  review of external note(s) from 2 sources (neurology, audiology)  ordering and/or review of 3+ test(s) in this encounter (see separate area of note for details)  The patient's evaluation involved:  review of external note(s) from 3+ sources (see separate area of note for details)  review of 3+ test result(s) ordered prior to this encounter (see separate area of note for details)  ordering and/or review of 3+ test(s) in this encounter (see separate area of note for details)  discussion of management or test interpretation with another health professional (see separate area of note for details)    The patient's management necessitated high risk (a decision regarding hospitalization).          Emerson Fabian MD  Emergency Medicine           Assessment & Plan    Mattie is a 47-year-old female that presented for drug assessment vs seizure evaluation.  Acceptable vital signs.  Patient sleeping but arousable to her baseline.  Oriented x4.  No gross neurodeficits on exam.  CT head negative for acute intracranial process.  Lactic acid negative.  Alcohol negative.  Troponin negative.  Prolactin negative.  CBC and BMP otherwise unremarkable and within normal limits.  Patient tolerated oral challenge of apple juice and crackers with no emesis. Low suspicion seizure and postictal. Suspect symptoms due to methamphetamine use as patient reported.  Patient stable for discharge home.  Strict return precautions given.   Advise follow-up with PCP office in to stop using methamphetamines.  Patient was agreeable to the discharge treatment plan, voiced understanding, and all questions were answered prior to discharge.    I have reviewed the nursing notes. I have reviewed the findings, diagnosis, plan and need for follow up with the patient.    Discharge Medication List as of 11/3/2023  5:12 PM          Final diagnoses:   Encounter for assessment of alcohol and drug use   Other atopic dermatitis   Hidradenitis suppurativa   Transient alteration of awareness     SEUN Carbajal  [facility] EMERGENCY DEPARTMENT  11/3/2023     Rebeca Davidson PA-C  11/03/23 [id number]    This note was created at least in part by the use of dragon voice dictation system. Inadvertent typographical errors may still exist.  Emerson Fabian MD.  Patient evaluated in the emergency department during the COVID-19 pandemic period. Careful attention to patients safety was addressed throughout the evaluation. Evaluation and treatment management was initiated with disposition made efficiently and appropriate as possible to minimize any risk of potential exposure to patient during this evaluation.         Emerson Fabian MD  11/03/23 [id number]

## 2023-11-08 ENCOUNTER — TRANSFERRED RECORDS (OUTPATIENT)
Dept: HEALTH INFORMATION MANAGEMENT | Facility: CLINIC | Age: 48
End: 2023-11-08
Payer: MEDICAID

## 2023-11-10 ENCOUNTER — OFFICE VISIT (OUTPATIENT)
Dept: FAMILY MEDICINE | Facility: CLINIC | Age: 48
End: 2023-11-10
Payer: MEDICAID

## 2023-11-10 VITALS
SYSTOLIC BLOOD PRESSURE: 149 MMHG | WEIGHT: 185.2 LBS | BODY MASS INDEX: 34.97 KG/M2 | RESPIRATION RATE: 20 BRPM | HEIGHT: 61 IN | DIASTOLIC BLOOD PRESSURE: 87 MMHG | OXYGEN SATURATION: 99 % | TEMPERATURE: 97.9 F | HEART RATE: 64 BPM

## 2023-11-10 DIAGNOSIS — L73.2 HIDRADENITIS SUPPURATIVA: Primary | ICD-10-CM

## 2023-11-10 DIAGNOSIS — F14.10 COCAINE ABUSE (H): ICD-10-CM

## 2023-11-10 PROCEDURE — 99214 OFFICE O/P EST MOD 30 MIN: CPT | Mod: GC

## 2023-11-10 RX ORDER — TOPIRAMATE 50 MG/1
50 TABLET, FILM COATED ORAL 2 TIMES DAILY
Qty: 90 TABLET | Refills: 1 | Status: SHIPPED | OUTPATIENT
Start: 2023-11-10 | End: 2023-12-27

## 2023-11-10 NOTE — PROGRESS NOTES
Hidradenitis suppurativa  -Discussed with patient that since she declined in office incision and drainage and preferred general surgery consult for evaluation of her adenitis suppurativa lesion I recommend that we place a referral to general surgery.  I discussed with patient that if lesion worsens or becomes swollen erythematous that she should schedule an as needed follow-up visit.  Patient verbalized clear understanding and agreement to treatment plan and had no further questions or concerns at this time.  - Adult General Surg Referral    Cocaine abuse (H)  -Patient was recently seen in the emergency department on 11/3/2023 for evaluation of seizure versus drug ingestion. Per ED note patient was found by EMS sleeping outside clinic where she has not scheduled to be seen.  Per ED report, patient stated that she took methamphetamines that day, however in clinic today patient stated she utilizes crack cocaine.  A lesion in patient's right axilla was noted in the ED, which was seemingly consistent with her adenitis suppurativa per report.  CT of the head was negative for any acute intracranial process.  Troponin was negative, Pro-Ashwin was negative, patient's symptomatology was most likely consistent with methamphetamine use.  Patient was discharged home, with recommended PCP follow-up.    -I discussed and recommended complete sobriety from recreational substances.  Patient is very motivated to discontinue use of cocaine and other recreational substances and has done so since her ED visit on 11/3/2023.  She was open to a mental health referral to further address substance use issues.  Patient wanted a medication that might help with her cocaine abuse.  I sent in a prescription for topiramate which she had been on in the past but denied currently taking.  I recommended the patient follow-up in around 2 weeks for follow-up as well as follow-up for preventative visit.   - Adult Mental Health  Referral;  "Future  - topiramate (TOPAMAX) 50 MG tablet; Take 1 tablet (50 mg) by mouth 2 times daily  Dispense: 90 tablet; Refill: 1      Samina Phelps is a 47 year old, presenting for the following health issues:  Hospital F/U      11/10/2023    10:55 AM   Additional Questions   Roomed by Daniel   Accompanied by self       HPI   -Patient states that she has been sober from recreational substances since 11/3/2023.  She stated that crack cocaine was her substance of choice.  She denied using opioids.  She denies fever, chills, shortness of breath, chest pain, nausea, vomiting, changes in bowel or bladder patterns.  She reports significant cravings for cocaine.  In addition she reports pain in her right axilla and under her left breast.  She states she had surgery for her adenitis suppurativa in the past in her left axilla.  She is interested in a medication to reduce her cravings for recreational substances and she is also interested to general surgery.    Review of Systems   Constitutional, HEENT, cardiovascular, pulmonary, gi and gu systems are negative, except as otherwise noted.  Kade Hernándezer, DO        Objective    BP (!) 149/87   Pulse 64   Temp 97.9  F (36.6  C) (Oral)   Resp 20   Ht 1.549 m (5' 1\")   Wt 84 kg (185 lb 3.2 oz)   LMP 07/23/2022 (Approximate)   SpO2 99%   BMI 34.99 kg/m    Body mass index is 34.99 kg/m .  Physical Exam  Constitutional:       General: She is not in acute distress.     Appearance: Normal appearance. She is not toxic-appearing.   HENT:      Head: Normocephalic.      Right Ear: External ear normal.      Left Ear: External ear normal.      Nose: Nose normal.      Mouth/Throat:      Mouth: Mucous membranes are moist.   Cardiovascular:      Rate and Rhythm: Normal rate and regular rhythm.      Pulses: Normal pulses.      Heart sounds: No murmur heard.     No gallop.   Pulmonary:      Effort: Pulmonary effort is normal. No respiratory distress.      Breath sounds: Normal breath sounds. " No wheezing or rales.   Skin:     Coloration: Skin is not jaundiced.      Findings: No bruising.             Comments: Lesion consistent with HS in right axilla with purulent drainage,. No erythema. Small lesion under right breast, no drainage, non-erythematous    Neurological:      General: No focal deficit present.      Mental Status: She is alert and oriented to person, place, and time.        ----- Service Performed and Documented by Resident or Fellow ------        Precepted with Dr. Esequiel Yousif DO

## 2023-11-10 NOTE — COMMUNITY RESOURCES LIST (ENGLISH)
11/10/2023   [facility] Outpatient Clinics  N/A  For additional resource needs, please contact your health insurance member services or your primary care team.  Phone: [phone]   Email: N/A   Address: [facility] [place] [address]   Hours: N/A        Food and Nutrition       Food pantry  1  [employer] [facility] [employer] Distance: 0.37 miles      In-Person   [address] [place] [address]  Language: English  Hours: Wed 5:30 PM - 6:30 PM  Fees: Free   Phone: (787) 522-6772 Email: info@[employer]in[facility] PharmaceuticalsHospitals in Rhode IslandFOXTOWN.MovieSet Website: https://www.[employer]plic[employer].org/     2  Vilma Patel [facility] Kitchen Food Shelf - Food Shelf Distance: 0.48 miles      Pickup   [address] [place] [address]  Language: English  Hours: Tue 6:00 PM - 7:30 PM , Wed 10:00 AM - 12:00 PM , Thu 6:00 PM - 7:30 PM , Fri 10:00 AM - 12:00 PM  Fees: Free   Phone: (709) 464-8924 Email: admin@[facility].org Website: http://[facility].org/community-services/little-kitchen-food-shelf/     SNAP application assistance  3  [employer] [place] Distance: 8.45 miles      Phone/Virtual   [address] [address]  Language: English, Hmong, Burmese, Japanese, Citizen of [place]  Hours: Mon - Fri 8:30 AM - 4:30 PM  Fees: Free   Phone: (755) 747-9507 Email: helpline@[employer].org Website: https://www.hungersolutions.org/programs/[place]-food-helpline/     4  [facility] Public Health [facility] Distance: 2.1 miles      In-Person, Phone/Virtual   [address] [place] [address]  Language: English  Hours: Mon - Fri 8:00 AM - 4:30 PM  Fees: Free   Phone: (209) 396-3652 Email: servicecentrajanfo@[place]. Website: https://www.[place]./[facility]/facilities/service-center-info     Soup kitchen or free meals  5  [facility] [employer] - Community Meal Distance: 2.11 miles      Pickup   [address] [place] [address]   Language: English  Hours: Mon 5:00 PM - 5:45 PM  Fees: Free   Phone: (919) 886-2283 Email: office@[employer].Colto Website: http://www.[employer].Colto     6  [employer] Distance: 2.2 miles      Pickup   [address] [place] [address]  Language: English, Hmong, Trinidadian, Moldovan  Hours: Mon - Thu 10:00 AM - 11:00 AM , Mon - Thu 1:00 PM - 2:00 PM , Sat - Sun 10:00 AM - 11:00 AM  Fees: Free   Phone: (226) 415-9785 Email: info@[employer]to Website: https://[employer].org/          Important Numbers & Websites       [address].org  Poison Control   (440) 290-6216 [place]  Suicide and Crisis Lifeline   [address] [place], [place] [address]line.org  Childhelp [place] Child Abuse Hotline   993.972.8987 Childhelphotline.org  National Sexual Assault Hotline   (909) 669-1595 (HOPE) Rainn.[facility]  National Runaway Safeline   (160) 135-3806 (RUNAWAY) [facility] County[facility]away.org  Pregnancy & Postpartum Support [place]   Call/text 753-423-4556 Ppsupportmn.org  Substance Abuse National Helpline ([facility]   407-974-HELP (4913) Findtreatment.gov  Emergency Services   911

## 2023-11-13 ENCOUNTER — CARE COORDINATION (OUTPATIENT)
Dept: FAMILY MEDICINE | Facility: CLINIC | Age: 48
End: 2023-11-13
Payer: MEDICAID

## 2023-11-13 ENCOUNTER — TELEPHONE (OUTPATIENT)
Dept: DERMATOLOGY | Facility: CLINIC | Age: 48
End: 2023-11-13
Payer: MEDICAID

## 2023-11-13 NOTE — PROGRESS NOTES
2023: Care Coordination     CC: call to patient offering community resources to address substance abuse and suggestion that she contact the clinic for follow up visit with Dr. Kelly. However, Ms. Pierre refused any resources during this visit and reported that she will call one of the numbers that are on the paperwork that she came home with. I am mailing a hard copy anyway's      .   Substance Use Recourses     Assessment, Supervised Detox, Residential, IOP, and Outpatient:     [employer]   https://www.peopleRadiant Zemax.org/program-category/treatment-recovery/  Call Central [phone]     [place] Adult & Teen Challenge   www.mntc.org  Text MNTC to 050402  Call 702-MPFWOIV  Email admissions@[facility].org     [facility]  https://www.[employer].org/care/overarching-care/behavioral-health-and-mental-health-adult  Inpatient: [phone] or [phone]  Outpatient: [phone]   Central [phone]  [facility] Health & Addiction Connection Line   https://account.[facility].org/servicelines/826  Call: [phone]      [employer]  http://www.resource-[place].org/chemical-mental-health/  [phone]  [address] [facility]   http://www.[facility].org/  [phone]  [address] Robin Nadine SKolby, [place] [facility] > Co-occurring Addiction Clinic ([facility])  https://[facility].org/the-recovery-clinic/  [phone]  [address] [facility] Sarah Olvera      Outpatient, Aftercare, etc.     [facility]   www.[employer]  [phone]  [address] [facility], [address], [facility]   www.Hylete  [phone]  Assessments at: [address]  Men's Programs: [facility]      Recovery Support     [place] Recovery Connection   https://[facility].org/      Alcoholics  Anonymous Support Group   https://aaminn[place].org/     Narcotics Anonymous Support Groups  https://www.naminn[place].org/     Smart Recovery Support Group  https://www.smartrecovery.org/          Henrry Sanchez Sr.   Care Coordination  [address] [address]  [email or web address]   Office: [phone] Direct: [phone]  [facility]

## 2023-11-13 NOTE — TELEPHONE ENCOUNTER
I called and spoke with Mattie and informed her that Dr. Li can see her for HS but he will not be doing a HS surgery. Dr. Li can refer her to someone who does surgery at her upcoming appointment. Pt understood and had no further questions.    LUANA Galarza

## 2023-11-13 NOTE — LETTER
2023      Mattie Pierre  [address]  [facility] [place] [facility] [address]        Dear Mattie,  I am one of the Care Coordinators at [facility]. Thank you for taking time out of your day to talk with me. I know that you were on your way to an appointment at the time of my call and did not have time to take the information over the phone. However, I did want to follow up with a list of community substances abuse resources.       Substance Use Recourses     Assessment, Supervised Detox, Residential, IOP, and Outpatient:     [employer]   https://www.peopleincorporated.org/program-category/treatment-recovery/  Call Central [phone]     [place] Adult & Teen Challenge   www.mntc.org  Text MNTC to 614906  Call 578-CEPTIWA  Email admissions@[facility].org     [facility]  https://www.ealth.org/care/overarching-care/behavioral-health-and-mental-health-adult  Inpatient: [phone] or [phone]  Outpatient: [phone]   Central [phone]  [facility] Mental Health & Addiction Connection Line   https://account.[facility]utData.org/servicelines/826  Call: [phone]              [employer]  http://www.[facility].org/chemical-mental-health/  [phone]  [facility] [facility]   http://www.[facility].org/  [phone]  [facility] Nadine SKolby [place] [facility] > Co-occurring Addiction Clinic ([facility])  https://[facility].org/the-recovery-clinic/  [phone]  [address] [facility] Sarah Olvera      Outpatient, Aftercare, etc.     [facility]   www.sixdimensionsKissMyAds  [phone]  [facility], [address], [facility]   www.[employer]  [phone]  Assessments at: [facility]  Men's Programs: [facility]      Recovery Support      [place] Recovery Connection   https://[facility].org/      Alcoholics Anonymous Support Group   https://aam[place].org/     Narcotics Anonymous Support Groups  https://www.naminn[place].org/     Smart Recovery Support Group  https://www.smartrecGoodland Regional Medical Centery.org/                          Sincerely,                   Henrry Sanchez Sr.   Care Coordination  [address] [address]  [email or web address] [email or web address] [email or web address]   Office: [phone] Direct: [phone]  [facility]

## 2023-11-13 NOTE — TELEPHONE ENCOUNTER
[facility]    Phone Message    May a detailed message be left on voicemail: yes     Reason for Call: Appointment Intake      Referring Provider Name:   Esequiel Johnson MD in [facility]    Diagnosis and/or Symptoms:   Referred to General Surgery - they said she needs to see Dermatology    Hidradenitis suppurativa; Right axillary evaluation for hidradenitis suppurativa lesion       Pt of Dr Li - has Appt on 01/18/23 for something else    Pt wondering if Dr Li can see her for HS as well - or who best to see - she said she needs a surgery - said she had this done before where they took a skin graft from her thigh and put under her left arm pit - says she needs this done on right side now    Dermatology Records with us in [facility]  Records also from Dermatology and General Surgery from [facility] in Care Everywhere she said    Said she was treated someplace else as well by [facility] but does not remember where from    Please review and call Pt to discuss - Thank you!          Action Taken: Message routed to:  [facility] ([facility] DERM    Travel Screening: Not Applicable

## 2023-11-16 ENCOUNTER — OFFICE VISIT (OUTPATIENT)
Dept: AUDIOLOGY | Facility: CLINIC | Age: 48
End: 2023-11-16
Payer: MEDICAID

## 2023-11-16 DIAGNOSIS — H90.3 SENSORINEURAL HEARING LOSS (SNHL) OF BOTH EARS: Primary | ICD-10-CM

## 2023-11-16 PROCEDURE — 99207 PR ASSESSMENT FOR HEARING AID: CPT | Performed by: AUDIOLOGIST

## 2023-11-16 NOTE — PROGRESS NOTES
AUDIOLOGY REPORT    SUBJECTIVE:  Mattie Pierre is a 47 year old female who was seen in the Audiology Clinic at the [facility] on 11/16/2023  for a follow-up check regarding the fitting of new hearing aids. Previous results have revealed a bilateral mild to severe sensorineural hearing loss.  The patient has been seen previously in this clinic and was fit with Phonak Audeo L50-R on 10/25/23.  Mattie reports the hearing aids stopped working.     OBJECTIVE:   Visual inspection revealed occluding cerumen. Based on patient report, the following changes were made;we reviewed changing the wax filter. After the patient reported good sound quality.    Reviewed 45 day trial period, care, cleaning (no water, dry brush), batteries (size n/a) insertion/removal, toxicity, low-battery signal), aid insertion/removal, volume adjustment (if applicable), user booklet, warranty information, storage cases, and other hearing aid details.     No charge visit today (in warranty hearing aid check).    ASSESSMENT:   A follow-up appointment for hearing aid fitting was completed today.  Changes to hearing aid was completed as outlined above.     PLAN:  Mattie will return for follow-up as needed, or at least every 9-12 months for cleaning and assessment of hearing aid.  . Please call this clinic with any questions regarding today s appointment.    Herb Hernandez, [facility]  Licensed Audiologist  [place] License #[id number]

## 2023-11-22 ENCOUNTER — HOSPITAL ENCOUNTER (OUTPATIENT)
Dept: BEHAVIORAL HEALTH | Facility: CLINIC | Age: 48
Discharge: HOME | End: 2023-11-22
Attending: FAMILY MEDICINE | Admitting: FAMILY MEDICINE
Payer: MEDICAID

## 2023-11-22 VITALS — WEIGHT: 174 LBS | HEIGHT: 61 IN | BODY MASS INDEX: 32.85 KG/M2

## 2023-11-22 DIAGNOSIS — F14.10 COCAINE ABUSE (H): ICD-10-CM

## 2023-11-22 DIAGNOSIS — F15.20 SEVERE STIMULANT USE DISORDER (H): Primary | ICD-10-CM

## 2023-11-22 PROCEDURE — H0001 ALCOHOL AND/OR DRUG ASSESS: HCPCS

## 2023-11-22 ASSESSMENT — ANXIETY QUESTIONNAIRES
7. FEELING AFRAID AS IF SOMETHING AWFUL MIGHT HAPPEN: MORE THAN HALF THE DAYS
GAD7 TOTAL SCORE: 19
IF YOU CHECKED OFF ANY PROBLEMS ON THIS QUESTIONNAIRE, HOW DIFFICULT HAVE THESE PROBLEMS MADE IT FOR YOU TO DO YOUR WORK, TAKE CARE OF THINGS AT HOME, OR GET ALONG WITH OTHER PEOPLE: EXTREMELY DIFFICULT
1. FEELING NERVOUS, ANXIOUS, OR ON EDGE: NEARLY EVERY DAY
GAD7 TOTAL SCORE: 19
3. WORRYING TOO MUCH ABOUT DIFFERENT THINGS: NEARLY EVERY DAY
6. BECOMING EASILY ANNOYED OR IRRITABLE: NEARLY EVERY DAY
4. TROUBLE RELAXING: MORE THAN HALF THE DAYS
2. NOT BEING ABLE TO STOP OR CONTROL WORRYING: NEARLY EVERY DAY
5. BEING SO RESTLESS THAT IT IS HARD TO SIT STILL: NEARLY EVERY DAY

## 2023-11-22 ASSESSMENT — COLUMBIA-SUICIDE SEVERITY RATING SCALE - C-SSRS
1. IN THE PAST MONTH, HAVE YOU WISHED YOU WERE DEAD OR WISHED YOU COULD GO TO SLEEP AND NOT WAKE UP?: NO
1. HAVE YOU WISHED YOU WERE DEAD OR WISHED YOU COULD GO TO SLEEP AND NOT WAKE UP?: YES

## 2023-11-22 ASSESSMENT — PAIN SCALES - GENERAL: PAINLEVEL: WORST PAIN (10)

## 2023-11-22 ASSESSMENT — PATIENT HEALTH QUESTIONNAIRE - PHQ9: SUM OF ALL RESPONSES TO PHQ QUESTIONS 1-9: 19
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
## 2023-11-27 ENCOUNTER — TELEPHONE (OUTPATIENT)
Dept: BEHAVIORAL HEALTH | Facility: CLINIC | Age: 48
End: 2023-11-27
Payer: MEDICAID

## 2023-11-30 ENCOUNTER — OFFICE VISIT (OUTPATIENT)
Dept: DERMATOLOGY | Facility: CLINIC | Age: 48
End: 2023-11-30
Payer: MEDICAID

## 2023-11-30 DIAGNOSIS — L73.2 HIDRADENITIS SUPPURATIVA: ICD-10-CM

## 2023-11-30 DIAGNOSIS — L28.0 LICHEN SIMPLEX CHRONICUS: ICD-10-CM

## 2023-11-30 DIAGNOSIS — L30.0 NUMMULAR DERMATITIS: Primary | ICD-10-CM

## 2023-11-30 PROCEDURE — 99214 OFFICE O/P EST MOD 30 MIN: CPT | Mod: GC | Performed by: DERMATOLOGY

## 2023-11-30 RX ORDER — CLINDAMYCIN PHOSPHATE 10 UG/ML
LOTION TOPICAL 2 TIMES DAILY
Qty: 60 ML | Refills: 3 | Status: SHIPPED | OUTPATIENT
Start: 2023-11-30 | End: 2024-04-15

## 2023-11-30 RX ORDER — MOMETASONE FUROATE 1 MG/G
OINTMENT TOPICAL 2 TIMES DAILY
Qty: 90 G | Refills: 2 | Status: SHIPPED | OUTPATIENT
Start: 2023-11-30 | End: 2024-07-19

## 2023-11-30 ASSESSMENT — PAIN SCALES - GENERAL: PAINLEVEL: WORST PAIN (10)

## 2023-11-30 NOTE — NURSING NOTE
Dermatology Rooming Note    Mattie Pierre's goals for this visit include:   Chief Complaint   Patient presents with    Derm Problem     Dermatitis- axilla and feet. Things are going terrible.      Renetta Castillo, EMT

## 2023-11-30 NOTE — PROGRESS NOTES
"[facility] Dermatology Note  Encounter Date: Nov 30, 2023  Office Visit     Dermatology Problem List:  1. Nummular/eczematous dermatitis and lichen simplex chronicus - hands, medial ankles, feet  - current: pending dupilumab, pending cordran tape for LSC bilateral medial ankles, mometasone  - prior: clobetasol oint BID, nbUVB, many topical steroids  2. Hidradenitis suppurative - axillae, intergluteal cleft  - pending repeat Gen Surg consultation  - BPO and clindamycin lotion  - S/p excision + grafting in L armpit \"many years ago\"  - prior: doxycycline 100 mg BID (started 1/27/22)  ____________________________________________    Assessment & Plan:   # Lichen simplex chronicus bilateral medial ankles  # Nummular/eczematous dermatitis with diffuse xerosis  Patient with longstanding history of nummular/eczematous dermatitis affecting the hands, medial ankles, and feet which has progressed to lichen simplex chronicus on exam today.  States that she has been using her topical steroids however still with diffuse xerosis and lichenification today, query adherence.  Given localized nature recommend intralesional triamcinolone however patient is needle averse.  Previously tried to get dupilumab ordered however not approved by insurance and we will send that pharmacy approval today.  Can consider addition of Cordran tape pending insurance approval.  Will switch topical clobetasol to mometasone as alternative agents given no reported improvement with the clobetasol.  Daily emollient was recommended.  - dupilumab MTM  - cordran tape MTM  - start mometasone ointment  - BID emollient    # Hidradenitis suppurativa  Seen by PCP a couple weeks ago and general surgery was placed for draining lesion on the right axilla which she has undergone excision in the past before.  Unsure if using any topicals at this time.  Not interested in oral antibiotics.  Will restart topicals including benzyl peroxide and clindamycin " lotion.  Not amenable to intralesional triamcinolone.  Can consider adalimumab in the future.  - Start BPO and clindamycin    # Onychomcyosis  - topical ciclopirox    Procedures Performed:   None    Follow-up: as scheduled, prn for new or changing lesions    Staff and Resident: Yannick Perez MD  PGY-4 Dermatology  Pager: [phone]    Patient was seen and examined with the dermatology resident. I agree with the history, review of systems, physical examination, assessments and plan.    Sana Lanier MD  Professor and  Chair  Department of Dermatology  [facility]     ____________________________________________    CC: Derm Problem (Dermatitis- axilla and feet. Things are going terrible. )    HPI:  Ms. Mattie Pierre is a(n) 48 year old female who presents today as a return patient for follow up.    - still very itchy  - says she's using the topicals as prescribed  - itching bilateral medial ankles, worst at night  - draining lesion on right axillae  - never got dupilumab  - did lights 2 times but then had social issues  - Patient is otherwise feeling well, without additional skin concerns.    Labs Reviewed:  N/A    Physical Exam:  Vitals: LMP 07/23/2022 (Approximate)   SKIN: Focused examination of trunk and lower extremities was performed.  - Hyperpigmented lichenified plaques on the bilateral medial ankles on background of diffuse moderate to severe xerosis  - Pink indurated papule nodule in the right axilla, no significant active drainage or surrounding erythema, background of scarring  - No other lesions of concern on areas examined.     Medications:  Current Outpatient Medications   Medication    ammonium lactate (LAC-HYDRIN) 12 % external lotion    chlorhexidine (HIBICLENS) 4 % liquid    clobetasol (TEMOVATE) 0.05 % external ointment    ClonazePAM (KLONOPIN PO)    cyclobenzaprine (FLEXMID) 7.5 MG tablet    dextromethorphan (DELSYM) 30 MG/5ML liquid    diclofenac (VOLTAREN) 1 % topical gel     diphenhydrAMINE (BENADRYL) 25 MG tablet    doxycycline monohydrate (ADOXA) 100 MG tablet    DULoxetine (CYMBALTA) 30 MG capsule    dupilumab (DUPIXENT) 300 MG/2ML prefilled pen    dupilumab (DUPIXENT) 300 MG/2ML prefilled pen    famotidine (PEPCID) 20 MG tablet    hydrOXYzine (VISTARIL) 50 MG capsule    lactase (LACTAID) 3000 UNIT tablet    lamoTRIgine (LAMICTAL) 150 MG tablet    levothyroxine (SYNTHROID/LEVOTHROID) 25 MCG tablet    loratadine (CLARITIN) 10 MG tablet    losartan (COZAAR) 100 MG tablet    methylcellulose (CITRUCEL) powder    mometasone (ELOCON) 0.1 % external ointment    naloxone (NARCAN) 4 MG/0.1ML nasal spray    naproxen (NAPROSYN) 500 MG tablet    nystatin (MYCOSTATIN) 987218 UNIT/GM external powder    oxybutynin ER (DITROPAN XL) 10 MG 24 hr tablet    polyethylene glycol (MIRALAX) 17 GM/Dose powder    prazosin (MINIPRESS) 5 MG capsule    QUEtiapine (SEROQUEL) 25 MG tablet    QUEtiapine ER (SEROQUEL XR) 200 MG 24 hr tablet    simvastatin (ZOCOR) 20 MG tablet    tacrolimus (PROTOPIC) 0.1 % external ointment    topiramate (TOPAMAX) 50 MG tablet    triamcinolone (KENALOG) 0.1 % external ointment    valACYclovir (VALTREX) 500 MG tablet    white petrolatum external gel     No current facility-administered medications for this visit.      Past Medical History:   Patient Active Problem List   Diagnosis    Bipolar disorder (H)    Obesity    Cellulitis of axilla, left    Iron deficiency anemia    Seizure disorder (H)    Hidradenitis suppurativa    Urgency incontinence    Chronic pain syndrome    Acne vulgaris    Tinea capitis    Fissure in skin    Severe stimulant use disorder (H)    Depression    Hypertension    Subclinical hypothyroidism    Vitamin D deficiency    Ganglion cyst of right foot    Hypertrophy of bone    Anhidrosis    Pes planovalgus    Other eczema    Nummular dermatitis    Other atopic dermatitis    Class 2 severe obesity due to excess calories with serious comorbidity in adult (H)      Past Medical History:   Diagnosis Date    Anemia     Arthritis     COPD (chronic obstructive pulmonary disease) (H)     Depression     Depressive disorder     Diabetes (H)     History of blood transfusion     Hypertension     Obesity     Seizures (H)     Thyroid disease        CC No referring provider defined for this encounter. on close of this encounter.

## 2023-11-30 NOTE — LETTER
"11/30/2023       RE: aMttie Pierre  [address]  [place] [address]     Dear Colleague,    Thank you for referring your patient, Mattie Pierre, to the [facility] [place] [facility]. Please see a copy of my visit note below.    [facility] Dermatology Note  Encounter Date: Nov 30, 2023  Office Visit     Dermatology Problem List:  1. Nummular/eczematous dermatitis and lichen simplex chronicus - hands, medial ankles, feet  - current: pending dupilumab, pending cordran tape for LSC bilateral medial ankles, mometasone  - prior: clobetasol oint BID, nbUVB, many topical steroids  2. Hidradenitis suppurative - axillae, intergluteal cleft  - pending repeat Gen Surg consultation  - BPO and clindamycin lotion  - S/p excision + grafting in L armpit \"many years ago\"  - prior: doxycycline 100 mg BID (started 1/27/22)  ____________________________________________    Assessment & Plan:   # Lichen simplex chronicus bilateral medial ankles  # Nummular/eczematous dermatitis with diffuse xerosis  Patient with longstanding history of nummular/eczematous dermatitis affecting the hands, medial ankles, and feet which has progressed to lichen simplex chronicus on exam today.  States that she has been using her topical steroids however still with diffuse xerosis and lichenification today, query adherence.  Given localized nature recommend intralesional triamcinolone however patient is needle averse.  Previously tried to get dupilumab ordered however not approved by insurance and we will send that pharmacy approval today.  Can consider addition of Cordran tape pending insurance approval.  Will switch topical clobetasol to mometasone as alternative agents given no reported improvement with the clobetasol.  Daily emollient was recommended.  - dupilumab MTM  - cordran tape MTM  - start mometasone ointment  - BID emollient    # " Hidradenitis suppurativa  Seen by PCP a couple weeks ago and general surgery was placed for draining lesion on the right axilla which she has undergone excision in the past before.  Unsure if using any topicals at this time.  Not interested in oral antibiotics.  Will restart topicals including benzyl peroxide and clindamycin lotion.  Not amenable to intralesional triamcinolone.  Can consider adalimumab in the future.  - Start BPO and clindamycin    # Onychomcyosis  - topical ciclopirox    Procedures Performed:   None    Follow-up: as scheduled, prn for new or changing lesions    Staff and Resident: Yannick Perez MD  PGY-4 Dermatology  Pager: [phone]    Patient was seen and examined with the dermatology resident. I agree with the history, review of systems, physical examination, assessments and plan.    Sana Lanier MD  Professor and  Chair  Department of Dermatology  [facility]     ____________________________________________    CC: Derm Problem (Dermatitis- axilla and feet. Things are going terrible. )    HPI:  Ms. Mattie Pierre is a(n) 48 year old female who presents today as a return patient for follow up.    - still very itchy  - says she's using the topicals as prescribed  - itching bilateral medial ankles, worst at night  - draining lesion on right axillae  - never got dupilumab  - did lights 2 times but then had social issues  - Patient is otherwise feeling well, without additional skin concerns.    Labs Reviewed:  N/A    Physical Exam:  Vitals: LMP 07/23/2022 (Approximate)   SKIN: Focused examination of trunk and lower extremities was performed.  - Hyperpigmented lichenified plaques on the bilateral medial ankles on background of diffuse moderate to severe xerosis  - Pink indurated papule nodule in the right axilla, no significant active drainage or surrounding erythema, background of scarring  - No other lesions of concern on areas examined.     Medications:  Current Outpatient  Medications   Medication    ammonium lactate (LAC-HYDRIN) 12 % external lotion    chlorhexidine (HIBICLENS) 4 % liquid    clobetasol (TEMOVATE) 0.05 % external ointment    ClonazePAM (KLONOPIN PO)    cyclobenzaprine (FLEXMID) 7.5 MG tablet    dextromethorphan (DELSYM) 30 MG/5ML liquid    diclofenac (VOLTAREN) 1 % topical gel    diphenhydrAMINE (BENADRYL) 25 MG tablet    doxycycline monohydrate (ADOXA) 100 MG tablet    DULoxetine (CYMBALTA) 30 MG capsule    dupilumab (DUPIXENT) 300 MG/2ML prefilled pen    dupilumab (DUPIXENT) 300 MG/2ML prefilled pen    famotidine (PEPCID) 20 MG tablet    hydrOXYzine (VISTARIL) 50 MG capsule    lactase (LACTAID) 3000 UNIT tablet    lamoTRIgine (LAMICTAL) 150 MG tablet    levothyroxine (SYNTHROID/LEVOTHROID) 25 MCG tablet    loratadine (CLARITIN) 10 MG tablet    losartan (COZAAR) 100 MG tablet    methylcellulose (CITRUCEL) powder    mometasone (ELOCON) 0.1 % external ointment    naloxone (NARCAN) 4 MG/0.1ML nasal spray    naproxen (NAPROSYN) 500 MG tablet    nystatin (MYCOSTATIN) 635939 UNIT/GM external powder    oxybutynin ER (DITROPAN XL) 10 MG 24 hr tablet    polyethylene glycol (MIRALAX) 17 GM/Dose powder    prazosin (MINIPRESS) 5 MG capsule    QUEtiapine (SEROQUEL) 25 MG tablet    QUEtiapine ER (SEROQUEL XR) 200 MG 24 hr tablet    simvastatin (ZOCOR) 20 MG tablet    tacrolimus (PROTOPIC) 0.1 % external ointment    topiramate (TOPAMAX) 50 MG tablet    triamcinolone (KENALOG) 0.1 % external ointment    valACYclovir (VALTREX) 500 MG tablet    white petrolatum external gel     No current facility-administered medications for this visit.      Past Medical History:   Patient Active Problem List   Diagnosis    Bipolar disorder (H)    Obesity    Cellulitis of axilla, left    Iron deficiency anemia    Seizure disorder (H)    Hidradenitis suppurativa    Urgency incontinence    Chronic pain syndrome    Acne vulgaris    Tinea capitis    Fissure in skin    Severe stimulant use disorder (H)     Depression    Hypertension    Subclinical hypothyroidism    Vitamin D deficiency    Ganglion cyst of right foot    Hypertrophy of bone    Anhidrosis    Pes planovalgus    Other eczema    Nummular dermatitis    Other atopic dermatitis    Class 2 severe obesity due to excess calories with serious comorbidity in adult (H)     Past Medical History:   Diagnosis Date    Anemia     Arthritis     COPD (chronic obstructive pulmonary disease) (H)     Depression     Depressive disorder     Diabetes (H)     History of blood transfusion     Hypertension     Obesity     Seizures (H)     Thyroid disease        CC No referring provider defined for this encounter. on close of this encounter.

## 2023-12-01 NOTE — PROGRESS NOTES
Medication Therapy Management (MTM) Encounter    ASSESSMENT:                            Nummular/eczematous dermatitis and lichen simplex chronicus: Uncontrolled. Per chart review, Dupixent has been attempted April 2024 and PA was denied, but no appeal letter. If both of these denied, we also can sign her up for PAP. For now she will continue topicals, but discussed that mometasone was sent as an alternative to clobetasol. We discussed Dupixent in detail today including dosing/frequency, side effects, and administration briefly. She is comfortable with a video link for admin teaching in the future.     Hidradenitis suppurativa: Patient to continue current regimen, but sounds like she is not using anything topical for this.     Substance abuse: Patient to continue to follow with substance abuse program.     Elevated TSH: Patient appears to have not filled this since 2021. Will remove from med list. Last TSH checked >1 year ago and was slightly abnormal. Will leave a future lab order to be checked with future labs.     Hypertension: Last BP elevated. Would recommend rechecking with future labs. Unclear if she has been filling this medication regularly.     Hyperlipidemia: Last lipids elevated, but >1 year old. Per dispense history, does not appear that she is filling this regularly. She has a high ASCVD risk score, therefore would encourage restarting in the future.     GERD: Per dispense history, she is likely not taking.     Anxiety: Per dispense history, the only medication she has been filling recently was lamotrigine. Per PDMP, no recent clonazepam fills -- would be hesitant to fill this with her substance abuse hx.     Pain: Naproxen refilled yesterday per PCP. Per dispense history, she has not filled cyclobenzaprine since Jan 2023.     OAB: Refilled yesterday by PCP.       PLAN:                            Order sent for Dupixent 300 mg x2 (600 mg) once then 300 mg every 14 days to [facility]  (#[phone]). They will contact patient pending insurance coverage. If not covered, will consider PAP. Will send administration video pending approval  Future TSH.    Follow-up: Via [employer] pending coverage     SUBJECTIVE/OBJECTIVE:                          Mattie Pierre is a 48 year old female contacted via secure video for an initial visit. She was referred to me from Dr. Lanier / Dr. Perez.     Reason for visit: dupilumab for refractory nummular dermatitis to light therapy and high potency topical steroids. for refractory lichen simplex chronicus   Medication Adherence/Access: Patient is not familiar with the names of her medications and what she is currently taking. She reports that she set them up in her med box.    Nummular/eczematous dermatitis and lichen simplex chronicus:   - pending dupilumab   - mometasone 0.1% twice daily to lower legs and trunk/extremities  - clobetasol 0.05% ointment  She reports the only topical she is using is clobetasol, which has been helpful. Per derm appointment on 11/30/23, she was switched to topical clobetasol to mometasone as alternative agents given no reported improvement with the clobetasol. Today she reports it has been helpful. She is also using Duoderm.   Today reports continues itching all over her body and antihistamines are not helpful.   prior: clobetasol oint BID, nbUVB, many topical steroids.   Longstanding history of nummular/eczematous dermatitis affecting the hands, medial ankles, and feet which has progressed to lichen simplex chronicus on exam  11/30.    Previously tried to get dupilumab ordered however not approved by insurance previously.    Can consider addition of Cordran tape pending insurance approval.    Daily emollient was recommended.   Regarding administration of Dupixent, she sounded ok with the injection and would be ok with a video link.     Hidradenitis suppurativa  - benzoyl peroxide 5% to armpits  - clindamycin 1% twice daily to armpits  "  - Hibiclen PRN  - doxycyline 100 mg twice daily   axillae, intergluteal cleft  pending repeat Gen Surg consultation  S/p excision + grafting in L armpit \"many years ago\"  Unclear if she is using treatments above, although she does state that she uses doxy. She does not like using the topicals since they get on her sheets.     Substance abuse:   - topiramate 50 mg twice daily   Topamax started on 11/10/23 by PCP. Pt previously was in ED.   Reports that she is starting group outpatient treatment soon. She did recognize the medication and believes it has been helping her.     Elevated TSH:    - Levothyroxine 25 mcg daily   She believes that she stopped taking.   TSH   Date Value Ref Range Status   09/14/2022 4.47 (H) 0.30 - 4.20 uIU/mL Final   10/18/2021 3.56 0.30 - 5.00 uIU/mL Final     Hypertension:   - losartan 100 mg daily.   She believes she is taking this.   No BP machine at home.  BP Readings from Last 3 Encounters:   11/10/23 (!) 149/87   11/03/23 (!) 154/97   09/26/23 132/88     Pulse Readings from Last 3 Encounters:   11/10/23 64   11/03/23 72   09/26/23 87     Hyperlipidemia:   - simvastatin 20mg daily  Denies ASCVD history  Lab Test 10/18/21  1102 01/04/18  1021 07/25/16  1050   CHOL 223* 230.2* 229.1*   HDL 42* 50.2 54.5   * 158* 159*   TRIG 176* 108.7 78.2   CHOLHDLRATIO  --  4.6 4.2   The 10-year ASCVD risk score (Feliz DEL CASTILLO, et al., 2019) is: 18.6%    Values used to calculate the score:      Age: 48 years      Sex: Female      Is Non- : Yes      Diabetic: No      Tobacco smoker: Yes      Systolic Blood Pressure: 149 mmHg      Is BP treated: Yes      HDL Cholesterol: 42 mg/dL      Total Cholesterol: 223 mg/dL    GERD:   - famotidine 20 mg TWICE DAILY   She is not sure if she is taking this.      Anxiety:   - clonazepam ??  - duloxetine 30 mg daily   - hydroxyzine 50 mg TWICE DAILY PRN   - prazosin 5 mg HS   - quetiapine 25 mg x2 (50 mg) AM and  mg HS  - lamotrigine " 150 mg twice daily  She cannot confirm that she is taking any of these medications today.   Lamotrigine for seizure history. She does believe she is taking lamotrigine.     Pain:   - cyclobenzaprine 7.5 mg TWICE DAILY PRN  - diclofenac gel   - naproxen  Unclear if she is taking.     OAB:   - oxybutynin ER 10 mg daily   Unclear if she is taking.       Today's Vitals: LMP 07/23/2022 (Approximate)     Allergies/ADRs: Reviewed in chart  Past Medical History: Reviewed in chart  Tobacco: She reports that she has been smoking cigarettes. She has a 46.00 pack-year smoking history. She has never used smokeless tobacco.Nicotine/Tobacco Cessation Plan:   Information offered: Patient not interested at this time  Alcohol: reviewed in chart    ----------------  Post Discharge Medication Reconciliation Status: discharge medications reconciled, continue medications without change.    I spent 16 minutes with this patient today. All changes were made via collaborative practice agreement with Sana Lanier. A copy of the visit note was provided to the patient's provider(s).    A summary of these recommendations was declined by the patient.    Antoinette Eckert, Pharm.D., [facility]   Medication Therapy Management Pharmacist   [facility]    Telemedicine Visit Details  Type of service:  Video Conference via [employer]  Start Time:  8:00 AM  End Time:  8:16 AM     Medication Therapy Recommendations  Elevated TSH    Current Medication: levothyroxine (SYNTHROID/LEVOTHROID) 25 MCG tablet   Rationale: Medication requires monitoring - Needs additional monitoring   Recommendation: Order Lab   Status: Accepted per CPA

## 2023-12-03 NOTE — PROGRESS NOTES
[facility] Previous DA Progress Note       Client: Mattie Pierre  MRN: [id number]    Client had a diagnostic assessment (WAIS-IV testing included) completed by Ana Avilez PsyD, LP Psychological Services on 11/08/2023 at [facility] (has to be within past 12-months by a licensed MH therapist, a psychiatric NP or CNS, or a Psychiatrist). The client received the diagnosis below. It should be noted that assessment of client's symptoms and diagnosis will be ongoing while client is in the Adult [facility] JOAN & Co-Occurring Programs and if deemed appropriate, a DA update will be completed by program therapist at that time.    DSM-5-TR diagnoses from DA:     317 (F70) Intellectual Disabilites   Mild  309.81 (F43.10) Posttraumatic Stress Disorder (Chronic)  304.20 (F14.20) Stimulant Use Disorder:  severe; ongoing  R/O Cluster B Personality Disorder  Alcohol Use Disorder in sustained remission (per patient report)    Gwendolyn Quintana, [facility], [facility]      Individual Session Summary (MICD)  DATE:  12/05/2023  START TIME: 12:45 PM   END TIME: 1:30 PM   Duration: 45 minutes     JOAN Diagnosis: 304.40 (F15.20) Stimulant Use Disorder, Severe  304.40 (F15.20) Severe, Crack Cocaine   Alcohol Use Disorder in sustained remission (per patient report)    Mental Health Diagnosis: 317 (F70) Intellectual Disability, Mild  309.81 (F43.10) Posttraumatic Stress Disorder (Chronic)  R/O Cluster B Personality Disorder      Data:  Initial individual session addressing both mental health and substance use, rapport building, treatment programming and engagement. The session focused on client's diagnoses and discussion of co-occurring disorders.      Client denies any suicidal thoughts, plans, or intent today. Client also denies any thoughts or behaviors of self-harm today.     Intervention: Motivational interviewing to assess for stage of change, explore understanding of diagnoses and build rapport to develop a trusting  therapeutic alliance to maximize benefits of treatment for Mattie. Provided verbal interventions including validation, support, and psycho-education on MH diagnoses, including PTSD. Reviewed DA conducted by SSA  dated 11/08/2023.    Assessment: Mattie reported a desire to stop using cocaine and decrease symptoms of PTSD. She was somewhat receptive to interventions to support engagement and requested plans to begin group on 12/11/2023. She agreed to follow through with referral to Addiction medicine to discuss cravings and nightmares. She endorsed distress and impaired functioning as a result of PTSD symptoms, including nightmares, hypervigilance, avoidance of cues including memories, and negative cognitions about herself, others and the world. She reported that anger, guilt, shame and trust problems impact her relationships with her boyfriend and adult children (4). She seemed open about past criminal history and symptoms. Has CADI waiver services in [facility]. Recently moved to a new apartment that she reported feeling good about.   Stage of Change: comtemplation    Plan. Begin Co-Occurring Disorders treatment on Monday, 12/11/2023 and meet with individual therapist weekly during Phase 1. DA is in  dated 11/08/2023

## 2023-12-04 ENCOUNTER — VIRTUAL VISIT (OUTPATIENT)
Dept: RHEUMATOLOGY | Facility: CLINIC | Age: 48
End: 2023-12-04
Attending: DERMATOLOGY
Payer: MEDICAID

## 2023-12-04 ENCOUNTER — TELEPHONE (OUTPATIENT)
Dept: DERMATOLOGY | Facility: CLINIC | Age: 48
End: 2023-12-04
Payer: MEDICAID

## 2023-12-04 DIAGNOSIS — G89.4 CHRONIC PAIN SYNDROME: ICD-10-CM

## 2023-12-04 DIAGNOSIS — L20.89 OTHER ATOPIC DERMATITIS: ICD-10-CM

## 2023-12-04 DIAGNOSIS — K21.9 GASTROESOPHAGEAL REFLUX DISEASE, UNSPECIFIED WHETHER ESOPHAGITIS PRESENT: ICD-10-CM

## 2023-12-04 DIAGNOSIS — E78.5 HYPERLIPIDEMIA, UNSPECIFIED HYPERLIPIDEMIA TYPE: ICD-10-CM

## 2023-12-04 DIAGNOSIS — I15.9 SECONDARY HYPERTENSION: ICD-10-CM

## 2023-12-04 DIAGNOSIS — N39.41 URGENCY INCONTINENCE: ICD-10-CM

## 2023-12-04 DIAGNOSIS — G40.909 SEIZURE DISORDER (H): ICD-10-CM

## 2023-12-04 DIAGNOSIS — L73.2 HIDRADENITIS SUPPURATIVA: ICD-10-CM

## 2023-12-04 DIAGNOSIS — R79.89 ELEVATED TSH: Primary | ICD-10-CM

## 2023-12-04 DIAGNOSIS — F15.20 SEVERE STIMULANT USE DISORDER (H): ICD-10-CM

## 2023-12-04 NOTE — Clinical Note
12/4/2023       RE: Mattie Pierre  [address]  [place] [address]     Dear Colleague,    Thank you for referring your patient, Mattie Pierre, to the [facility]HEUMATOLOG[facility] [place] at [facility]. Please see a copy of my visit note below.    Medication Therapy Management (MTM) Encounter    ASSESSMENT:                            ***:  ***      PLAN:                            ***    Follow-up: {followuptest2:301837}    SUBJECTIVE/OBJECTIVE:                          Mattie Pierre is a 48 year old female contacted via secure video for an initial visit. She was referred to me from Dr. Lanier / Dr. Perez. {mtmvisitdetails:148897}     Reason for visit: dupilumab for refractory nummular dermatitis to light therapy and high potency topical steroids. for refractory lichen simplex chronicus   Medication Adherence/Access: {[facility]9528[facility]}    Nummular/eczematous dermatitis and lichen simplex chronicus:   - pending dupilumab **reorder and restart since >6 months per Clau. Can do PAP if denied.   - mometasone 0.1% twice daily to lower legs and trunk/extremities  - Benedryl? Hydroxyzine? Loratadine ?   prior: clobetasol oint BID, nbUVB, many topical steroids.   longstanding history of nummular/eczematous dermatitis affecting the hands, medial ankles, and feet which has progressed to lichen simplex chronicus on exam  11/30.  States that she has been using her topical steroids however still with diffuse xerosis and lichenification today, query adherence.  Given localized nature recommend intralesional triamcinolone however patient is needle averse.    Previously tried to get dupilumab ordered however not approved by insurance previously.    Can consider addition of Cordran tape pending insurance approval.    Will switch topical clobetasol to mometasone as alternative agents given no reported improvement with the clobetasol.   Daily emollient  "was recommended.     She is using the cream and it is helpful. Clobetasol she is using right now. She is putting on the wounded area and the hands. Hand not as bad as it used to be. Duoderm she is using. Itching really bad all over her body. Antihistamines are not helpful.   Mom used to   Video   Mychart ok    Hidradenitis suppurativa  - benzoyl peroxide 5% to armpits  - clindamycin 1% twice daily to armpits   - Hibiclen PRN?   - doxycyline 100 mg twice daily ?   axillae, intergluteal cleft  pending repeat Gen Surg consultation  S/p excision + grafting in L armpit \"many years ago\"  prior: doxycycline 100 mg BID (started 1/27/22)    That is also not doing well hard to sleep. She is not using much since she does not want on her sheets.    Substance abuse:   - topiramate 50 mg twice daily 0 helping her. Will be starting group outpatient.   Topamax started on 11/10/23 by PCP. Pt previously was in ED.     Hypothyroidism:   - Levothyroxine 25 mcg daily - stopped taking, but knows she should   Patient is having the following symptoms: {hypo/hyperthyroid:483935}.   TSH   Date Value Ref Range Status   09/14/2022 4.47 (H) 0.30 - 4.20 uIU/mL Final   10/18/2021 3.56 0.30 - 5.00 uIU/mL Final       Hypertension:   - losartan 100 mg daily. Taking it. No BP machine at home./   Patient reports { :458262}.  Patient {HTNDoes/doesnot:057147}.    BP Readings from Last 3 Encounters:   11/10/23 (!) 149/87   11/03/23 (!) 154/97   09/26/23 132/88     Pulse Readings from Last 3 Encounters:   11/10/23 64   11/03/23 72   09/26/23 87     Hyperlipidemia:   simvastatin 20mg daily  Patient reports { :797626}.  {lipidascvd:813549}     Lab Test 10/18/21  1102 01/04/18  1021 07/25/16  1050   CHOL 223* 230.2* 229.1*   HDL 42* 50.2 54.5   * 158* 159*   TRIG 176* 108.7 78.2   CHOLHDLRATIO  --  4.6 4.2       GERD:   - famotidine 20 mg TWICE DAILY ? Not sure.     Anxiety:   - clonazepam ??  - duloxetine 30 mg daily   - hydroxyzine?   - prazosin 5 " "mg HS   - quetiapine 25 mg x2?  mg HS?         Seizures:   - lamotrigine 150 mg wice daily? - thinks she is taking.     Pain:   - cyclobenzaprine 7.5 mg TWICE DAILY PRN?   - diclofenac gel   - naproxen?     OAB:   - oxybutynin ER 10 mg daily ?     Cough:   - dextromethorphan liquid?     Lactaid PRN     Citrucel ?   Miralax?     Valtrex 1000 mg THREE TIMES DAILY ?         Today's Vitals: LMP 07/23/2022 (Approximate)     Allergies/ADRs: {1/2/3/4/5:036512}  Past Medical History: {1/2/3/4/5:327135}  Tobacco: She reports that she has been smoking cigarettes. She has a 46.00 pack-year smoking history. She has never used smokeless tobacco.Nicotine/Tobacco Cessation Plan:   {Nicotine/Tobacco Cessation Plan:012238}    Alcohol: {ALCOHOL CONSUMPTION HX:301857}  {Social and Goals:624244}  ----------------  {SERAFIN?:184316}    I spent {mtm total time 3:750779} with this patient today. { :163267}. A copy of the visit note was provided to the patient's provider(s).    A summary of these recommendations {GIVEN/NOT GIVEN:302937}.    Antoinette Eckert, Pharm.D., [facility]   Medication Therapy Management Pharmacist   [facility] Dermatology    Telemedicine Visit Details  Type of service:  {telemedvisitmtm:044612::\"Telephone visit\"}  Start Time: {video/phone visit start time:152948}  End Time: {video/phone visit end time:152948}     Medication Therapy Recommendations  No medication therapy recommendations to display       Medication Therapy Management (MTM) Encounter    ASSESSMENT:                            Nummular/eczematous dermatitis and lichen simplex chronicus: Uncontrolled. Per chart review, Dupixent has been attempted April 2024 and PA was denied, but no appeal letter. If both of these denied, we also can sign her up for PAP. For now she will continue topicals, but discussed that mometasone was sent as an alternative to clobetasol. We discussed Dupixent in detail today including dosing/frequency, side effects, and " administration briefly. She is comfortable with a video link for admin teaching in the future.     Hidradenitis suppurativa: Patient to continue current regimen, but sounds like she is not using anything topical for this.     Substance abuse: Patient to continue to follow with substance abuse program.     Hypothyroidism:       - Levothyroxine 25 mcg daily   She believes that she stopped taking.   TSH   Date Value Ref Range Status   09/14/2022 4.47 (H) 0.30 - 4.20 uIU/mL Final   10/18/2021 3.56 0.30 - 5.00 uIU/mL Final     Hypertension:   - losartan 100 mg daily.   She believes she is taking this.   No BP machine at home.  BP Readings from Last 3 Encounters:   11/10/23 (!) 149/87   11/03/23 (!) 154/97   09/26/23 132/88     Pulse Readings from Last 3 Encounters:   11/10/23 64   11/03/23 72   09/26/23 87     Hyperlipidemia:   - simvastatin 20mg daily  Denies ASCVD history  Lab Test 10/18/21  1102 01/04/18  1021 07/25/16  1050   CHOL 223* 230.2* 229.1*   HDL 42* 50.2 54.5   * 158* 159*   TRIG 176* 108.7 78.2   CHOLHDLRATIO  --  4.6 4.2       GERD:   - famotidine 20 mg TWICE DAILY   She is not sure if she is taking this.      Anxiety:   - clonazepam ??  - duloxetine 30 mg daily   - hydroxyzine 50 mg TWICE DAILY PRN   - prazosin 5 mg HS   - quetiapine 25 mg x2 (50 mg) AM and  mg HS  - lamotrigine 150 mg twice daily  She cannot confirm that she is taking any of these medications today.   Lamotrigine for seizure history. She does believe she is taking lamotrigine.     Pain:   - cyclobenzaprine 7.5 mg TWICE DAILY PRN  - diclofenac gel   - naproxen  Unclear if she is taking.     OAB:   - oxybutynin ER 10 mg daily   Unclear if she is taking.       PLAN:                            ***    Follow-up: {followuptest2:417659}    SUBJECTIVE/OBJECTIVE:                          Mattie Pierre is a 48 year old female contacted via secure video for an initial visit. She was referred to me from Dr. Lanier / Dr. Perez.  "    Reason for visit: dupilumab for refractory nummular dermatitis to light therapy and high potency topical steroids. for refractory lichen simplex chronicus   Medication Adherence/Access: Patient is not familiar with the names of her medications and what she is currently taking. She reports that she set them up in her med box.    Nummular/eczematous dermatitis and lichen simplex chronicus:   - pending dupilumab   - mometasone 0.1% twice daily to lower legs and trunk/extremities  - clobetasol 0.05% ointment  She reports the only topical she is using is clobetasol, which has been helpful. Per derm appointment on 11/30/23, she was switched to topical clobetasol to mometasone as alternative agents given no reported improvement with the clobetasol. Today she reports it has been helpful. She is also using Duoderm.   Today reports continues itching all over her body and antihistamines are not helpful.   prior: clobetasol oint BID, nbUVB, many topical steroids.   Longstanding history of nummular/eczematous dermatitis affecting the hands, medial ankles, and feet which has progressed to lichen simplex chronicus on exam  11/30.    Previously tried to get dupilumab ordered however not approved by insurance previously.    Can consider addition of Cordran tape pending insurance approval.    Daily emollient was recommended.   Regarding administration of Dupixent, she sounded ok with the injection and would be ok with a video link.     Hidradenitis suppurativa  - benzoyl peroxide 5% to armpits  - clindamycin 1% twice daily to armpits   - Hibiclen PRN  - doxycyline 100 mg twice daily   axillae, intergluteal cleft  pending repeat Gen Surg consultation  S/p excision + grafting in L armpit \"many years ago\"  Unclear if she is using treatments above, although she does state that she uses doxy. She does not like using the topicals since they get on her sheets.     Substance abuse:   - topiramate 50 mg twice daily   Topamax started on " 11/10/23 by PCP. Pt previously was in ED.   Reports that she is starting group outpatient treatment soon. She did recognize the medication and believes it has been helping her.     Hypothyroidism:   - Levothyroxine 25 mcg daily   She believes that she stopped taking.   TSH   Date Value Ref Range Status   09/14/2022 4.47 (H) 0.30 - 4.20 uIU/mL Final   10/18/2021 3.56 0.30 - 5.00 uIU/mL Final     Hypertension:   - losartan 100 mg daily.   She believes she is taking this.   No BP machine at home.  BP Readings from Last 3 Encounters:   11/10/23 (!) 149/87   11/03/23 (!) 154/97   09/26/23 132/88     Pulse Readings from Last 3 Encounters:   11/10/23 64   11/03/23 72   09/26/23 87     Hyperlipidemia:   - simvastatin 20mg daily  Denies ASCVD history  Lab Test 10/18/21  1102 01/04/18  1021 07/25/16  1050   CHOL 223* 230.2* 229.1*   HDL 42* 50.2 54.5   * 158* 159*   TRIG 176* 108.7 78.2   CHOLHDLRATIO  --  4.6 4.2       GERD:   - famotidine 20 mg TWICE DAILY   She is not sure if she is taking this.      Anxiety:   - clonazepam ??  - duloxetine 30 mg daily   - hydroxyzine 50 mg TWICE DAILY PRN   - prazosin 5 mg HS   - quetiapine 25 mg x2 (50 mg) AM and  mg HS  - lamotrigine 150 mg twice daily  She cannot confirm that she is taking any of these medications today.   Lamotrigine for seizure history. She does believe she is taking lamotrigine.     Pain:   - cyclobenzaprine 7.5 mg TWICE DAILY PRN  - diclofenac gel   - naproxen  Unclear if she is taking.     OAB:   - oxybutynin ER 10 mg daily   Unclear if she is taking.       Today's Vitals: LMP 07/23/2022 (Approximate)     Allergies/ADRs: Reviewed in chart  Past Medical History: Reviewed in chart  Tobacco: She reports that she has been smoking cigarettes. She has a 46.00 pack-year smoking history. She has never used smokeless tobacco.Nicotine/Tobacco Cessation Plan:   Information offered: Patient not interested at this time  Alcohol: reviewed in  chart    ----------------  Post Discharge Medication Reconciliation Status: discharge medications reconciled, continue medications without change.    I spent 16 minutes with this patient today. All changes were made via collaborative practice agreement with Sana Lanier. A copy of the visit note was provided to the patient's provider(s).    A summary of these recommendations was declined by the patient.    Antoinette Eckert, Pharm.D., [facility]   Medication Therapy Management Pharmacist   [facility] Dermatology    Telemedicine Visit Details  Type of service:  Video Conference via [employer]  Start Time:  8:00 AM  End Time:  8:16 AM     Medication Therapy Recommendations  No medication therapy recommendations to display         Again, thank you for allowing me to participate in the care of your patient.      Sincerely,    Enrique VALERAD

## 2023-12-04 NOTE — TELEPHONE ENCOUNTER
PA Initiation    Medication: DUPIXENT 300 MG/2ML SC SOPN  Insurance Company: [place] Medicaid ([facility]) - Phone [phone] Fax [phone]  Pharmacy Filling the Rx: [facility] - [place], [place] - [address]  Filling Pharmacy Phone:    Filling Pharmacy Fax:    Start Date: 12/4/2023       [id number]

## 2023-12-04 NOTE — LETTER
12/5/2023    Patient: Mattie Pierre  YOB: 1975    To whom it may concern:     I am contacting you as a dermatologist caring for Mattie Pierre with regard to the patient's diagnosis of Atopic Dermatitis (L20.9). This patient initially established with our dermatology clinic in 2022, but was previously following with another dermatologist.     I prescribed this patient Dupixent, which required a prior authorization. Unfortunately, the prior authorization was denied and the patient is now unable to fill their prescription. I have reviewed the patient's diagnosis, care plan and clinical guidelines for treatment and request a formal appeal of your denial for Dupixent.    Atopic dermatitis, a form of eczema, is a chronic inflammatory disease with symptoms often appearing as a rash on the skin. Moderate-to-severe atopic dermatitis is characterized by rashes often covering much of the body, and can include intense, persistent itching and skin dryness, cracking, redness, crusting, and oozing. Itch is one of the most burdensome symptoms for patients and can be debilitating. In addition, people with moderate-to-severe atopic dermatitis experience impaired quality of life, including disrupted sleep, and increased anxiety and depression symptoms along with their disease.    When treating a patient with Atopic Dermatitis it is necessary to have access to the full spectrum of accepted treatments as patients may not be able to use one particular treatment due to lack of response, the potential for side effects or even an allergic reaction. It can become a serious safety issue for the patient if I am not able to prescribe a wide variety of treatments for this condition.    I strongly believe this patient needs access to Dupixent.  Dupixent is approved for the treatment of moderate to severe atopic dermatitis and achieves degrees of improvement not achieved by other comparably safe therapies. In the published phase  "3 trials, over 1/3 of patients achieved the endpoint of \"clear or almost clear\" and 2/3 of patients achieved EASI 50, an endpoint which results in marked improvement in quality of life.      Mattie has had longstanding atopic dermatitis that is recalcitrance to potent topical steroids and calcineurin inhibitors. She has tried many topicals without relief, including use under glove/sock occlusion at night. The following medications listed below she has continued to use with NO success. She has not filled these twice because they have been ineffective for her and there is no point on continuing them if they offer no relief after a trial:  - clobetasol (TEMOVATE) 0.05 % ointment (started 7/31/18, refilled 4/11/23)  - triamcinolone 0.1% ointment  (reordered 4/11/23)  - tacrolimus 0.1% ointment (started 4/11/23)  - mometasone (ELOCON) 0.1 % external ointment (ordered 11/30/23 to replace clobetasol which was ineffective)  - nbUVB treatments     Additionally, I request that you review the following evidence showing how this medication can be effectively utilized to treat Atopic Dermatitis (L20.9):  American Academy of Dermatology guideline on atopic dermatitis available at https://www.aad.org/guidelines/ad  Rob EL, Vivek T, Abbey E, Ayaz LA, Liv A, Tom MJ, Rica JI, Bety M, Katkevin Y, Jonathon CHARMAINE, Gavin K, Татьяна M, Ele Y, Woleloyberg A, Sylvia Y, Colby NM, Mahogany G, Radha B, Lainey H, Srathak V, Karina L, Dov A, Walker N, Hong GD, Jose SALAS; SOLO 1 and SOLO 2 Investigators. Two Phase 3 Trials of Dupilumab versus Placebo in Atopic Dermatitis. N Engl J Med. 2016 Sep 30. [Epub ahead of print]    On behalf of Mattie Pierre, I would appreciate your prompt reconsideration of this denial. Please feel free to contact me below for any additional information you may require. I look forward to receiving your response and approval of coverage for this medication.    Sincerely,    Sana" Yvette Lanier MD    Office contact:   Clau Santiago   Pharmacy Liaison Dermatology   P: [phone]   F: [phone]   [email or web address]

## 2023-12-05 ENCOUNTER — BEH TREATMENT PLAN (OUTPATIENT)
Dept: BEHAVIORAL HEALTH | Facility: CLINIC | Age: 48
End: 2023-12-05
Payer: MEDICAID

## 2023-12-05 ENCOUNTER — HOSPITAL ENCOUNTER (OUTPATIENT)
Dept: BEHAVIORAL HEALTH | Facility: CLINIC | Age: 48
Discharge: HOME | End: 2023-12-05
Attending: FAMILY MEDICINE
Payer: MEDICAID

## 2023-12-05 DIAGNOSIS — F14.20 COCAINE USE DISORDER, MODERATE, DEPENDENCE (H): Primary | ICD-10-CM

## 2023-12-05 DIAGNOSIS — F10.20 ALCOHOL USE DISORDER, MODERATE, DEPENDENCE (H): Primary | ICD-10-CM

## 2023-12-05 PROCEDURE — H2035 A/D TX PROGRAM, PER HOUR: HCPCS
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
## 2023-12-05 NOTE — PROGRESS NOTES
Weekly Progress Note            12/04/2023-12/08/2023  Mattie Pierre  1975  [id number]    D) Pt attended ZERO groups  this week with no absences. She completed Service Initiation  and reviewed pre-existing DA this week. A) Staff facilitated groups and reviewed tx progress. Assessed for [facility].   R) See Comp Assessment summary dated 12/05/2023 for assessment along six dimensions and for VA status.  T) Mattie requested start date of 12.11.2023. Schedule requested by Alexander Rodriguez. 1:1 appointment schedule TBD next week.
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
## 2023-12-06 ENCOUNTER — TELEPHONE (OUTPATIENT)
Dept: BEHAVIORAL HEALTH | Facility: CLINIC | Age: 48
End: 2023-12-06
Payer: MEDICAID

## 2023-12-06 NOTE — TELEPHONE ENCOUNTER
----- Message from RD Kincaid sent at 12/5/2023  1:48 PM CST -----  Scheduling Request    Patient Name: Mattie Pierre  Location of programming: [place]  Start Date: December / 11 / 2023  Group:  MICD GROUP PH1 on Monday, Wednesday, and Thursday at 9:00 AM to 12:00 PM  Attending Provider (MD): Cindy Hilario  Number of visits to be scheduled: 21  Duration of Appointment in minutes: 180  Visit Type: In-person or Treatment - 870    Additional notes: N/A

## 2023-12-07 NOTE — TELEPHONE ENCOUNTER
PRIOR AUTHORIZATION DENIED    Medication: DUPIXENT 300 MG/2ML SC SOPN  Insurance Company: [place] [employer] ([facility]) - Phone [phone] Fax [phone]  Denial Date: 12/5/2023  Denial Reason(s):   Appeal Information: [phone]  Patient Notified:

## 2023-12-08 ENCOUNTER — DOCUMENTATION ONLY (OUTPATIENT)
Dept: BEHAVIORAL HEALTH | Facility: CLINIC | Age: 48
End: 2023-12-08
Payer: MEDICAID

## 2023-12-10 ENCOUNTER — DOCUMENTATION ONLY (OUTPATIENT)
Dept: BEHAVIORAL HEALTH | Facility: CLINIC | Age: 48
End: 2023-12-10
Payer: MEDICAID

## 2023-12-13 ENCOUNTER — TELEPHONE (OUTPATIENT)
Dept: BEHAVIORAL HEALTH | Facility: CLINIC | Age: 48
End: 2023-12-13
Payer: MEDICAID

## 2023-12-13 NOTE — TELEPHONE ENCOUNTER
----- Message from RD Kincaid sent at 12/13/2023  2:40 PM CST -----  Scheduling Request    Patient Name: Mattie Pierre  Location of programming: [place]  Start Date: December / 18 / 2023  Group: [facility] MICD PH1 on Monday, Wednesday, and Thursday at 9:00 AM to 12:00 PM  Attending Provider (MD): Cindy Hilario MD  Number of visits to be scheduled: 7  Duration of Appointment in minutes: 180  Visit Type: In-person or Treatment - 870    Additional notes: Mattie cancelled her group appointments on 12/18, 12/20, 12/21, 12/27, 12/28, 1/3 and 1/4 by mistake.

## 2023-12-13 NOTE — TELEPHONE ENCOUNTER
Writer spoke with Mattei wallace. Writer initially called to check in with Mattie as she has had difficulties making it to group due to illness, previously scheduled appointments and transportation issues. Mattie stated that her medical ride company had the wrong  time. Writer noticed that Mattie had cancelled all of her remaining December group appointments via [employer] and discussed this with patient. Mattie states is not sure why they are cancelled and that she is planning to join group on Monday. Will add her back to the schedule. Mattie denies any safety concerns, SI/SIB/HI, and reiterated multiple times that she has not used substances or alcohol since November 1st. Mattie denied any symptoms of withdrawal.      RD Kincaid on 12/13/2023 at 2:37 PM

## 2023-12-14 ENCOUNTER — DOCUMENTATION ONLY (OUTPATIENT)
Dept: BEHAVIORAL HEALTH | Facility: CLINIC | Age: 48
End: 2023-12-14
Payer: MEDICAID

## 2023-12-14 NOTE — PROGRESS NOTES
Weekly Progress Note   Mattie Pierre  1975  [id number]      D) Pt attended ZERO groups  this week with 3 absences. Mattie reported feeling sick on Monday 12/11, had a mix-up with her medical rides on 12/13 and had a previously scheduled appointment on 12/14. Writer checked in with patient on 12/13 to discuss continuing care. A) Staff facilitated groups and reviewed tx progress. Assessed for [facility]. R) Unable to assess along six dimensions or for [facility] due to lack of attendance.   T) Mattie will join group on 12/18

## 2023-12-15 DIAGNOSIS — F10.90 ALCOHOL USE DISORDER: Primary | ICD-10-CM

## 2023-12-15 DIAGNOSIS — F15.90 STIMULANT USE DISORDER: ICD-10-CM

## 2023-12-18 ENCOUNTER — TELEPHONE (OUTPATIENT)
Dept: UROLOGY | Facility: CLINIC | Age: 48
End: 2023-12-18
Payer: MEDICAID

## 2023-12-18 ENCOUNTER — HOSPITAL ENCOUNTER (OUTPATIENT)
Dept: BEHAVIORAL HEALTH | Facility: CLINIC | Age: 48
Discharge: HOME | End: 2023-12-18
Attending: FAMILY MEDICINE
Payer: MEDICAID

## 2023-12-18 ENCOUNTER — TELEPHONE (OUTPATIENT)
Dept: DERMATOLOGY | Facility: CLINIC | Age: 48
End: 2023-12-18
Payer: MEDICAID

## 2023-12-18 DIAGNOSIS — F15.90 STIMULANT USE DISORDER: ICD-10-CM

## 2023-12-18 DIAGNOSIS — L73.2 HIDRADENITIS SUPPURATIVA: ICD-10-CM

## 2023-12-18 DIAGNOSIS — F10.90 ALCOHOL USE DISORDER: ICD-10-CM

## 2023-12-18 LAB
AMPHETAMINE QUAL URINE POCT: NEGATIVE
BARBITURATE QUAL URINE POCT: NEGATIVE
BENZODIAZEPINE QUAL URINE POCT: NEGATIVE
BUPRENORPHINE QUAL URINE POCT: NEGATIVE
COCAINE QUAL URINE POCT: NEGATIVE
CREATININE QUAL URINE POCT: ABNORMAL
INTERNAL QC QUAL URINE POCT: ABNORMAL
MDMA QUAL URINE POCT: NEGATIVE
METHADONE QUAL URINE POCT: NEGATIVE
METHAMPHETAMINE QUAL URINE POCT: NEGATIVE
OPIATE QUAL URINE POCT: NEGATIVE
OXYCODONE QUAL URINE POCT: NEGATIVE
PH QUAL URINE POCT: ABNORMAL
PHENCYCLIDINE QUAL URINE POCT: NEGATIVE
POCT KIT EXPIRATION DATE: ABNORMAL
POCT KIT LOT NUMBER: ABNORMAL
SPECIFIC GRAVITY POCT: >=1.03
TEMPERATURE URINE POCT: ABNORMAL
THC QUAL URINE POCT: NEGATIVE

## 2023-12-18 PROCEDURE — 80305 DRUG TEST PRSMV DIR OPT OBS: CPT

## 2023-12-18 PROCEDURE — H2035 A/D TX PROGRAM, PER HOUR: HCPCS | Mod: HQ

## 2023-12-18 NOTE — TELEPHONE ENCOUNTER
Received fax from [facility] [address]    Message to Prescriber: Directions aren't adequate. Please send new RX    Medication: benzoyl peroxide 5 % external liquid

## 2023-12-18 NOTE — GROUP NOTE
Group Therapy Documentation    PATIENT'S NAME: Mattie Pierre  MRN:   [id number]  :   1975  ACCT. NUMBER: [id number]  DATE OF SERVICE: 23  START TIME:  9:00 AM  END TIME: 12:00 PM  FACILITATOR(S): Sandy Wilson, [facility]; Alexander Rodriguez [facility]  TOPIC: BEH Group Therapy  Number of patients attending the group:  8  Group Length:  3 Hours    Group Therapy Type: Addiction and Psychoeducation    Summary of Group / Topics Discussed:    Meditation/Breathing Exercises, Mindfulness, and Sober support groups,     Attestation:   Dr. Hilario - Medical Director - Provides oversight and supervision of care.      Group Attendance:  Attended group session    Patient's response to the group topic/interactions:  cooperative with task and discussed personal experience with topic    Patient appeared to be Actively participating, Attentive, and Engaged.        Client specific details:  Today was Mattie's 1st day of group. She was anxious very fidgety. Mattie reported that her clothes make her very itchy so she was also working on managing that. Mattie reports that she has removed using people from her life so has not had any cravings. She is not making it to sober support meetings and it is unknown if she will plan to attend in the future. .

## 2023-12-18 NOTE — TELEPHONE ENCOUNTER
Scheduled new patient appointment for overactive bladder-asked patient to complete voiding diary. Mailing out forms    Scheduled for 1/24/24 with Dr Lani Bishop, RN, BSN  Care Coordinator Urology  [facility]  Urology Clinic  [phone]

## 2023-12-20 ENCOUNTER — HOSPITAL ENCOUNTER (OUTPATIENT)
Dept: BEHAVIORAL HEALTH | Facility: CLINIC | Age: 48
Discharge: HOME | End: 2023-12-20
Attending: FAMILY MEDICINE
Payer: MEDICAID

## 2023-12-20 DIAGNOSIS — F31.9 BIPOLAR AFFECTIVE DISORDER, REMISSION STATUS UNSPECIFIED (H): ICD-10-CM

## 2023-12-20 DIAGNOSIS — F14.20 COCAINE USE DISORDER, MODERATE, DEPENDENCE (H): Primary | ICD-10-CM

## 2023-12-20 PROCEDURE — H2035 A/D TX PROGRAM, PER HOUR: HCPCS | Mod: HQ

## 2023-12-20 NOTE — GROUP NOTE
Group Therapy Documentation    PATIENT'S NAME: Mattie Pierre  MRN:   [id number]  [facility]   1975  [id number]  DATE OF SERVICE: 23  START TIME:  9:00 AM  END TIME: 12:00 PM  FACILITATOR(S): Gwendolyn Quintana, [facility], [facility]; lAexander Rodriguez, [facility]  TOPIC: BEH Group Therapy  Number of patients attending the group:  7  Group Length:  3 Hours    Group Therapy Type: Addiction, Psychotherapeutic, and Skills/Education    Summary of Group / Topics Discussed:    Cognitive Therapy Techniques, Co-occurring Illness, Coping Skills/Lifestyle Managemet, Choices in Recovery, Distress Tolerance, Meditation/Breathing Exercises, Mindfulness, and Self-Care Activities    Attestation:   Dr. Hilario - Medical Director - Provides oversight and supervision of care.       Group Attendance:  Attended group session    Patient's response to the group topic/interactions:  cooperative with task, expressed readiness to alter behaviors, expressed understanding of topic, listened actively, and requested more information about topic    Patient appeared to be Actively participating, Attentive, and Engaged.        Client specific details:    Mattie reported continuing abstinence with no motivating thoughts, urges or cravings attributing this, in part, to topomax. She reported abstaining from gambling as well.   Reported use of coping skills utilized: Remembering the positive, Meditation music for sleep, has a sober friend/sponsor, attends [employer] on line, pets (2 cats), working with hands/hanging pictures.  She expressed experiencing tingling in her right arm and was urged to discuss this with her doctor, which she reported is scheduled for this or next week. She noted that she tried EMDR to treat PTSD and was told she was not ready for that treatment. Seeking safety and other coping skills to be supported.  She also noted that she has attended recovery support groups in the past ([facility]) and it didn't work since other people who use  substances are there and know her.

## 2023-12-21 ENCOUNTER — DOCUMENTATION ONLY (OUTPATIENT)
Dept: BEHAVIORAL HEALTH | Facility: CLINIC | Age: 48
End: 2023-12-21
Payer: MEDICAID
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
## 2023-12-21 NOTE — PROGRESS NOTES
ABSENT NOTE:    Mattie Pierre was absent from group 12/21/2023 . This absence was excused. Mattie cancelled via [employer] and let counselor, Alexander Rodriguez, [facility] [place], know she was out due to illness/exacerbation of medical condition. Patient is expected to be in group on 12/27/2023.     Gwendolyn Quintana, [facility], [facility] [place] [place]  12/21/2023 , 2:11 PM

## 2023-12-21 NOTE — TELEPHONE ENCOUNTER
Medication Appeal Initiation    Medication: DUPIXENT 300 MG/2ML SC SOPN  Appeal Start Date:  12/21/2023  Insurance Company: [place] Medicaid  Insurance Phone: [phone]  Insurance Fax: [phone]  Comments:   faxed appeal

## 2023-12-22 ENCOUNTER — DOCUMENTATION ONLY (OUTPATIENT)
Dept: BEHAVIORAL HEALTH | Facility: CLINIC | Age: 48
End: 2023-12-22
Payer: MEDICAID

## 2023-12-22 ENCOUNTER — TELEPHONE (OUTPATIENT)
Dept: BEHAVIORAL HEALTH | Facility: CLINIC | Age: 48
End: 2023-12-22
Payer: MEDICAID

## 2023-12-22 NOTE — TELEPHONE ENCOUNTER
----- Message from Gwendolyn Quintana [facility] sent at 12/22/2023  1:24 PM CST -----  Regarding: Individual Session request (IOP)  Patient Name:  YAYO JACKSON [[id number]]  Location of programming: [facility] ([place])  Start Date: 12/27/2023  Day/s of week: Wednesdays  Time: 12:15pm  Individual Sessions For OP Group: (MI/CF-Rn-[facility] IOP)  Provider: Cindy Hilario MD  Number of visits to be scheduled: 7  Length/Duration of Appointment in minutes: 60  Visit Type: In person  Additional notes: Individual sessions required during phase 1

## 2023-12-26 ENCOUNTER — MYC MEDICAL ADVICE (OUTPATIENT)
Dept: PHARMACY | Facility: CLINIC | Age: 48
End: 2023-12-26
Payer: MEDICAID

## 2023-12-26 NOTE — TELEPHONE ENCOUNTER
MEDICATION APPEAL DENIED    Medication: DUPIXENT 300 MG/2ML SC SOPN  Insurance Company: [place] Medicaid  Denial Date: 12/21/2023  Denial Reason(s):   Second Level Appeal Information: State appeal hearing with   Patient Notified: Noam

## 2023-12-27 ENCOUNTER — DOCUMENTATION ONLY (OUTPATIENT)
Dept: BEHAVIORAL HEALTH | Facility: CLINIC | Age: 48
End: 2023-12-27

## 2023-12-27 ENCOUNTER — CARE COORDINATION (OUTPATIENT)
Dept: FAMILY MEDICINE | Facility: CLINIC | Age: 48
End: 2023-12-27

## 2023-12-27 ENCOUNTER — OFFICE VISIT (OUTPATIENT)
Dept: FAMILY MEDICINE | Facility: CLINIC | Age: 48
End: 2023-12-27
Payer: MEDICAID

## 2023-12-27 VITALS
WEIGHT: 192.4 LBS | OXYGEN SATURATION: 98 % | TEMPERATURE: 98.4 F | BODY MASS INDEX: 34.09 KG/M2 | HEART RATE: 75 BPM | RESPIRATION RATE: 16 BRPM | SYSTOLIC BLOOD PRESSURE: 128 MMHG | HEIGHT: 63 IN | DIASTOLIC BLOOD PRESSURE: 85 MMHG

## 2023-12-27 DIAGNOSIS — L73.2 HIDRADENITIS SUPPURATIVA: ICD-10-CM

## 2023-12-27 DIAGNOSIS — Z00.00 VISIT FOR PREVENTIVE HEALTH EXAMINATION: Primary | ICD-10-CM

## 2023-12-27 DIAGNOSIS — M79.662 PAIN OF LEFT LOWER LEG: ICD-10-CM

## 2023-12-27 DIAGNOSIS — F31.31 BIPOLAR AFFECTIVE DISORDER, CURRENTLY DEPRESSED, MILD (H): ICD-10-CM

## 2023-12-27 DIAGNOSIS — Z12.11 SCREEN FOR COLON CANCER: ICD-10-CM

## 2023-12-27 DIAGNOSIS — I10 PRIMARY HYPERTENSION: ICD-10-CM

## 2023-12-27 DIAGNOSIS — G89.4 CHRONIC PAIN SYNDROME: ICD-10-CM

## 2023-12-27 DIAGNOSIS — E78.5 HYPERLIPIDEMIA LDL GOAL <70: ICD-10-CM

## 2023-12-27 DIAGNOSIS — F14.10 COCAINE ABUSE (H): ICD-10-CM

## 2023-12-27 LAB
CHOLEST SERPL-MCNC: 266 MG/DL
FASTING STATUS PATIENT QL REPORTED: ABNORMAL
HBA1C MFR BLD: 6 % (ref 0–5.6)
HDLC SERPL-MCNC: 41 MG/DL
LDLC SERPL CALC-MCNC: 182 MG/DL
NONHDLC SERPL-MCNC: 225 MG/DL
TRIGL SERPL-MCNC: 215 MG/DL

## 2023-12-27 PROCEDURE — 36415 COLL VENOUS BLD VENIPUNCTURE: CPT

## 2023-12-27 PROCEDURE — 80061 LIPID PANEL: CPT

## 2023-12-27 PROCEDURE — 90677 PCV20 VACCINE IM: CPT

## 2023-12-27 PROCEDURE — 99396 PREV VISIT EST AGE 40-64: CPT | Mod: 25

## 2023-12-27 PROCEDURE — 90471 IMMUNIZATION ADMIN: CPT

## 2023-12-27 PROCEDURE — 99214 OFFICE O/P EST MOD 30 MIN: CPT | Mod: 25

## 2023-12-27 PROCEDURE — 83036 HEMOGLOBIN GLYCOSYLATED A1C: CPT

## 2023-12-27 RX ORDER — DULOXETIN HYDROCHLORIDE 30 MG/1
30 CAPSULE, DELAYED RELEASE ORAL DAILY
Qty: 90 CAPSULE | Refills: 3 | Status: SHIPPED | OUTPATIENT
Start: 2023-12-27 | End: 2024-02-07 | Stop reason: DRUGHIGH

## 2023-12-27 RX ORDER — TOPIRAMATE 50 MG/1
50 TABLET, FILM COATED ORAL 2 TIMES DAILY
Qty: 90 TABLET | Refills: 1 | Status: SHIPPED | OUTPATIENT
Start: 2023-12-27

## 2023-12-27 RX ORDER — QUETIAPINE 200 MG/1
200 TABLET, FILM COATED, EXTENDED RELEASE ORAL AT BEDTIME
Qty: 90 TABLET | Refills: 3 | Status: SHIPPED | OUTPATIENT
Start: 2023-12-27 | End: 2024-07-24

## 2023-12-27 RX ORDER — QUETIAPINE FUMARATE 25 MG/1
TABLET, FILM COATED ORAL
Qty: 60 TABLET | Refills: 3 | Status: SHIPPED | OUTPATIENT
Start: 2023-12-27 | End: 2024-02-07

## 2023-12-27 RX ORDER — ROSUVASTATIN CALCIUM 20 MG/1
20 TABLET, COATED ORAL DAILY
Qty: 90 TABLET | Refills: 3 | Status: SHIPPED | OUTPATIENT
Start: 2023-12-27 | End: 2024-07-24

## 2023-12-27 RX ORDER — LOSARTAN POTASSIUM 100 MG/1
100 TABLET ORAL DAILY
Qty: 90 TABLET | Refills: 1 | Status: SHIPPED | OUTPATIENT
Start: 2023-12-27 | End: 2024-07-24

## 2023-12-27 RX ORDER — SIMVASTATIN 20 MG
20 TABLET ORAL AT BEDTIME
Qty: 30 TABLET | Refills: 11 | Status: CANCELLED | OUTPATIENT
Start: 2023-12-27

## 2023-12-27 ASSESSMENT — ENCOUNTER SYMPTOMS
BREAST MASS: 1
FREQUENCY: 1
NERVOUS/ANXIOUS: 1
HEADACHES: 1
HEARTBURN: 1
PARESTHESIAS: 1
MYALGIAS: 1
ABDOMINAL PAIN: 1
DIARRHEA: 1
WEAKNESS: 1
ARTHRALGIAS: 1
CONSTIPATION: 1
SHORTNESS OF BREATH: 1

## 2023-12-27 NOTE — PROGRESS NOTES
12/27/23    Pt came to clinic appt today by train and requested for clinic to arrange a cab back home from Rooming staff because she is experiencing some leg pain and did not want to walk to the train. Rooming staff called down to me and I let patient know that we are not able to arrange any cab services for her. Pt became upset and told Rooming Staff that she would not leave unless someone sets up ride a ride for her and her neighbor to return home. Rooming staff called me again and to let me know pt was upset because we are unable to accommodate a ride for her.     I called [employer] (who typically requires a 3 days notice to set up a ride) and  they were able to make an exception to get her a ride home.    Ride is set up with [employer]. Went up to exam room and verified addres to return home with pt. Provided [employer]'s information and let pt know that next time she will need to arrange her own transportation. Pt voiced understanding.     Christina Rose on 12/27/2023 at 2:36 PM

## 2023-12-27 NOTE — PROGRESS NOTES
ABSENT NOTE:    Mattie Pierre was absent from group 12/27/2023 . This absence was excused. Mattie informed writer last week that they had a doctor appointment today. Patient is expected to be in group on 12/28/23.     Alexander Rodriguez [facility]  12/27/2023 , 12:06 PM

## 2023-12-27 NOTE — PATIENT INSTRUCTIONS
Thank you for your visit  -I have sent refills for many of your medications  -If your leg pain is worsening or not getting getter, please schedule a follow-up.  -Feel  free to schedule a follow-up for any additional concerns that we did not cover today  Kade Yousif, DO

## 2023-12-27 NOTE — PROGRESS NOTES
"      SICU PLAN OF CARE NOTE    Dx: Spinal stenosis    Shift Events: pressors remain off. Multiple BM s    Goals of Care: pt/ot, comfort, encourage PO intake     Neuro: AAO x4, Arouses to Voice, Follows Commands, and Moves All Extremities, responses delayed, repetition required    Vital Signs: /66 (BP Location: Left arm, Patient Position: Lying)   Pulse 86   Temp 98.1 °F (36.7 °C) (Oral)   Resp 12   Ht 5' 8" (1.727 m)   Wt 110 kg (242 lb 8.1 oz)   SpO2 96%   BMI 36.87 kg/m²     Cardiac: NSR, HR 70s-80s. MAPs > 65    Respiratory: Room Air    Diet: Clear Liquid    Gtts: n/a    Urine Output: Urinary Catheter 125 cc/shift       Labs/Accuchecks: ACHS accuchecks. All labs trended & reviewed with crit care MD, replacements as needed    Skin: all skin monitored for redness and breakdown throughout shift. No altered skin integrity. Patient repositioned Q2 hours, foam dressings in place, specialty bed in use and plugged in.        " "Preceptor attestation:  Vital signs reviewed: /85   Pulse 75   Temp 98.4  F (36.9  C) (Oral)   Resp 16   Ht 1.588 m (5' 2.5\")   Wt 87.3 kg (192 lb 6.4 oz)   LMP 07/23/2022 (Approximate)   SpO2 98%   BMI 34.63 kg/m      Patient seen, evaluated, and discussed with the resident.  I verified the content of the note, which accurately reflects my assessment of the patient and the plan of care.    Supervising physician: Belgica Saleh MD  [facility]    "

## 2023-12-27 NOTE — PROGRESS NOTES
Assessment & Plan     Visit for preventive health examination  -Patient presents today for routine preventative health examination.  She is scheduled for a mammogram in the near future.  She is open to colonoscopy screening and a colonoscopy was ordered.  Up-to-date on Pap and cervical cancer screening but will be due within the next year.  She has a history of hyperlipidemia and is on medications which can affect lipid levels.  I have placed an order for repeat hyperlipidemia screening.  I discussed with patient that I would recommend she schedule an as needed visit for some of her chronic conditions such as pain, mental health issues, as well as substance use disorder.  She verbalized clear understanding and agreement to this and would schedule a follow-up visit when it was convenient for her.    Screen for colon cancer  -Patient is over the age of 44 I recommended colon cancer screening.  We discussed colonoscopy as well as FIT testing.  She elected to have a colonoscopy ordered.  See below for order placed.  - Colonoscopy Screening  Referral      Hyperlipidemia LDL goal <70  -Has history of hyperlipidemia and was taking simvastatin.  Given her previous elevations in lipid levels as well as her elevated A1c in the past.  I recommend we increase her statin to a high intensity statin and I placed an order for the below statin to be sent to pharmacy on file.  She verbalized clear understanding and agreement to this treatment plan and had no further questions or concerns at this time.  -Will repeat a lipid level today. Discussed stopping simvastatin and replacing with rosuvastatin.  - rosuvastatin (CRESTOR) 20 MG tablet  Dispense: 90 tablet; Refill: 3  - Lipid panel reflex to direct LDL Fasting; Future  - Hemoglobin A1c; Future    Cocaine abuse (H)  -Patient has a history of cocaine abuse and she reports that topiramate has been very effective at reducing cravings.  She is been sober since our last  appointment.  I have sent in a refill of this prescription to her pharmacy on file.  Patient verbalized clear understanding and agreement to this treatment plan and had no further questions or concerns at this time.  - topiramate (TOPAMAX) 50 MG tablet  Dispense: 90 tablet; Refill: 1    Primary hypertension  -Has a history of chronic hypertension.  Blood pressures were within the normal range today 128/85.  This appears to be a well-managed chronic condition.  I have sent in a refill of losartan to her pharmacy on file.  Reviewed previous lab testing including creatinine.  Clear understanding and agreement to this treatment plan and had no further questions or concerns at this time.  - losartan (COZAAR) 100 MG tablet  Dispense: 90 tablet; Refill: 1    Bipolar affective disorder, currently depressed, mild (H)  -Patient has a longstanding history of bipolar disorder and has been on the same dose of Seroquel for quite some time.  I have sent in a refill of this medication to her pharmacy on file.  Recent labs including BMP, CBC were reviewed.  Will repeat lipid and A1c at today's visit.  Patient verbalized clear understanding and agreement to this treatment plan and had no further questions or concerns at this time.  - QUEtiapine (SEROQUEL) 25 MG tablet  Dispense: 60 tablet; Refill: 3  - QUEtiapine ER (SEROQUEL XR) 200 MG 24 hr tablet  Dispense: 90 tablet; Refill: 3    Chronic pain syndrome  -Patient has been taking duloxetine for quite some time for chronic pain.  She has tolerated this medication well and would like to continue with it.  She states she is in need of a refill.  I discussed that we can send a refill to her pharmacy on file.  Patient verbalized clear understanding and agreement to this treatment plan and had no further questions or concerns at this time  - DULoxetine (CYMBALTA) 30 MG capsule  Dispense: 90 capsule; Refill: 3    Pain of left lower leg  -Reported pain of her left lower leg starting  12/26/2023.  She has not tried any interventions to improve the pain.  She denies any injury recently.  Denies any changes in bowel or bladder patterns.  Pain is not out of proportion based on examination.  I discussed with patient's that I would recommend continuing her naproxen for pain relief, resting, and elevating the left lower extremity.  I discussed that if pain worsens or fails to improve with the conservative management we discussed that she can schedule an as needed follow-up visit at her earliest convenience.  Patient verbalized clear understanding and agreement to this treatment plan and had no further questions or concerns at this time.    Hidradenitis Suppurativa  -Referral was placed for general surgery at our previous visit.  I discussed with our referral coordinator today, who indicated that the previously placed surgery referral was unable to be scheduled and that a dermatology referral may be most appropriate in her situation.  I have placed the below referral to dermatology so that her her adenitis suppurativa can be best evaluated and managed.  - Adult Dermatology  Referral; Future      Return if symptoms worsen or fail to improve.    Kade Yousif [facility]    Samina Phelps is a 48 year old, presenting for the following health issues:  Physical      12/27/2023     1:10 PM   Additional Questions   Roomed by Mao   Accompanied by self     -She reports pain in her left lower extremit which started yesterday 12/26/3.  She reports no recent injury.  She did note that she had a fall about 3 or 4 weeks ago.  She reports no acute changes in her health or symptoms besides the left lower extremity pain.  Denies any changes in her bowel or bladder patterns.  She reports that she has been taking her medications as prescribed.  She denies taking levothyroxine.  She is interested in colonoscopy and mammogram screening.  She is interested in the pneumonia  vaccination.    She denies headache, fever, chills, nausea, vomiting, significant shortness of breath.      Healthy Habits:     Getting at least 3 servings of Calcium per day:  Yes    Bi-annual eye exam:  NO    Dental care twice a year:  NO    Sleep apnea or symptoms of sleep apnea:  Sleep apnea    Diet:  Low salt, Diabetic and Gluten-free/reduced    Frequency of exercise:  4-5 days/week    Duration of exercise:  15-30 minutes    Taking medications regularly:  No    Barriers to taking medications:  Cost of medication, Problems remembering to take them and Side effects    Medication side effects:  Muscle aches, Significant flushing and Lightheadedness    Additional concerns today:  Yes     -Patient's questionnaire for ROS is relatively ash positive.  Upon further discussion she denies any recent changes in her overall health status other than her left lower extremity pain.  I discussed that if any of her symptoms worsen or change from her baseline she can schedule a follow-up visit so that we can further address any issue if it arises.  She verbalized clear understanding and agreement to this treatment plan and felt comfortable proceeding with a routine preventative visit at today's visit and she stated that she would address any concerns at a subsequent visit.      Mattie is a 48 year old, presenting for the following:  Physical          12/27/2023     1:10 PM   Additional Questions   Roomed by Mao   Accompanied by self         Healthy Habits:     Getting at least 3 servings of Calcium per day:  Yes    Bi-annual eye exam:  NO    Dental care twice a year:  NO    Sleep apnea or symptoms of sleep apnea:  Sleep apnea    Diet:  Low salt, Diabetic and Gluten-free/reduced    Frequency of exercise:  4-5 days/week    Duration of exercise:  15-30 minutes    Taking medications regularly:  No    Barriers to taking medications:  Cost of medication, Problems remembering to take them and Side effects    Medication side effects:   Muscle aches, Significant flushing and Lightheadedness    Additional concerns today:  Yes          Social History            Tobacco Use    Smoking status: Every Day       Packs/day: 2.00       Years: 23.00       Additional pack years: 0.00       Total pack years: 46.00       Types: Cigarettes    Smokeless tobacco: Never    Tobacco comments:       trying to quit, but states that it's hard   Substance Use Topics    Alcohol use: Not Currently       Alcohol/week: 1.0 standard drink of alcohol       Types: 1 Standard drinks or equivalent per week             12/27/2023    12:25 PM   Alcohol Use   Prescreen: >3 drinks/day or >7 drinks/week? Not Applicable     Breast Cancer Screening:     FHS-7:        9/20/2022     1:30 PM   Breast CA Risk Assessment (FHS-7)   Did any of your first-degree relatives have breast or ovarian cancer? No   Did any of your relatives have bilateral breast cancer? No   Did any man in your family have breast cancer? No   Did any woman in your family have breast and ovarian cancer? No   Did any woman in your family have breast cancer before age 50 y? No   Do you have 2 or more relatives with breast and/or ovarian cancer? No   Do you have 2 or more relatives with breast and/or bowel cancer? No             Latest Ref Rng & Units 6/13/2019    11:14 AM   PAP / HPV   HPV 16 DNA NEG Negative    HPV 18 DNA NEG Negative    Other HR HPV NEG Negative       Reviewed and updated as needed this visit by clinical staff   Tobacco  Allergies                Reviewed and updated as needed this visit by Provider  Review of Systems   HENT:  Positive for congestion and hearing loss.    Respiratory:  Positive for shortness of breath.    Gastrointestinal:  Positive for abdominal pain, constipation, diarrhea and heartburn.   Breasts:  Positive for tenderness and breast mass. Negative for discharge.   Genitourinary:  Positive for frequency, pelvic pain and urgency. Negative for vaginal bleeding and vaginal discharge.  "  Musculoskeletal:  Positive for arthralgias and myalgias.   Neurological:  Positive for weakness, headaches and paresthesias.   Psychiatric/Behavioral:  Positive for mood changes. The patient is nervous/anxious.       Constitutional, HEENT, cardiovascular, pulmonary, gi and gu systems are negative, except as otherwise noted.  -ROS reviewed, see HPI for pertinent positives and negatives  Kade Yousif, DO        Objective    /85   Pulse 75   Temp 98.4  F (36.9  C) (Oral)   Resp 16   Ht 1.588 m (5' 2.5\")   Wt 87.3 kg (192 lb 6.4 oz)   LMP 07/23/2022 (Approximate)   SpO2 98%   BMI 34.63 kg/m    Body mass index is 34.63 kg/m .  Physical Exam  Constitutional:       General: She is not in acute distress.     Appearance: She is not toxic-appearing.   HENT:      Head: Normocephalic and atraumatic.      Right Ear: External ear normal.      Left Ear: External ear normal.      Nose: Nose normal.      Mouth/Throat:      Mouth: Mucous membranes are moist.   Cardiovascular:      Rate and Rhythm: Normal rate and regular rhythm.      Heart sounds: No murmur heard.     No friction rub. No gallop.   Pulmonary:      Effort: Pulmonary effort is normal. No respiratory distress.      Breath sounds: Normal breath sounds. No wheezing or rales.   Abdominal:      General: Abdomen is flat.      Palpations: Abdomen is soft.   Musculoskeletal:      Comments: -Lower extremity edema bilaterally.  Range of motion in hip flexion, knee extension and flexion and dorsi and plantarflexion normal in the left lower extremity.  Pain was not elicited to palpation of left lower extremity.  Gait normal.   Skin:     Capillary Refill: Capillary refill takes less than 2 seconds.      Findings: Lesion present.      Comments: Lesion in left axilla consistent with HS   Neurological:      General: No focal deficit present.      Mental Status: She is alert and oriented to person, place, and time.   Psychiatric:         Behavior: Behavior normal.      "     ----- Service Performed and Documented by Resident or Fellow ------      Precepted with MD Kade Yost, DO

## 2023-12-27 NOTE — PROGRESS NOTES
Prior to immunization administration, verified patients identity using patient s name and date of birth. Please see Immunization Activity for additional information.     Screening Questionnaire for Adult Immunization    Are you sick today?   No   Do you have allergies to medications, food, a vaccine component or latex?   No   Have you ever had a serious reaction after receiving a vaccination?   No   Do you have a long-term health problem with heart, lung, kidney, or metabolic disease (e.g., diabetes), asthma, a blood disorder, no spleen, complement component deficiency, a cochlear implant, or a spinal fluid leak?  Are you on long-term aspirin therapy?   No   Do you have cancer, leukemia, HIV/AIDS, or any other immune system problem?   No   Do you have a parent, brother, or sister with an immune system problem?   No   In the past 3 months, have you taken medications that affect  your immune system, such as prednisone, other steroids, or anticancer drugs; drugs for the treatment of rheumatoid arthritis, Crohn s disease, or psoriasis; or have you had radiation treatments?   No   Have you had a seizure, or a brain or other nervous system problem?   No   During the past year, have you received a transfusion of blood or blood    products, or been given immune (gamma) globulin or antiviral drug?   No   For women: Are you pregnant or is there a chance you could become       pregnant during the next month?   No   Have you received any vaccinations in the past 4 weeks?   No     Immunization questionnaire answers were all negative.      Patient instructed to remain in clinic for 15 minutes afterwards, and to report any adverse reactions.     Screening performed by Daniel Wolf CMA on 12/27/2023 at 2:07 PM.        Answers submitted by the patient for this visit:  Annual Preventive Visit (Submitted on 12/27/2023)  Chief Complaint: Annual Exam:  Frequency of exercise:: 4-5 days/week  Getting at least 3 servings of Calcium per  day:: Yes  Diet:: Low salt, Diabetic, Gluten-free/reduced  Taking medications regularly:: No  Medication side effects:: Muscle aches, Significant flushing, Lightheadedness  Bi-annual eye exam:: NO  Dental care twice a year:: NO  Sleep apnea or symptoms of sleep apnea:: Sleep apnea  abdominal pain: Yes  congestion: Yes  constipation: Yes  diarrhea: Yes  nervous/anxious: Yes  frequency: Yes  headaches: Yes  hearing loss: Yes  heartburn: Yes  arthralgias: Yes  mood changes: Yes  myalgias: Yes  Skin sensation changes: Yes  urgency: Yes  shortness of breath: Yes  weakness: Yes  pelvic pain: Yes  vaginal bleeding: No  vaginal discharge: No  tenderness: Yes  breast mass: Yes  breast discharge: No  Additional concerns today:: Yes  Exercise outside of work (Submitted on 12/27/2023)  Chief Complaint: Annual Exam:  Duration of exercise:: 15-30 minutes  Barriers to taking medications (Submitted on 12/27/2023)  Chief Complaint: Annual Exam:  Barriers to taking medications:: Cost of medication, Problems remembering to take them, Side effects

## 2023-12-27 NOTE — PROGRESS NOTES
SUBJECTIVE:   Mattie is a 48 year old, presenting for the following:  Physical        12/27/2023     1:10 PM   Additional Questions   Roomed by Mao   Accompanied by self       Healthy Habits:     Getting at least 3 servings of Calcium per day:  Yes    Bi-annual eye exam:  NO    Dental care twice a year:  NO    Sleep apnea or symptoms of sleep apnea:  Sleep apnea    Diet:  Low salt, Diabetic and Gluten-free/reduced    Frequency of exercise:  4-5 days/week    Duration of exercise:  15-30 minutes    Taking medications regularly:  No    Barriers to taking medications:  Cost of medication, Problems remembering to take them and Side effects    Medication side effects:  Muscle aches, Significant flushing and Lightheadedness    Additional concerns today:  Yes          {Add if <65 person on Medicare  - Required Questions (Optional):876434}  {Outside tests to abstract? (Optional):707472}    {additional problems to add (Optional):384724}      Social History     Tobacco Use    Smoking status: Every Day     Packs/day: 2.00     Years: 23.00     Additional pack years: 0.00     Total pack years: 46.00     Types: Cigarettes    Smokeless tobacco: Never    Tobacco comments:     trying to quit, but states that it's hard   Substance Use Topics    Alcohol use: Not Currently     Alcohol/week: 1.0 standard drink of alcohol     Types: 1 Standard drinks or equivalent per week     {Rooming staff  Click this link to complete the Prescreen if response below is not for today's visit  Alcohol Use Prescreen >3 drinks/day or > 7 drinks/week.  If the prescreen question answer is YES, complete the full AUDIT  :414930}        12/27/2023    12:25 PM   Alcohol Use   Prescreen: >3 drinks/day or >7 drinks/week? Not Applicable   {add AUDIT responses (Optional) (A score of 7 for adult men is an indication of hazardous drinking; a score of 8 or more is an indication of an alcohol use disorder.  A score of 7 or more for adult women is an indication of  hazardous drinking or an alchohol use disorder):422801}  Reviewed orders with patient.  Reviewed health maintenance and updated orders accordingly - { :758381}  {Chronicprobdata (optional):681461}    Breast Cancer Screening:    FHS-7:       9/20/2022     1:30 PM   Breast CA Risk Assessment (FHS-7)   Did any of your first-degree relatives have breast or ovarian cancer? No   Did any of your relatives have bilateral breast cancer? No   Did any man in your family have breast cancer? No   Did any woman in your family have breast and ovarian cancer? No   Did any woman in your family have breast cancer before age 50 y? No   Do you have 2 or more relatives with breast and/or ovarian cancer? No   Do you have 2 or more relatives with breast and/or bowel cancer? No     {If any of the questions to the BCRA (FHS-7) are answered yes, consider ordering referral for genetic counseling (Optional) :161849}  {AMB Mammogram Decision Support (Optional) :365642}  Pertinent mammograms are reviewed under the imaging tab.    History of abnormal Pap smear: { :392073}      Latest Ref Rng & Units 6/13/2019    11:14 AM   PAP / HPV   HPV 16 DNA NEG Negative    HPV 18 DNA NEG Negative    Other HR HPV NEG Negative      Reviewed and updated as needed this visit by clinical staff   Tobacco  Allergies               Reviewed and updated as needed this visit by Provider                 {HISTORY OPTIONS (Optional):896197}    Review of Systems   HENT:  Positive for congestion and hearing loss.    Respiratory:  Positive for shortness of breath.    Gastrointestinal:  Positive for abdominal pain, constipation, diarrhea and heartburn.   Breasts:  Positive for tenderness and breast mass. Negative for discharge.   Genitourinary:  Positive for frequency, pelvic pain and urgency. Negative for vaginal bleeding and vaginal discharge.   Musculoskeletal:  Positive for arthralgias and myalgias.   Neurological:  Positive for weakness, headaches and paresthesias.  "  Psychiatric/Behavioral:  Positive for mood changes. The patient is nervous/anxious.      {FEMALE ROS (Optional):342755}     OBJECTIVE:   LMP 07/23/2022 (Approximate)   Physical Exam  {Exam Choices (Optional):707226}    {Diagnostic Test Results (Optional):472280}    ASSESSMENT/PLAN:   {Diag Picklist:746221}    {Patient advised of split billing (Optional):312591}      COUNSELING:  {FEMALE COUNSELING MESSAGES:678242::\"Reviewed preventive health counseling, as reflected in patient instructions\"}        She reports that she has been smoking cigarettes. She has a 46 pack-year smoking history. She has never used smokeless tobacco.  Nicotine/Tobacco Cessation Plan:   {Nicotine/Tobacco Cessation Plan:209516}      {Counseling Resources  [place] Preventive Services Task Force  Cholesterol Screening  Health diet/nutrition  Pooled Cohorts Equation Calculator  USDA's MyPlate  ASA Prophylaxis  Lung CA Screening  Osteoporosis prevention/bone health :335360}  {Breast Cancer Risk Calculator  BRCA-Related Cancer Risk Assessment FHS-7 Tool :898657}  Kade Yousif DO  [facility]  "

## 2023-12-28 ENCOUNTER — DOCUMENTATION ONLY (OUTPATIENT)
Dept: BEHAVIORAL HEALTH | Facility: CLINIC | Age: 48
End: 2023-12-28
Payer: MEDICAID

## 2023-12-28 NOTE — PROGRESS NOTES
Weekly Progress Note      12/25/2023-12/31/2023  Mattie Pierre  1975  [id number]      D) Pt attended ZERO groups this week with 2 excused absences.   A) Staff facilitated groups and reviewed tx progress. Assessed for [facility].   R) Unable to assess along six dimensions or for [facility] due to lack of attendance.   T) Mattie is scheduled to join group on 1/2/2024.

## 2023-12-28 NOTE — PROGRESS NOTES
ABSENT NOTE:    Mattie Pierre was absent from group 12/28/2023 . This absence was excused. This writer attempted to contact patient via phone. Mattie reported that she was frustrated with this writer and other primary counselor for scheduling group and individual appointments on days where she had medical appointment. She reported that she cancelled her group appointments today and yesterday due to medical appointments and this caused her transportation to also be cancelled. She stated that she has several upcoming medical appointments and that she would like to discharge. Writer attempted to discuss alternatives to discharge, but Mattie hung up on writer. Patient at this point is requesting to be discharged from programming.        Alexander Rodriguez, [facility]  12/28/2023 , 5:00 PM

## 2023-12-29 ENCOUNTER — DOCUMENTATION ONLY (OUTPATIENT)
Dept: BEHAVIORAL HEALTH | Facility: CLINIC | Age: 48
End: 2023-12-29
Payer: MEDICAID
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
## 2024-01-03 ENCOUNTER — OFFICE VISIT (OUTPATIENT)
Dept: SLEEP MEDICINE | Facility: CLINIC | Age: 49
End: 2024-01-03
Attending: FAMILY MEDICINE
Payer: MEDICAID

## 2024-01-03 VITALS
WEIGHT: 196 LBS | SYSTOLIC BLOOD PRESSURE: 97 MMHG | OXYGEN SATURATION: 98 % | HEART RATE: 66 BPM | HEIGHT: 62 IN | BODY MASS INDEX: 36.07 KG/M2 | RESPIRATION RATE: 18 BRPM | DIASTOLIC BLOOD PRESSURE: 70 MMHG

## 2024-01-03 DIAGNOSIS — R06.83 SNORING: Primary | ICD-10-CM

## 2024-01-03 DIAGNOSIS — G47.09 OTHER INSOMNIA: ICD-10-CM

## 2024-01-03 DIAGNOSIS — G47.19 EXCESSIVE DAYTIME SLEEPINESS: ICD-10-CM

## 2024-01-03 DIAGNOSIS — R35.1 NOCTURIA: ICD-10-CM

## 2024-01-03 PROCEDURE — 99203 OFFICE O/P NEW LOW 30 MIN: CPT

## 2024-01-03 ASSESSMENT — SLEEP AND FATIGUE QUESTIONNAIRES
HOW LIKELY ARE YOU TO NOD OFF OR FALL ASLEEP WHILE SITTING INACTIVE IN A PUBLIC PLACE: HIGH CHANCE OF DOZING
HOW LIKELY ARE YOU TO NOD OFF OR FALL ASLEEP WHILE SITTING QUIETLY AFTER LUNCH WITHOUT ALCOHOL: MODERATE CHANCE OF DOZING
HOW LIKELY ARE YOU TO NOD OFF OR FALL ASLEEP WHILE LYING DOWN TO REST IN THE AFTERNOON WHEN CIRCUMSTANCES PERMIT: MODERATE CHANCE OF DOZING
HOW LIKELY ARE YOU TO NOD OFF OR FALL ASLEEP WHEN YOU ARE A PASSENGER IN A CAR FOR AN HOUR WITHOUT A BREAK: HIGH CHANCE OF DOZING
HOW LIKELY ARE YOU TO NOD OFF OR FALL ASLEEP WHILE SITTING AND TALKING TO SOMEONE: SLIGHT CHANCE OF DOZING
HOW LIKELY ARE YOU TO NOD OFF OR FALL ASLEEP IN A CAR, WHILE STOPPED FOR A FEW MINUTES IN TRAFFIC: SLIGHT CHANCE OF DOZING
HOW LIKELY ARE YOU TO NOD OFF OR FALL ASLEEP WHILE WATCHING TV: HIGH CHANCE OF DOZING
HOW LIKELY ARE YOU TO NOD OFF OR FALL ASLEEP WHILE SITTING AND READING: MODERATE CHANCE OF DOZING

## 2024-01-03 NOTE — PATIENT INSTRUCTIONS
General recommendations for sleep problems (Insomnia)  Allow 2-4 weeks to see results     Establish a regular sleep schedule    Most people only need 7-8 hours of sleep.  Don't be in bed longer than you need     to sleep or you will end up spending more time awake in bed. This trains your    brain to think of the bed as a place to not sleep.  Go to bed at same time each night   Get up at same time each day - Set an alarm everyday (even weekends). This is one of    the most important tips. It prevents you from relying on your insomnia to get you    up on time for your day. That actually reinforces insomnia. It also will help your    body get into a pattern where you start feeling tired at a consistent time each    night.  The body functions best when you keep a consistent routine.  Avoid sleeping-in and napping. Anytime you sleep during the day, you will be less tired at    night. You may be tired enough to fall asleep, but you will wake more in the    middle of the night because you will have met your sleep need before the night is    done.   Cut down time in bed (if not asleep, get up)- Use your bed only for sleep and sex    Anytime you spend time in bed doing activities other than sleep (reading,    watching TV, working, playing on the computer or phone, or even just laying in    bed trying to sleep), you are training your brain to think of the bed as a place to    do activities other than sleep. If you are not falling asleep within 20-30 minutes,    get out of bed. While out of bed, avoid bright lights. Avoid work or chores. Being    productive in the middle of the night reinforces waking up at night. Find relaxing,    not particularly entertaining activities like reading, listening to music, or relaxation    exercises. Go back to bed if you start feeling groggy, or after about 30 minutes,    even if not feeling very tired. Sometimes, just getting out of bed stops the pattern    of getting frustrated about laying  in bed not sleeping, and that can help you fall    asleep.   Avoid trying to force yourself to sleep- sleep is not like everything else. The harder you    work at most things, the more you can accomplish. The harder you work at    sleep, the less you will sleep.     Make the bedroom comfortable - quiet, dark and cool are better. Consider ear plugs    (silicon). Use dark blinds or wear an eye mask if needed     Make a relaxing routine prior to bedtime  Relaxation exercises:   Progressive muscle relaxation: Relax each muscle group individually    Begin with your feet, flex, then relax. Try to imagine your feet feeling heavy and sinking into the bed. Move to your calves, do the same thing. Work through each muscle group toward your head.    Relaxing Mental Imagery: Try to imagine a trip that you took and found relaxing, or imagine a day at the beach. Try to walk yourself through the day in your mind as if you were dreaming it. Try to imagine sensing the different experiences, such as feeling sand between your toes, the heat of the sun on your skin, seeing the waves crashing the shore, the smell of the salt water, etc.     Deal with your worries before bedtime    Set aside a worry time around dinner time for 10-15 minutes. Write down the    things that are on your mind. Plan time in the coming days to address those    issues. Brainstorm ideas on how you will deal with them. Try to identify issues    that are out of your control, and try to let those issues go.  Listen to relaxation tapes   Classical Music or Nature sounds   Back Massage   Get regular exercise each day (at least 1-2 hours before bedtime)   Take medications only as directed   Eat a light bedtime snack or warm drink   Warm milk   Warm herbal tea (non-caffeinated)     Things to avoid   No overstimulating activities just before bed   No competitive games before bedtime   No exciting television programs before bedtime   Avoid caffeine after lunchtime   Avoid  chocolate   Do not use alcohol to induce sleep (worsens Insomnia)   Do not take someone else's sleeping pills   Do not look at the clock when awakening   Do not turn on light when getting up to use bathroom, use a nightlight     Online Programs   www.SHUTi.me (pronounced shut eye). There is a fee for this program. Enter the code  [facility]  if you decide to enroll in this program.    www.sleepIO.com (pronounced sleep ee oh). There is a fee for this program. Enter the code  [facility]  if you decide to enroll in this program.     Suggested Resources  Insomnia Treatment Books   Overcoming Insomnia by Mitchell Lemos and Marizol Sanchez (2008)  No More Sleepless Nights by Casey Lacey and Azucena Reyes (1996)  Say Julienne to Insomnia by Venkatesh Lundberg (2009)  The Insomnia Workbook by Rachana Conley and Mikael Ruff (2009)  The Insomnia Answer by Ole Torres and Kyle Dunaway (2006)    Stress Management and Relaxation Books  The Relaxation and Stress Reduction Workbook by Noreen Ruiz, Sujata Haywood and Nba Cesar (2008)  Stress Management Workbook: Techniques and Self-Assessment Procedures by Christen Douglass and Omid Damon (1997)  A Mindfulness-Based Stress Reduction Workbook by Roberto Cardoso and Trish Dotson (2010)  The Complete Stress Management Workbook by Adrian Matias, Teodoro Mclean and Randolph Coreas (1996)  Assert Yourself by Marietta Solano and Phoenix Solano (1977)    Relaxation Resources for Computer Download   These websites offer resources to help you relax. This list is for information only. [facility] is not responsible for the quality of services or the actions of any person or organization.  Progressive Muscle Relaxation (PMR):   http://www.innerNephositystudio.com/progressive-muscle-relaxation-exercise.html   http://studentsupport.[facility]/counseling/resources/self-help/relaxation-and-stress-management/   Deep Breathing  "Exercises:  http://www.[employer]/breathing-awareness.html     Meditation:   www.Medaxion  www.ViewCastguided[employer] Analyticsmeditation-site.[facility] You may have to pay for some of these resources.    Guided Imagery:  http://www.[employer]/guided-imagery-scripts.html   http://Sevenpop/library/lojaowhkkz-fkzuea-aqadatb/   Consider phone apps such as: Calm, Headspace or Insight Timer.    Counseling / Behavioral Health  [facility]  Visit [email or web address] or call [phone] to find a clinic close to you.  Or call [phone] for [facility].           MY TREATMENT INFORMATION FOR SLEEP APNEA-  Mattie Pierre    DOCTOR : KIRA Anglin CNP    Am I having a sleep study at a sleep center?  --->Due to normal delays, you will be contacted within 2-4 weeks to schedule    Am I having a home sleep study?  --->Watch the video for the device you are using:    -/drop off device- https://www.AllClear ID.com/watch?v=yGGFBdELGhk    Frequently asked questions:  1. What is Obstructive Sleep Apnea (LONNY)? LONNY is the most common type of sleep apnea. Apnea means, \"without breath.\"  Apnea is most often caused by narrowing or collapse of the upper airway as muscles relax during sleep.   Almost everyone has occasional apneas. Most people with sleep apnea have had brief interruptions at night frequently for many years.  The severity of sleep apnea is related to how frequent and severe the events are.   2. What are the consequences of LONNY? Symptoms include: feeling sleepy during the day, snoring loudly, gasping or stopping of breathing, trouble sleeping, and occasionally morning headaches or heartburn at night.  Sleepiness can be serious and even increase the risk of falling asleep while driving. Other health consequences may include development of high blood pressure and other cardiovascular disease in persons who are susceptible. Untreated LONNY can contribute to " heart disease, stroke and diabetes.   3. What are the treatment options? In most situations, sleep apnea is a lifelong disease that must be managed with daily therapy. Medications are not effective for sleep apnea and surgery is generally not considered until other therapies have been tried. Your treatment is your choice . Continuous Positive Airway (CPAP) works right away and is the therapy that is effective in nearly everyone. An oral device to hold your jaw forward is usually the next most reliable option. Other options include postioning devices (to keep you off your back), weight loss, and surgery including a tongue pacing device. There is more detail about some of these options below.  4. Are my sleep studies covered by insurance? Although we will request verification of coverage, we advise you also check in advance of the study to ensure there is coverage.    Important tips for those choosing CPAP and similar devices  For new devices, sign up for device ANA to monitor your device for your followup visits  We encourage you to utilize the [employer] ana or website (Mersimo) to monitor your therapy progress and share the data with your healthcare team when you discuss your sleep apnea.                                                    Know your equipment:  CPAP is continuous positive airway pressure that prevents obstructive sleep apnea by keeping the throat from collapsing while you are sleeping. In most cases, the device is  smart  and can slowly self-adjusts if your throat collapses and keeps a record every day of how well you are treated-this information is available to you and your care team.  BPAP is bilevel positive airway pressure that keeps your throat open and also assists each breath with a pressure boost to maintain adequate breathing.  Special kinds of BPAP are used in patients who have inadequate breathing from lung or heart disease. In most cases, the device is  smart  and can  slowly self-adjusts to assist breathing. Like CPAP, the device keeps a record of how well you are treated.  Your mask is your connection to the device. You get to choose what feels most comfortable and the staff will help to make sure if fits. Here: are some examples of the different masks that are available: Magnetic mask aids may assist with use but there are safety issues that should be addressed when considering with magnets* (see end of discussion).       Key points to remember on your journey with sleep apnea:  Sleep study.  PAP devices often need to be adjusted during a sleep study to show that they are effective and adjusted right.  Good tips to remember: Try wearing just the mask during a quiet time during the day so your body adapts to wearing it. A humidifier is recommended for comfort in most cases to prevent drying of your nose and throat. Allergy medication from your provider may help you if you are having nasal congestion.  Getting settled-in. It takes more than one night for most of us to get used to wearing a mask. Try wearing just the mask during a quiet time during the day so your body adapts to wearing it. A humidifier is recommended for comfort in most cases. Our team will work with you carefully on the first day and will be in contact within 4 days and again at 2 and 4 weeks for advice and remote device adjustments. Your therapy is evaluated by the device each day.   Use it every night. The more you are able to sleep naturally for 7-8 hours, the more likely you will have good sleep and to prevent health risks or symptoms from sleep apnea. Even if you use it 4 hours it helps. Occasionally all of us are unable to use a medical therapy, in sleep apnea, it is not dangerous to miss one night.   Communicate. Call our skilled team on the number provided on the first day if your visit for problems that make it difficult to wear the device. Over 2 out of 3 patients can learn to wear the device long-term  with help from our team. Remember to call our team or your sleep providers if you are unable to wear the device as we may have other solutions for those who cannot adapt to mask CPAP therapy. It is recommended that you sleep your sleep provider within the first 3 months and yearly after that if you are not having problems.   Use it for your health. We encourage use of CPAP masks during daytime quiet periods to allow your face and brain to adapt to the sensation of CPAP so that it will be a more natural sensation to awaken to at night or during naps. This can be very useful during the first few weeks or months of adapting to CPAP though it does not help medically to wear CPAP during wakefulness and  should not be used as a strategy just to meet guidelines.  Take care of your equipment. Make sure you clean your mask and tubing using directions every day and that your filter and mask are replaced as recommended or if they are not working.     *Masks with magnets:  Updated Contraindications  Masks with magnetic components are contraindicated for use by patients where they, or anyone in close physical contact while using the mask, have the following:   Active medical implants that interact with magnets (i.e., pacemakers, implantable cardioverter defibrillators (ICD), neurostimulators, cerebrospinal fluid (CSF) shunts, insulin/infusion pumps)   Metallic implants/objects containing ferromagnetic material (i.e., aneurysm clips/flow disruption devices, embolic coils, stents, valves, electrodes, implants to restore hearing or balance with implanted magnets, ocular implants, metallic splinters in the eye)  Updated Warning  Keep the mask magnets at a safe distance of at least 6 inches (150 mm) away from implants or medical devices that may be adversely affected by magnetic interference. This warning applies to you or anyone in close physical contact with your mask. The magnets are in the frame and lower headgear clips, with a  magnetic field strength of up to 400mT. When worn, they connect to secure the mask but may inadvertently detach while asleep.  Implants/medical devices, including those listed within contraindications, may be adversely affected if they change function under external magnetic fields or contain ferromagnetic materials that attract/repel to magnetic fields (some metallic implants, e.g., contact lenses with metal, dental implants, metallic cranial plates, screws, eduard hole covers, and bone substitute devices). Consult your physician and  of your implant / other medical device for information on the potential adverse effects of magnetic fields.    BESIDES CPAP, WHAT OTHER THERAPIES ARE THERE?    Positioning Device  Positioning devices are generally used when sleep apnea is mild and only occurs on your back.This example shows a pillow that straps around the waist. It may be appropriate for those whose sleep study shows milder sleep apnea that occurs primarily when lying flat on one's back. Preliminary studies have shown benefit but effectiveness at home may need to be verified by a home sleep test. These devices are generally not covered by medical insurance.  Examples of devices that maintain sleeping on the back to prevent snoring and mild sleep apnea.    Belt type body positioner  http://Dealer Inspire/    Electronic reminder  http://nightshifttherapy.com/            Oral Appliance  What is oral appliance therapy?  An oral appliance device fits on your teeth at night like a retainer used after having braces. The device is made by a specialized dentist and requires several visits over 1-2 months before a manufactured device is made to fit your teeth and is adjusted to prevent your sleep apnea. Once an oral device is working properly, snoring should be improved. A home sleep test may be recommended at that time if to determine whether the sleep apnea is adequately treated.       Some things to remember:  -Oral  devices are often, but not always, covered by your medical insurance. Be sure to check with your insurance provider.   -If you are referred for oral therapy, you will be given a list of specialized dentists to consider or you may choose to visit the Web site of the American Academy of Dental Sleep Medicine  -Oral devices are less likely to work if you have severe sleep apnea or are extremely overweight.     More detailed information  An oral appliance is a small acrylic device that fits over the upper and lower teeth  (similar to a retainer or a mouth guard). This device slightly moves jaw forward, which moves the base of the tongue forward, opens the airway, improves breathing for effective treat snoring and obstructive sleep apnea in perhaps 7 out of 10 people .  The best working devices are custom-made by a dental device  after a mold is made of the teeth 1, 2, 3.  When is an oral appliance indicated?  Oral appliance therapy is recommended as a first-line treatment for patients with primary snoring, mild sleep apnea, and for patients with moderate sleep apnea who prefer appliance therapy to use of CPAP4, 5. Severity of sleep apnea is determined by sleep testing and is based on the number of respiratory events per hour of sleep.   How successful is oral appliance therapy?  The success rate of oral appliance therapy in patients with mild sleep apnea is 75-80% while in patients with moderate sleep apnea it is 50-70%. The chance of success in patients with severe sleep apnea is 40-50%. The research also shows that oral appliances have a beneficial effect on the cardiovascular health of LONNY patients at the same magnitude as CPAP therapy7.  Oral appliances should be a second-line treatment in cases of severe sleep apnea, but if not completely successful then a combination therapy utilizing CPAP plus oral appliance therapy may be effective. Oral appliances tend to be effective in a broad range of patients  although studies show that the patients who have the highest success are females, younger patients, those with milder disease, and less severe obesity. 3, 6.   Finding a dentist that practices dental sleep medicine  Specific training is available through the American Academy of Dental Sleep Medicine for dentists interested in working in the field of sleep. To find a dentist who is educated in the field of sleep and the use of oral appliances, near you, visit the Web site of the American Academy of Dental Sleep Medicine.    References  1. Tamika et al. Objectively measured vs self-reported compliance during oral appliance therapy for sleep-disordered breathing. Chest 2013; 144(5): 6976-1990.  2. Juliann, et al. Objective measurement of compliance during oral appliance therapy for sleep-disordered breathing. Thorax 2013; 68(1): 91-96.  3. Rakan et al. Mandibular advancement devices in 620 men and women with LONNY and snoring: tolerability and predictors of treatment success. Chest 2004; 125: 8725-7250.  4. Hesham, et al. Oral appliances for snoring and LONNY: a review. Sleep 2006; 29: 244-262.  5. Karishma et al. Oral appliance treatment for LONNY: an update. J Clin Sleep Med 2014; 10(2): 215-227.  6. Rosalia et al. Predictors of OSAH treatment outcome. J Dent Res 2007; 86: 2135-9396.      Weight Loss:    Weight loss is a long-term strategy that may improve sleep apnea in some patients.    Weight management is a personal decision and the decision should be based on your interest and the potential benefits.  If you are interested in exploring weight loss strategies, the following discussion covers the impact on weight loss on sleep apnea and the approaches that may be successful.    Being overweight does not necessarily mean you will have health consequences.  Those who have BMI over 35 or over 27 with existing medical conditions carries greater risk.   Weight loss decreases severity of sleep apnea in most  people with obesity. For those with mild obesity who have developed snoring with weight gain, even 15-30 pound weight loss can improve and occasionally eliminate sleep apnea.  Structured and life-long dietary and health habits are necessary to lose weight and keep healthier weight levels.     Though there may be significant health benefits from weight loss, long-term weight loss is very difficult to achieve- studies show success with dietary management in less than 10% of people. In addition, substantial weight loss may require years of dietary control and may be difficult if patients have severe obesity. In these cases, surgical management may be considered.  Finally, older individuals who have tolerated obesity without health complications may be less likely to benefit from weight loss strategies.      BMI 35    Surgery:    Surgery for obstructive sleep apnea is considered generally only when other therapies fail to work. Surgery may be discussed with you if you are having a difficult time tolerating CPAP and or when there is an abnormal structure that requires surgical correction.  Nose and throat surgeries often enlarge the airway to prevent collapse.  Most of these surgeries create pain for 1-2 weeks and up to half of the most common surgeries are not effective throughout life.  You should carefully discuss the benefits and drawbacks to surgery with your sleep provider and surgeon to determine if it is the best solution for you.   More information  Surgery for LONNY is directed at areas that are responsible for narrowing or complete obstruction of the airway during sleep.  There are a wide range of procedures available to enlarge and/or stabilize the airway to prevent blockage of breathing in the three major areas where it can occur: the palate, tongue, and nasal regions.  Successful surgical treatment depends on the accurate identification of the factors responsible for obstructive sleep apnea in each person.  A  personalized approach is required because there is no single treatment that works well for everyone.  Because of anatomic variation, consultation with an examination by a sleep surgeon is a critical first step in determining what surgical options are best for each patient.  In some cases, examination during sedation may be recommended in order to guide the selection of procedures.  Patients will be counseled about risks and benefits as well as the typical recovery course after surgery. Surgery is typically not a cure for a person s LONNY.  However, surgery will often significantly improve one s LONNY severity (termed  success rate ).  Even in the absence of a cure, surgery will decrease the cardiovascular risk associated with OSA7; improve overall quality of life8 (sleepiness, functionality, sleep quality, etc).  Palate Procedures:  Patients with LONNY often have narrowing of their airway in the region of their tonsils and uvula.  The goals of palate procedures are to widen the airway in this region as well as to help the tissues resist collapse.  Modern palate procedure techniques focus on tissue conservation and soft tissue rearrangement, rather than tissue removal.  Often the uvula is preserved in this procedure. Residual sleep apnea is common in patient after pharyngoplasty with an average reduction in sleep apnea events of 33%2.    Tongue Procedures:  Exam while patients are awake, the muscles that surround the throat are active and keep this region open for breathing. These muscles relax during sleep, allowing the tongue and other structures to collapse and block breathing.  There are several different tongue procedures available.  Selection of a tongue base procedure depends on characteristics seen on physical exam.  Generally, procedures are aimed at removing bulky tissues in this area or preventing the back of the tongue from falling back during sleep.  Success rates for tongue surgery range from  50-62%3.  Hypoglossal Nerve Stimulation:  Hypoglossal nerve stimulation has recently received approval from the [place] Food and Drug Administration for the treatment of obstructive sleep apnea.  This is based on research showing that the system was safe and effective in treating sleep apnea6.  Results showed that the median AHI score decreased 68%, from 29.3 to 9.0. This therapy uses an implant system that senses breathing patterns and delivers mild stimulation to airway muscles, which keeps the airway open during sleep.  The system consists of three fully implanted components: a small generator (similar in size to a pacemaker), a breathing sensor, and a stimulation lead.  Using a small handheld remote, a patient turns the therapy on before bed and off upon awakening.    Candidates for this device must be greater than 18 years of age, have moderate to severe LONNY (AHI between 15-65), BMI less than 35, have tried CPAP/oral appliance for at least 8 weeks without success, and have appropriate upper airway anatomy (determined by a sleep endoscopy performed by Dr. Alex Chaidez).  Hypoglossal Nerve Stimulation Pathway:    The sleep surgeon s office will work with the patient through the insurance prior-authorization process (including communications and appeals).    Nasal Procedures:  Nasal obstruction can interfere with nasal breathing during the day and night.  Studies have shown that relief of nasal obstruction can improve the ability of some patients to tolerate positive airway pressure therapy for obstructive sleep apnea1.  Treatment options include medications such as nasal saline, topical corticosteroid and antihistamine sprays, and oral medications such as antihistamines or decongestants. Non-surgical treatments can include external nasal dilators for selected patients. If these are not successful by themselves, surgery can improve the nasal airway either alone or in combination with these other  options.  Combination Procedures:  Combination of surgical procedures and other treatments may be recommended, particularly if patients have more than one area of narrowing or persistent positional disease.  The success rate of combination surgery ranges from 66-80%2,3.    References  Toby BETANCOURT. The Role of the Nose in Snoring and Obstructive Sleep Apnoea: An Update.  Eur Arch Otorhinolaryngol. 2011; 268: 1365-73.   Mathew SM; Ivis JA; Susanne JR; Pallanch JF; Silvio MB; Edy SG; Rojas BARROSO. Surgical modifications of the upper airway for obstructive sleep apnea in adults: a systematic review and meta-analysis. SLEEP 2010;33(10):0160-2062. Loly ALBA. Hypopharyngeal surgery in obstructive sleep apnea: an evidence-based medicine review.  Arch Otolaryngol Head Neck Surg. 2006 Feb;132(2):206-13.  Gary YH1, Javier Y, [place] IRENE. The efficacy of anatomically based multilevel surgery for obstructive sleep apnea. Otolaryngol Head Neck Surg. 2003 Oct;129(4):327-35.  Kezirian E, Goldberg A. Hypopharyngeal Surgery in Obstructive Sleep Apnea: An Evidence-Based Medicine Review. Arch Otolaryngol Head Neck Surg. 2006 Feb;132(2):206-13.  Nicci CEDENO et al. Upper-Airway Stimulation for Obstructive Sleep Apnea.  N Engl J Med. 2014 Jan 9;370(2):139-49.  Timi Y et al. Increased Incidence of Cardiovascular Disease in Middle-aged Men with Obstructive Sleep Apnea. Am J Respir Crit Care Med; 2002 166: 159-165  Talamantes EM et al. Studying Life Effects and Effectiveness of Palatopharyngoplasty (SLEEP) study: Subjective Outcomes of Isolated Uvulopalatopharyngoplasty. Otolaryngol Head Neck Surg. 2011; 144: 623-631.  WHAT IF I ONLY HAVE SNORING?  Mandibular advancement devices, lateral sleep positioning, long-term weight loss and treatment of nasal allergies have been shown to improve snoring.  Exercising tongue muscles with a game (https://apps.[employer]/us/ana/soundly-reduce-snoring/au5735303392) or stimulating the tongue during the day with  a device (https://doi.org/10.3390/ukr71341124) have improved snoring in some individuals.    Remember to Drive Safe... Drive Alive     Sleep health profoundly affects your health, mood, and your safety.  Thirty three percent of the population (one in three of us) is not getting enough sleep and many have a sleep disorder. Not getting enough sleep or having an untreated / undertreated sleep condition may make us sleepy without even knowing it. In fact, our driving could be dramatically impaired due to our sleep health. As your provider, here are some things I would like you to know about driving:     Here are some warning signs for impairment and dangerous drowsy driving:              -Having been awake more than 16 hours               -Looking tired               -Eyelid drooping              -Head nodding (it could be too late at this point)              -Driving for more than 30 minutes     Some things you could do to make the driving safer if you are experiencing some drowsiness:              -Stop driving and rest              -Call for transportation              -Make sure your sleep disorder is adequately treated     Some things that have been shown NOT to work when experiencing drowsiness while driving:              -Turning on the radio              -Opening windows              -Eating any  distracting  /  entertaining  foods (e.g., sunflower seeds, candy, or any other)              -Talking on the phone      Your decision may not only impact your life, but also the life of others. Please, remember to drive safe for yourself and all of us.

## 2024-01-03 NOTE — PROGRESS NOTES
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 mentation intact and speech normal  PSYCH: mentation appears normal and affect normal/bright  Mallampati Class: III.  Tonsillar Stage: 1 hidden by pillars.         Data: All pertinent previous laboratory data reviewed     Recent Labs   Lab Test 11/03/23  1340 08/22/22  1021    141   POTASSIUM 4.1 4.8   CHLORIDE 103 105   CO2 26 27   ANIONGAP 13 9   * 97   BUN 19.0 12.8   CR 1.25* 0.93   JAMARI 9.4 9.3       Recent Labs   Lab Test 11/03/23  1341   WBC 9.7   RBC 5.16   HGB 12.7   HCT 42.7   MCV 83   MCH 24.6*   MCHC 29.7*   RDW 15.5*          Recent Labs   Lab Test 08/22/22  1021   PROTTOTAL 7.8   ALBUMIN 4.0   BILITOTAL 0.2   ALKPHOS 79   AST 26   ALT 14       TSH (uIU/mL)   Date Value   09/14/2022 4.47 (H)   10/18/2021 3.56       Barbiturates Qual Urine (no units)   Date Value   02/28/2017 NEGATIVE     Benzodiazepine Qual Urine (no units)   Date Value   02/28/2017 NEGATIVE     Cannabinoids Qual Urine (no units)   Date Value   02/28/2017 NEGATIVE     Cocaine Qual Urine (no units)   Date Value   02/28/2017 POSITIVE (A)       No results found for: \"IRONSAT\", \"VS40631\", \"JAGUAR\"    No results found for: \"PH\", \"PHARTERIAL\", " "\"PO2\", \"LS7TQCPUQHE\", \"SAT\", \"PCO2\", \"HCO3\", \"BASEEXCESS\", \"JOSE LUIS\", \"BEB\"    @LABRCNTIPR(phv:4,pco2v:4,po2v:4,hco3v:4,wilma:4,o2per:4)@    Echocardiology: No results found for this or any previous visit (from the past 4320 hour(s)).    Chest x-ray:   XR CHEST 2 VIEWS 09/03/2023    Narrative  EXAM: XR CHEST 2 VIEWS  LOCATION: [facility]  DATE: 9/3/2023    INDICATION: Productive cough, COUGH  COMPARISON: 08/02/2022    IMPRESSION: Negative chest.      Chest CT: No results found for this or any previous visit from the past 365 days.      PFT: Most Recent [facility] Pulmonary Function Testing    Lui Morocho, KIRA CNP 1/3/2024   "

## 2024-01-03 NOTE — COMMUNITY RESOURCES LIST (ENGLISH)
01/03/2024   [facility]  N/A  For questions about this resource list or additional care needs, please contact your primary care clinic or care manager.  Phone: [phone]   Email: N/A   Address: [address] [place] [id number]   Hours: N/A        Financial Stability       Utility payment assistance  1  [facility] Distance: 0.81 miles      In-Person   [address] [place] [address]  Language: English, Kiswahili  Hours: Mon - Sat 8:00 AM - 12:00 PM , Mon - Tue 1:00 PM - 8:00 PM , Wed 1:00 PM - 4:00 PM , Thu - Fri 1:00 PM - 8:00 PM , Sat 12:30 PM - 4:00 PM  Fees: Free   Phone: (411) 642-4876 Email: Santa@[facility] Website: https://[facility]/home/#whatwedo     2  [facility] - Low Income Energy Assistance Program (LIHEAP) application assistance Distance: 2.1 miles      In-Person, Phone/Virtual   [address] [place] [address]  Language: English  Hours: Mon - Fri 8:00 AM - 4:30 PM  Fees: Free   Phone: (299) 863-8565 Email: servicecentrajanfo@[employer]. Website: https://www.[employer]./[facility]/facilities/service-center-info          Food and Nutrition       Food pantry  3  [facility] Distance: 0.37 miles      In-Person   [address] [place] [address]  Language: English  Hours: Wed 5:30 PM - 6:30 PM  Fees: Free   Phone: (280) 197-4913 Email: info@[facility].org Website: https://www.Amura.org/     4  [facility] Kitchen Food Shelf - Food Shelf Distance: 0.48 miles      Pickup   [address] [place] [address]  Language: English  Hours: Tue 6:00 PM - 7:30 PM , Wed 10:00 AM - 12:00 PM , Thu 6:00 PM - 7:30 PM , Fri 10:00 AM - 12:00 PM  Fees: Free   Phone: (796) 246-8109 Email:  admin@[facility].org Website: http://[facility].org/community-services/little-kitchen-food-shelf/     SNAP application assistance  5  [facility] Human Services and Public Health Department [facility] Distance: 2.1 miles      In-Person, Phone/Virtual   [address] [place] [address]  Language: English  Hours: Mon - Fri 8:00 AM - 4:30 PM  Fees: Free   Phone: (560) 685-6607 Email: servicecenterinfo@[facility] Website: https://www.[facility]/facilities/service-center-info     6  [facility] ([facility]) Distance: 2.58 miles      In-Person, Phone/Virtual   [address] [facility] [address] [place] [address]  Language: English, Arnol, Gregg  Hours: Mon - Fri 9:00 AM - 5:00 PM  Fees: Free   Phone: (270) 489-8987 Email: john@[employer] Website: https://www.[employer].TapToLearn/"[employer]".WheresTheBus/     Soup kitchen or free meals  7  [facility] Community Meal Distance: 2.11 miles      Pickup   [address] [place] [address]  Language: English  Hours: Mon 5:00 PM - 5:45 PM  Fees: Free   Phone: (693) 873-7314 Email: office@[facility]"2,10E+07"[facility].org Website: http://www.[facility].org     8  [employer] Distance: 2.2 miles      Pickup   [address] [place] [address]  Language: English, Hmong, Peruvian, Georgian  Hours: Mon - Thu 10:00 AM - 11:00 AM , Mon - Thu 1:00 PM - 2:00 PM , Sat - Sun 10:00 AM - 11:00 AM  Fees: Free   Phone: (132) 721-2882 Email: info@sharingAc[employer]ureing[employer]ang[employer].org Website: https://sharingAcrisureingGuangdong Hengxing Groups.org/          Transportation       Free or low-cost transportation  9  [facility] Distance: 2.58 miles      In-Person   [address] [place] [address]  Language: English  Hours: Mon - Wed 9:30 AM - 12:00 PM , Mon - Wed 1:00 PM - 2:00 PM Appt. Only  Fees: Free   Phone: (724) 192-6070 Email: info@saint[employer]ious Hat.org Website: http://www.saintola.org/     10  [facility] - Bus [facility] - Free or low-cost transportation Distance: 2.91 miles      In-Person   [address] [place] [address]  Language: English  Hours: Tue 9:30 AM - 11:30 AM , Thu 9:30 AM - 11:30 AM  Fees: Free   Phone: (471) 663-7895 Email: info@[employer] Website: http://www.[employer]/     Transportation to medical appointments  11  [facility] (AIF[employer]) Distance: 4.98 miles      In-Person   [address] [place] [address]  Language: Slovak, Sinhala, English, Gujarati, Dinesh, Croatian, Romansh, Ukrainian, Mohawk, Korean  Hours: Mon - Wed 9:00 AM - 5:00 PM , Thu 12:00 PM - 6:00 PM , Fri 9:00 AM - 5:00 PM , Sun 10:30 AM - 2:00 PM Appt. Only  Fees: Free   Phone: (176) 325-5229 Email: info@[facility].org Website: https://www.[facility]-aifw.org/     12  [place] Transportation Distance: 7.46 miles      In-Person   [address] [address]  Language: English  Hours: Mon - Sat 4:00 AM - 6:00 PM  Fees: Insurance, Self Pay   Phone: (513) 577-6673 Email: delighttransportation1@[employer] Website: https://helpmeconnect.[facility].mn./HelpMeConnect/Providers/Delight_Transportation/Transportation/2?returnUrl=%2FHelpMeConnect%2FSearch%2FBasicNeeds%2FTransportation%2FTransportationServices%3Fstart%3D40          Important Numbers & Websites       Emergency Services   911  City Services   311  Poison Control   (882) 833-2100  Suicide Prevention Lifeline   (450) 486-2670 (TALK)  Child Abuse Hotline   (640) 450-2634 (4-A-Child)  Sexual Assault Hotline   (153) 615-8931 (HOPE)  National Runaway Safeline   (464) 125-7872 (RUNAWAY)  All-Options Talkline   (656) 389-1260  Substance Abuse Referral   (272) 991-1888 (HELP)

## 2024-01-04 NOTE — TELEPHONE ENCOUNTER
FREE DRUG APPLICATION INITIATED    Medication: DUPIXENT 300 MG/2ML SC SOPN  Free Drug Program Name:  Dupixent MyWay  Date Submitted: 1/4/2024 11:54 AM  Phone #: [phone]  Fax #: [phone]  Additional Information: faxed provider portion

## 2024-01-05 ENCOUNTER — ANCILLARY PROCEDURE (OUTPATIENT)
Dept: MAMMOGRAPHY | Facility: CLINIC | Age: 49
End: 2024-01-05
Payer: MEDICAID

## 2024-01-05 DIAGNOSIS — Z12.31 VISIT FOR SCREENING MAMMOGRAM: ICD-10-CM

## 2024-01-05 PROCEDURE — 77063 BREAST TOMOSYNTHESIS BI: CPT | Mod: GC

## 2024-01-05 PROCEDURE — 77067 SCR MAMMO BI INCL CAD: CPT | Mod: GC

## 2024-01-05 NOTE — TELEPHONE ENCOUNTER
MEDICAL RECORDS REQUEST   [facility]  Urology Clinic  [address] [place] [address]  PHONE: [phone]  Fax: [phone]        FUTURE VISIT INFORMATION                                                   Mattie Pierre, : 1975 scheduled for future visit at [facility]    APPOINTMENT INFORMATION:  Date: 2024  Provider:  Sindhu Garibay MD  Reason for Visit/Diagnosis: New overactive bladder    RECORDS REQUESTED FOR VISIT                                                     NOTES  STATUS/DETAILS   OFFICE NOTE from other specialist  yes   OPERATIVE REPORT  yes   MEDICATION LIST  yes   LABS     URINALYSIS (UA)  yes     PRE-VISIT CHECKLIST      Joint diagnostic appointment coordinated correctly          (ensure right order & amount of time) Yes   RECORD COLLECTION COMPLETE Yes

## 2024-01-09 ENCOUNTER — PRE VISIT (OUTPATIENT)
Dept: UROLOGY | Facility: CLINIC | Age: 49
End: 2024-01-09
Payer: MEDICAID

## 2024-01-09 NOTE — TELEPHONE ENCOUNTER
Reason for visit: new overactive bladder     Relevant information: overactive bladder    Records/imaging/labs/orders: in epic    Pt called: No need for a call    At Rooming: virtual visit    Humphrey Spivey  1/9/2024  3:36 PM

## 2024-01-10 NOTE — TELEPHONE ENCOUNTER
Spoke with program and they haven't receive application, refaxed again today. They also didn't mail out application as I requested. Spoke with MTM and they are going to mail out new form to patient

## 2024-01-11 NOTE — TELEPHONE ENCOUNTER
Mailed form for Dupixent Patient assistance program application to patient.     Prema Giraldo, PharmD  [facility] Pharmacist

## 2024-01-18 ENCOUNTER — OFFICE VISIT (OUTPATIENT)
Dept: DERMATOLOGY | Facility: CLINIC | Age: 49
End: 2024-01-18
Payer: MEDICAID

## 2024-01-18 ENCOUNTER — TELEPHONE (OUTPATIENT)
Dept: DERMATOLOGY | Facility: CLINIC | Age: 49
End: 2024-01-18

## 2024-01-18 DIAGNOSIS — L73.2 HIDRADENITIS SUPPURATIVA: ICD-10-CM

## 2024-01-18 DIAGNOSIS — Z79.899 ENCOUNTER FOR LONG-TERM CURRENT USE OF HIGH RISK MEDICATION: ICD-10-CM

## 2024-01-18 DIAGNOSIS — L30.0 NUMMULAR DERMATITIS: ICD-10-CM

## 2024-01-18 DIAGNOSIS — L20.89 OTHER ATOPIC DERMATITIS: Primary | ICD-10-CM

## 2024-01-18 PROCEDURE — 99214 OFFICE O/P EST MOD 30 MIN: CPT | Performed by: DERMATOLOGY

## 2024-01-18 RX ORDER — CLOBETASOL PROPIONATE 0.5 MG/G
OINTMENT TOPICAL 2 TIMES DAILY
Qty: 60 G | Refills: 1 | Status: SHIPPED | OUTPATIENT
Start: 2024-01-18 | End: 2024-07-29

## 2024-01-18 RX ORDER — MYCOPHENOLATE MOFETIL 500 MG/1
500 TABLET ORAL 2 TIMES DAILY
Qty: 60 TABLET | Refills: 2 | Status: SHIPPED | OUTPATIENT
Start: 2024-01-18 | End: 2024-07-11

## 2024-01-18 ASSESSMENT — PAIN SCALES - GENERAL: PAINLEVEL: SEVERE PAIN (6)

## 2024-01-18 NOTE — LETTER
"1/18/2024       RE: Mattie Pierre  [address]  [facility] [address]     Dear Colleague,    Thank you for referring your patient, Mattie Pierre, to the [facility] [place] [facility]. Please see a copy of my visit note below.    [facility] Dermatology Note  Encounter Date: Jan 18, 2024  Office Visit     Dermatology Problem List:  1. 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 Hidradenitis suppurative - axillae, intergluteal cleft  - pending repeat Gen Surg consultation  - BPO and clindamycin lotion  - S/p excision + grafting in L armpit \"many years ago\"  - prior: doxycycline 100 mg BID (started 1/27/22)    ____________________________________________    Assessment & Plan:  #Dermatitis, poorly controlled  Extreme pruritus, which interferes with patient s sleep and quality of life. Previously tried to start Dupixent, but insurance denial. She did not fill out forms for prior authorization (housing instability at the time). Discussed trying again, given severity of her symptoms. MTM met with patient to fill out forms in clinic. Discussed other treatment options, in case Dupixent will still not be covered. Potential drug interactions with her medicines and methotrexate. Discussed mycophenolate mofetil as an option but would need safety labs. Also discussed that it will take ~1 month to work. After shared decision-making conversation, the patient was willing to try mycophenolate mofetil and get safety labs. Given whole body pruritus, also discussed phototherapy, including time commitment (2-3 visits per week) and need for multiple sessions to see improvement. Patient would like to try phototherapy again. Patient would also like " more Cordran tape, so provided refills. RTC 6-8 weeks to see if mycophenolate mofetil is effective for patient and to see if Dupixent will be covered.  Plan:  - Safety labs: CBC, CMP, quant-TB  -Start mycophenolate mofetil 500mg BID, pending labs  - MTM referral for Dupixent  - Phototherapy 2-3 times per week - nbUVB in office; repeat orders placed  -Clobetasol 0.05% ointment for ankles  -Cordran tape prn to open areas on acral skin; avoid use on face, armpits, groin  -RTC 6-8 weeks    #HS, stable  Patient reports HS lesions are not draining and stable. She is waiting for de-adelso procedure of R axilla with general surgery (patient reports benefit from previous L axilla de-adelso procedure). Patient primarily concerned with dermatitis at this visit.      Procedures Performed: None    Follow-up: 6-8 weeks    Staff and Medical Student:   Seen and staffed with Dr. Guillermo Cantu, MS3    I was present with the medical student who participated in the service and in the documentation.  I have verified the history and personally performed the physical exam and medical decision making.  I agree with the assessment and plan of care as documented in the note.     Emory Li MD  Dermatology Attending    ____________________________________________    CC: Derm Problem (Mattie is here today for a Eczema and HS follow up )    HPI:  Ms. Mattie Pierre is a(n) 48 year old female who presents today as a return patient for Eczema and HS. She was previously seen 11/30/2023, where they tried to initiate Dupixent, and she was instructed to use mometasone ointment until Dupixent was available. She was denied by her insurance company, and was then unable to complete paperwork for prior authorization (housing situation was previously unstable). Since last visit, she has been trying the mometasone ointment and lotions (OTC oatmeal lotions and soap). She applies this after she showers, and she showers up to 3 times per day.  She also used the Cordran tape with good response, but she ran out. She feels incredibly itchy all over her body, especially at night. Her clothes sometimes bother her (make her itchy), so she has tried changing to perfume-free detergent, which has not helped. She has developed new pustules on her hands, despite trying to wear gloves when working with water.She has had dermatitis for years, but did not have eczema, asthma, or allergies as a child.  For her HS, she has not been able to schedule with general surgery for her R armpit (previously had surgery on her L armpit, and she reports the surgery helped a lot). She reports that they are not currently draining, but they still bother her.    Labs:  11/3/2023 CBC and BMP reviewed.     Physical Exam:  Vitals: LMP 07/23/2022 (Approximate)   SKIN: Focused examination of back, scalp, and distal extremities performed:   -Bilateral ankles with lichenified, excoriated, erythematous plaques with scale and superimposed white, keratotic papules  -Bilateral palms with yellowish pustules and ruptured pustules ranging from 1mm to 5mm  -Scalp with diffuse scale  -Discrete, darker brown ~3mm papules with scale and excoriations on back  - No other lesions of concern on areas examined.     Medications:  Current Outpatient Medications   Medication    ammonium lactate (LAC-HYDRIN) 12 % external lotion    benzoyl peroxide 5 % external liquid    chlorhexidine (HIBICLENS) 4 % liquid    ciclopirox (PENLAC) 8 % external solution    clindamycin (CLEOCIN T) 1 % external lotion    clobetasol (TEMOVATE) 0.05 % external ointment    cyclobenzaprine (FLEXMID) 7.5 MG tablet    diphenhydrAMINE (BENADRYL) 25 MG tablet    doxycycline monohydrate (ADOXA) 100 MG tablet    DULoxetine (CYMBALTA) 30 MG capsule    dupilumab (DUPIXENT) 300 MG/2ML prefilled pen    famotidine (PEPCID) 20 MG tablet    flurandrenolide (CORDRAN) 4 MCG/SQCM TAPE    hydrOXYzine (VISTARIL) 50 MG capsule    lactase (LACTAID) 3000  UNIT tablet    lamoTRIgine (LAMICTAL) 150 MG tablet    loratadine (CLARITIN) 10 MG tablet    losartan (COZAAR) 100 MG tablet    methylcellulose (CITRUCEL) powder    mometasone (ELOCON) 0.1 % external ointment    mycophenolate (GENERIC EQUIVALENT) 500 MG tablet    naloxone (NARCAN) 4 MG/0.1ML nasal spray    naproxen (NAPROSYN) 500 MG tablet    oxyBUTYnin ER (DITROPAN XL) 10 MG 24 hr tablet    polyethylene glycol (MIRALAX) 17 GM/Dose powder    prazosin (MINIPRESS) 5 MG capsule    QUEtiapine (SEROQUEL) 25 MG tablet    QUEtiapine ER (SEROQUEL XR) 200 MG 24 hr tablet    rosuvastatin (CRESTOR) 20 MG tablet    tacrolimus (PROTOPIC) 0.1 % external ointment    topiramate (TOPAMAX) 50 MG tablet    triamcinolone (KENALOG) 0.1 % external ointment    valACYclovir (VALTREX) 500 MG tablet    white petrolatum external gel    ClonazePAM (KLONOPIN PO)     No current facility-administered medications for this visit.      Past Medical History:   Patient Active Problem List   Diagnosis    Bipolar disorder (H)    Obesity    Cellulitis of axilla, left    Iron deficiency anemia    Seizure disorder (H)    Hidradenitis suppurativa    Urgency incontinence    Chronic pain syndrome    Acne vulgaris    Tinea capitis    Fissure in skin    Severe stimulant use disorder (H)    Depression    Hypertension    Subclinical hypothyroidism    Vitamin D deficiency    Ganglion cyst of right foot    Hypertrophy of bone    Anhidrosis    Pes planovalgus    Other eczema    Nummular dermatitis    Other atopic dermatitis    Class 2 severe obesity due to excess calories with serious comorbidity in adult (H)     Past Medical History:   Diagnosis Date    Anemia     Arthritis     COPD (chronic obstructive pulmonary disease) (H)     Depression     Depressive disorder     Diabetes (H)     History of blood transfusion     Hypertension     Obesity     Seizures (H)     Thyroid disease         CC Emory Li MD  [address] [facility]  [place] [address] on  close of this encounter.

## 2024-01-18 NOTE — TELEPHONE ENCOUNTER
Obtained patient portion today in clinic. Scanned & email forms to submit to program to [place].     Prema Giraldo, PharmD  [facility] Pharmacist

## 2024-01-18 NOTE — NURSING NOTE
Dermatology Rooming Note    Mattie Pierre's goals for this visit include:   Chief Complaint   Patient presents with    Derm Problem     Mattie is here today for a Eczema and HS follow up      LUANA Galarza

## 2024-01-19 ENCOUNTER — MYC REFILL (OUTPATIENT)
Dept: DERMATOLOGY | Facility: CLINIC | Age: 49
End: 2024-01-19
Payer: MEDICAID

## 2024-01-19 DIAGNOSIS — L20.89 OTHER ATOPIC DERMATITIS: ICD-10-CM

## 2024-01-19 NOTE — PROGRESS NOTES
"[facility] Dermatology Note  Encounter Date: Jan 18, 2024  Office Visit     Dermatology Problem List:  1. 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 Hidradenitis suppurative - axillae, intergluteal cleft  - pending repeat Gen Surg consultation  - BPO and clindamycin lotion  - S/p excision + grafting in L armpit \"many years ago\"  - prior: doxycycline 100 mg BID (started 1/27/22)    ____________________________________________    Assessment & Plan:  #Dermatitis, poorly controlled  Extreme pruritus, which interferes with patient s sleep and quality of life. Previously tried to start Dupixent, but insurance denial. She did not fill out forms for prior authorization (housing instability at the time). Discussed trying again, given severity of her symptoms. MTM met with patient to fill out forms in clinic. Discussed other treatment options, in case Dupixent will still not be covered. Potential drug interactions with her medicines and methotrexate. Discussed mycophenolate mofetil as an option but would need safety labs. Also discussed that it will take ~1 month to work. After shared decision-making conversation, the patient was willing to try mycophenolate mofetil and get safety labs. Given whole body pruritus, also discussed phototherapy, including time commitment (2-3 visits per week) and need for multiple sessions to see improvement. Patient would like to try phototherapy again. Patient would also like more Cordran tape, so provided refills. RTC 6-8 weeks to see if mycophenolate mofetil is effective for patient and to see if Dupixent will be covered.  Plan:  - Safety labs: CBC, CMP, quant-TB  -Start mycophenolate mofetil 500mg BID, pending labs  - MTM referral for Dupixent  - Phototherapy 2-3 times per week - nbUVB in " office; repeat orders placed  -Clobetasol 0.05% ointment for ankles  -Cordran tape prn to open areas on acral skin; avoid use on face, armpits, groin  -RTC 6-8 weeks    #HS, stable  Patient reports HS lesions are not draining and stable. She is waiting for de-adelso procedure of R axilla with general surgery (patient reports benefit from previous L axilla de-adelso procedure). Patient primarily concerned with dermatitis at this visit.      Procedures Performed: None    Follow-up: 6-8 weeks    Staff and Medical Student:   Seen and staffed with Dr. Guillermo Cantu, MS3    I was present with the medical student who participated in the service and in the documentation.  I have verified the history and personally performed the physical exam and medical decision making.  I agree with the assessment and plan of care as documented in the note.     Emory Li MD  Dermatology Attending    ____________________________________________    CC: Derm Problem (Mattie is here today for a Eczema and HS follow up )    HPI:  Ms. Mattie Pierre is a(n) 48 year old female who presents today as a return patient for Eczema and HS. She was previously seen 11/30/2023, where they tried to initiate Dupixent, and she was instructed to use mometasone ointment until Dupixent was available. She was denied by her insurance company, and was then unable to complete paperwork for prior authorization (housing situation was previously unstable). Since last visit, she has been trying the mometasone ointment and lotions (OTC oatmeal lotions and soap). She applies this after she showers, and she showers up to 3 times per day. She also used the Cordran tape with good response, but she ran out. She feels incredibly itchy all over her body, especially at night. Her clothes sometimes bother her (make her itchy), so she has tried changing to perfume-free detergent, which has not helped. She has developed new pustules on her hands, despite trying to  wear gloves when working with water.She has had dermatitis for years, but did not have eczema, asthma, or allergies as a child.  For her HS, she has not been able to schedule with general surgery for her R armpit (previously had surgery on her L armpit, and she reports the surgery helped a lot). She reports that they are not currently draining, but they still bother her.    Labs:  11/3/2023 CBC and BMP reviewed.     Physical Exam:  Vitals: LMP 07/23/2022 (Approximate)   SKIN: Focused examination of back, scalp, and distal extremities performed:   -Bilateral ankles with lichenified, excoriated, erythematous plaques with scale and superimposed white, keratotic papules  -Bilateral palms with yellowish pustules and ruptured pustules ranging from 1mm to 5mm  -Scalp with diffuse scale  -Discrete, darker brown ~3mm papules with scale and excoriations on back  - No other lesions of concern on areas examined.     Medications:  Current Outpatient Medications   Medication     ammonium lactate (LAC-HYDRIN) 12 % external lotion     benzoyl peroxide 5 % external liquid     chlorhexidine (HIBICLENS) 4 % liquid     ciclopirox (PENLAC) 8 % external solution     clindamycin (CLEOCIN T) 1 % external lotion     clobetasol (TEMOVATE) 0.05 % external ointment     cyclobenzaprine (FLEXMID) 7.5 MG tablet     diphenhydrAMINE (BENADRYL) 25 MG tablet     doxycycline monohydrate (ADOXA) 100 MG tablet     DULoxetine (CYMBALTA) 30 MG capsule     dupilumab (DUPIXENT) 300 MG/2ML prefilled pen     famotidine (PEPCID) 20 MG tablet     flurandrenolide (CORDRAN) 4 MCG/SQCM TAPE     hydrOXYzine (VISTARIL) 50 MG capsule     lactase (LACTAID) 3000 UNIT tablet     lamoTRIgine (LAMICTAL) 150 MG tablet     loratadine (CLARITIN) 10 MG tablet     losartan (COZAAR) 100 MG tablet     methylcellulose (CITRUCEL) powder     mometasone (ELOCON) 0.1 % external ointment     mycophenolate (GENERIC EQUIVALENT) 500 MG tablet     naloxone (NARCAN) 4 MG/0.1ML nasal spray      naproxen (NAPROSYN) 500 MG tablet     oxyBUTYnin ER (DITROPAN XL) 10 MG 24 hr tablet     polyethylene glycol (MIRALAX) 17 GM/Dose powder     prazosin (MINIPRESS) 5 MG capsule     QUEtiapine (SEROQUEL) 25 MG tablet     QUEtiapine ER (SEROQUEL XR) 200 MG 24 hr tablet     rosuvastatin (CRESTOR) 20 MG tablet     tacrolimus (PROTOPIC) 0.1 % external ointment     topiramate (TOPAMAX) 50 MG tablet     triamcinolone (KENALOG) 0.1 % external ointment     valACYclovir (VALTREX) 500 MG tablet     white petrolatum external gel     ClonazePAM (KLONOPIN PO)     No current facility-administered medications for this visit.      Past Medical History:   Patient Active Problem List   Diagnosis     Bipolar disorder (H)     Obesity     Cellulitis of axilla, left     Iron deficiency anemia     Seizure disorder (H)     Hidradenitis suppurativa     Urgency incontinence     Chronic pain syndrome     Acne vulgaris     Tinea capitis     Fissure in skin     Severe stimulant use disorder (H)     Depression     Hypertension     Subclinical hypothyroidism     Vitamin D deficiency     Ganglion cyst of right foot     Hypertrophy of bone     Anhidrosis     Pes planovalgus     Other eczema     Nummular dermatitis     Other atopic dermatitis     Class 2 severe obesity due to excess calories with serious comorbidity in adult (H)     Past Medical History:   Diagnosis Date     Anemia      Arthritis      COPD (chronic obstructive pulmonary disease) (H)      Depression      Depressive disorder      Diabetes (H)      History of blood transfusion      Hypertension      Obesity      Seizures (H)      Thyroid disease         CC Emory Li MD  [address] [facility] [address]  [place] [address] on close of this encounter.

## 2024-01-19 NOTE — TELEPHONE ENCOUNTER
Appears that this was ordered yesterday, but will resend since it sounds like she got the other medications but not this one.

## 2024-01-22 ENCOUNTER — TELEPHONE (OUTPATIENT)
Dept: DERMATOLOGY | Facility: CLINIC | Age: 49
End: 2024-01-22
Payer: MEDICAID

## 2024-01-22 NOTE — TELEPHONE ENCOUNTER
Via phone per patient she will call to scheduled the following:    Appointment type: [facility]  Provider: Dr. Li  Return date: Patient choice  Specialty phone number: [phone]

## 2024-01-24 ENCOUNTER — VIRTUAL VISIT (OUTPATIENT)
Dept: UROLOGY | Facility: CLINIC | Age: 49
End: 2024-01-24
Payer: MEDICAID

## 2024-01-24 ENCOUNTER — TELEPHONE (OUTPATIENT)
Dept: UROLOGY | Facility: CLINIC | Age: 49
End: 2024-01-24

## 2024-01-24 ENCOUNTER — PRE VISIT (OUTPATIENT)
Dept: UROLOGY | Facility: CLINIC | Age: 49
End: 2024-01-24

## 2024-01-24 DIAGNOSIS — R15.2 INCONTINENCE OF FECES WITH FECAL URGENCY: ICD-10-CM

## 2024-01-24 DIAGNOSIS — R15.9 INCONTINENCE OF FECES WITH FECAL URGENCY: ICD-10-CM

## 2024-01-24 DIAGNOSIS — N39.41 URGE INCONTINENCE: Primary | ICD-10-CM

## 2024-01-24 DIAGNOSIS — R39.15 URGENCY OF URINATION: ICD-10-CM

## 2024-01-24 DIAGNOSIS — R35.0 FREQUENCY OF URINATION: ICD-10-CM

## 2024-01-24 PROCEDURE — 99204 OFFICE O/P NEW MOD 45 MIN: CPT | Mod: 95 | Performed by: UROLOGY

## 2024-01-24 RX ORDER — MIRABEGRON 25 MG/1
25 TABLET, FILM COATED, EXTENDED RELEASE ORAL DAILY
Qty: 30 TABLET | Refills: 11 | Status: SHIPPED | OUTPATIENT
Start: 2024-01-24

## 2024-01-24 ASSESSMENT — PAIN SCALES - GENERAL: PAINLEVEL: EXTREME PAIN (9)

## 2024-01-24 NOTE — PROGRESS NOTES
HPI:  Mattie iPerre is a 48 year old female with urinary urgency and frequency as well as urge incontinence.  This has been going on for a while.  She has tried medication in the form of oxybutynin but this did not help.  She has also been having fecal urgency and incontinence.    Reviewed previous notes from Dr. Yousif on 12/27/23    Exam:  LMP 07/23/2022 (Approximate)   GENERAL: alert and no distress  EYES: Eyes grossly normal to inspection.  No discharge or erythema, or obvious scleral/conjunctival abnormalities.  RESP: No audible wheeze, cough, or visible cyanosis.    SKIN: Visible skin clear. No significant rash, abnormal pigmentation or lesions.  NEURO: Cranial nerves grossly intact.  Mentation and speech appropriate for age.  PSYCH: Appropriate affect, tone, and pace of words      Review of Labs:  The following labs were reviewed by me and discussed with the patient:  Lab Results   Component Value Date    WBC 9.7 11/03/2023     Lab Results   Component Value Date    RBC 5.16 11/03/2023     Lab Results   Component Value Date    HGB 12.7 11/03/2023    HGB 12.4 06/13/2019     Lab Results   Component Value Date    HCT 42.7 11/03/2023    HCT 42.2 06/13/2019     Lab Results   Component Value Date    MCV 83 11/03/2023    MCV 82 06/13/2019     Lab Results   Component Value Date    MCH 24.6 11/03/2023    MCH 24.1 06/13/2019     Lab Results   Component Value Date    MCHC 29.7 11/03/2023    MCHC 29.4 06/13/2019     Lab Results   Component Value Date    RDW 15.5 11/03/2023    RDW 16.8 06/13/2019     Lab Results   Component Value Date     11/03/2023        Last Comprehensive Metabolic Panel:  Sodium   Date Value Ref Range Status   11/03/2023 142 135 - 145 mmol/L Final     Comment:     Reference intervals for this test were updated on 09/26/2023 to more accurately reflect our healthy population. There may be differences in the flagging of prior results with similar values performed with this method. Interpretation  of those prior results can be made in the context of the updated reference intervals.    01/04/2019 143 136 - 145 mmol/L Final     Potassium   Date Value Ref Range Status   11/03/2023 4.1 3.4 - 5.3 mmol/L Final   10/18/2021 4.3 3.5 - 5.0 mmol/L Final   01/04/2019 4.0 3.5 - 5.0 mmol/L Final     Chloride   Date Value Ref Range Status   11/03/2023 103 98 - 107 mmol/L Final   10/18/2021 104 98 - 107 mmol/L Final   01/04/2019 105 98 - 107 mmol/L Final     Carbon Dioxide   Date Value Ref Range Status   08/23/2018 22.1 20.0 - 32.0 mmol/L Final     Carbon Dioxide (CO2)   Date Value Ref Range Status   11/03/2023 26 22 - 29 mmol/L Final   10/18/2021 27 22 - 31 mmol/L Final     Anion Gap   Date Value Ref Range Status   11/03/2023 13 7 - 15 mmol/L Final   10/18/2021 10 5 - 18 mmol/L Final     Glucose   Date Value Ref Range Status   11/03/2023 101 (H) 70 - 99 mg/dL Final   10/18/2021 101 70 - 125 mg/dL Final   01/04/2019 94 70 - 125 mg/dL Final     Urea Nitrogen   Date Value Ref Range Status   11/03/2023 19.0 6.0 - 20.0 mg/dL Final   10/18/2021 12 8 - 22 mg/dL Final   01/04/2019 11 8 - 22 mg/dL Final     Creatinine   Date Value Ref Range Status   11/03/2023 1.25 (H) 0.51 - 0.95 mg/dL Final   01/04/2019 0.89 0.60 - 1.10 mg/dL Final     GFR Estimate   Date Value Ref Range Status   11/03/2023 53 (L) >60 mL/min/1.73m2 Final   01/04/2019 >60 >60 mL/min/1.73m2 Final     Calcium   Date Value Ref Range Status   11/03/2023 9.4 8.6 - 10.0 mg/dL Final   01/04/2019 9.3 8.5 - 10.5 mg/dL Final     Bilirubin Total   Date Value Ref Range Status   08/22/2022 0.2 <=1.2 mg/dL Final   01/04/2019 0.3 0.0 - 1.0 mg/dL Final     Alkaline Phosphatase   Date Value Ref Range Status   08/22/2022 79 35 - 104 U/L Final   01/04/2018 70.0 40.0 - 150.0 U/L Final     ALT   Date Value Ref Range Status   08/22/2022 14 10 - 35 U/L Final   01/04/2018 24.5 0.0 - 45.0 U/L Final     AST   Date Value Ref Range Status   08/22/2022 26 10 - 35 U/L Final   01/04/2018 18.8  0.0 - 45.0 U/L Final                Glucose Urine (no units)   Date Value   06/13/2019 Negative     Ketones Urine (no units)   Date Value   06/13/2019 Trace (A)     Specific Gravity Urine (no units)   Date Value   06/13/2019 1.025     pH Urine (no units)   Date Value   06/13/2019 5.5     Nitrite Urine (no units)   Date Value   06/13/2019 Negative          Assessment & Plan   47 y/o female with urinary urgency and frequency as well as urge incontinence that has been refractory to anticholinergics.  She has tried behavioral changes which also did not help.  She also has fecal urgency and incontinence.  We discussed options of other medications as well as PTNS and SNS.  She is interested in SNS but would like to try another medication in the meantime.  -myrbetriq 25 mg  -f/u in a month for cystoscopy  -schedule PNE in a couple months, can cancel if the medication helps.    Sindhu Garibay MD  [facility] [place]

## 2024-01-24 NOTE — LETTER
1/24/2024       RE: Mattie Pierre  [address]  [place] [facility] [address]     Dear Colleague,    Thank you for referring your patient, Mattie Pierre, to the [facility] [place] at [facility]. Please see a copy of my visit note below.    Virtual Visit Details    Type of service:  Video Visit   Video Start Time: 8:36 AM  Video End Time:8:45 AM    Originating Location (pt. Location): Home    Distant Location (provider location):  Off-site  Platform used for Video Visit: [facility]    HPI:  Mattie Pierre is a 48 year old female with urinary urgency and frequency as well as urge incontinence.  This has been going on for a while.  She has tried medication in the form of oxybutynin but this did not help.  She has also been having fecal urgency and incontinence.    Reviewed previous notes from Dr. Yousif on 12/27/23    Exam:  LMP 07/23/2022 (Approximate)   GENERAL: alert and no distress  EYES: Eyes grossly normal to inspection.  No discharge or erythema, or obvious scleral/conjunctival abnormalities.  RESP: No audible wheeze, cough, or visible cyanosis.    SKIN: Visible skin clear. No significant rash, abnormal pigmentation or lesions.  NEURO: Cranial nerves grossly intact.  Mentation and speech appropriate for age.  PSYCH: Appropriate affect, tone, and pace of words      Review of Labs:  The following labs were reviewed by me and discussed with the patient:  Lab Results   Component Value Date    WBC 9.7 11/03/2023     Lab Results   Component Value Date    RBC 5.16 11/03/2023     Lab Results   Component Value Date    HGB 12.7 11/03/2023    HGB 12.4 06/13/2019     Lab Results   Component Value Date    HCT 42.7 11/03/2023    HCT 42.2 06/13/2019     Lab Results   Component Value Date    MCV 83 11/03/2023    MCV 82 06/13/2019     Lab Results   Component Value Date    MCH 24.6 11/03/2023    MCH 24.1 06/13/2019     Lab Results   Component Value Date     MCHC 29.7 11/03/2023    MCHC 29.4 06/13/2019     Lab Results   Component Value Date    RDW 15.5 11/03/2023    RDW 16.8 06/13/2019     Lab Results   Component Value Date     11/03/2023        Last Comprehensive Metabolic Panel:  Sodium   Date Value Ref Range Status   11/03/2023 142 135 - 145 mmol/L Final     Comment:     Reference intervals for this test were updated on 09/26/2023 to more accurately reflect our healthy population. There may be differences in the flagging of prior results with similar values performed with this method. Interpretation of those prior results can be made in the context of the updated reference intervals.    01/04/2019 143 136 - 145 mmol/L Final     Potassium   Date Value Ref Range Status   11/03/2023 4.1 3.4 - 5.3 mmol/L Final   10/18/2021 4.3 3.5 - 5.0 mmol/L Final   01/04/2019 4.0 3.5 - 5.0 mmol/L Final     Chloride   Date Value Ref Range Status   11/03/2023 103 98 - 107 mmol/L Final   10/18/2021 104 98 - 107 mmol/L Final   01/04/2019 105 98 - 107 mmol/L Final     Carbon Dioxide   Date Value Ref Range Status   08/23/2018 22.1 20.0 - 32.0 mmol/L Final     Carbon Dioxide (CO2)   Date Value Ref Range Status   11/03/2023 26 22 - 29 mmol/L Final   10/18/2021 27 22 - 31 mmol/L Final     Anion Gap   Date Value Ref Range Status   11/03/2023 13 7 - 15 mmol/L Final   10/18/2021 10 5 - 18 mmol/L Final     Glucose   Date Value Ref Range Status   11/03/2023 101 (H) 70 - 99 mg/dL Final   10/18/2021 101 70 - 125 mg/dL Final   01/04/2019 94 70 - 125 mg/dL Final     Urea Nitrogen   Date Value Ref Range Status   11/03/2023 19.0 6.0 - 20.0 mg/dL Final   10/18/2021 12 8 - 22 mg/dL Final   01/04/2019 11 8 - 22 mg/dL Final     Creatinine   Date Value Ref Range Status   11/03/2023 1.25 (H) 0.51 - 0.95 mg/dL Final   01/04/2019 0.89 0.60 - 1.10 mg/dL Final     GFR Estimate   Date Value Ref Range Status   11/03/2023 53 (L) >60 mL/min/1.73m2 Final   01/04/2019 >60 >60 mL/min/1.73m2 Final     Calcium    Date Value Ref Range Status   11/03/2023 9.4 8.6 - 10.0 mg/dL Final   01/04/2019 9.3 8.5 - 10.5 mg/dL Final     Bilirubin Total   Date Value Ref Range Status   08/22/2022 0.2 <=1.2 mg/dL Final   01/04/2019 0.3 0.0 - 1.0 mg/dL Final     Alkaline Phosphatase   Date Value Ref Range Status   08/22/2022 79 35 - 104 U/L Final   01/04/2018 70.0 40.0 - 150.0 U/L Final     ALT   Date Value Ref Range Status   08/22/2022 14 10 - 35 U/L Final   01/04/2018 24.5 0.0 - 45.0 U/L Final     AST   Date Value Ref Range Status   08/22/2022 26 10 - 35 U/L Final   01/04/2018 18.8 0.0 - 45.0 U/L Final            Glucose Urine (no units)   Date Value   06/13/2019 Negative     Ketones Urine (no units)   Date Value   06/13/2019 Trace (A)     Specific Gravity Urine (no units)   Date Value   06/13/2019 1.025     pH Urine (no units)   Date Value   06/13/2019 5.5     Nitrite Urine (no units)   Date Value   06/13/2019 Negative          Assessment & Plan  49 y/o female with urinary urgency and frequency as well as urge incontinence that has been refractory to anticholinergics.  She has tried behavioral changes which also did not help.  She also has fecal urgency and incontinence.  We discussed options of other medications as well as PTNS and SNS.  She is interested in SNS but would like to try another medication in the meantime.  -myrbetriq 25 mg  -f/u in a month for cystoscopy  -schedule PNE in a couple months, can cancel if the medication helps.    Sindhu Garibay MD  [facility] UROLOGY [facility]

## 2024-01-24 NOTE — PATIENT INSTRUCTIONS
-myrbetriq 25 mg  -f/u in a month for cystoscopy  -schedule PNE in a couple months, can cancel if the medication helps.

## 2024-01-24 NOTE — PROGRESS NOTES
Virtual Visit Details    Type of service:  Video Visit   Video Start Time: 8:36 AM  Video End Time:8:45 AM    Originating Location (pt. Location): Home    Distant Location (provider location):  Off-site  Platform used for Video Visit: Cindi

## 2024-01-24 NOTE — NURSING NOTE
Is the patient currently in the state of [place]? YES    Visit mode:VIDEO    If the visit is dropped, the patient can be reconnected by: VIDEO VISIT: Text to cell phone:   Telephone Information:   Mobile [phone]    and VIDEO VISIT: Send to e-mail at: [email or web address]    Will anyone else be joining the visit? NO  (If patient encounters technical issues they should call [phone])    How would you like to obtain your AVS? MyChart    Are changes needed to the allergy or medication list? No    Reason for visit: Consult    Janine JIN

## 2024-01-26 ENCOUNTER — TELEPHONE (OUTPATIENT)
Dept: PSYCHIATRY | Facility: CLINIC | Age: 49
End: 2024-01-26
Payer: MEDICAID

## 2024-01-26 NOTE — TELEPHONE ENCOUNTER
Patient is requesting a letter for her Emotional Support Animal from Dr. Oakley. Call patient at: [phone]

## 2024-01-29 ENCOUNTER — TELEPHONE (OUTPATIENT)
Dept: UROLOGY | Facility: CLINIC | Age: 49
End: 2024-01-29
Payer: MEDICAID

## 2024-01-29 ENCOUNTER — MYC MEDICAL ADVICE (OUTPATIENT)
Dept: PSYCHIATRY | Facility: CLINIC | Age: 49
End: 2024-01-29
Payer: MEDICAID

## 2024-01-29 DIAGNOSIS — N39.41 URGE INCONTINENCE: Primary | ICD-10-CM

## 2024-01-29 PROBLEM — Z79.899 ENCOUNTER FOR LONG-TERM CURRENT USE OF HIGH RISK MEDICATION: Status: ACTIVE | Noted: 2024-01-29

## 2024-01-29 NOTE — TELEPHONE ENCOUNTER
"    1) RN spoke with pt at [phone]. Pt reports that she needs a general \"To Whom it May Concern,\" letter for her new puppy. Pt states that the animal helps keep her calm.      2) Routing to provider for review.     Lorraine Maya RN on 1/29/2024 at 10:03 AM    "

## 2024-01-29 NOTE — TELEPHONE ENCOUNTER
RN received instructions from Dr. Oakley regarding this patients request for a letter.  RN replied to the patient in a [employer] message.  Please refer to MyChart encounter 1/29/24.      Garth Sandhu RN on 1/29/2024 at 11:56 AM

## 2024-01-29 NOTE — TELEPHONE ENCOUNTER
Please place UA/UC for upcoming cystoscopy with Dr. Garibay.    [facility] Assistant- Surgical Specialties

## 2024-02-02 ENCOUNTER — TELEPHONE (OUTPATIENT)
Dept: UROLOGY | Facility: CLINIC | Age: 49
End: 2024-02-02
Payer: MEDICAID

## 2024-02-02 NOTE — TELEPHONE ENCOUNTER
Spoke with dupixent and they received PA denial but not appeal refaxed both denials to program again

## 2024-02-05 NOTE — TELEPHONE ENCOUNTER
PA Initiation    Medication: MYRBETRIQ 25 MG PO TB24  Insurance Company: [place] Medicaid ([facility]) - Phone [phone] Fax [phone]  Pharmacy Filling the Rx: [id number] - [place] [address] [address] AT [facility] [address]  Filling Pharmacy Phone: [phone]  Filling Pharmacy Fax: [phone]  Start Date: 2/5/2024

## 2024-02-06 RX ORDER — FESOTERODINE FUMARATE 8 MG/1
8 TABLET, FILM COATED, EXTENDED RELEASE ORAL DAILY
Qty: 30 TABLET | Refills: 3 | Status: SHIPPED | OUTPATIENT
Start: 2024-02-06

## 2024-02-06 ASSESSMENT — PATIENT HEALTH QUESTIONNAIRE - PHQ9
10. IF YOU CHECKED OFF ANY PROBLEMS, HOW DIFFICULT HAVE THESE PROBLEMS MADE IT FOR YOU TO DO YOUR WORK, TAKE CARE OF THINGS AT HOME, OR GET ALONG WITH OTHER PEOPLE: VERY DIFFICULT
SUM OF ALL RESPONSES TO PHQ QUESTIONS 1-9: 19
SUM OF ALL RESPONSES TO PHQ QUESTIONS 1-9: 19

## 2024-02-06 NOTE — TELEPHONE ENCOUNTER
PRIOR AUTHORIZATION DENIED    Medication: MYRBETRIQ 25 MG PO TB24    Insurance Company: [place] Medicaid ([facility]) - Phone [phone] Fax [phone]    Denial Date: 2/6/2024    Denial Reason(s): Patient needs to try and fail 1 more preferred medication      Appeal Information:

## 2024-02-07 ENCOUNTER — MEDICAL CORRESPONDENCE (OUTPATIENT)
Dept: HEALTH INFORMATION MANAGEMENT | Facility: CLINIC | Age: 49
End: 2024-02-07
Payer: MEDICAID

## 2024-02-07 ENCOUNTER — VIRTUAL VISIT (OUTPATIENT)
Dept: PSYCHIATRY | Facility: CLINIC | Age: 49
End: 2024-02-07
Payer: MEDICAID

## 2024-02-07 ENCOUNTER — TELEPHONE (OUTPATIENT)
Dept: UROLOGY | Facility: CLINIC | Age: 49
End: 2024-02-07
Payer: MEDICAID

## 2024-02-07 DIAGNOSIS — F31.32 BIPOLAR AFFECTIVE DISORDER, CURRENTLY DEPRESSED, MODERATE (H): Primary | ICD-10-CM

## 2024-02-07 DIAGNOSIS — G47.9 SLEEPING DIFFICULTY: ICD-10-CM

## 2024-02-07 DIAGNOSIS — G40.909 SEIZURE DISORDER (H): ICD-10-CM

## 2024-02-07 DIAGNOSIS — Z79.899 ENCOUNTER FOR LONG-TERM CURRENT USE OF HIGH RISK MEDICATION: ICD-10-CM

## 2024-02-07 DIAGNOSIS — F31.31 BIPOLAR AFFECTIVE DISORDER, CURRENTLY DEPRESSED, MILD (H): ICD-10-CM

## 2024-02-07 DIAGNOSIS — F14.20 COCAINE USE DISORDER, MODERATE, DEPENDENCE (H): ICD-10-CM

## 2024-02-07 DIAGNOSIS — F15.20 SEVERE STIMULANT USE DISORDER (H): ICD-10-CM

## 2024-02-07 DIAGNOSIS — F32.1 CURRENT MODERATE EPISODE OF MAJOR DEPRESSIVE DISORDER, UNSPECIFIED WHETHER RECURRENT (H): ICD-10-CM

## 2024-02-07 PROCEDURE — 99205 OFFICE O/P NEW HI 60 MIN: CPT | Mod: 95 | Performed by: NURSE PRACTITIONER

## 2024-02-07 RX ORDER — DULOXETIN HYDROCHLORIDE 60 MG/1
60 CAPSULE, DELAYED RELEASE ORAL DAILY
Qty: 30 CAPSULE | Refills: 1 | Status: SHIPPED | OUTPATIENT
Start: 2024-02-07

## 2024-02-07 RX ORDER — QUETIAPINE FUMARATE 25 MG/1
25 TABLET, FILM COATED ORAL
Start: 2024-02-07

## 2024-02-07 RX ORDER — MIRTAZAPINE 7.5 MG/1
7.5 TABLET, FILM COATED ORAL AT BEDTIME
Qty: 60 TABLET | Refills: 1 | Status: SHIPPED | OUTPATIENT
Start: 2024-02-07

## 2024-02-07 ASSESSMENT — ANXIETY QUESTIONNAIRES
8. IF YOU CHECKED OFF ANY PROBLEMS, HOW DIFFICULT HAVE THESE MADE IT FOR YOU TO DO YOUR WORK, TAKE CARE OF THINGS AT HOME, OR GET ALONG WITH OTHER PEOPLE?: VERY DIFFICULT
7. FEELING AFRAID AS IF SOMETHING AWFUL MIGHT HAPPEN: MORE THAN HALF THE DAYS
GAD7 TOTAL SCORE: 16
1. FEELING NERVOUS, ANXIOUS, OR ON EDGE: MORE THAN HALF THE DAYS
GAD7 TOTAL SCORE: 16
4. TROUBLE RELAXING: MORE THAN HALF THE DAYS
6. BECOMING EASILY ANNOYED OR IRRITABLE: NEARLY EVERY DAY
IF YOU CHECKED OFF ANY PROBLEMS ON THIS QUESTIONNAIRE, HOW DIFFICULT HAVE THESE PROBLEMS MADE IT FOR YOU TO DO YOUR WORK, TAKE CARE OF THINGS AT HOME, OR GET ALONG WITH OTHER PEOPLE: VERY DIFFICULT
7. FEELING AFRAID AS IF SOMETHING AWFUL MIGHT HAPPEN: MORE THAN HALF THE DAYS
3. WORRYING TOO MUCH ABOUT DIFFERENT THINGS: NEARLY EVERY DAY
5. BEING SO RESTLESS THAT IT IS HARD TO SIT STILL: SEVERAL DAYS
2. NOT BEING ABLE TO STOP OR CONTROL WORRYING: NEARLY EVERY DAY
GAD7 TOTAL SCORE: 16

## 2024-02-07 ASSESSMENT — PAIN SCALES - GENERAL: PAINLEVEL: NO PAIN (0)

## 2024-02-07 NOTE — TELEPHONE ENCOUNTER
Retail Pharmacy Prior Authorization Team   Phone: [phone]    Note: Due to record-high volumes, our turn-around is time taking longer than normal . We are currently 8 days behind in the pools.   We are working diligently to submit all requests in a timely manner and in the order they are received. Please only flag true urgent requests as high priority to the pool at this time.   If you have questions - please send a note/message in the active PA encounter and send back to the RPPA (Retail Pharmacy Prior Authorization) team [[phone]].    If you have more specific questions about our process please reach out to our supervisor Duyen Delgadillo.   Thank you!

## 2024-02-07 NOTE — PATIENT INSTRUCTIONS
"Patient Education   The [facility]  What to Expect  Here's what to expect in the [facility].   About the program  You'll be meeting with a psychiatric doctor to check your mental health. A psychiatric doctor helps you deal with troubling thoughts and feelings by giving you medicine. They'll make sure you know the plan for your care. You may see them for a long time. When you're feeling better, they may refer you back to seeing your family doctor.   If you have any questions, we'll be glad to talk to you.  About visits  Be open  At your visits, please talk openly about your problems. It may feel hard, but it's the best way for us to help you.  Cancelling visits  If you can't come to your visit, please call us right away at [phone]. If you don't cancel at least 24 hours (1 full day) before your visit, that's \"late cancellation.\"  Not showing up for your visits  Being very late is the same as not showing up. You'll be a \"no show\" if:  You're more than 15 minutes late for a 30-minute (half hour) visit.  You're more than 30 minutes late for a 60-minute (full hour) visit.  If you cancel late or don't show up 2 times within 6 months, we may end your care.  Getting help between visits  If you need help between visits, you can call us Monday to Friday from 8 a.m. to 4:30 p.m. at [phone].  Emergency care  Call 911 or go to the nearest emergency department if your life or someone else's life is in danger.  Call 988 anytime to reach the national Suicide and Crisis hotline.  Medicine refills  To refill your medicine, call your pharmacy. You can also call [facility] at [phone], Monday to Friday, 8 a.m. to 4:30 p.m. It can take 1 to 3 business days to get a refill.   Forms, letters, and tests  You may have papers to fill out, like FMLA, short-term disability, and workability. We can help you with these forms at your visits, but you must have an " FMLA form signed electronically. .  Many thanks  Lynn Curry M.D., appointment. You may need more than 1 visit for this, to be in an intensive therapy program, or both.  Before we can give you medicine for ADHD, we may refer you to get tested for it or confirm it another way.  We may not be able to give you an emotional support animal letter.  We don't do mental health checks ordered by the court.   We don't do mental health testing, but we can refer you to get tested.   Thank you for choosing us for your care.  For informational purposes only. Not to replace the advice of your health care provider. Copyright   2022 [facility]. All rights reserved. [employer] [id number] - 12/22.

## 2024-02-07 NOTE — NURSING NOTE
Is the patient currently in the state of [place]? YES    Visit mode:VIDEO    If the visit is dropped, the patient can be reconnected by: VIDEO VISIT: Text to cell phone:   Telephone Information:   Mobile [phone]       Will anyone else be joining the visit? NO  (If patient encounters technical issues they should call [phone]    How would you like to obtain your AVS? MyChart    Are changes needed to the allergy or medication list? No    Reason for visit: Consult    Dusty JIN

## 2024-02-07 NOTE — PROGRESS NOTES
"PSYCHIATRIC DIAGNOSTIC ASSESSMENT      Name:  Mattie Pierre  : 1975    Mattie Pierre is a 48 year old female who is being evaluated via a billable Video visit.      Telemedicine Visit: The patient's condition can be safely assessed and treated via synchronous audio and visual telemedicine encounter.      Reason for Telemedicine Visit: COVID 19 pandemic and the social and physical recommendations by the CDC and MDH., Patient has requested telehealth visit, and Patient unable to travel      Originating Site (Patient Location): Patient's home    Distant Site (Provider Location): [facility] Outpatient Setting: [facility]    Consent:  The patient/guardian has verbally consented to: the potential risks and benefits of telemedicine (video visit or phone) versus in person care; bill my insurance or make self-payment for services provided; and responsibility for payment of non-covered services.     Mode of Communication:  Tenebril platform     As the provider I attest to compliance with applicable laws and regulations related to telemedicine.                                              CHIEF COMPLAINT     \"To establish long term psychiatric care\"  HISTORY OF PRESENT ILLNESS     Patient is a 48 year old,  Black or  Not  or  female with a history of bipolar affective disorder, currently depressed, moderate, severe stimulant use disorder, cocaine use disorder, moderate, dependence, seizure disorder, current moderate episode of major depressive disorder, unspecified whether recurrent, and sleep difficulty who presents for initial psychiatric evaluation to establish long-term psychiatric care for the medication management of ongoing psychiatric symptoms related to worsening depression and difficulty staying asleep.  Patient reports she has been struggling with worsening depression in the form of low motivation, increased irritability, poor concentration, poor sleep, and " increased isolation for several months.  Patient reported symptoms have progressively worsened for the past few weeks despite taking Cymbalta for depression and Seroquel for sleep and bipolar symptoms.  Patient stated psychosocial stressor related to poor relationship with her children exacerbated psychiatric symptoms.  Patient reported she started experiencing psychiatric symptoms related to mood lability, excessive worry, and worsening depression at 17 years old secondary to substance abuse.  Patient endorsed history of inpatient psychiatric hospitalizations secondary to worsening depression, suicidal ideation in the context of cocaine abuse.  Patient reported several inpatient and outpatient CD treatments with most recently in Dec 2023 at [facility].  Patient endorsed history of MICD commitment when hospitalized at [facility] several years ago..  Patient endorsed history of suicidal ideation and attempt by way of slitting her arm with most recently over 20 years ago.  Patient reported attending individual psychotherapy in the past and gained benefit from it.  Patient is open to individual psychotherapy to further help with ongoing psychiatric symptoms.  PSYCHIATRIC HISTORY:   History of Psychiatric Hospitalizations:   - Inpatient: [facility] in 2023 and [place]   - IOP/PHP/Day treatment: IOP For Co-Occurring Disorder with [place] in December 2023. She does has ILS worker, , and PCA  History of Suicidal Ideation: Positive  History of Suicide Attempts:  Positive by slitting her wrist in her 20s    History of Self-injurious Behavior: Denies a history of SIB.  Current:  No  History of Violence/Aggression: Negative  History of Commitment? Negative  Electroconvulsive Therapy (ECT):Negative    PSYCHIATRIC REVIEW OF SYSTEMS:   Psychiatric Review of Systems:   Depression:   Reports: depressed mood, Denies suicidal ideation, decreased interest, changes in sleep, changes in appetite,  guilt, hopelessness, helplessness, impaired concentration, decreased energy, irritability.  Mariella:   Denies: sleeplessness, increased goal-directed activities, abrupt increase in energy pressured speech  Psychosis:   Denies: visual hallucinations, auditory hallucinations, paranoia  Anxiety:   Reports: excessive worries that are difficult to control, panic attacks  PTSD:   Reports: re-experiencing past trauma, nightmares, increased arousal, avoidance of traumatic stimuli, impaired function.  OCD:   Denies: obsessions, checking, symmetry, cleaning, skin picking.  Eating Disorder:   Denies: restriction, binging, purging.    Sleep: Struggles with staying asleep      MEDICATIONS                                                                                                Current Outpatient Medications   Medication Sig    ammonium lactate (LAC-HYDRIN) 12 % external lotion Apply topically 2 times daily    benzoyl peroxide 5 % external liquid Wash once daily to armpits    chlorhexidine (HIBICLENS) 4 % liquid Apply topically daily as needed for wound care    ciclopirox (PENLAC) 8 % external solution Apply to adjacent skin and affected nails daily.  Remove with alcohol every 7 days, then repeat.    clindamycin (CLEOCIN T) 1 % external lotion Apply topically 2 times daily For armpits    clobetasol (TEMOVATE) 0.05 % external ointment Apply topically 2 times daily To feet and ankles, cover with saran wrap at bedtime    ClonazePAM (KLONOPIN PO)     cyclobenzaprine (FLEXMID) 7.5 MG tablet Take 1 tablet (7.5 mg) by mouth 2 times daily as needed for muscle spasms    diphenhydrAMINE (BENADRYL) 25 MG tablet Take 1 tablet (25 mg) by mouth every 6 hours as needed for itching or allergies    doxycycline monohydrate (ADOXA) 100 MG tablet Take 1 tablet (100 mg) by mouth 2 times daily    DULoxetine (CYMBALTA) 30 MG capsule Take 1 capsule (30 mg) by mouth daily    dupilumab (DUPIXENT) 300 MG/2ML prefilled pen Inject 2 mLs (300 mg)  Subcutaneous every 14 days    famotidine (PEPCID) 20 MG tablet Take 1 tablet (20 mg) by mouth 2 times daily    Fesoterodine Fumarate (TOVIAZ) 8 MG TB24 Take 1 tablet (8 mg) by mouth daily    flurandrenolide (CORDRAN) 4 MCG/SQCM TAPE Apply tape once daily to areas of itching    hydrOXYzine (VISTARIL) 50 MG capsule Take 1 capsule (50 mg) by mouth 2 times daily as needed for itching or other (sleep)    lactase (LACTAID) 3000 UNIT tablet Take 3 tablets (9,000 Units) by mouth 3 times daily (with meals)    lamoTRIgine (LAMICTAL) 150 MG tablet Take 1 tablet (150 mg) by mouth 2 times daily    loratadine (CLARITIN) 10 MG tablet Take 1 tablet (10 mg) by mouth daily    losartan (COZAAR) 100 MG tablet Take 1 tablet (100 mg) by mouth daily    methylcellulose (CITRUCEL) powder Take 2 g by mouth daily Start with 1 tablespoon per day and if tolerated, may increase up to 2-3 tablespoons per day.    mirabegron (MYRBETRIQ) 25 MG 24 hr tablet Take 1 tablet (25 mg) by mouth daily    mometasone (ELOCON) 0.1 % external ointment Apply topically 2 times daily For rash on lower legs and trunk/extremities    mycophenolate (GENERIC EQUIVALENT) 500 MG tablet Take 1 tablet (500 mg) by mouth 2 times daily    naloxone (NARCAN) 4 MG/0.1ML nasal spray Spray 1 spray (4 mg) into one nostril alternating nostrils as needed for opioid reversal every 2-3 minutes until assistance arrives    naproxen (NAPROSYN) 500 MG tablet Take 1 tablet (500 mg) by mouth 2 times daily as needed for moderate pain    oxyBUTYnin ER (DITROPAN XL) 10 MG 24 hr tablet Take 1 tablet (10 mg) by mouth daily    polyethylene glycol (MIRALAX) 17 GM/Dose powder Take 17 g (1 capful) by mouth daily    prazosin (MINIPRESS) 5 MG capsule Take 1 capsule (5 mg) by mouth At Bedtime    QUEtiapine (SEROQUEL) 25 MG tablet TAKE 2 TABLETS(15 MG) BY MOUTH EVERY MORNING    QUEtiapine ER (SEROQUEL XR) 200 MG 24 hr tablet Take 1 tablet (200 mg) by mouth at bedtime    rosuvastatin (CRESTOR) 20 MG tablet  "Take 1 tablet (20 mg) by mouth daily    tacrolimus (PROTOPIC) 0.1 % external ointment Apply topically 2 times daily To areas of eczema    topiramate (TOPAMAX) 50 MG tablet Take 1 tablet (50 mg) by mouth 2 times daily    triamcinolone (KENALOG) 0.1 % external ointment Apply topically 2 times daily To areas of itch on back    valACYclovir (VALTREX) 500 MG tablet Take 2 tablets (1,000 mg) by mouth 3 times daily    white petrolatum external gel Apply to hands and feet twice daily     No current facility-administered medications for this visit.       DRUG MONITORING:  [place] Prescription Monitoring Program [place] controlled substances in the last [place] in [place]:  [place] review was not needed today..      PAST PSYCHOTROPIC MEDICATIONS:    Zyprexa, Prozac  VITALS   LMP 2022 (Approximate)      BP Readings from Last 1 Encounters:   24 97/70     Pulse Readings from Last 1 Encounters:   24 66     Wt Readings from Last 1 Encounters:   24 88.9 kg (196 lb)     Ht Readings from Last 1 Encounters:   24 1.575 m (5' 2\")     Estimated body mass index is 35.85 kg/m  as calculated from the following:    Height as of 1/3/24: 1.575 m (5' 2\").    Weight as of 1/3/24: 88.9 kg (196 lb).      PERTINENT HISTORY   PAST MEDICAL HISTORY:   Past Medical History:   Diagnosis Date    Anemia     Arthritis     COPD (chronic obstructive pulmonary disease) (H)     Depression     Depressive disorder     Diabetes (H)     History of blood transfusion     Hypertension     Obesity     Seizures (H)     Thyroid disease        PAST SURGICAL HISTORY:   Past Surgical History:   Procedure Laterality Date    AMPUTATE FOOT Left 2016    Procedure: PARTIAL PHALANGECTOMY 2ND TOE LEFT FOOT, EXOSTECTOMY LEFT GREAT TOE;  Surgeon: Mustapha Pollard DPM;  Location: [facility];  Service:      SECTION      EYE SURGERY      GYN SURGERY           NO HISTORY OF SURGERY   "       FAMILY HISTORY:   Family History   Problem Relation Age of Onset    Depression Mother     Substance Abuse Mother     Dementia Mother     Diabetes Mother     Crohn's Disease Mother     Colon Cancer Mother         Unknown age of onset    Substance Abuse Father     Cerebrovascular Disease Father     No Known Problems Maternal Grandmother     Substance Abuse Maternal Grandfather     No Known Problems Paternal Grandmother     Substance Abuse Paternal Grandfather     Substance Abuse Brother     No Known Problems Sister     Substance Abuse Son     Substance Abuse Son     No Known Problems Daughter     No Known Problems Maternal Half-Brother     No Known Problems Maternal Half-Sister     No Known Problems Paternal Half-Brother     No Known Problems Paternal Half-Sister     No Known Problems Niece     No Known Problems Nephew     No Known Problems Cousin     No Known Problems Other     Sleep Apnea Maternal Uncle     Coronary Artery Disease No family hx of     Cancer No family hx of     Heart Disease No family hx of     Hypertension No family hx of     Hyperlipidemia No family hx of     Kidney Disease No family hx of     Obesity No family hx of     Thrombosis No family hx of     Asthma No family hx of     Arthritis No family hx of     Thyroid Disease No family hx of     Mental Illness No family hx of     Cystic Fibrosis No family hx of     Early Death No family hx of     Coronary Artery Disease Early Onset No family hx of     Heart Failure No family hx of     Bleeding Diathesis No family hx of     Breast Cancer No family hx of     Ovarian Cancer No family hx of     Uterine Cancer No family hx of     Prostate Cancer No family hx of     Colorectal Cancer No family hx of     Pancreatic Cancer No family hx of     Lung Cancer No family hx of     Melanoma No family hx of     Autoimmune Disease No family hx of     Unknown/Adopted No family hx of     Genetic Disorder No family hx of        SOCIAL HISTORY:   Social History  "    Tobacco Use    Smoking status: Every Day     Packs/day: 0.50     Years: 23.00     Additional pack years: 0.00     Total pack years: 11.50     Types: Cigarettes    Smokeless tobacco: Never    Tobacco comments:     trying to quit, but states that it's hard   Substance Use Topics    Alcohol use: Not Currently     Alcohol/week: 1.0 standard drink of alcohol     Types: 1 Standard drinks or equivalent per week         Seizures :.Endorse history of seizures.  History of cardiac disease, rheumatic fever, fainting or dizziness, especially with exercise, seizures, chest pain or shortness of breath with exercise, unexplained change in exercise tolerance, palpitations, high blood pressure, or heart murmur?   No    LABS & IMAGING                                                                                                                Personally reviewed  Recent Labs   Lab Test 11/03/23  1341   WBC 9.7   HGB 12.7   HCT 42.7   MCV 83        Recent Labs   Lab Test 11/03/23  1340 08/22/22  1021    141   POTASSIUM 4.1 4.8   CHLORIDE 103 105   CO2 26 27   * 97   JAMARI 9.4 9.3   BUN 19.0 12.8   CR 1.25* 0.93   GFRESTIMATED 53* 76   ALBUMIN  --  4.0   PROTTOTAL  --  7.8   AST  --  26   ALT  --  14   ALKPHOS  --  79   BILITOTAL  --  0.2     Recent Labs   Lab Test 12/27/23  1427   CHOL 266*   *   HDL 41*   TRIG 215*   A1C 6.0*     Recent Labs   Lab Test 09/14/22  1109   TSH 4.47*   T4 1.05     No results found for: \"ZUK232\", \"ZLCF177\", \"TWYT28JKSTL\", \"VITD3\", \"D2VIT\", \"D3VIT\", \"DTOT\", \"[id number]\", \"[id number]\", \"[id number]\", \"[id number]\", \"[id number]\", \"[id number]\"     ALLERGY & IMMUNIZATIONS       Allergies   Allergen Reactions    Fumaric Acid Itching    Lactose Other (See Comments)    Morphine Anxiety, Hives, Itching and Rash       FAMILY MEDICAL HISTORY:     Family History       Problem (# of Occurrences) Relation (Name,Age of Onset)    Substance Abuse (7) Mother, Father, Maternal Grandfather, " Paternal Grandfather, Brother, Son, Son    Dementia (1) Mother    Depression (1) Mother    Diabetes (1) Mother    Cerebrovascular Disease (1) Father    Crohn's Disease (1) Mother    Colon Cancer (1) Mother: Unknown age of onset    Sleep Apnea (1) Maternal Uncle    No Known Problems (12) Maternal Grandmother, Paternal Grandmother, Sister, Daughter, Maternal Half-Brother, Maternal Half-Sister, Paternal Half-Brother, Paternal Half-Sister, Niece, Nephew, Cousin, Other           Negative family history of: Coronary Artery Disease, Cancer, Heart Disease, Hypertension, Hyperlipidemia, Kidney Disease, Obesity, Thrombosis, Asthma, Arthritis, Thyroid Disease, Mental Illness, Cystic Fibrosis, Early Death, Coronary Artery Disease Early Onset, Heart Failure, Bleeding Diathesis, Breast Cancer, Ovarian Cancer, Uterine Cancer, Prostate Cancer, Colorectal Cancer, Pancreatic Cancer, Lung Cancer, Melanoma, Autoimmune Disease, Unknown/Adopted, Genetic Disorder              Family history of sudden or unexplained death or an event requiring resuscitation in children or young adults, cardiac arrhythmias (eg, Poly-Parkinson-White syndrome), long QT syndrome, catecholaminergic paroxysmal ventricular tachycardia, Brugada syndrome, arrhythmogenic right ventricular dysplasia, hypertrophic cardiomyopathy, dilated cardiomyopathy, or Marfan syndrome?  No    FAMILY PSYCHIATRIC HISTORY:   Psychiatry:Denies  Substance use history in family: Both parents and siblings struggled with substance use  Family suicide history:Negative      SIGNIFICANT SOCIAL/FAMILY HISTORY:                                           Born and raised in: [place]  Relationship status: , lives with the significant order  Children: 3 children    Highest education level was:11 the grade   Service:Denies  Employment status: Disability  LEGAL: Hx of Civil commitment, multiple arrest and long term times due to contraband possession, theft and armed robbery.     "  SUBSTANCE USE HISTORY    Tobacco use:Smokes about a pack and half of cigarette daily  Caffeine:Drinks 2 cups of coffee daily  Current alcohol:  Drink  Current substance use:Sober for 3 month. Hx of cocaine dependence. Did treatment at [facility] in 2022  Past use alcohol/substance use:Cocaine, THC       MEDICAL REVIEW OF SYSTEMS:   Ten system review was completed with pertinent positives noted above    MENTAL STATUS EXAM:   Mental Status Examination (limited due to video virtual visit format):  Vital Signs: There were no vitals taken for this virtual visit.  Appearance: adequately groomed, appears stated age, and in no apparent distress.  Attitude: cooperative   Eye Contact: good to the extent that can be determined in a video visit  Muscle Strength and Tone: no gross abnormalities based on remote observation  Psychomotor Behavior:  no evidence of tardive dyskinesia, dystonia, or tics based on remote observation  Gait and Station: normal, no gross abnormalities based on remote observation  Speech: clear, coherent, normal prosody, regular rate, regular rhythm and fluent  Associations: No loosening of associations  Thought Process: coherent and goal directed  Thought Content: no evidence of suicidal ideation or homicidal ideation, no evidence of psychotic thought, no auditory hallucinations present and no visual hallucinations present  Mood: \"depressed\"  Affect: appropriate and in normal range  Insight: good  Judgment: intact, adequate for safety  Impulse Control: intact  Oriented to: time, place, person and situation  Attention Span and Concentration: normal  Language: Intact  Recent and Remote Memory: intact to interview. Not formally assessed. No amnesia.  Fund of Knowledge: appropriate        SAFETY   Feels safe in home: Yes   Suicidal ideation: Denies  History of suicide attempts:  No   Hx of impulsivity: No     DSM 5 DIAGNOSIS:   1. Bipolar affective disorder, currently depressed, moderate (H)    2. Severe " stimulant use disorder (H)    3. Seizure disorder (H)    4. Encounter for long-term current use of high risk medication    5. Current moderate episode of major depressive disorder, unspecified whether recurrent (H)    6. Cocaine use disorder, moderate, dependence (H)    7. Sleeping difficulty    ASSESSMENT AND PLAN    Patient is a 48 year old,  Black or  Not  or  female with a history of bipolar affective disorder, currently depressed, moderate, severe stimulant use disorder, cocaine use disorder, moderate, dependence, seizure disorder, current moderate episode of major depressive disorder, unspecified whether recurrent, and sleep difficulty who presents for initial psychiatric evaluation to establish long-term psychiatric care for the medication management of ongoing psychiatric symptoms related to worsening depression and difficulty staying asleep.  Patient with a longstanding history of crack cocaine dependence who relapsed in November 2023 is here to seek medication for the management of worsening depression and difficulty staying asleep secondary to psychosocial stressors related to poor relationship with children.  She believes all her 3 children in addition to grandchildren cut ties with her due to struggling with crack cocaine.  She worries excessively about not being able to have access to her grandchildren.  She feels isolated and believes significant other ignores her most of the time.  She reports she has been sober since November 2023. She attended IOP for Co-Occurring Disorders with [facility] last December.  She takes Lamictal and Topamax for seizures.  She has not had seizures for several years.  She will be seeing her neurologist in 2 days time.  Patient who presented as depressed  and overwhelmed with lower sides of her hair shaved, stated she shaves her hair whenever she is depressed.  Patient who presented as a poor historian reported she has had failed trial  with Prozac and olanzapine in the past.  She cannot remember most of the medications she has tried in the past.  She is currently taking duloxetine 30 mg daily, Seroquel 200 mg at bedtime prazosin 5 mg for night terror.  I suggested titrating duloxetine to 60 mg to effectively manage worsening depression while starting patient on Remeron 7.5 mg for 3 nights then increase to 15 mg at Night to Effectively Manage Difficulty Staying Asleep.  I Discussed Medication Side Effect, Risk, and Benefit with the Patient.  Patient Verbalized Good Understanding and She Was Amenable to Taking Remeron at Bedtime with Increased Dose of Duloxetine.  Since patient is open to individual psychotherapy, I sent referral for individual psychotherapy with him at [facility].  Patient currently denies suicidal and homicidal ideation.  She also denies both auditory and visual hallucination.  Patient report normal no hypomania.  Of note, she has missed several appointments to meet with a sleep specialist.  Patient return to clinic in 6-week for follow-up.    Plan:  1.Patient will take the medications as prescribed.   Medications: Take Cymbalta 60 mg for mood, depression and anxiety  Take Remeron 7.5 mg for three nights and increase to 2 tablets (15 mg) after third nights for sleep and depression  Seroquel 200 mg at bedtime  Seroquel 25 mg as needed for sleep  Prazosin 5 mg at bedtime  Hydroxyzine 50 mg twice daily as needed for anxiety and sleep  Lamictal 150 mg twice daily for Seizure - Managed by Neurologist  Topamax 50 mg twice daily for cocaine abuse  Patient will not stop taking medications or adjust them without consulting with the provider.  2.Patient will call with any problems between visits.  3.Patient will go to the emergency room if not feeling safe , unable to function in the community, or if suicidal, homicidal or hearing voices or having paranoia.  4.Patient will abstain from drugs and alcohol./Pt denies use .  5.Patient will not  drive if sedated on medications or under influence of any substance.  6.Patient will not mix psychiatric medications with drugs and alcohol.   7.Patient will watch his diet and exercise.  8.Patient will see non psychiatric providers for non psychiatric disorders.  9. Next appointment in 6 weeks    Risk Assessment:     Mattie has notable risk factors for self-harm, including, single status, anxiety, depression, substance abuse and hx of SI and Suicide attempts.. However, risk is mitigated by ability to volunteer a safety plan and history of seeking help when needed. Additional steps taken to minimize risk include making medication adjustment, asking patient to call 911 and go to the ER if not able to stay safe at home,  Therefore, based on all available evidence including the factors cited above, Mattie does not appear to be an imminent danger to self or others and does not meet criteria 72 hour hold. However, if patient uses substances or is non-adherent with medication, their risk of decompensation and SI/HI will be elevated. This was discussed with the patient as she verbalized good understanding.     CONSULTS/REFERRALS:   Continue therapy  None at this time  Coordinate care with therapist as needed    MEDICAL:   None at this time  Coordinate care with PCP (Kade Yousif) as needed  Follow up with primary care provider as planned or for acute medical concerns.    PSYCHOEDUCATION:  Medication side effects and alternatives reviewed. Health promotion activities recommended and reviewed today. All questions addressed. Education and counseling completed regarding risks and benefits of medications and psychotherapy options.  Consent provided by patient/guardian  Call the psychiatric nurse line with medication questions or concerns at [phone].  [email or web address] may be used to communicate with your provider, but this is not intended to be used for emergencies.  BLACK BOX WARNING: Discussed the Food and Drug Administration  (FDA) requires that all antidepressants carry a warning that some children, adolescents and young adults may be at increased risk of suicide when taking antidepressants. Anyone taking an antidepressant should be watched closely for worsening depression or unusual behavior especially in the first few weeks after starting an SSRI. Keep in mind, antidepressants are more likely to reduce suicide risk in the long run by improving mood.   SEROTONIN SYNDROME:  Discussed risks of Serotonin syndrome (ie, serotonin toxicity) which is a potentially life-threatening condition associated with increased serotonergic activity in the central nervous system (CNS). It is seen with therapeutic medication use, inadvertent interactions between drugs, and intentional self-poisoning. Serotonin syndrome may involve a spectrum of clinical findings, which often include mental status changes, autonomic hyperactivity, and neuromuscular abnormalities.    BENZODIAZEPINE:  discussion on how benzos work and the need to use them short term due to potential of anxiety getting.  This is a controlled substance with risk for abuse, need to keep in a safe keep place and cannot replace lost scripts.    HYPNOTIC USE: Hypnotic use, risk for CNS depression, sleep-walking, not to mix with ETOH or other CNS depressant, need for six hours of sleep, stop if change in mood.  This is a controlled substance with risk for abuse, need to keep in a safe keep place and cannot replace lost scripts.  FIRST GENERATION ANTIPSYCHOTIC/ SECOND GENERATION ANTIPSYCHOTIC USE:  Atypical need for cardiometabolic monitoring with medication- B/P, weight, blood sugar, cholesterol.  Need to monitor for abnormal movements taught  SAFETY:  We all care about your loved one's safety. To reduce the risk of self-harm, remove access to all:  Firearms, Medicines (both prescribed and over-the-counter), Knives and other sharp objects, Ropes and like materials, and Alcohol  SLEEP HYGIENE:  establish a sleep routine, limit screen time 1 hour prior to bed, use bed for sleep only, take sleep/medications on time (including sleepy time tea, trazadone or herbal treatments such as melatonin), aroma therapy, limit caffeine/sugar, yoga, guided imagery, stretch, meditation, limit naps to 20 minutes, make a temperature change in the room, white noise, be mindful of slowing down breathing, take a warm bath/shower, frequently wash sheets, and journaling.   Medlineplus.gov is information for patients.  It is run by the [employer] and it contains information about all disorders, diseases and all medications.      COMMUNITY RESOURCES:    CRISIS NUMBERS: Provided in AVS 2024  National Suicide Prevention Lifeline: 8-041-413-TALK (381-598-3865)  Identification International/resources for a list of additional resources (SOS)            [facility] - [phone]   Urgent Care Adult Mental [facility]hujbq-[phone] mobile unit/  crisis line  [facility] -[phone]   COPE  [place] Mobile Team -[phone] (adults)/ [phone] (child)  Poison Control Center - 1-110.782.7219    OR  go to nearest ER  Crisis Text Line for any crisis  send this-   To: 140043   [facility] ([facility]  [phone]  National Suicide Prevention Lifeline: 589.434.5092 (TTY: 889.527.2050). Call anytime for help.  (www.suicidepreventionlifeline.org)  National [place] on Mental Illness (www.nitza.org): 681-513-6409 or 160-836-3060.   Mental Health Association (www.mentalhealth.org): 710.288.6331 or 368-493-9751.  [place] Crisis Text Line: Text [place] to 312334  Suicide LifeLine Chat: suicidepre[employer].org/chat    ADMINISTRATIVE BILLIN min spent interviewing patient, reviewing referral documents, obtaining and reviewing outside records, communication with other health specialists, and preparing this report on this day: 24    Video/Phone Start Time: 2:36  pm  Video/Phone End Time:  3:32 pm    Greater than 50% of time was spent in counseling and coordination of care regarding above diagnoses and treatment plan.    Signed:   Flako Barakat, MSN, APRN, PMHNP-BC  Long Term Outpatient Psychiatry  Chart documentation done in part with Dragon Voice Recognition software.  Although reviewed after completion, some word and grammatical errors may remain.   Answers submitted by the patient for this visit:  Patient Health Questionnaire (Submitted on 2/6/2024)  If you checked off any problems, how difficult have these problems made it for you to do your work, take care of things at home, or get along with other people?: Very difficult  PHQ9 TOTAL SCORE: 19  TRUE-7 (Submitted on 2/7/2024)  TRUE 7 TOTAL SCORE: 16

## 2024-02-07 NOTE — PROGRESS NOTES
Virtual Visit Details    Type of service:  Video Visit   Video Start Time:  2:36 pm  Video End Time: 3:32 pm    Originating Location (pt. Location): Home    Distant Location (provider location):  Off-site  Platform used for Video Visit: Cindi

## 2024-02-09 ENCOUNTER — DOCUMENTATION ONLY (OUTPATIENT)
Dept: FAMILY MEDICINE | Facility: CLINIC | Age: 49
End: 2024-02-09
Payer: MEDICAID

## 2024-02-09 NOTE — TELEPHONE ENCOUNTER
Spoke with dupixent and they asked us to send over denials. I explained that I spoke with some last week and resent denials. Rep said she did see them on file and they will review application. Will check back next week for another update.

## 2024-02-09 NOTE — PROGRESS NOTES
To be completed in Nursing note:    Please reference list for forms that require a visit for completion.  Please remind patients that providers are given 3-5 business days to complete and return forms.      Form type: Incontinence order form    Date form received: 24    Date form completed by Physician:  24    How was form returned to patient (mailed, faxed, or at  for patient to ): faxed to [facility] [phone]    Date form mailed/faxed/left at  for patient and sent to HIM for scannin24      Once form is left for patient, faxed, or mailed PCS will then close the documentation only encounter.

## 2024-02-15 NOTE — TELEPHONE ENCOUNTER
Unable to leave a voicemail to schedule, SendGrid message sent for patient to schedule when ready.     Levon Blake on 2/15/2024 at 3:33 PM

## 2024-02-19 PROBLEM — R15.9 INCONTINENCE OF FECES WITH FECAL URGENCY: Status: ACTIVE | Noted: 2024-01-24

## 2024-02-19 PROBLEM — N39.41 URGE INCONTINENCE: Status: ACTIVE | Noted: 2024-01-24

## 2024-02-19 PROBLEM — R35.0 FREQUENCY OF URINATION: Status: ACTIVE | Noted: 2024-01-24

## 2024-02-19 PROBLEM — R15.2 INCONTINENCE OF FECES WITH FECAL URGENCY: Status: ACTIVE | Noted: 2024-01-24

## 2024-02-19 PROBLEM — R39.15 URGENCY OF URINATION: Status: ACTIVE | Noted: 2024-01-24

## 2024-02-19 NOTE — TELEPHONE ENCOUNTER
FREE DRUG APPLICATION APPROVED    Medication: DUPIXENT 300 MG/2ML SC SOPN  Program Name:  Dupixent Terry  Effective Date: 2/19/2024  Expiration Date: 2/19/2025  Pharmacy Filling the Rx: [facility] [address]    LVM with patient regarding PAP approval.

## 2024-02-21 NOTE — TELEPHONE ENCOUNTER
Patient is scheduled for a surgical procedure with Dr. Garibay      Spoke with: Patient via [employer] (unable to connect via phone)      Date of Surgery/Procedure: Tuesday April 02, 2024    Location: [facility] OR      Informed patient they will need an adult : Yes     Pre-op: No       Post-op: Wednesday April 10, 2024     Additional comments:      Surgery packet: MeetCast     Patient is aware that surgery time is tentative to change and to expect a call 3-1 business days from Pre Admission Nursing for instructions and arrival time

## 2024-02-21 NOTE — TELEPHONE ENCOUNTER
Prior Authorization Not Needed per Insurance    Medication: Fesoterodine Fumarate (TOVIAZ) 8 MG TB24  The name brand Toviaz is preferred and covered by [place] medicaid. Pharmacy received a paid claim for brand.    Pharmacy Notified:  Yes  Patient Notified:  No    Closing encounter.    Thank you,  Central Prior Authorization Team  [phone]

## 2024-02-29 ENCOUNTER — MEDICAL CORRESPONDENCE (OUTPATIENT)
Dept: HEALTH INFORMATION MANAGEMENT | Facility: CLINIC | Age: 49
End: 2024-02-29
Payer: MEDICAID

## 2024-03-11 ENCOUNTER — OFFICE VISIT (OUTPATIENT)
Dept: UROLOGY | Facility: CLINIC | Age: 49
End: 2024-03-11
Payer: MEDICAID

## 2024-03-11 ENCOUNTER — LAB (OUTPATIENT)
Dept: LAB | Facility: CLINIC | Age: 49
End: 2024-03-11
Payer: MEDICAID

## 2024-03-11 VITALS — SYSTOLIC BLOOD PRESSURE: 131 MMHG | DIASTOLIC BLOOD PRESSURE: 80 MMHG | HEART RATE: 100 BPM

## 2024-03-11 DIAGNOSIS — N39.41 URGE INCONTINENCE: ICD-10-CM

## 2024-03-11 DIAGNOSIS — N39.41 URGE INCONTINENCE: Primary | ICD-10-CM

## 2024-03-11 LAB
ALBUMIN UR-MCNC: 200 MG/DL
APPEARANCE UR: ABNORMAL
BACTERIA #/AREA URNS HPF: ABNORMAL /HPF
BILIRUB UR QL STRIP: NEGATIVE
COLOR UR AUTO: YELLOW
GLUCOSE UR STRIP-MCNC: NEGATIVE MG/DL
HGB UR QL STRIP: ABNORMAL
KETONES UR STRIP-MCNC: NEGATIVE MG/DL
LEUKOCYTE ESTERASE UR QL STRIP: ABNORMAL
MUCOUS THREADS #/AREA URNS LPF: PRESENT /LPF
NITRATE UR QL: NEGATIVE
PH UR STRIP: 6 [PH] (ref 5–7)
RBC #/AREA URNS AUTO: ABNORMAL /HPF
SKIP: ABNORMAL
SP GR UR STRIP: 1.03 (ref 1–1.03)
SQUAMOUS #/AREA URNS AUTO: ABNORMAL /LPF
UROBILINOGEN UR STRIP-MCNC: NORMAL MG/DL
WBC #/AREA URNS AUTO: ABNORMAL /HPF

## 2024-03-11 PROCEDURE — 87086 URINE CULTURE/COLONY COUNT: CPT

## 2024-03-11 PROCEDURE — 81001 URINALYSIS AUTO W/SCOPE: CPT

## 2024-03-11 PROCEDURE — 52000 CYSTOURETHROSCOPY: CPT | Performed by: UROLOGY

## 2024-03-11 NOTE — PROGRESS NOTES
Reason for Visit:  Cystoscopy    Clinical Data: Ms. Mattie Pierre is a 48 year old female with a hx of urinary urgency and frequency as well as urge incontinence. This has been going on for a while. She has tried medication in the form of oxybutynin but this did not help. She has also been having fecal urgency and incontinence.     Cystoscopy procedure:  Pt. Was consented and placed in the lithotomy position.  She was cleaned and preparred in the usual fashion.  Lidocain gel was inserted into the urethra and given time to take effect.  A 16 fr flexible cystoscope was then inserted through the urethra and into the bladder.  The urethra was wnl.  The bladder was with 2+ trabeculation.  No tumors, diverticulae, or stones.  Bilateral u/o's were effluxing clear urine.  The cystoscope was then withdrawn.  The pt. Tolerated the procedure well.  But then she had an UDC that caused her to leak out.    A/P:  48 year old female with urinary urgency and frequency as well as urge incontinence that has been refractory to anticholinergics. She has tried behavioral changes which also did not help. She also has fecal urgency and incontinence. We discussed options of other medications as well as PTNS and SNS. She is interested in SNS but wanted to try Myrbetriq but she doesn't think it helped.  She would like to proceed with SNS.  If that does not help, then will try botox.  -proceed with scheduled PNE    Thank you for allowing me to participate in the care of  Ms. Mattie Pierre and I will keep you updated on her progress.    Sindhu Garibay MD   oriented to person, place and time , normal sensation , short and long term memory intact

## 2024-03-11 NOTE — NURSING NOTE
Mattie Pierre's goals for this visit include:   Chief Complaint   Patient presents with    Cystoscopy     Urge Incontinence       She requests these members of her care team be copied on today's visit information:     PCP: Kade Yousif    Referring Provider:  No referring provider defined for this encounter.    /80 (BP Location: Right arm, Patient Position: Sitting, Cuff Size: Adult Regular)   Pulse 100   LMP 07/23/2022 (Approximate)     Do you need any medication refills at today's visit?     Belgica Puri MA on 3/11/2024 at 10:34 AM

## 2024-03-12 LAB — BACTERIA UR CULT: NORMAL

## 2024-03-18 ENCOUNTER — NURSE TRIAGE (OUTPATIENT)
Dept: FAMILY MEDICINE | Facility: CLINIC | Age: 49
End: 2024-03-18

## 2024-03-18 ENCOUNTER — E-VISIT (OUTPATIENT)
Dept: URGENT CARE | Facility: CLINIC | Age: 49
End: 2024-03-18
Payer: MEDICAID

## 2024-03-18 DIAGNOSIS — L70.8 OTHER ACNE: Primary | ICD-10-CM

## 2024-03-18 PROCEDURE — 99207 PR NON-BILLABLE SERV PER CHARTING: CPT | Performed by: PHYSICIAN ASSISTANT

## 2024-03-18 NOTE — TELEPHONE ENCOUNTER
Patient calling with twofold concerns    PCP/medication concern -  Stiffness ongoing and worsening since starting dupixent in December  Patient states after each injection she notices a lump/knot and bubbling of the skin at the site for several days  Stiffness in joints in whole body, worst pain in the hips  Sweats and chills but not sure of temp/has not taken    Seen at [facility] for primary care, will schedule per recommended triage disposition to be seen this week.    Urology team -   Patient also had questions regarding urine testing done on 3/11 and upcoming procedure. Wanted to know what, if any, follow up is needed based on UA before she is seen by urology for upcoming procedure on 4/2. Patient also seems unclear on exactly what this procedure is and how it is being done. Needs clear education prior to procedure and writer feels this would be best described/clarified by urology team. Patient will require a phone call with appropriate medical literacy level of description to verbally discuss and clarify - not only written/via MyChart, as patient has read this and still does not have a good understanding of the procedure or prep. Routing for education and recommendations based on 3/11 urinalysis.    HEATHER BrandN, RN  [facility]      Reason for Disposition   MODERATE pain (e.g., interferes with normal activities) and present > 3 days   Patient wants to be seen    Additional Information   Negative: Chest pain   Negative: Arm pains with exertion (e.g., walking)   Negative: Muscle aches from influenza (flu) suspected   Negative: Muscle aches from heat exposure suspected   Negative: Lyme disease suspected (e.g., bull's eye rash or tick bite / exposure in past month)   Negative: Pain only in back   Negative: Pain in one arm OR arm pains caused by recent vigorous activity (e.g., sports, lifting, overuse)   Negative: Pain in one leg OR leg pains caused by recent vigorous activity (e.g.,  sports, lifting, overuse)   Negative: Rash over large area or most of the body (widespread or generalized)    Protocols used: Muscle Aches and Body Pain-A-[place]

## 2024-03-19 ASSESSMENT — PATIENT HEALTH QUESTIONNAIRE - PHQ9
10. IF YOU CHECKED OFF ANY PROBLEMS, HOW DIFFICULT HAVE THESE PROBLEMS MADE IT FOR YOU TO DO YOUR WORK, TAKE CARE OF THINGS AT HOME, OR GET ALONG WITH OTHER PEOPLE: EXTREMELY DIFFICULT
SUM OF ALL RESPONSES TO PHQ QUESTIONS 1-9: 11
SUM OF ALL RESPONSES TO PHQ QUESTIONS 1-9: 11

## 2024-03-20 ENCOUNTER — OFFICE VISIT (OUTPATIENT)
Dept: FAMILY MEDICINE | Facility: CLINIC | Age: 49
End: 2024-03-20
Payer: MEDICAID

## 2024-03-20 VITALS
HEIGHT: 62 IN | OXYGEN SATURATION: 100 % | TEMPERATURE: 97.9 F | SYSTOLIC BLOOD PRESSURE: 146 MMHG | DIASTOLIC BLOOD PRESSURE: 90 MMHG | BODY MASS INDEX: 35.51 KG/M2 | RESPIRATION RATE: 16 BRPM | HEART RATE: 67 BPM | WEIGHT: 193 LBS

## 2024-03-20 DIAGNOSIS — G89.4 CHRONIC PAIN SYNDROME: Primary | ICD-10-CM

## 2024-03-20 PROCEDURE — 99213 OFFICE O/P EST LOW 20 MIN: CPT | Mod: GC

## 2024-03-20 RX ORDER — LIDOCAINE 50 MG/G
1 PATCH TOPICAL EVERY 24 HOURS
Qty: 15 PATCH | Refills: 3 | Status: SHIPPED | OUTPATIENT
Start: 2024-03-20 | End: 2024-04-02

## 2024-03-20 RX ORDER — LIDOCAINE 4 G/G
1 PATCH TOPICAL EVERY 24 HOURS
Qty: 10 PATCH | Refills: 3 | Status: SHIPPED | OUTPATIENT
Start: 2024-03-20 | End: 2024-04-02

## 2024-03-20 NOTE — PROGRESS NOTES
"  Assessment & Plan     Chronic pain syndrome  -History chronic pain syndrome.  She states that she has pain \"all over\"  She states that lidocaine patches have been helpful in the past, so I have sent these to her pharmacy on file.  Patient left prior to the conclusion of our visit, stating she was frustrated and \"wanted to smoke\".  I do think the patient should be further worked up for possible rheumatological and autoimmune conditions.  If patient decides to schedule a follow-up appointment, I would recommend an CHASE, uric acid, rheumatoid factor, and anti-CCP to make sure that there is not a systemic condition affecting her and causing chronic pain.  - Lidocaine (LIDOCARE) 4 % Patch  Dispense: 10 patch; Refill: 3  - lidocaine (LIDODERM) 5 % patch  Dispense: 15 patch; Refill: 3    BMI  Estimated body mass index is 35.3 kg/m  as calculated from the following:    Height as of this encounter: 1.575 m (5' 2\").    Weight as of this encounter: 87.5 kg (193 lb).       No follow-ups on file.    Samina Phelps is a 48 year old, presenting for the following health issues:  Chills, Sweats, body stiffness, and Results (From Urology)        3/20/2024    11:22 AM   Additional Questions   Roomed by Mao   Accompanied by self         3/20/2024    Information    services provided? No     HPI   She reports pain and swelling after Dupixent injection. Stiffness in the Right lower extremity. She has stopped Dupixent.  States that the morning she has pain \"all over\".  She states that lidocaine patches have been helpful in the past.  She affirms urinary incontinence for which she sees a urologist for.  In addition she reports loose stools over the past 2 weeks.  Patient states she has tried pool therapy and PT.  Patient became agitated about the lack of progress on her chronic pain and she left the office prior to the completion of our discussion and exam.       Review of Systems  Constitutional, HEENT, " "cardiovascular, pulmonary, gi and gu systems are negative, except as otherwise noted.  ROS reviewed, see HPI for pertinent positives and negatives.  Kade Yousif DO        Objective    BP (!) 146/90   Pulse 67   Temp 97.9  F (36.6  C) (Oral)   Resp 16   Ht 1.575 m (5' 2\")   Wt 87.5 kg (193 lb)   LMP 07/23/2022 (Approximate)   SpO2 100%   BMI 35.30 kg/m    Body mass index is 35.3 kg/m .  Physical Exam  Constitutional:       General: She is not in acute distress.     Appearance: She is not ill-appearing or toxic-appearing.      Comments: Agitated   HENT:      Head: Normocephalic.      Right Ear: External ear normal.      Left Ear: External ear normal.      Nose: Nose normal.      Mouth/Throat:      Mouth: Mucous membranes are moist.   Pulmonary:      Effort: Pulmonary effort is normal. No respiratory distress.      Breath sounds: Normal breath sounds.   Abdominal:      General: There is no distension.   Musculoskeletal:      Right lower leg: No edema.      Left lower leg: No edema.   Neurological:      Mental Status: She is alert.   Psychiatric:         Behavior: Behavior is agitated.         Judgment: Judgment is impulsive.             Precepted with Dr. Justo Wylie DO    Signed Electronically by: Kade Yousif DO    "

## 2024-03-21 DIAGNOSIS — Z98.890 HISTORY OF ILEAL CONDUIT: Primary | ICD-10-CM

## 2024-03-22 ENCOUNTER — TELEPHONE (OUTPATIENT)
Dept: FAMILY MEDICINE | Facility: CLINIC | Age: 49
End: 2024-03-22
Payer: MEDICAID

## 2024-03-22 RX ORDER — CLINDAMYCIN PHOSPHATE 10 UG/ML
LOTION TOPICAL 2 TIMES DAILY
Qty: 60 ML | Refills: 2 | Status: SHIPPED | OUTPATIENT
Start: 2024-03-22

## 2024-03-22 NOTE — TELEPHONE ENCOUNTER
Medication/Dose: Lidocaine 5% patches   Diagnosis and ICD code (if different than what is on RX):  Chronic pain syndrome [G89.4]     New/renewal/insurance change PA/secondary ins. PA:  Previously Tried and Failed:    Rationale:      Insurance   Primary: BEHAVIORAL HE[employer] FUNDS  Insurance ID:  [id number]    Secondary (if applicable):MEDICAID [place]  Insurance ID:  [id number]    Pharmacy Information (if different than what is on RX)  Name:  [facility]   Phone:  [phone]  Fax:[phone]

## 2024-03-26 ENCOUNTER — TELEPHONE (OUTPATIENT)
Dept: UROLOGY | Facility: CLINIC | Age: 49
End: 2024-03-26
Payer: MEDICAID

## 2024-03-26 NOTE — TELEPHONE ENCOUNTER
Patient returned my call to answer her questions about the procedure with Dr Garibay and what she needs to do.   Since the procedure is local patient can drive herself home from the procedure. Patient will stop naproxen. Patient understands she can not submerge into in water during trial. Patient knows she needs do sponge baths. Patient is scheduled to come in 4/10/24 device removal.  Patient will call if she has any questions    Beverly Bishop RN, BSN  Care Coordinator Urology  [facility], [place]  Urology Clinic  [phone]

## 2024-03-26 NOTE — TELEPHONE ENCOUNTER
Left message to have the patient call to discuss procedure with Dr Garibay on 4/1/24.  Wanted to give some instructions and a little guidance for the local procedure    Beverly Bishop, RN, BSN  Care Coordinator Urology  [facility], [place]  Urology [facility]  [phone]

## 2024-04-01 NOTE — TELEPHONE ENCOUNTER
INR good. Based on trend and historical maintenance dose, will adjust dose. Recheck INR in 2 weeks     P Family Medicine phone call message- general phone call:    Reason for call: the Pt called to ask if the Dr could call her to talk about getting a new walker and would like a call back     Return call needed: Yes    OK to leave a message on voice mail? Yes    Primary language: English      needed? No    Call taken on March 26, 2018 at 1:48 PM by Sunday Lemos

## 2024-04-02 ENCOUNTER — HOSPITAL ENCOUNTER (OUTPATIENT)
Facility: AMBULATORY SURGERY CENTER | Age: 49
Discharge: HOME | End: 2024-04-02
Attending: UROLOGY | Admitting: UROLOGY
Payer: MEDICAID

## 2024-04-02 ENCOUNTER — ANCILLARY PROCEDURE (OUTPATIENT)
Dept: RADIOLOGY | Facility: AMBULATORY SURGERY CENTER | Age: 49
End: 2024-04-02
Attending: UROLOGY
Payer: MEDICAID

## 2024-04-02 VITALS
HEIGHT: 62 IN | BODY MASS INDEX: 35.51 KG/M2 | SYSTOLIC BLOOD PRESSURE: 143 MMHG | WEIGHT: 193 LBS | RESPIRATION RATE: 16 BRPM | TEMPERATURE: 98.3 F | HEART RATE: 80 BPM | OXYGEN SATURATION: 98 % | DIASTOLIC BLOOD PRESSURE: 97 MMHG

## 2024-04-02 DIAGNOSIS — N39.9 UROLOGIC DISORDER: ICD-10-CM

## 2024-04-02 PROCEDURE — 64561 IMPLANT NEUROELECTRODES: CPT | Mod: 50 | Performed by: UROLOGY

## 2024-04-02 PROCEDURE — 64561 IMPLANT NEUROELECTRODES: CPT | Mod: RT

## 2024-04-02 PROCEDURE — C1897 LEAD, NEUROSTIM TEST KIT: HCPCS

## 2024-04-02 DEVICE — KIT NRSTM AXONICS PERIPHERAL NERVE EVALUATION LEAD 1701: Type: IMPLANTABLE DEVICE | Site: BACK | Status: FUNCTIONAL

## 2024-04-02 DEVICE — KIT NRSTM AXONICS PERCUTANEOUS NERVE EVALUATION 1901: Type: IMPLANTABLE DEVICE | Site: BACK | Status: FUNCTIONAL

## 2024-04-02 RX ORDER — LIDOCAINE HYDROCHLORIDE 10 MG/ML
INJECTION, SOLUTION EPIDURAL; INFILTRATION; INTRACAUDAL; PERINEURAL DAILY PRN
Status: DISCONTINUED | OUTPATIENT
Start: 2024-04-02 | End: 2024-04-02 | Stop reason: HOSPADM

## 2024-04-02 NOTE — DISCHARGE INSTRUCTIONS
"Sacral Nerve Stimulator    What to Expect:  - There will likely be pain over the needle site. Use Tylenol and Advil (if allowed) for pain. Take stool softeners (Senna) to prevent constipation.    Diet:    You may return to your normal diet that you were taking before surgery.      Activity Restrictions:   No restrictions, but listen to your body. If something hurts, don't do it.    Incision Care:   Do not submerge underwater or shower while the leads are in place for the next week. You can sponge bath as long as you do not get the backside and leads wet.    Medications:  1.  Resume all home medications    Monitor: Call sooner or go the emergency department if you develop fevers, chills, uncontrolled pain, nausea or vomiting, or increased redness or drainage from the incision site.    Follow-up:  Follow up in 1 week with Dr. Garibay for symptom check.    Phone numbers:   - Monday through Friday 8am to 4:30pm: Call [phone] with questions, requests for medication refills, or to schedule or confirm an appointment.  - Nights or weekends: call the after hours emergency pager - [phone] and tell the  \"I would like to page the Urology Resident on call.\" Please note, due to prescribing laws, resident physicians are unable to prescribe narcotics after-hours. If you feel as though you will need a refill of a narcotic pain medication, you will need to call the clinic during business hours OR seek emergency care.  - For emergencies, call [phone]    "

## 2024-04-02 NOTE — OP NOTE
Urology Operative Summary     Pre-operative diagnosis: Urinary urgency and frequency and urge incontinence   Post-operative diagnosis: Same   Procedure: Percutaneous Neural Exam  Interpretation of fluoroscopic imaging      Surgeon: Sindhu Garibay MD   Assistant(s): Panchito Anton MD      Anesthesia: Local anesthesia       Estimated blood loss: Less than 10 ml   Complications: None   Condition: Patient taken to recovery in stable condition.            Indication:  Ms. Mattie Pierre is a 48 year old female with a hx of pelvic pain and OAB. She has tried different treatments including anticholinergics for her problem and they have not been successful.  After discussing further management options, the patient has elected to proceed with a percutaneous neural examination as a trial for a permanent interstim implant.    Procedure:  The patient was identified correctly, consented and placed in the prone position.  The patient's sacral area was prepped and draped in the usual sterile fashion. Using the fluoroscope in the AP position the medial aspects of the sacral foramena were identified and marked.  The fluoroscope was then placed in the lateral position and the S3 foramen was identified and marked.  Using marcaine with bicarb bilateral insertion sites were injected to anesthetize the skin.  Once this was achieved a 3 1/2 inch needle was inserted on the right side until it was passed through the S3 foramen as seen on fluoroscopy.      Next the needle was tested and the patient had a toe response at about 0.3 mA and a sensory response at 0.3 in the buttocks area.  This was the best response on the right.  The same was then done on the left and a toe response was seen at about 3 mA and sensory response at the same amplitude. Of note, the right needle was lower in the foramen (Left is higher).       At this point the stilette was removed from the insertion needle and the electrode was passed until the 3 1/2 inch myra on  both sides.  The needle was pulled out leaving the electrode in place.  These were secured down with steri-strips and a dressing was applied.    All appropriate wires were put in place and attached to the generator and the [employer] representative went over instructions with the patient.     Plan: Ms. Mattie Pierre will f/u with nursing visit in ~ 1 week for lead removal and to go over results of the trial.    Panchito Anton MD  Urology Resident     Patient was seen, evaluated and plan was formulated in conjunction with me and I agree with the above.  I was present for the entire procedure.  Sindhu Garibay MD

## 2024-04-10 ENCOUNTER — ALLIED HEALTH/NURSE VISIT (OUTPATIENT)
Dept: UROLOGY | Facility: CLINIC | Age: 49
End: 2024-04-10
Payer: MEDICAID

## 2024-04-10 DIAGNOSIS — N39.41 URGE INCONTINENCE: Primary | ICD-10-CM

## 2024-04-10 PROCEDURE — 99211 OFF/OP EST MAY X REQ PHY/QHP: CPT

## 2024-04-10 NOTE — PROGRESS NOTES
Chief Complaint   Patient presents with    Clinic Care Coordination - Face To Face     Device removed neurostimulator trial       Patient Active Problem List   Diagnosis    Bipolar disorder (H)    Obesity    Cellulitis of axilla, left    Iron deficiency anemia    Seizure disorder (H)    Hidradenitis suppurativa    Urgency incontinence    Chronic pain syndrome    Acne vulgaris    Tinea capitis    Fissure in skin    Severe stimulant use disorder (H)    Depression    Hypertension    Subclinical hypothyroidism    Vitamin D deficiency    Ganglion cyst of right foot    Hypertrophy of bone    Anhidrosis    Pes planovalgus    Other eczema    Nummular dermatitis    Other atopic dermatitis    Class 2 severe obesity due to excess calories with serious comorbidity in adult (H)    Encounter for long-term current use of high risk medication    Cocaine use disorder, moderate, dependence (H)    Urge incontinence    Urgency of urination    Frequency of urination    Incontinence of feces with fecal urgency       Allergies   Allergen Reactions    Fumaric Acid Itching    Lactose Other (See Comments)    Morphine Anxiety, Hives, Itching and Rash       Current Outpatient Medications   Medication Sig Dispense Refill    ammonium lactate (LAC-HYDRIN) 12 % external lotion Apply topically 2 times daily 225 g 4    benzoyl peroxide 5 % external liquid Wash once daily to armpits 118 mL 3    chlorhexidine (HIBICLENS) 4 % liquid Apply topically daily as needed for wound care 946 mL 3    ciclopirox (PENLAC) 8 % external solution Apply to adjacent skin and affected nails daily.  Remove with alcohol every 7 days, then repeat. 6.6 mL 11    clindamycin (CLEOCIN T) 1 % external lotion Apply topically 2 times daily 60 mL 2    clindamycin (CLEOCIN T) 1 % external lotion Apply topically 2 times daily For armpits 60 mL 3    clobetasol (TEMOVATE) 0.05 % external ointment Apply topically 2 times daily To feet and ankles, cover with saran wrap at bedtime 60 g 1     ClonazePAM (KLONOPIN PO)       cyclobenzaprine (FLEXMID) 7.5 MG tablet Take 1 tablet (7.5 mg) by mouth 2 times daily as needed for muscle spasms 60 tablet 1    diphenhydrAMINE (BENADRYL) 25 MG tablet Take 1 tablet (25 mg) by mouth every 6 hours as needed for itching or allergies 90 tablet 1    doxycycline monohydrate (ADOXA) 100 MG tablet Take 1 tablet (100 mg) by mouth 2 times daily 180 tablet 0    DULoxetine (CYMBALTA) 60 MG capsule Take 1 capsule (60 mg) by mouth daily 30 capsule 1    famotidine (PEPCID) 20 MG tablet Take 1 tablet (20 mg) by mouth 2 times daily 60 tablet 1    Fesoterodine Fumarate (TOVIAZ) 8 MG TB24 Take 1 tablet (8 mg) by mouth daily 30 tablet 3    hydrOXYzine (VISTARIL) 50 MG capsule Take 1 capsule (50 mg) by mouth 2 times daily as needed for itching or other (sleep) 90 capsule 3    lactase (LACTAID) 3000 UNIT tablet Take 3 tablets (9,000 Units) by mouth 3 times daily (with meals) 90 tablet 3    lamoTRIgine (LAMICTAL) 150 MG tablet Take 1 tablet (150 mg) by mouth 2 times daily 180 tablet 3    loratadine (CLARITIN) 10 MG tablet Take 1 tablet (10 mg) by mouth daily 30 tablet 11    losartan (COZAAR) 100 MG tablet Take 1 tablet (100 mg) by mouth daily 90 tablet 1    methylcellulose (CITRUCEL) powder Take 2 g by mouth daily Start with 1 tablespoon per day and if tolerated, may increase up to 2-3 tablespoons per day. 454 g 3    mirabegron (MYRBETRIQ) 25 MG 24 hr tablet Take 1 tablet (25 mg) by mouth daily 30 tablet 11    mirtazapine (REMERON) 7.5 MG tablet Take 1 tablet (7.5 mg) by mouth at bedtime For 3 nights then increase to 2 tablets (15 mg) after third night. 60 tablet 1    mometasone (ELOCON) 0.1 % external ointment Apply topically 2 times daily For rash on lower legs and trunk/extremities 90 g 2    mycophenolate (GENERIC EQUIVALENT) 500 MG tablet Take 1 tablet (500 mg) by mouth 2 times daily 60 tablet 2    naloxone (NARCAN) 4 MG/0.1ML nasal spray Spray 1 spray (4 mg) into one nostril  alternating nostrils as needed for opioid reversal every 2-3 minutes until assistance arrives 0.2 mL 1    naproxen (NAPROSYN) 500 MG tablet Take 1 tablet (500 mg) by mouth 2 times daily as needed for moderate pain 30 tablet 1    oxyBUTYnin ER (DITROPAN XL) 10 MG 24 hr tablet Take 1 tablet (10 mg) by mouth daily 90 tablet 3    prazosin (MINIPRESS) 5 MG capsule Take 1 capsule (5 mg) by mouth At Bedtime 30 capsule 3    QUEtiapine (SEROQUEL) 25 MG tablet Take 1 tablet (25 mg) by mouth nightly as needed (Sleep)      QUEtiapine ER (SEROQUEL XR) 200 MG 24 hr tablet Take 1 tablet (200 mg) by mouth at bedtime 90 tablet 3    rosuvastatin (CRESTOR) 20 MG tablet Take 1 tablet (20 mg) by mouth daily 90 tablet 3    tacrolimus (PROTOPIC) 0.1 % external ointment Apply topically 2 times daily To areas of eczema 60 g 1    topiramate (TOPAMAX) 50 MG tablet Take 1 tablet (50 mg) by mouth 2 times daily 90 tablet 1    triamcinolone (KENALOG) 0.1 % external ointment Apply topically 2 times daily To areas of itch on back 453.6 g 1    valACYclovir (VALTREX) 500 MG tablet Take 2 tablets (1,000 mg) by mouth 3 times daily 42 tablet 0    white petrolatum external gel Apply to hands and feet twice daily 390 g 3       Social History     Tobacco Use    Smoking status: Every Day     Current packs/day: 0.50     Average packs/day: 0.5 packs/day for 23.0 years (11.5 ttl pk-yrs)     Types: Cigarettes    Smokeless tobacco: Never    Tobacco comments:     trying to quit, but states that it's hard   Vaping Use    Vaping status: Never Used   Substance Use Topics    Alcohol use: Not Currently     Alcohol/week: 1.0 standard drink of alcohol     Types: 1 Standard drinks or equivalent per week    Drug use: Yes     Types: Cocaine       Mattie Pierre comes into clinic today at the request of Dr. Garibay for PNE lead removal.    Patient diagnosis: urge incontinence     This service provided today was under the direct supervision of Edwin Alva PA-C, who was  available if needed.    Mattie Pierre presents to clinic today for removal of PNE leads after seven day PNE trial. Patient  experience symptom relief, noting a 80% improvement in symptoms. Patient stated that right side provided better symptom relief.  Leads were removed without difficulty. Lead site showed no signs of infection.  This information will be relayed to Dr. Garibay. Patient does not want to place the device in her body at this time. Says she will keep wearing depends. Tried explaining to patient how the device works without success.       Beverly Bishop RN  4/10/2024  9:12 AM

## 2024-04-15 ENCOUNTER — OFFICE VISIT (OUTPATIENT)
Dept: FAMILY MEDICINE | Facility: CLINIC | Age: 49
End: 2024-04-15
Payer: MEDICAID

## 2024-04-15 VITALS
TEMPERATURE: 98.6 F | DIASTOLIC BLOOD PRESSURE: 96 MMHG | RESPIRATION RATE: 16 BRPM | HEART RATE: 99 BPM | WEIGHT: 194.8 LBS | SYSTOLIC BLOOD PRESSURE: 151 MMHG | OXYGEN SATURATION: 98 % | BODY MASS INDEX: 35.63 KG/M2

## 2024-04-15 DIAGNOSIS — A59.9 TRICHOMONAS INFECTION: ICD-10-CM

## 2024-04-15 DIAGNOSIS — R82.90 FOUL SMELLING URINE: Primary | ICD-10-CM

## 2024-04-15 DIAGNOSIS — B96.89 BV (BACTERIAL VAGINOSIS): ICD-10-CM

## 2024-04-15 DIAGNOSIS — N76.0 BV (BACTERIAL VAGINOSIS): ICD-10-CM

## 2024-04-15 DIAGNOSIS — G89.4 CHRONIC PAIN SYNDROME: ICD-10-CM

## 2024-04-15 DIAGNOSIS — N89.8 VAGINAL DISCHARGE: ICD-10-CM

## 2024-04-15 DIAGNOSIS — N95.1 VAGINAL DRYNESS, MENOPAUSAL: ICD-10-CM

## 2024-04-15 DIAGNOSIS — I10 PRIMARY HYPERTENSION: ICD-10-CM

## 2024-04-15 LAB
CLUE CELLS: PRESENT
TRICHOMONAS, WET PREP: PRESENT
WBC'S/HIGH POWER FIELD, WET PREP: ABNORMAL
YEAST, WET PREP: ABNORMAL

## 2024-04-15 PROCEDURE — 86200 CCP ANTIBODY: CPT

## 2024-04-15 PROCEDURE — 87210 SMEAR WET MOUNT SALINE/INK: CPT

## 2024-04-15 PROCEDURE — 84550 ASSAY OF BLOOD/URIC ACID: CPT

## 2024-04-15 PROCEDURE — 86431 RHEUMATOID FACTOR QUANT: CPT

## 2024-04-15 PROCEDURE — 36415 COLL VENOUS BLD VENIPUNCTURE: CPT

## 2024-04-15 PROCEDURE — 86038 ANTINUCLEAR ANTIBODIES: CPT

## 2024-04-15 PROCEDURE — 99214 OFFICE O/P EST MOD 30 MIN: CPT | Mod: GC

## 2024-04-15 RX ORDER — METRONIDAZOLE 500 MG/1
500 TABLET ORAL 2 TIMES DAILY
Qty: 14 TABLET | Refills: 0 | Status: SHIPPED | OUTPATIENT
Start: 2024-04-15 | End: 2024-04-22

## 2024-04-15 NOTE — PROGRESS NOTES
Preceptor Attestation:    I discussed the patient with the resident and evaluated the patient in person. I have verified the content of the note, which accurately reflects my assessment of the patient and the plan of care.   Supervising Physician:  Girish Funes MD.

## 2024-04-15 NOTE — PATIENT INSTRUCTIONS
We will check a swab for infection.  You were unable to leave a urine sample today but I would recommend returning if your symptoms continue to have your urine checked and to screen for any sexually transmitted infections.  We have also sent lab work to follow-up on the workup for your chronic pain syndrome.  You were given a prescription for a rolling walker.  Please follow-up with myself or Dr. Yousif.

## 2024-04-15 NOTE — PROGRESS NOTES
Assessment & Plan     Trichomonas Infection  BV  Discussed with patient. Discussed need for partner treatment of Trichomonas. She declined EPT and is going to have her partner come in.  - Metronidazole 500 mg BID x 7 days    Foul smelling urine  Vaginal discharge  Vaginal dryness, menopausal  Patient was unable to leave a urine sample today.  She had just voided prior to the appointment.  She was not interested in waiting until she could give a urine sample.  We were able to send a wet prep prep today for concern for yeast, BV or trichomoniasis.  If wet prep is negative,  I would recommend checking a UA and checking for gonorrhea chlamydia as she has risk factor of unprotected intercourse.  - Urine Macroscopic with reflex to Microscopic  - Wet preparation  - Urine Macroscopic with reflex to Microscopic  - Neisseria gonorrhoeae PCR - Clinic Collect  - Chlamydia trachomatis PCR - Clinic Collect  - Follow up with Dr Yousif, if no infection found, could consider treating post menopausal vaginal dryness symptoms.     Chronic pain syndrome  PCP at last visit was planning to complete autoimmune work up for chronic pain. She is stating today her pain is unchanged and asking for a script for rolling walker to help with her mobility which is limited by pain. She recently had a fall to her knee when her previous walker broke.   - Walker Order for DME - ONLY FOR DME  - Antinuclear Ab Cascade  - Uric acid  - Rheumatoid factor  - Cyclic Citrullinated Peptide Antibody IgG  - Antinuclear Ab Cascade  - Uric acid  - Rheumatoid factor  - Cyclic Citrullinated Peptide Antibody IgG  - Follow up with Dr Yousif to review results     Primary hypertension  BP elevated today. She is working with her PCP to get this under control. Will not make changes today as she normally sees her PCP. Could consider adding 2nd agent or combination pill.   - Follow up appt set          BMI  Estimated body mass index is 35.63 kg/m  as calculated from the  "following:    Height as of 4/2/24: 1.575 m (5' 2\").    Weight as of this encounter: 88.4 kg (194 lb 12.8 oz).     No follow-ups on file.    Samina Phelps is a 48 year old, presenting for the following health issues:  Other (Smelly urine / blood in urine /Needs a walker with a seat )        3/20/2024    11:22 AM   Additional Questions   Roomed by Mao   Accompanied by self     HPI   Patient reports a few days of foul smelling urine. She has not noticed any frequency or blood in her urine. She had her urine check in March and did not have an infection at that time. She has also noted vaginal discharge. Describes as yellow.  She is sexually active with 1 male partner and they do not use condoms. She is post menopausal a few years. She is also experiencing vaginal dryness, pain with intercourse and one episode of spotting following intercourse.   She denies fever, chills, cp, sob, abdominal pain or new back pain.         Objective    BP (!) 151/96   Pulse 99   Temp 98.6  F (37  C) (Oral)   Resp 16   Wt 88.4 kg (194 lb 12.8 oz)   LMP 07/23/2022 (Approximate)   SpO2 98%   BMI 35.63 kg/m    Body mass index is 35.63 kg/m .  Physical Exam   GENERAL: alert and no distress  RESP: lungs clear to auscultation - no rales, rhonchi or wheezes  CV: regular rate and rhythm, normal S1 S2  ABDOMEN: soft, nontender, no hepatosplenomegaly, no masses and bowel sounds normal  MS: no gross musculoskeletal defects noted, no edema  BACK: no CVA tenderness            Signed Electronically by: Kelly Espinoza MD    "

## 2024-04-16 LAB
ANA SER QL IF: NEGATIVE
RHEUMATOID FACT SERPL-ACNC: <10 IU/ML
URATE SERPL-MCNC: 6.7 MG/DL (ref 2.4–5.7)

## 2024-04-17 LAB — CCP AB SER IA-ACNC: 0.9 U/ML

## 2024-04-22 ENCOUNTER — MYC MEDICAL ADVICE (OUTPATIENT)
Dept: FAMILY MEDICINE | Facility: CLINIC | Age: 49
End: 2024-04-22

## 2024-04-23 NOTE — TELEPHONE ENCOUNTER
Results of last test reviewed with patient. Reviewed ways to manage and prevent BV. Pt verbalizes understanding. GARRY Dacosta

## 2024-05-03 ENCOUNTER — OFFICE VISIT (OUTPATIENT)
Dept: FAMILY MEDICINE | Facility: CLINIC | Age: 49
End: 2024-05-03
Payer: MEDICAID

## 2024-05-03 VITALS
HEIGHT: 62 IN | DIASTOLIC BLOOD PRESSURE: 85 MMHG | RESPIRATION RATE: 16 BRPM | HEART RATE: 92 BPM | SYSTOLIC BLOOD PRESSURE: 137 MMHG | OXYGEN SATURATION: 99 % | BODY MASS INDEX: 35.22 KG/M2 | TEMPERATURE: 97.8 F | WEIGHT: 191.4 LBS

## 2024-05-03 DIAGNOSIS — L40.50 PSORIASIS WITH ARTHROPATHY (H): ICD-10-CM

## 2024-05-03 DIAGNOSIS — F31.5 BIPOLAR DISORDER, CURRENT EPISODE DEPRESSED, SEVERE, WITH PSYCHOTIC FEATURES (H): ICD-10-CM

## 2024-05-03 DIAGNOSIS — E66.01 CLASS 2 SEVERE OBESITY DUE TO EXCESS CALORIES WITH SERIOUS COMORBIDITY AND BODY MASS INDEX (BMI) OF 35.0 TO 35.9 IN ADULT (H): ICD-10-CM

## 2024-05-03 DIAGNOSIS — M21.42 FLAT FEET, BILATERAL: ICD-10-CM

## 2024-05-03 DIAGNOSIS — M76.821 INSUFFICIENCY OF BOTH POSTERIOR TIBIAL TENDONS: ICD-10-CM

## 2024-05-03 DIAGNOSIS — E66.812 CLASS 2 SEVERE OBESITY DUE TO EXCESS CALORIES WITH SERIOUS COMORBIDITY AND BODY MASS INDEX (BMI) OF 35.0 TO 35.9 IN ADULT (H): ICD-10-CM

## 2024-05-03 DIAGNOSIS — M76.822 INSUFFICIENCY OF BOTH POSTERIOR TIBIAL TENDONS: ICD-10-CM

## 2024-05-03 DIAGNOSIS — M25.579 PAIN IN JOINT, ANKLE AND FOOT, UNSPECIFIED LATERALITY: Primary | ICD-10-CM

## 2024-05-03 DIAGNOSIS — M21.41 FLAT FEET, BILATERAL: ICD-10-CM

## 2024-05-03 PROCEDURE — 99214 OFFICE O/P EST MOD 30 MIN: CPT | Mod: GC

## 2024-05-03 PROCEDURE — 81374 HLA I TYPING 1 ANTIGEN LR: CPT

## 2024-05-03 PROCEDURE — 36415 COLL VENOUS BLD VENIPUNCTURE: CPT

## 2024-05-03 ASSESSMENT — PATIENT HEALTH QUESTIONNAIRE - PHQ9
SUM OF ALL RESPONSES TO PHQ QUESTIONS 1-9: 15
SUM OF ALL RESPONSES TO PHQ QUESTIONS 1-9: 15
10. IF YOU CHECKED OFF ANY PROBLEMS, HOW DIFFICULT HAVE THESE PROBLEMS MADE IT FOR YOU TO DO YOUR WORK, TAKE CARE OF THINGS AT HOME, OR GET ALONG WITH OTHER PEOPLE: SOMEWHAT DIFFICULT

## 2024-05-03 NOTE — PROGRESS NOTES
"  Assessment & Plan     Posterior tibialis tendinopathy, bilateral  Pain in joint, ankle and foot, unspecified laterality  Tendon strengthening exercises given. She declines PT or joint injection. Discussed shoe inserts.   - Tendon strengthening exercises     Psoriasis with arthropathy (H)  Has chronic pain. Recent autoimmune work up overall neg, including CHASE, Anti CCP, RF. Will check HLA for possible psoriatic arthritis given she has psoriasis. Otherwise continue current pain management.   - HLA-B27 Typing    Class 2 severe obesity due to excess calories with serious comorbidity and body mass index (BMI) of 35.0 to 35.9 in adult (H)  Stable. Continue current plan.     Bipolar disorder, current episode depressed, severe, with psychotic features (H)  Stable. Continue current medications.     STI follow up  Recent positive Trich. She completed treatment. Discussed follow up for recommended test of cure in 2 more weeks.  - Follow up scheduled         BMI  Estimated body mass index is 34.78 kg/m  as calculated from the following:    Height as of this encounter: 1.58 m (5' 2.21\").    Weight as of this encounter: 86.8 kg (191 lb 6.4 oz).       No follow-ups on file.    Samina Phelps is a 48 year old, presenting for the following health issues:  Other (Follow up from last visit )        3/20/2024    11:22 AM   Additional Questions   Roomed by Daniel   Accompanied by self     HPI     Presenting today regarding new ankle pain. Reports pain is both ankles over the the last 2-3 days. She continues to have her baseline pain all over but feels this pain is new. Her recent autoimmune work up with negative for RF, CCP, CHASE, Uric acid mildly elevated. She denies fall, injury or trauma. She does note that she has flat fleet and does not use inserts. She has tried physical therapy and joint injections in the past with minimal relief.   Also wanted to follow up regarding BV, and Trichomonas dx recently. She completed all tablets " "of Metronidazole treatment. She is not having any pelvic pain or vaginal discharge symptoms.     Review of Systems  Constitutional, neuro, ENT, endocrine, pulmonary, cardiac, gastrointestinal, genitourinary, musculoskeletal, integument and psychiatric systems are negative, except as otherwise noted.      Objective    /85   Pulse 92   Temp 97.8  F (36.6  C) (Oral)   Resp 16   Ht 1.58 m (5' 2.21\")   Wt 86.8 kg (191 lb 6.4 oz)   LMP 07/23/2022 (Approximate)   SpO2 99%   BMI 34.78 kg/m    Body mass index is 34.78 kg/m .  Physical Exam   GENERAL: alert and no distress  RESP: lungs clear to auscultation - no rales, rhonchi or wheezes  CV: regular rate and rhythm, normal S1 S2, no S3 or S4, no murmur, click or rub, no peripheral edema  MS: no gross musculoskeletal defects noted, no edema  SKIN: Psoriatic plaques on right ankle and left ankle area.   LOWER EXTREM: Tender to palpation across bilateral posterior tibialis tendon. Noted to have flat feet. No swelling, erythema or point tenderness.             Signed Electronically by: Kelly Espinoza MD    Answers submitted by the patient for this visit:  Patient Health Questionnaire (Submitted on 5/3/2024)  If you checked off any problems, how difficult have these problems made it for you to do your work, take care of things at home, or get along with other people?: Somewhat difficult  PHQ9 TOTAL SCORE: 15    "

## 2024-05-06 NOTE — PROGRESS NOTES
Physician Attestation   I, Harvey Wylie MD, saw this patient and agree with the findings and plan of care as documented in the note.      Items personally reviewed/procedural attestation: vitals.    Harvey Wylie MD

## 2024-05-07 LAB
B LOCUS: NORMAL
B27TEST METHOD: NORMAL

## 2024-06-10 ENCOUNTER — TELEPHONE (OUTPATIENT)
Dept: AUDIOLOGY | Facility: CLINIC | Age: 49
End: 2024-06-10
Payer: MEDICAID
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
## 2024-06-18 ENCOUNTER — OFFICE VISIT (OUTPATIENT)
Dept: OTOLARYNGOLOGY | Facility: CLINIC | Age: 49
End: 2024-06-18
Payer: MEDICAID

## 2024-06-18 VITALS
OXYGEN SATURATION: 98 % | WEIGHT: 193 LBS | BODY MASS INDEX: 35.51 KG/M2 | DIASTOLIC BLOOD PRESSURE: 97 MMHG | TEMPERATURE: 98.4 F | SYSTOLIC BLOOD PRESSURE: 159 MMHG | HEIGHT: 62 IN | HEART RATE: 82 BPM

## 2024-06-18 DIAGNOSIS — H90.3 BILATERAL SENSORINEURAL HEARING LOSS: ICD-10-CM

## 2024-06-18 DIAGNOSIS — H61.21 IMPACTED CERUMEN OF RIGHT EAR: Primary | ICD-10-CM

## 2024-06-18 PROCEDURE — 69210 REMOVE IMPACTED EAR WAX UNI: CPT | Performed by: REGISTERED NURSE

## 2024-06-18 PROCEDURE — 99212 OFFICE O/P EST SF 10 MIN: CPT | Mod: 25 | Performed by: REGISTERED NURSE

## 2024-06-18 ASSESSMENT — PAIN SCALES - GENERAL: PAINLEVEL: EXTREME PAIN (8)

## 2024-06-18 ASSESSMENT — PATIENT HEALTH QUESTIONNAIRE - PHQ9: SUM OF ALL RESPONSES TO PHQ QUESTIONS 1-9: 16
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
## 2024-06-18 NOTE — NURSING NOTE
"Chief Complaint   Patient presents with    RECHECK     Follow up     Blood pressure (!) 159/97, pulse 82, temperature 98.4  F (36.9  C), height 1.575 m (5' 2\"), weight 87.5 kg (193 lb), last menstrual period 07/23/2022, SpO2 98%, not currently breastfeeding.  Ezequiel Castellanos LPN'    "
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
## 2024-06-25 ENCOUNTER — TELEPHONE (OUTPATIENT)
Dept: DERMATOLOGY | Facility: CLINIC | Age: 49
End: 2024-06-25
Payer: MEDICAID

## 2024-06-25 NOTE — TELEPHONE ENCOUNTER
I spoke with Mattie and she notes that nothing she has been doing is working for her skin. Angela notes that she would like to have a skin graft of the area. I informed Mattie that skin grafts aren't something we do in Dermatology. Pt would like a referral to a different specialty. I informed Mattie that she can further discuss all of these questions at her upcoming appointment on 7/11

## 2024-07-11 ENCOUNTER — LAB (OUTPATIENT)
Dept: LAB | Facility: CLINIC | Age: 49
End: 2024-07-11
Payer: MEDICAID

## 2024-07-11 ENCOUNTER — OFFICE VISIT (OUTPATIENT)
Dept: DERMATOLOGY | Facility: CLINIC | Age: 49
End: 2024-07-11
Payer: MEDICAID

## 2024-07-11 ENCOUNTER — TELEPHONE (OUTPATIENT)
Dept: DERMATOLOGY | Facility: CLINIC | Age: 49
End: 2024-07-11

## 2024-07-11 DIAGNOSIS — L20.89 OTHER ATOPIC DERMATITIS: Primary | ICD-10-CM

## 2024-07-11 DIAGNOSIS — L28.0 LICHEN SIMPLEX CHRONICUS: Primary | ICD-10-CM

## 2024-07-11 DIAGNOSIS — L20.89 OTHER ATOPIC DERMATITIS: ICD-10-CM

## 2024-07-11 DIAGNOSIS — L28.0 LICHEN SIMPLEX CHRONICUS: ICD-10-CM

## 2024-07-11 DIAGNOSIS — R21 SKIN ERUPTION: ICD-10-CM

## 2024-07-11 LAB
ALBUMIN SERPL BCG-MCNC: 4.4 G/DL (ref 3.5–5.2)
ALP SERPL-CCNC: 87 U/L (ref 40–150)
ALT SERPL W P-5'-P-CCNC: 22 U/L (ref 0–50)
ANION GAP SERPL CALCULATED.3IONS-SCNC: 10 MMOL/L (ref 7–15)
AST SERPL W P-5'-P-CCNC: 23 U/L (ref 0–45)
BASOPHILS # BLD AUTO: 0 10E3/UL (ref 0–0.2)
BASOPHILS NFR BLD AUTO: 0 %
BILIRUB DIRECT SERPL-MCNC: <0.2 MG/DL (ref 0–0.3)
BILIRUB SERPL-MCNC: 0.3 MG/DL
BUN SERPL-MCNC: 15.7 MG/DL (ref 6–20)
CALCIUM SERPL-MCNC: 9.6 MG/DL (ref 8.6–10)
CHLORIDE SERPL-SCNC: 106 MMOL/L (ref 98–107)
CHOLEST SERPL-MCNC: 258 MG/DL
CREAT SERPL-MCNC: 1.2 MG/DL (ref 0.51–0.95)
DEPRECATED HCO3 PLAS-SCNC: 28 MMOL/L (ref 22–29)
EGFRCR SERPLBLD CKD-EPI 2021: 55 ML/MIN/1.73M2
EOSINOPHIL # BLD AUTO: 0 10E3/UL (ref 0–0.7)
EOSINOPHIL NFR BLD AUTO: 0 %
ERYTHROCYTE [DISTWIDTH] IN BLOOD BY AUTOMATED COUNT: 15.8 % (ref 10–15)
FASTING STATUS PATIENT QL REPORTED: NO
FASTING STATUS PATIENT QL REPORTED: NO
GLUCOSE SERPL-MCNC: 116 MG/DL (ref 70–99)
HBV SURFACE AB SERPL IA-ACNC: 98.4 M[IU]/ML
HBV SURFACE AB SERPL IA-ACNC: REACTIVE M[IU]/ML
HBV SURFACE AG SERPL QL IA: NONREACTIVE
HCT VFR BLD AUTO: 42.2 % (ref 35–47)
HCV AB SERPL QL IA: NONREACTIVE
HDLC SERPL-MCNC: 51 MG/DL
HGB BLD-MCNC: 12.8 G/DL (ref 11.7–15.7)
IMM GRANULOCYTES # BLD: 0 10E3/UL
IMM GRANULOCYTES NFR BLD: 1 %
LDLC SERPL CALC-MCNC: 158 MG/DL
LYMPHOCYTES # BLD AUTO: 1.4 10E3/UL (ref 0.8–5.3)
LYMPHOCYTES NFR BLD AUTO: 18 %
MCH RBC QN AUTO: 24.8 PG (ref 26.5–33)
MCHC RBC AUTO-ENTMCNC: 30.3 G/DL (ref 31.5–36.5)
MCV RBC AUTO: 82 FL (ref 78–100)
MONOCYTES # BLD AUTO: 0.5 10E3/UL (ref 0–1.3)
MONOCYTES NFR BLD AUTO: 7 %
NEUTROPHILS # BLD AUTO: 5.8 10E3/UL (ref 1.6–8.3)
NEUTROPHILS NFR BLD AUTO: 74 %
NONHDLC SERPL-MCNC: 207 MG/DL
NRBC # BLD AUTO: 0 10E3/UL
NRBC BLD AUTO-RTO: 0 /100
PLATELET # BLD AUTO: 357 10E3/UL (ref 150–450)
POTASSIUM SERPL-SCNC: 4 MMOL/L (ref 3.4–5.3)
PROT SERPL-MCNC: 8 G/DL (ref 6.4–8.3)
RBC # BLD AUTO: 5.16 10E6/UL (ref 3.8–5.2)
SODIUM SERPL-SCNC: 144 MMOL/L (ref 135–145)
TRIGL SERPL-MCNC: 245 MG/DL
WBC # BLD AUTO: 7.8 10E3/UL (ref 4–11)

## 2024-07-11 PROCEDURE — 88312 SPECIAL STAINS GROUP 1: CPT | Mod: TC | Performed by: DERMATOLOGY

## 2024-07-11 PROCEDURE — 86704 HEP B CORE ANTIBODY TOTAL: CPT | Performed by: DERMATOLOGY

## 2024-07-11 PROCEDURE — 86481 TB AG RESPONSE T-CELL SUSP: CPT | Performed by: DERMATOLOGY

## 2024-07-11 PROCEDURE — 82248 BILIRUBIN DIRECT: CPT | Performed by: PATHOLOGY

## 2024-07-11 PROCEDURE — 99214 OFFICE O/P EST MOD 30 MIN: CPT | Mod: 25 | Performed by: DERMATOLOGY

## 2024-07-11 PROCEDURE — 36415 COLL VENOUS BLD VENIPUNCTURE: CPT | Performed by: PATHOLOGY

## 2024-07-11 PROCEDURE — 86706 HEP B SURFACE ANTIBODY: CPT | Performed by: DERMATOLOGY

## 2024-07-11 PROCEDURE — 88305 TISSUE EXAM BY PATHOLOGIST: CPT | Mod: 26 | Performed by: DERMATOLOGY

## 2024-07-11 PROCEDURE — 85025 COMPLETE CBC W/AUTO DIFF WBC: CPT | Performed by: PATHOLOGY

## 2024-07-11 PROCEDURE — 87340 HEPATITIS B SURFACE AG IA: CPT | Performed by: DERMATOLOGY

## 2024-07-11 PROCEDURE — 87389 HIV-1 AG W/HIV-1&-2 AB AG IA: CPT | Performed by: DERMATOLOGY

## 2024-07-11 PROCEDURE — 86803 HEPATITIS C AB TEST: CPT | Performed by: DERMATOLOGY

## 2024-07-11 PROCEDURE — 11104 PUNCH BX SKIN SINGLE LESION: CPT | Mod: GC | Performed by: DERMATOLOGY

## 2024-07-11 PROCEDURE — 99000 SPECIMEN HANDLING OFFICE-LAB: CPT | Performed by: PATHOLOGY

## 2024-07-11 PROCEDURE — 80053 COMPREHEN METABOLIC PANEL: CPT | Performed by: PATHOLOGY

## 2024-07-11 PROCEDURE — 80061 LIPID PANEL: CPT | Performed by: PATHOLOGY

## 2024-07-11 RX ORDER — MYCOPHENOLATE MOFETIL 500 MG/1
500 TABLET ORAL 2 TIMES DAILY
Qty: 60 TABLET | Refills: 0 | Status: SHIPPED | OUTPATIENT
Start: 2024-07-11

## 2024-07-11 RX ORDER — UPADACITINIB 15 MG/1
15 TABLET, EXTENDED RELEASE ORAL DAILY
Qty: 30 TABLET | Refills: 3 | OUTPATIENT
Start: 2024-07-11

## 2024-07-11 ASSESSMENT — PAIN SCALES - GENERAL: PAINLEVEL: NO PAIN (0)

## 2024-07-11 NOTE — PROGRESS NOTES
"[facility] Dermatology Note  Encounter Date: Jul 11, 2024  Office Visit     Dermatology Problem List:  #. Nummular/atopic dermatitis and lichen simplex chronicus of medial ankles  - current: Cellcept, cordran tape for LSC bilateral medial ankles, clobetasol oint BID  - pending: Rinvoq 15mg   - prior: many topical steroids, dupilumab (reported injection site reaction), nbUVB     #. Hidradenitis suppurative - axillae, intergluteal cleft, stable  - BPO and clindamycin lotion  - S/p excision + grafting in L armpit \"many years ago\"  - prior: doxycycline 100 mg BID (started 1/27/22)    ____________________________________________    Assessment & Plan:  #Atopic Dermatitis/Lichen Simplex Chronicus, poorly controlled, with little to no improvement on CellCept 500mg BID since January 2024. Patient notes persistent itch with no relief. Has already tried nbUVB with little to no improvement, had a injection site reaction with Dupixent, and has to persistently use Clobetasol ointment with no relief. Will plan on doing the pre-work up for Rinvoq (Skyler inhibitor) as patient has persistently failed previous therapies and now is impacting quality of life. Given that the site has not been biopsied in the recent past and cannot find the pathology, will plan to re-biopsy to reaffirm the diagnosis and guide treatment plan.       Plan:  - Punch biopsy of the right medial ankle done, procedure note below  - Safety labs for Rinvoq ordered today:   - Labs to be drawn today, CBC, CMP, quant Gold, Hepatic Panel, Lipid Panel, HIV, Hepatitis Panel   - Continue mycophenolate mofetil 500mg BID refill placed today, pending Rinvoq 15mg daily    - MTM consult placed today 7/11/24  -Cordran tape prn to open areas on acral skin; avoid use on face, armpits, groin    #HS, stable  Patient reports HS lesions are  stable. She is waiting for de-adelso procedure of R axilla with general surgery (patient reports benefit from previous L " axilla de-adelso procedure). Appears that general surgery referral was placed 11/10/23    Procedures Performed:   - PROCEDURE: Punch Biopsy, R Ankle  The risks and benefits of the procedure were described to the patient. These include but are not limited to bleeding, infection, scar, incomplete removal, and non-diagnostic biopsy. Written informed consent was obtained. The area was cleansed with an alcohol pad and injected with lidocaine with epinephrine (<1mL used). Once anesthesia was obtained, a 3 mm punch biopsy was performed. The tissue was placed in a labeled container with formalin and sent to pathology. The site was closed using gel foam and pressure bandage. Vaseline and a bandage were applied to the wound. The patient tolerated the procedure well and was given post biopsy care instructions.      Follow-up: 4 months     Staff and Resident     Neelima Tyler MD  Dermatology Resident, PGY-4  [facility]  Pager: [phone]      I have seen and examined this patient and agree with the assessment and plan as documented in the resident's note, and was present for all procedures.    Emory Li MD  Dermatology Attending  ____________________________________________    CC: Derm Problem (Follow up dermatitis, not going well at all. )    HPI:  Ms. Mattie Pierre is a(n) 48 year old female who presents today as a return patient for Eczema and HS.     Today the patient reports that she has felt little to no improvement since starting the MMF.    - She has been diligently applying the clobetasol and has noticed little to no improvement.   - She had a reaction to Dupixent in the past and does not want to continue using it.   - Patient would like a skin graft if possible to repalce the skin on her ankles as nothing is helping   - She tried nbUVB in the past and did not help at all.       Labs:  11/3/2023 CBC and BMP reviewed.   Pending labs 7/11/24    Physical Exam:  Vitals: LMP 07/23/2022 (Approximate)    SKIN: Focused examination distal extremities  - Bilateral ankles with lichenified, excoriated, plaques with scale and superimposed white, keratotic papules  - Bilateral palms with yellowish pustules and ruptured pustules ranging from 1mm to 5mm  - No other lesions of concern on areas examined.     Medications:  Current Outpatient Medications   Medication Sig Dispense Refill    ammonium lactate (LAC-HYDRIN) 12 % external lotion Apply topically 2 times daily 225 g 4    benzoyl peroxide 5 % external liquid Wash once daily to armpits 118 mL 3    chlorhexidine (HIBICLENS) 4 % liquid Apply topically daily as needed for wound care 946 mL 3    ciclopirox (PENLAC) 8 % external solution Apply to adjacent skin and affected nails daily.  Remove with alcohol every 7 days, then repeat. 6.6 mL 11    clindamycin (CLEOCIN T) 1 % external lotion Apply topically 2 times daily 60 mL 2    clobetasol (TEMOVATE) 0.05 % external ointment Apply topically 2 times daily To feet and ankles, cover with saran wrap at bedtime 60 g 1    cyclobenzaprine (FLEXMID) 7.5 MG tablet Take 1 tablet (7.5 mg) by mouth 2 times daily as needed for muscle spasms 60 tablet 1    diphenhydrAMINE (BENADRYL) 25 MG tablet Take 1 tablet (25 mg) by mouth every 6 hours as needed for itching or allergies 90 tablet 1    doxycycline monohydrate (ADOXA) 100 MG tablet Take 1 tablet (100 mg) by mouth 2 times daily 180 tablet 0    DULoxetine (CYMBALTA) 60 MG capsule Take 1 capsule (60 mg) by mouth daily 30 capsule 1    famotidine (PEPCID) 20 MG tablet Take 1 tablet (20 mg) by mouth 2 times daily 60 tablet 1    Fesoterodine Fumarate (TOVIAZ) 8 MG TB24 Take 1 tablet (8 mg) by mouth daily 30 tablet 3    hydrOXYzine (VISTARIL) 50 MG capsule Take 1 capsule (50 mg) by mouth 2 times daily as needed for itching or other (sleep) 90 capsule 3    lactase (LACTAID) 3000 UNIT tablet Take 3 tablets (9,000 Units) by mouth 3 times daily (with meals) 90 tablet 3    lamoTRIgine (LAMICTAL) 150  MG tablet Take 1 tablet (150 mg) by mouth 2 times daily 180 tablet 3    loratadine (CLARITIN) 10 MG tablet Take 1 tablet (10 mg) by mouth daily 30 tablet 11    losartan (COZAAR) 100 MG tablet Take 1 tablet (100 mg) by mouth daily 90 tablet 1    methylcellulose (CITRUCEL) powder Take 2 g by mouth daily Start with 1 tablespoon per day and if tolerated, may increase up to 2-3 tablespoons per day. 454 g 3    mirabegron (MYRBETRIQ) 25 MG 24 hr tablet Take 1 tablet (25 mg) by mouth daily 30 tablet 11    mirtazapine (REMERON) 7.5 MG tablet Take 1 tablet (7.5 mg) by mouth at bedtime For 3 nights then increase to 2 tablets (15 mg) after third night. 60 tablet 1    mometasone (ELOCON) 0.1 % external ointment Apply topically 2 times daily For rash on lower legs and trunk/extremities 90 g 2    mycophenolate (GENERIC EQUIVALENT) 500 MG tablet Take 1 tablet (500 mg) by mouth 2 times daily 60 tablet 2    naproxen (NAPROSYN) 500 MG tablet Take 1 tablet (500 mg) by mouth 2 times daily as needed for moderate pain 30 tablet 1    oxyBUTYnin ER (DITROPAN XL) 10 MG 24 hr tablet Take 1 tablet (10 mg) by mouth daily 90 tablet 3    prazosin (MINIPRESS) 5 MG capsule Take 1 capsule (5 mg) by mouth At Bedtime 30 capsule 3    QUEtiapine (SEROQUEL) 25 MG tablet Take 1 tablet (25 mg) by mouth nightly as needed (Sleep)      QUEtiapine ER (SEROQUEL XR) 200 MG 24 hr tablet Take 1 tablet (200 mg) by mouth at bedtime 90 tablet 3    rosuvastatin (CRESTOR) 20 MG tablet Take 1 tablet (20 mg) by mouth daily 90 tablet 3    tacrolimus (PROTOPIC) 0.1 % external ointment Apply topically 2 times daily To areas of eczema 60 g 1    topiramate (TOPAMAX) 50 MG tablet Take 1 tablet (50 mg) by mouth 2 times daily 90 tablet 1    triamcinolone (KENALOG) 0.1 % external ointment Apply topically 2 times daily To areas of itch on back 453.6 g 1    valACYclovir (VALTREX) 500 MG tablet Take 2 tablets (1,000 mg) by mouth 3 times daily 42 tablet 0    white petrolatum external  gel Apply to hands and feet twice daily 390 g 3    ClonazePAM (KLONOPIN PO)        No current facility-administered medications for this visit.      Past Medical History:   Patient Active Problem List   Diagnosis    Bipolar disorder (H)    Obesity    Cellulitis of axilla, left    Iron deficiency anemia    Seizure disorder (H)    Hidradenitis suppurativa    Urgency incontinence    Chronic pain syndrome    Acne vulgaris    Tinea capitis    Fissure in skin    Severe stimulant use disorder (H)    Bipolar disorder, current episode depressed, severe, with psychotic features (H)    Hypertension    Subclinical hypothyroidism    Vitamin D deficiency    Ganglion cyst of right foot    Hypertrophy of bone    Anhidrosis    Pes planovalgus    Other eczema    Nummular dermatitis    Other atopic dermatitis    Class 2 severe obesity due to excess calories with serious comorbidity in adult (H)    Encounter for long-term current use of high risk medication    Cocaine use disorder, moderate, dependence (H)    Urge incontinence    Urgency of urination    Frequency of urination    Incontinence of feces with fecal urgency     Past Medical History:   Diagnosis Date    Anemia     Arthritis     COPD (chronic obstructive pulmonary disease) (H)     Depression     Depressive disorder     Diabetes (H)     History of blood transfusion     Hypertension     Obesity     Seizures (H)     Thyroid disease         CC Emory Li MD  [address] [id number] on close of this encounter.

## 2024-07-11 NOTE — LETTER
"7/11/2024       RE: Mattie Pierre  [address]  [facility] [address]     Dear Colleague,    Thank you for referring your patient, Mattie Pierre, to the [facility]ERMATOLOG[facility] [place] at [facility]. Please see a copy of my visit note below.    [facility] Dermatology Note  Encounter Date: Jul 11, 2024  Office Visit     Dermatology Problem List:  #. Nummular/atopic dermatitis and lichen simplex chronicus of medial ankles  - current: Cellcept, cordran tape for LSC bilateral medial ankles, clobetasol oint BID  - pending: Rinvoq 15mg   - prior: many topical steroids, dupilumab (reported injection site reaction), nbUVB     #. Hidradenitis suppurative - axillae, intergluteal cleft, stable  - BPO and clindamycin lotion  - S/p excision + grafting in L armpit \"many years ago\"  - prior: doxycycline 100 mg BID (started 1/27/22)    ____________________________________________    Assessment & Plan:  #Atopic Dermatitis/Lichen Simplex Chronicus, poorly controlled, with little to no improvement on CellCept 500mg BID since January 2024. Patient notes persistent itch with no relief. Has already tried nbUVB with little to no improvement, had a injection site reaction with Dupixent, and has to persistently use Clobetasol ointment with no relief. Will plan on doing the pre-work up for Rinvoq (Skyler inhibitor) as patient has persistently failed previous therapies and now is impacting quality of life. Given that the site has not been biopsied in the recent past and cannot find the pathology, will plan to re-biopsy to reaffirm the diagnosis and guide treatment plan.       Plan:  - Punch biopsy of the right medial ankle done, procedure note below  - Safety labs for Rinvoq ordered today:   - Labs to be drawn today, CBC, CMP, quant Gold, Hepatic Panel, Lipid Panel, HIV, Hepatitis Panel   - Continue mycophenolate mofetil 500mg BID refill placed " today, pending Rinvoq 15mg daily    - MTM consult placed today 7/11/24  -Cordran tape prn to open areas on acral skin; avoid use on face, armpits, groin    #HS, stable  Patient reports HS lesions are  stable. She is waiting for de-adelso procedure of R axilla with general surgery (patient reports benefit from previous L axilla de-adelso procedure). Appears that general surgery referral was placed 11/10/23    Procedures Performed:   - PROCEDURE: Punch Biopsy, R Ankle  The risks and benefits of the procedure were described to the patient. These include but are not limited to bleeding, infection, scar, incomplete removal, and non-diagnostic biopsy. Written informed consent was obtained. The area was cleansed with an alcohol pad and injected with lidocaine with epinephrine (<1mL used). Once anesthesia was obtained, a 3 mm punch biopsy was performed. The tissue was placed in a labeled container with formalin and sent to pathology. The site was closed using gel foam and pressure bandage. Vaseline and a bandage were applied to the wound. The patient tolerated the procedure well and was given post biopsy care instructions.      Follow-up: 4 months     Staff and Resident     Neelima Tyler MD  Dermatology Resident, PGY-4  [facility]  Pager: [phone]      I have seen and examined this patient and agree with the assessment and plan as documented in the resident's note, and was present for all procedures.    Emory Li MD  Dermatology Attending  ____________________________________________    CC: Derm Problem (Follow up dermatitis, not going well at all. )    HPI:  Ms. Mattie Pierre is a(n) 48 year old female who presents today as a return patient for Eczema and HS.     Today the patient reports that she has felt little to no improvement since starting the MMF.    - She has been diligently applying the clobetasol and has noticed little to no improvement.   - She had a reaction to Dupixent in the past  and does not want to continue using it.   - Patient would like a skin graft if possible to repalce the skin on her ankles as nothing is helping   - She tried nbUVB in the past and did not help at all.       Labs:  11/3/2023 CBC and BMP reviewed.   Pending labs 7/11/24    Physical Exam:  Vitals: LMP 07/23/2022 (Approximate)   SKIN: Focused examination distal extremities  - Bilateral ankles with lichenified, excoriated, plaques with scale and superimposed white, keratotic papules  - Bilateral palms with yellowish pustules and ruptured pustules ranging from 1mm to 5mm  - No other lesions of concern on areas examined.     Medications:  Current Outpatient Medications   Medication Sig Dispense Refill     ammonium lactate (LAC-HYDRIN) 12 % external lotion Apply topically 2 times daily 225 g 4     benzoyl peroxide 5 % external liquid Wash once daily to armpits 118 mL 3     chlorhexidine (HIBICLENS) 4 % liquid Apply topically daily as needed for wound care 946 mL 3     ciclopirox (PENLAC) 8 % external solution Apply to adjacent skin and affected nails daily.  Remove with alcohol every 7 days, then repeat. 6.6 mL 11     clindamycin (CLEOCIN T) 1 % external lotion Apply topically 2 times daily 60 mL 2     clobetasol (TEMOVATE) 0.05 % external ointment Apply topically 2 times daily To feet and ankles, cover with saran wrap at bedtime 60 g 1     cyclobenzaprine (FLEXMID) 7.5 MG tablet Take 1 tablet (7.5 mg) by mouth 2 times daily as needed for muscle spasms 60 tablet 1     diphenhydrAMINE (BENADRYL) 25 MG tablet Take 1 tablet (25 mg) by mouth every 6 hours as needed for itching or allergies 90 tablet 1     doxycycline monohydrate (ADOXA) 100 MG tablet Take 1 tablet (100 mg) by mouth 2 times daily 180 tablet 0     DULoxetine (CYMBALTA) 60 MG capsule Take 1 capsule (60 mg) by mouth daily 30 capsule 1     famotidine (PEPCID) 20 MG tablet Take 1 tablet (20 mg) by mouth 2 times daily 60 tablet 1     Fesoterodine Fumarate (TOVIAZ) 8  MG TB24 Take 1 tablet (8 mg) by mouth daily 30 tablet 3     hydrOXYzine (VISTARIL) 50 MG capsule Take 1 capsule (50 mg) by mouth 2 times daily as needed for itching or other (sleep) 90 capsule 3     lactase (LACTAID) 3000 UNIT tablet Take 3 tablets (9,000 Units) by mouth 3 times daily (with meals) 90 tablet 3     lamoTRIgine (LAMICTAL) 150 MG tablet Take 1 tablet (150 mg) by mouth 2 times daily 180 tablet 3     loratadine (CLARITIN) 10 MG tablet Take 1 tablet (10 mg) by mouth daily 30 tablet 11     losartan (COZAAR) 100 MG tablet Take 1 tablet (100 mg) by mouth daily 90 tablet 1     methylcellulose (CITRUCEL) powder Take 2 g by mouth daily Start with 1 tablespoon per day and if tolerated, may increase up to 2-3 tablespoons per day. 454 g 3     mirabegron (MYRBETRIQ) 25 MG 24 hr tablet Take 1 tablet (25 mg) by mouth daily 30 tablet 11     mirtazapine (REMERON) 7.5 MG tablet Take 1 tablet (7.5 mg) by mouth at bedtime For 3 nights then increase to 2 tablets (15 mg) after third night. 60 tablet 1     mometasone (ELOCON) 0.1 % external ointment Apply topically 2 times daily For rash on lower legs and trunk/extremities 90 g 2     mycophenolate (GENERIC EQUIVALENT) 500 MG tablet Take 1 tablet (500 mg) by mouth 2 times daily 60 tablet 2     naproxen (NAPROSYN) 500 MG tablet Take 1 tablet (500 mg) by mouth 2 times daily as needed for moderate pain 30 tablet 1     oxyBUTYnin ER (DITROPAN XL) 10 MG 24 hr tablet Take 1 tablet (10 mg) by mouth daily 90 tablet 3     prazosin (MINIPRESS) 5 MG capsule Take 1 capsule (5 mg) by mouth At Bedtime 30 capsule 3     QUEtiapine (SEROQUEL) 25 MG tablet Take 1 tablet (25 mg) by mouth nightly as needed (Sleep)       QUEtiapine ER (SEROQUEL XR) 200 MG 24 hr tablet Take 1 tablet (200 mg) by mouth at bedtime 90 tablet 3     rosuvastatin (CRESTOR) 20 MG tablet Take 1 tablet (20 mg) by mouth daily 90 tablet 3     tacrolimus (PROTOPIC) 0.1 % external ointment Apply topically 2 times daily To areas  of eczema 60 g 1     topiramate (TOPAMAX) 50 MG tablet Take 1 tablet (50 mg) by mouth 2 times daily 90 tablet 1     triamcinolone (KENALOG) 0.1 % external ointment Apply topically 2 times daily To areas of itch on back 453.6 g 1     valACYclovir (VALTREX) 500 MG tablet Take 2 tablets (1,000 mg) by mouth 3 times daily 42 tablet 0     white petrolatum external gel Apply to hands and feet twice daily 390 g 3     ClonazePAM (KLONOPIN PO)        No current facility-administered medications for this visit.      Past Medical History:   Patient Active Problem List   Diagnosis     Bipolar disorder (H)     Obesity     Cellulitis of axilla, left     Iron deficiency anemia     Seizure disorder (H)     Hidradenitis suppurativa     Urgency incontinence     Chronic pain syndrome     Acne vulgaris     Tinea capitis     Fissure in skin     Severe stimulant use disorder (H)     Bipolar disorder, current episode depressed, severe, with psychotic features (H)     Hypertension     Subclinical hypothyroidism     Vitamin D deficiency     Ganglion cyst of right foot     Hypertrophy of bone     Anhidrosis     Pes planovalgus     Other eczema     Nummular dermatitis     Other atopic dermatitis     Class 2 severe obesity due to excess calories with serious comorbidity in adult (H)     Encounter for long-term current use of high risk medication     Cocaine use disorder, moderate, dependence (H)     Urge incontinence     Urgency of urination     Frequency of urination     Incontinence of feces with fecal urgency     Past Medical History:   Diagnosis Date     Anemia      Arthritis      COPD (chronic obstructive pulmonary disease) (H)      Depression      Depressive disorder      Diabetes (H)      History of blood transfusion      Hypertension      Obesity      Seizures (H)      Thyroid disease         CC Emory Li MD  [address]  [place] [address] on close of this encounter.      Again, thank you for allowing me to  participate in the care of your patient.      Sincerely,    Emory Li MD

## 2024-07-11 NOTE — PATIENT INSTRUCTIONS
Continue taking the Mycophenolate Mofetil until you receive the Rinvoq.     Will plan no orders labs today     Will plan for a three month follow up in three months

## 2024-07-11 NOTE — TELEPHONE ENCOUNTER
Placed orders for Rinvoq (upadacitinib) 15 mg daily for Atopic Dermatitis per discussion with Dr. Li. Patient tried & failed Dupixent due to severe injection site reactions & no clinical response in AD.    Prema Giraldo, PharmD  [place] Pharmacist

## 2024-07-11 NOTE — NURSING NOTE
Dermatology Rooming Note    Mattie Pierre's goals for this visit include:   Chief Complaint   Patient presents with    Derm Problem     Follow up dermatitis, not going well at all.      Shila Hinton RN

## 2024-07-12 ENCOUNTER — TELEPHONE (OUTPATIENT)
Dept: DERMATOLOGY | Facility: CLINIC | Age: 49
End: 2024-07-12
Payer: MEDICAID

## 2024-07-12 LAB
HBV CORE AB SERPL QL IA: REACTIVE
HIV 1+2 AB+HIV1 P24 AG SERPL QL IA: NONREACTIVE

## 2024-07-12 NOTE — LETTER
September 4, 2024  URGENT: Appeals & Grievances    Re: Prior Authorization Request    Plan: [place] Medicaid    Patient: Mattie Pierre   Member ID: [id number]  YOB: 1975                                         To whom it may concern:     I am contacting you as a dermatologist caring for Mattie Pierre with regard to the medical necessity of Rinvoq (upadacitinib) to treat the patient's diagnosis of Atopic Dermatitis (L20.9). Of note, their diagnosis has been confirmed by biopsy. I have reviewed the plan's criteria for coverage, along with the patient's diagnosis, care plan and clinical guidelines for treatment and request a formal appeal of your denial for Rinvoq.     Treatment requested: Rinvoq (upadacitinib)   Requested Dose: 15 mg tablet once daily  Prior treatments tried & response:   Dupixent (dupilumab) 300 mg every 14 days (03/2024-04/2024): completed initial loading dose (600 mg) & 1 maintenance dose (300 mg), substantial injection site reactions with numerous blisters/sores that took > 7 days to resolve & were very painful (occurred with both doses), stopped due to severity of reaction   Mycophenolate 500 mg twice daily (01/2024-07/2024): not effective, no response in AD  Clobetasol 0.05% ointment as needed (04/2019-Present): inadequate response   Cordran (flurandrenolide) 4 mcg/sqcm tape daily to areas of itch (01/2024-Present): inadequate response   Tacrolimus 0.1% ointment twice daily (04/2023-06/2023): not effective for AD, caused substantial burning/irritation of skin  Triamcinolone 0.1% ointment 2 x daily as needed (06/2018-03/2022): not effective for AD  Mometasone 0.1% ointment 2 x daily as needed (11/2023-01/2024): not effective for AD  Narrowband UVB photo therapy treatments 2-3 times per week (04/2023-09/2023): limited to no response in AD   Oral antihistamines (cetirizine, diphenhydramine, etc): not effective for persistent pruritus     I prescribed this patient Rinvoq  "(upadacitinib), which required a prior authorization. Unfortunately, the prior authorization was denied and the patient is now unable to fill their prescription.The plan's denial states that Rinvoq will be covered if the following criteria have been met: (1) drug is restricted to FDA approved indications, (2) other therapies for the disease state have been tried at a reasonable dose & duration without success (intolerance or ineffectiveness), (3) prescribing physician makes a statement of anticipated benefit & defines when efficacy will be re-evaluated. Based on the plan's listed criteria for coverage, Mattie Pierre meets these requires and this letter serves to demonstrate that all criteria for coverage have been met (see below):   (1) \"Drug is restricted to FDA approved indications\" - Rinvoq (upadacitinib) is FDA approved for the treatment of refractory, moderate to severe Atopic Dermatitis at an initial dose of 15 mg daily; may increase to 30 mg once daily if inadequate response.  (2) \"Other therapies for the disease state have been tried at a reasonable dose & duration without success (intolerance or ineffectiveness)\" - Patient has tried and failed topical calcineurin inhibitors and topical corticosteroids as outlined above due to lack of efficacy and/or side effects. Additionally, given the refractory nature of her condition & extent of BSA involvement (>10%) - topical therapies alone are unable to adequately treat her dermatitis. Patient has also tried an failed an FDA approved biologic for the treatment of AD - Dupixent (dupilumab). She completed the initial loading & one maintenance dose of Dupixent - but due to the severity of injections site reactions she experienced from Dupixent we advised the patient to stop treatment (intolerance). The only alternative biologic therapy for the treatment of AD is Adbry (tralokinumab) - however given it's similar properties to Dupixent our decision to avoid treatment was " "intentional due to the patient's intolerance & severe reaction to Dupixent. Also, due to the patient's severe needle phobia which was worsened by her injection site reaction with Dupixent- we feel this further supports the rationale to avoid treatment with Adbry. Furthermore, the only FDA approved treatment option left available for the treatment of this patient's AD is Rinvoq.   (3) \"Prescribing physician makes a statement of anticipated benefit & defines when efficacy will be re-evaluated.\" - Rinvoq is an oral Janus kinase inhibitor, that is FDA approved for the treatment of moderate to severe atopic dermatitis that achieves degrees of improvement not achieved by other comparably safe therapies. More specifically, in the published phase 3 trials, around 1/2 of patients achieved the endpoint of \"clear or almost clear\" and over 2/3 of patients achieved EASI-75 (75% reduction in Eczema Area & Severity index score from baseline), an endpoint which results in marked improvement in quality of life. In addition, the efficacy & safety of Rinvoq (upadacitinib) vs Dupixent (dupilumab) were compared in a large randomized clinical trial, and Rinvoq demonstrated superior efficacy with no new safety signals in comparison to Dupixent. Furthermore, as the prescribing physician I will re-evaluate the patient's response to Rinvoq by Week 16 of therapy as this is when the primary outcomes of responders in clinical trials peaked.   Additionally, I request that you review the following evidence showing how this medication can be effectively utilized to treat Atopic Dermatitis (L20.9):  ALPESH Potter et al (2021). Once-daily upadacitinib versus placebo in adolescents and adults with moderate-to-severe atopic dermatitis (Measure Up 1 and Measure Up 2): results from two replicate double-blind, randomised controlled phase 3 trials. Lancet (Baker, [place]), 397(20964), 7445-9292. " https://doi.org/10.0180/-4056(80)02188-2  VERNA Peck et al (2021). Efficacy and Safety of Upadacitinib vs Dupilumab in Adults With Moderate-to-Severe Atopic Dermatitis: A Randomized Clinical Trial. [facility] dermat[facility], 157(9), 9363-1612. https://doi.org/10.1001/jamadermatol.2021.3023  VIMAL Rocha et al (2021). Safety and efficacy of upadacitinib in combination with topical corticosteroids in adolescents and adults with moderate-to-severe atopic dermatitis (AD Up): results from a randomised, double-blind, placebo-controlled, phase 3 trial. Lancet (Baker, [place]), 397(40220), 8419-7905. https://doi.org/10.1016/-9350(35)40861-3    Atopic dermatitis, a form of eczema, is a chronic inflammatory disease with symptoms often appearing as a rash on the skin. Moderate-to-severe atopic dermatitis is characterized by rashes often covering much of the body, and can include intense, persistent itching and skin dryness, cracking, redness, crusting, and oozing. Itch, pain & burning are some of the most burdensome symptoms in Mattie's case, which have negatively impacted her overall quality of life due to disrupted sleep, worsened pain, and increased anxiety and depression symptoms along with her disease.    In my clinical opinion as a dermatologist, supported by the medical literature described above, the combination of prior failed treatments (Dupixent, topical calcineurin inhibitors, and topical steroids), and extensive & refractory nature of this patient's condition, that initiation of Rinvoq at an FDA-approved dose of 15 mg daily is the most appropriate next step in treating this patient's AD and restoring her quality of life.     On behalf of Mattie Pierre, I would appreciate your prompt reconsideration of this denial. Please feel free to contact me below for any additional information you may require. I look forward to receiving your response and approval of coverage for this medication.      Sincerely,      Emory Li MD  Dermatology Attending      [facility] [address] [address]  Phone: [phone]  Fax: [phone]

## 2024-07-12 NOTE — TELEPHONE ENCOUNTER
PA Initiation    Medication: RINVOQ 15 MG PO TB24  Insurance Company: [place] Medicaid ([facility]) - Phone [phone] Fax [phone]  Pharmacy Filling the Rx:    Filling Pharmacy Phone:    Filling Pharmacy Fax:    Start Date: 7/12/2024    [email or web address]        Rebeca Perez CphT  [facility] [email or web address]  Phone: [phone]  Fax: [phone]

## 2024-07-12 NOTE — LETTER
July 22, 2024  URGENT: Appeals & Grievances    Re: Prior Authorization Request    Plan: [place] Medicaid    Patient: Mattie Pierre   Member ID: [id number]  YOB: 1975                                         To whom it may concern:     I am contacting you as a dermatologist caring for Mattie Pierre with regard to the medical necessity of Rinvoq (upadacitinib) to treat the patient's diagnosis of Atopic Dermatitis (L20.9). Of note, their diagnosis has been confirmed by biopsy. I have reviewed the plan's criteria for coverage, along with the patient's diagnosis, care plan and clinical guidelines for treatment and request a formal appeal of your denial for Rinvoq.     Treatment requested: Rinvoq (upadacitinib)   Requested Dose: 15 mg tablet once daily  Prior treatments tried & response:   Dupixent (dupilumab) 300 mg every 14 days (03/2024-04/2024): completed initial loading dose (600 mg) & 1 maintenance dose (300 mg), substantial injection site reactions with numerous blisters/sores that took > 7 days to resolve & were very painful (occurred with both doses), stopped due to severity of reaction   Mycophenolate 500 mg twice daily (01/2024-07/2024): not effective, no response in AD  Clobetasol 0.05% ointment as needed (04/2019-Present): inadequate response   Cordran (flurandrenolide) 4 mcg/sqcm tape daily to areas of itch (01/2024-Present): inadequate response   Tacrolimus 0.1% ointment twice daily (04/2023-06/2023): not effective for AD, caused substantial burning/irritation of skin  Triamcinolone 0.1% ointment 2 x daily as needed (06/2018-03/2022): not effective for AD  Mometasone 0.1% ointment 2 x daily as needed (11/2023-01/2024): not effective for AD  Narrowband UVB photo therapy treatments 2-3 times per week (04/2023-09/2023): limited to no response in AD   Oral antihistamines (cetirizine, diphenhydramine, etc): not effective for persistent pruritus     I prescribed this patient Rinvoq  "(upadacitinib), which required a prior authorization. Unfortunately, the prior authorization was denied and the patient is now unable to fill their prescription.The plan's denial states that Rinvoq will be covered if the following criteria have been met: (1) drug is restricted to FDA approved indications, (2) other therapies for the disease state have been tried at a reasonable dose & duration without success (intolerance or ineffectiveness), (3) prescribing physician makes a statement of anticipated benefit & defines when efficacy will be re-evaluated. Based on the plan's listed criteria for coverage, Mattie Pierre meets these requires and this letter serves to demonstrate that all criteria for coverage have been met (see below):   (1) \"Drug is restricted to FDA approved indications\" - Rinvoq (upadacitinib) is FDA approved for the treatment of refractory, moderate to severe Atopic Dermatitis at an initial dose of 15 mg daily; may increase to 30 mg once daily if inadequate response.  (2) \"Other therapies for the disease state have been tried at a reasonable dose & duration without success (intolerance or ineffectiveness)\" - Patient has tried and failed topical calcineurin inhibitors and topical corticosteroids as outlined above due to lack of efficacy and/or side effects. Additionally, given the refractory nature of her condition & extent of BSA involvement (>10%) - topical therapies alone are unable to adequately treat her dermatitis. Patient has also tried an failed an FDA approved biologic for the treatment of AD - Dupixent (dupilumab). She completed the initial loading & one maintenance dose of Dupixent - but due to the severity of injections site reactions she experienced from Dupixent we advised the patient to stop treatment (intolerance). The only alternative biologic therapy for the treatment of AD is Adbry (tralokinumab) - however given it's similar properties to Dupixent our decision to avoid treatment was " "intentional due to the patient's intolerance & severe reaction to Dupixent. Also, due to the patient's severe needle phobia which was worsened by her injection site reaction with Dupixent- we feel this further supports the rationale to avoid treatment with Adbry. Furthermore, the only FDA approved treatment option left available for the treatment of this patient's AD is Rinvoq.   (3) \"Prescribing physician makes a statement of anticipated benefit & defines when efficacy will be re-evaluated.\" - Rinvoq is an oral Janus kinase inhibitor, that is FDA approved for the treatment of moderate to severe atopic dermatitis that achieves degrees of improvement not achieved by other comparably safe therapies. More specifically, in the published phase 3 trials, around 1/2 of patients achieved the endpoint of \"clear or almost clear\" and over 2/3 of patients achieved EASI-75 (75% reduction in Eczema Area & Severity index score from baseline), an endpoint which results in marked improvement in quality of life. In addition, the efficacy & safety of Rinvoq (upadacitinib) vs Dupixent (dupilumab) were compared in a large randomized clinical trial, and Rinvoq demonstrated superior efficacy with no new safety signals in comparison to Dupixent. Furthermore, as the prescribing physician I will re-evaluate the patient's response to Rinvoq by Week 16 of therapy as this is when the primary outcomes of responders in clinical trials peaked.   Additionally, I request that you review the following evidence showing how this medication can be effectively utilized to treat Atopic Dermatitis (L20.9):  ALPESH Potter et al (2021). Once-daily upadacitinib versus placebo in adolescents and adults with moderate-to-severe atopic dermatitis (Measure Up 1 and Measure Up 2): results from two replicate double-blind, randomised controlled phase 3 trials. Lancet (Baker, [place]), 397(93495), 9308-1553. " https://doi.org/10.6034/-3135(03)68288-2  VERNA Peck et al (2021). Efficacy and Safety of Upadacitinib vs Dupilumab in Adults With Moderate-to-Severe Atopic Dermatitis: A Randomized Clinical Trial. [facility] dermatology, 157(9), 8401-9199. https://doi.org/10.1001/jamadermatol.2021.3023  VIMAL Rocha et al (2021). Safety and efficacy of upadacitinib in combination with topical corticosteroids in adolescents and adults with moderate-to-severe atopic dermatitis (AD Up): results from a randomised, double-blind, placebo-controlled, phase 3 trial. Lancet (Baker, [place]), 397(35821), 3852-9995. https://doi.org/10.1016/-0781(45)63414-0    Atopic dermatitis, a form of eczema, is a chronic inflammatory disease with symptoms often appearing as a rash on the skin. Moderate-to-severe atopic dermatitis is characterized by rashes often covering much of the body, and can include intense, persistent itching and skin dryness, cracking, redness, crusting, and oozing. Itch, pain & burning are some of the most burdensome symptoms in Mattie's case, which have negatively impacted her overall quality of life due to disrupted sleep, worsened pain, and increased anxiety and depression symptoms along with her disease.    In my clinical opinion as a dermatologist, supported by the medical literature described above, the combination of prior failed treatments (Dupixent, topical calcineurin inhibitors, and topical steroids), and extensive & refractory nature of this patient's condition, that initiation of Rinvoq at an FDA-approved dose of 15 mg daily is the most appropriate next step in treating this patient's AD and restoring her quality of life.     On behalf of Mattie Pierre, I would appreciate your prompt reconsideration of this denial. Please feel free to contact me below for any additional information you may require. I look forward to receiving your response and approval of coverage for this medication.      Sincerely,      Emory Li MD  Dermatology Attending      [facility] [address] [address]  Phone: [phone]  Fax: [phone]    Office Contact:   Rebeca Perez [facility]  Pharmacy Liaison Dermatology  Phone: [phone]   Fax: [phone]  [email or web address]

## 2024-07-13 LAB
GAMMA INTERFERON BACKGROUND BLD IA-ACNC: 0.37 IU/ML
M TB IFN-G BLD-IMP: NEGATIVE
M TB IFN-G CD4+ BCKGRND COR BLD-ACNC: 9.63 IU/ML
MITOGEN IGNF BCKGRD COR BLD-ACNC: -0.04 IU/ML
MITOGEN IGNF BCKGRD COR BLD-ACNC: -0.06 IU/ML
QUANTIFERON MITOGEN: 10 IU/ML
QUANTIFERON NIL TUBE: 0.37 IU/ML
QUANTIFERON TB1 TUBE: 0.31 IU/ML
QUANTIFERON TB2 TUBE: 0.33

## 2024-07-17 ENCOUNTER — VIRTUAL VISIT (OUTPATIENT)
Dept: DERMATOLOGY | Facility: CLINIC | Age: 49
End: 2024-07-17
Attending: DERMATOLOGY
Payer: MEDICAID

## 2024-07-17 DIAGNOSIS — E78.5 HYPERLIPIDEMIA, UNSPECIFIED HYPERLIPIDEMIA TYPE: ICD-10-CM

## 2024-07-17 DIAGNOSIS — F31.5 BIPOLAR DISORDER, CURRENT EPISODE DEPRESSED, SEVERE, WITH PSYCHOTIC FEATURES (H): ICD-10-CM

## 2024-07-17 DIAGNOSIS — G40.909 SEIZURE DISORDER (H): ICD-10-CM

## 2024-07-17 DIAGNOSIS — I15.9 SECONDARY HYPERTENSION: ICD-10-CM

## 2024-07-17 DIAGNOSIS — L20.89 OTHER ATOPIC DERMATITIS: Primary | ICD-10-CM

## 2024-07-17 DIAGNOSIS — L73.2 HIDRADENITIS SUPPURATIVA: ICD-10-CM

## 2024-07-17 DIAGNOSIS — N39.41 URGENCY INCONTINENCE: ICD-10-CM

## 2024-07-17 NOTE — Clinical Note
Matti Yousif - I am a MTM pharmacist that works in the derm clinic. I met with your pt to discuss starting a new med for dermatitis - Rinvoq. I wanted to make sure you were aware there some potential associated risks (low reported rates) for CVE & thrombosis. Both myself & Derm have addressed this with patient. I also discussed the importance of taking losartan & rosuvastatin as prescribed to keep BP & cholesterol at goal. That said, she was not interested in discussing non-derm meds with me & it was unclear if she is taking these - BP & cholesterol were high w last checks & outside fill records show these have not been filled since 12/29/23. These records can be inaccurate but worth pointing out. Rinvoq can cause slight elevations in LDL-C so I pointed out that we really want her to be taking rosuvastatin as prescribed. Her next follow-up with you is 7/24/24. Hoping you can further review if she is actively taking both losartan & rosuvastatin. Thanks!  Prema Giraldo, [facility]

## 2024-07-17 NOTE — PROGRESS NOTES
Medication Therapy Management (MTM) Encounter    ASSESSMENT:                            Medication Adherence/Access:   Was unable to complete full medication review today due to patient time constraints. Patient would benefit from full medication review - scheduled 1 month follow-up & will re-attempt to review at that time.    Atopic Dermatitis:   Persistent flares on hands & feet, despite numerous trials of topical calcineurin inhibitors & topical corticosteroids. Most recently patient tried one dose of Dupixent but experienced a severe injection site reaction. Due to severe needle phobia patient declines any injectable treatments. Dermatology instead recommended a trial of Rinvoq (upadacitinib). Provided education on Rinvoq today including dosing, administration, side effects, precautions, monitoring, and time to efficacy.   - Pre-biologic labs: completed. Hep B results indicate resolved infection/immunity per Dr. Li lab note   - Safety labs: CBC, CMP, fasting lipid panel. Repeat safety labs 12 weeks after initiation of Rinvoq  - Vaccines: Avoid live vaccines. Due for the following non-live vaccines: Covid-19 vaccine (2023-24) and Shingrix. Will address eligible vaccines at follow-up     Hidradenitis suppurativa:   Stable.     Hypertension:   Patient was not meeting blood pressure goal at last office visit. Patient would benefit from continuing to take losartan as prescribed by primary - unclear if this is being filled & will update primary to further review.     Hyperlipidemia:   LDL-cholesterol not at goal of <100 mg/dL. Recommended patient take rosuvastatin as prescribed by primary - unclear if this is being consistently taken & will update primary to further review. Discussed that Rinvoq may elevate cholesterol and that if LDL-C continues to remain elevated that we will need her primary to adjust her statin dose. Encouraged patient to continue to work on dietary interventions as she has been.     Seizure  disorder:   Follows with Neurologist for management.     Bipolar disorder:  Patient would benefit from further review of psychiatric medications that patient is actively taking at next follow-up. Continues to follow with psychiatrist for management.     Urinary incontinence:   Patient prescribed both fesoterodine & oxybutynin, since these are both antimuscarinics (anticholinergic) agents would advise against concomitant use. Fill records do not indicate that either of these are being taken. Patient also prescribed Mybetriq, a beta-3 adrenergic agonists. Will consult with Urologist to see which medications patient should or should not be taking & update medication list.     PLAN:                            Will appeal for coverage of Rinvoq. If denied will pursue PAP   Obtained signed PAP form from Dr. Li   Sent email with Rinvoq PAP application to patient ([email or web address])     Pending coverage: Start Rinvoq (upadacitinib) 1 tablet (15 mg) daily   Recheck safety labs (CBC, CMP & lipids) 3 months after initiation     Routed updates to primary care provider that patient may not be taking losartan and rosuvastatin.   Next follow-up with Dr. Yousif on 7/24/24      Clarify with urologist which of the 3 prescribed agents (Mybetriq, oxybutynin, fesoterodine) patient should be on and updated medication list     Follow-up: via [employer] message with updates on coverage of Rinvoq and with me (Prema Giraldo [facility]) for a phone visit on Wednesday, August 7th at 9:00am for a full medication review    SUBJECTIVE/OBJECTIVE:                          Mattie Pierre is a 48 year old female called for an initial visit. She was referred to me from Dr. Emory Li MD.      Reason for visit: Rinvoq initiation.    Medication Adherence/Access:   Patient did not want to complete full med review at our visit. Will attempt to complete at follow-up.   Rinvoq coverage:   - PA denied per patient, received a letter in the mail      Atopic Dermatitis:   - Mycophenolate 500 mg twice daily (started ~7/12/24)   - Rinvoq (upadacitinib) 15 mg daily (initiation pending coverage)   - Clobetasol 0.05% ointment as needed (does not find effective)   - flurandrenolide (Cordran) 4 mcg/sqcm tape daily to areas of itch (not effective)  - Vaseline applies daily   No current side effects.     Patient reports rash initially started on hands & has spread to bilateral feet. Describes rash as extremely itchy, painful & burns. Reports that symptoms are worst at bedtime and that this has affected her sleep.     Reports she tried Dupixent for 1st dose & had a severe injection site reaction with blisters/sores around injection area. Not interested in retrying this, severe fear of needles.     Specialist: Dr. Emory Li MD, Dermatology. Last visit on 7/11/24. The following was recommended:   - Safety labs for Rinvoq ordered today  - Continue mycophenolate mofetil 500mg BID refill placed today, pending Rinvoq 15mg daily   -Cordran tape prn to open areas on acral skin; avoid use on face, armpits, groin    Previous treatment:   - Tacrolimus 0.1% ointment (not effective, caused burning/irritation)   - Clobetasol 0.05% ointment (not effective)   - Dupixent (dupilumab) 300 mg (see notes above, stopped due to injection site reaction)   - Triamcinolone 0.1% ointment as needed (not effective)   - Mometasone 0.1% ointment as needed (not effective)   - nbUVB (not effective)     Pre-Biologic Screening: completed 7/11/24  Hep B Surface Antibody Reactive     Hep B Core Antibody  Reactive     Hep B Surface Antigen Non-reactive   Hep C Antibody  Non-reactive    HIV Antigen Antibody Non-reactive    Quantiferon TB Gold Negative  Last checked: 7/11/24     Reviewed Dr. Li's lab notes to patient with her today  CBC (last checked 7/11/24): comparable to previous CBCs  CMP (last checked 7/11/24): eGFR 55 mL/min (comparable to 8 months ago), liver enzymes within normal  limits  Lipids (last checked 7/11/24): elevated - consult with PCP about possible statin, patient declines for now as she has been working on dietary changes to lower cholesterol     Immunization History   Covid-19 vaccine (8789-4256 version)  Due to receive   Influenza (annual) Consider vaccine in 2024-25 season, avoid live FluMist   Pneumococcal  Prevnar-20: 12/27/23 Up-to-date   Tetanus/Tdap  Up-to-date   Shingrix Eligible to receive with Rinvoq (immunomodulator)   All patients on biologics should avoid live vaccines (varicella/VZV, intranasal influenza, MMR, or yellow fever vaccine (if traveling))       Hidradenitis suppurativa:   - Clindamycin 1% lotion twice daily as needed   No concerns with changes in symptoms reported today. Lesions stable. Awaiting de-adelso procedure of right axilla with general surgery     Hypertension:   - Losartan 100 mg daily   No reported side effects.  Managed by primary care provider (Dr. Kade Yousif)    Hyperlipidemia:   - Rosuvastatin 20 mg daily   No reported side effects.  Not clear if she is consistently taking rosuvastatin. Patient reports she prefers to lower cholesterol with dietary changes and that she has trying to reduce butter & fried/greasy foods.    Managed by primary care provider (Dr. Kade Yousif)     Seizure disorder:   - Lamotrigine 150 mg twice daily (dx: seizure disorder)   - Topiramate 50 mg twice daily (dx: cocaine abuse)  No reported side effects.   Follows with Neurologist for management.     Depression/Bipolar disorder:  - Duloxetine 60 mg daily   - Quetiapine  mg daily at bedtime + 25 mg as needed for sleep (dx: bipolar disorder)  - Hydroxyzine 50 mg twice daily as needed for anxiety & sleep   - Mirtazapine 15 mg nightly at bedtime (dx: bipolar disorder, depression)  - Prazosin 5 mg nightly at bedtime (dx: bipolar disorder, depression)  No reported side effects.     Specialist: Psychiatry, Flako Barakat CNP.      Urinary incontinence:   -  Myrbetriq 25 mg daily   - Fesoterodine 8 mg daily   - Oxybutynin ER 10 mg daily   No reported side effects.    Specialist: Urology (Dr. Sindhu Garibay). Last visit 3/11/24. Plan was to continue Myrbetriq     Allergies/ADRs: Reviewed in chart  Past Medical History: Reviewed in chart  Tobacco: She reports that she has been smoking cigarettes. She has a 11.5 pack-year smoking history. She has never used smokeless tobacco.Nicotine/Tobacco Cessation Plan  Information offered: Patient not interested at this time  ----------------    I spent 30 minutes with this patient today. All changes were made via collaborative practice agreement with Emory Li. A copy of the visit note was provided to the patient's provider(s).    A summary of these recommendations was sent via [employer].    Prema Giraldo, PharmD  Medication Therapy Management Pharmacist   [facility]     Telemedicine Visit Details  Type of service:  Telephone visit  Start Time:  11:30am  End Time:  12:00pm     Medication Therapy Recommendations  No medication therapy recommendations to display

## 2024-07-17 NOTE — Clinical Note
7/17/2024       RE: Mattie Pierre  [address]  [place] [facility] [place] [facility] [address]     Dear Colleague,    Thank you for referring your patient, Mattie Pierre, to the [facility] [place] at [facility]. Please see a copy of my visit note below.    Medication Therapy Management (MTM) Encounter    ASSESSMENT:                            Medication Adherence/Access:   *** coverage - {PA status (Optional):842118}    ***:   ***  Improving / Well controlled / Flaring , patient would benefit from ***.   Provided education on *** today including dosing, administration, side effects, precautions, monitoring, and time to efficacy.   Discussed avoiding live vaccines and encouraged receiving the following non-live vaccines: *** Covid-19 vaccine (2023-24), annual influenza, Shingrix, Prevnar 20, Tetanus booster.   Pre-biologic labs up-to-date / need to be completed ***.     PLAN:                            ***      [email or web address]    Follow-up: {followuptest2:467066}    SUBJECTIVE/OBJECTIVE:                          Mattie Pierre is a 48 year old female called for an initial visit. She was referred to me from Dr. Emory Li MD.      Reason for visit: Rinvoq initiation.    Medication Adherence/Access:     Rinvoq coverage:   - PA denied per patient, received a letter in the mail     Atopic Dermatitis:   - Mycophenolate 500 mg twice daily (started ~7/12/24)   -   {DERM SYSTEMIC AGENTS:882583}  {DERM TOPICALS:674665}  Side effects: ***.    Clobetasol 0.05% ointment (not effective)   Symptoms: ***.    Rashes started on hands & has developed to bilateral feet. Itchy burns & painful. Primary concerns at bedtime cause it's affecting her sleep.     Patient reports she tried Dupixent for a few doses & had substantial injection site reactions with blisters/sores. She only gave the one dose.     Specialist: {Dermatology Providers:574626}, Dermatology.  Last visit on ***. The following was recommended:   - ***    Previous treatment:   - Tacrolimus 0.1% ointment   {DERM SYSTEMIC AGENTS:943027}  {DERM TOPICALS:809743}  - Light therapy -     Pre-Biologic Screening: ***  Hep B Surface Antibody {DERM RESULTS:998317}    Hep B Core Antibody  {DERM RESULTS:957093}    Hep B Surface Antigen {DERM RESULTS:628561}   Hep C Antibody  {DERM RESULTS:755896}    HIV Antigen Antibody {DERM RESULTS:403526}    Quantiferon TB Gold {DERM RESULTS:631103}  Last checked: ***     CBC (last checked ***): ***  CMP (last checked ***): eGFR *** mL/min, liver enzymes ***    1. Your blood sugar was slightly high but not in a dangerous range.  This would simply be something to talk to your primary doctor about next time you see them.  Usually they will just suggest increasing your exercise and potentially modifying your diet slightly as a first step.     2. Your kidney function was slightly decreased, but also not in a dangerous range.  The number was very similar to a blood draw you had 8 months ago, so nothing is getting worse.  Just another not-urgent issue to discuss with your primary doctor.     3. Your cholesterol is high and it may be worth considering going on a cholesterol lowering medication in the future.       4. All your blood tests for infections like hepatitis, TB and HIV are all normal.  You have immunity to Hepatitis B.    Immunization History   Covid-19 vaccine (1835-7006 version)  {Status:264488}   Influenza (annual) {Status:323038}, avoid live FluMist   Pneumococcal  Prevnar-13: ***  Pneumovax-23: ***   Prevnar-20: *** {Status:731258}   Tetanus/Tdap  {Status:755043}   Shingrix {Status:200017}   RSV (only for ? 60 years old)  {Status:826256}   All patients on biologics should avoid live vaccines (varicella/VZV, intranasal influenza, MMR, or yellow fever vaccine (if traveling))         Allergies/ADRs: Reviewed in chart  Past Medical History: Reviewed in chart  Tobacco: She reports  "that she has been smoking cigarettes. She has a 11.5 pack-year smoking history. She has never used smokeless tobacco.Nicotine/Tobacco Cessation Plan  {Nicotine/Tobacco Cessation Plan:505106}    ----------------  {SERAFIN?:310229}  I spent {mt total time 3:978104} with this patient today. { :553040}. A copy of the visit note was provided to the patient's provider(s).    A summary of these recommendations {GIVEN/NOT GIVEN:382535}.    ***    Telemedicine Visit Details  Type of service:  {telemedvisitmtm:509316::\"Telephone visit\"}  Start Time: {video/phone visit start time:152948}  End Time: {video/phone visit end time:152948}     Medication Therapy Recommendations  No medication therapy recommendations to display       Again, thank you for allowing me to participate in the care of your patient.      Sincerely,    Prema Giraldo RPH    "

## 2024-07-17 NOTE — Clinical Note
Matti Garibay - VÍCTOR am a MTM pharmacist in the derm clinic. I met with this pt to discuss starting Rinvoq for her dermatitis. As part of my role I always try to complete a full med review but unfortunately patient was not receptive to this. That said it looked like there were some duplicate therapies for her UI/OAB & since she was unable to tell me if she is actually taking all of these I just wanted to clarify the tx plan you recommend for her. She is currently prescribed Mybetriq, oxybutynin & fesoterodine. Outside fill records are not always accurate so I try not to rely on this but it showed Myrbetriq & fesoterodine have never been filled & oxybutynin was last filled 12/11/23. Which if any of these meds do you want her to take? Are you comfortable with me removing the others that she shouldn't be taking? Please let me know your thoughts on this - thanks!   Prema Giraldo, [facility]

## 2024-07-17 NOTE — Clinical Note
[place] as an FYI. You signed off on free drug form for Rinvoq yesterday just incase but I'll try to get covered by her insurance first.   A couple notes, I did reach out to her primary so they are aware she is starting Rinvoq & can review that she should be taking both losartan & rosuvastatin as prescribed at their next follow-up on 7/24. I suspect she may not be taking either of these based on her outside fill records. Also since her cholesterol & BP have not been controlled, and with starting the Rinvoq we'd ideally like to minimize CV risk factors as much as possible so I'm hoping she restarts these if she's not on them.   I'll keep you updated on coverage process for Rinvoq & when this is actually started - thanks!  Prema

## 2024-07-18 NOTE — TELEPHONE ENCOUNTER
PRIOR AUTHORIZATION DENIED    Medication: RINVOQ 15 MG PO TB24  Insurance Company: [place] Medicaid ([facility]) - Phone [phone] Fax [phone]  Denial Date: 7/12/2024  Denial Reason(s):     Appeal Information:     Patient Notified: NA, routed to [facility]    Rebeca Perez CphT  [facility] Liaison  [email or web address]  Phone: [phone]  Fax: [phone]

## 2024-07-19 NOTE — PATIENT INSTRUCTIONS
"Recommendations from today's MTM visit:                                                       Dermatitis/Rash:   I will submit an appeal for coverage of Rinvoq to your insurance. If denied we will try to enroll you in the 's free drug program    I sent you an email with the information on how to apply for Rinvoq's free drug program. Please complete this as soon as possible & update me when you've completed this  I will update you if we get the Rinvoq covered by your insurance    Pending coverage: Start Rinvoq (upadacitinib) 1 tablet (15 mg) by mouth daily   We will need to recheck your labs 3 months after starting the Rinvoq    Blood Pressure & Cholesterol:   Continue to take losartan 1 tablet (100 mg) by mouth once daily for your blood pressure as prescribed by Dr. Yousif     Continue to take rosuvastatin 1 tablet (20 mg) by mouth once daily for cholesterol & heart protection    Please discuss this further at your follow-up with your primary care provider (Dr. Yousif) on 7/24/24 if you have questions on this    Overactive Bladder:   You currently are prescribed 3 different medications for your urinary symptoms. Which of the following are you currently taking?   Mybetriq 25 mg tablet daily   Oxybutynin 10 mg tablet daily   Fesoterodine (Toviaz) 8 mg tablet once daily     I messaged your urologist Dr. Garibay to see which ones they want you to be on    Follow-up:  with me (Prema Giraldo, [facility]) for a phone visit on Wednesday, August 7th at 9:00am. Let's plan to review where the coverage process is at for Rinvoq and complete a full review of your current medication list.    It was great speaking with you today.  I value your experience and would be very thankful for your time in providing feedback in our clinic survey. In the next few days, you may receive an email or text message from [employer] with a link to a survey related to your  clinical pharmacist.\"     To schedule another MTM appointment, please call " the clinic directly or you may call the [facility] scheduling line at [phone] or toll-free at [phone].     My Clinical Pharmacist's contact information:                                                      Please feel free to contact me with any questions or concerns you have.     Prema Giraldo, PharmD  [facility] Pharmacist  [facility]

## 2024-07-22 NOTE — TELEPHONE ENCOUNTER
Completed appeal letter for Rinvoq. Routing to pharmacy liaisons. Please submit letter from 7/22/24 to insurance.     Prema Giraldo RPh

## 2024-07-23 NOTE — TELEPHONE ENCOUNTER
Rebeca Perez Cp  [facility] Liaison  [email or web address]  Phone: [phone]  Fax: [phone]

## 2024-07-23 NOTE — PROGRESS NOTES
"  Assessment & Plan     Hyperlipidemia LDL goal <70  -Patient has a history of hyperlipidemia.  I will send a refill of rosuvastatin.  Recent cholesterol testing was elevated.  Recommend she follow-up in approximately 1 month to ensure medication adherence.  She verbalized clear understanding agreement this treatment plan and had no further questions or concerns at this time  - rosuvastatin (CRESTOR) 20 MG tablet  Dispense: 90 tablet; Refill: 3    Primary hypertension  Refill placed for losartan.  She is relatively poor historian in terms of knowledge of the medication she takes on a daily basis, however she is quite certain that she has been taking losartan relatively regularly.  Will send a refill for this medication to her pharmacy on file.  - losartan (COZAAR) 100 MG tablet  Dispense: 90 tablet; Refill: 3    Bipolar affective disorder, currently depressed, mild (H)  Patient has a history of bipolar disorder.  She states that she takes-200 of Seroquel at bedtime and she also takes 25 mg of Seroquel by mouth nightly for sleep.  I will send her a refill of the 200 mg tablet to her pharmacy on file and recommend that she follow-up about a month to make sure that her mental health is stable.  She verbalized clear understanding and agreement to this treatment plan and had no further questions or concerns at this time.  - QUEtiapine ER (SEROQUEL XR) 200 MG 24 hr tablet  Dispense: 90 tablet; Refill: 3  PHQ-9 stable from previous visits.  Denies any thoughts of harm to self or others.    BMI  Estimated body mass index is 34.46 kg/m  as calculated from the following:    Height as of this encounter: 1.575 m (5' 2\").    Weight as of this encounter: 85.5 kg (188 lb 6.4 oz).     Return in about 4 weeks (around 8/21/2024).    Samina Phelps is a 48 year old, presenting for the following health issues:  Hypertension and Results (Follow up labs)    HPI   Dermatology recommended a trial of Rinvoq (upadacitinib).   -Patient " was unable to complete full MTM reconciliation due to time constraints per note. Patient failed dupixent due to severe injection site reaction.  -Relatively poor historian of her medication history.  She does state that she has been taking her antihypertensives.  She is unsure if she is taking a statin.  She denies headache, fever, chills, nausea, vomiting, shortness of breath, chest pain.  Reports no additional concerns at this time.      Review of Systems  Constitutional, HEENT, cardiovascular, pulmonary, gi and gu systems are negative, except as otherwise noted.  ROS reviewed, see HPI for pertinent positives and negatives.  Kade Yousif, DO    PATIENT HEALTH QUESTIONNAIRE-9 (PHQ - 9)    Over the last 2 weeks, how often have you been bothered by any of the following problems?    1. Little interest or pleasure in doing things -  Nearly every day   2. Feeling down, depressed, or hopeless -  More than half the days   3. Trouble falling or staying asleep, or sleeping too much - Nearly every day   4. Feeling tired or having little energy -  Several days   5. Poor appetite or overeating -  More than half the days   6. Feeling bad about yourself - or that you are a failure or have let yourself or your family down -  Several days   7. Trouble concentrating on things, such as reading the newspaper or watching television - Nearly every day   8. Moving or speaking so slowly that other people could have noticed? Or the opposite - being so fidgety or restless that you have been moving around a lot more than usual Not at all   9. Thoughts that you would be better off dead or of hurting  yourself in some way Not at all   Total Score: 15         5/3/2024     6:12 AM 6/18/2024     1:42 PM 7/24/2024     6:57 AM   PHQ   PHQ-9 Total Score 15 16 15   Q9: Thoughts of better off dead/self-harm past 2 weeks Not at all Not at all Not at all            Objective    /87   Pulse 68   Temp 98.9  F (37.2  C) (Oral)   Resp 16   Ht  "1.575 m (5' 2\")   Wt 85.5 kg (188 lb 6.4 oz)   LMP 07/23/2022 (Approximate)   SpO2 98%   BMI 34.46 kg/m    Body mass index is 34.46 kg/m .  Physical Exam  Constitutional:       General: She is not in acute distress.     Appearance: Normal appearance. She is not toxic-appearing or diaphoretic.   HENT:      Head: Normocephalic and atraumatic.      Right Ear: External ear normal.      Left Ear: External ear normal.      Nose: Nose normal.   Cardiovascular:      Rate and Rhythm: Normal rate and regular rhythm.      Heart sounds: No murmur heard.     No friction rub. No gallop.   Pulmonary:      Effort: Pulmonary effort is normal. No respiratory distress.      Breath sounds: Normal breath sounds. No wheezing or rales.   Skin:     Findings: Rash present.      Comments: -Erythematous rash present on her feet bilaterally, with secondary excoriations present   Neurological:      Mental Status: She is alert and oriented to person, place, and time.            Precepted with Dr. Tyson North MD    Signed Electronically by: Kade Yousif DO    "

## 2024-07-24 ENCOUNTER — OFFICE VISIT (OUTPATIENT)
Dept: FAMILY MEDICINE | Facility: CLINIC | Age: 49
End: 2024-07-24
Payer: MEDICAID

## 2024-07-24 VITALS
HEART RATE: 68 BPM | RESPIRATION RATE: 16 BRPM | OXYGEN SATURATION: 98 % | WEIGHT: 188.4 LBS | TEMPERATURE: 98.9 F | HEIGHT: 62 IN | BODY MASS INDEX: 34.67 KG/M2 | SYSTOLIC BLOOD PRESSURE: 139 MMHG | DIASTOLIC BLOOD PRESSURE: 87 MMHG

## 2024-07-24 DIAGNOSIS — I10 PRIMARY HYPERTENSION: Primary | ICD-10-CM

## 2024-07-24 DIAGNOSIS — E78.5 HYPERLIPIDEMIA LDL GOAL <70: ICD-10-CM

## 2024-07-24 DIAGNOSIS — F31.31 BIPOLAR AFFECTIVE DISORDER, CURRENTLY DEPRESSED, MILD (H): ICD-10-CM

## 2024-07-24 PROCEDURE — 99214 OFFICE O/P EST MOD 30 MIN: CPT | Mod: GC

## 2024-07-24 RX ORDER — ROSUVASTATIN CALCIUM 20 MG/1
20 TABLET, COATED ORAL DAILY
Qty: 90 TABLET | Refills: 3 | Status: SHIPPED | OUTPATIENT
Start: 2024-07-24

## 2024-07-24 RX ORDER — LOSARTAN POTASSIUM 100 MG/1
100 TABLET ORAL DAILY
Qty: 90 TABLET | Refills: 3 | Status: SHIPPED | OUTPATIENT
Start: 2024-07-24

## 2024-07-24 RX ORDER — QUETIAPINE 200 MG/1
200 TABLET, FILM COATED, EXTENDED RELEASE ORAL AT BEDTIME
Qty: 90 TABLET | Refills: 3 | Status: SHIPPED | OUTPATIENT
Start: 2024-07-24

## 2024-07-24 ASSESSMENT — PATIENT HEALTH QUESTIONNAIRE - PHQ9
SUM OF ALL RESPONSES TO PHQ QUESTIONS 1-9: 15
SUM OF ALL RESPONSES TO PHQ QUESTIONS 1-9: 15
10. IF YOU CHECKED OFF ANY PROBLEMS, HOW DIFFICULT HAVE THESE PROBLEMS MADE IT FOR YOU TO DO YOUR WORK, TAKE CARE OF THINGS AT HOME, OR GET ALONG WITH OTHER PEOPLE: VERY DIFFICULT

## 2024-07-26 NOTE — TELEPHONE ENCOUNTER
No return call received writer will remain available as needs arise    btrn   Refill Request       Last Seen: Last Seen Department: 4/25/2024  Last Seen by PCP: 12/4/2023    Last Written: 7/17/24 30 with 3    Next Appointment:   Future Appointments   Date Time Provider Department Center   8/8/2024  2:15 PM Alexei Powell MD [facility]   10/7/2024  2:30 PM Alisha Caballero MD [facility]       Requested Prescriptions     Pending Prescriptions Disp Refills    celecoxib (CELEBREX) 200 MG capsule 30 capsule 3     Sig: TAKE 1 CAPSULE DAILY

## 2024-07-29 ENCOUNTER — MYC REFILL (OUTPATIENT)
Dept: DERMATOLOGY | Facility: CLINIC | Age: 49
End: 2024-07-29
Payer: MEDICAID

## 2024-07-29 ENCOUNTER — MYC REFILL (OUTPATIENT)
Dept: FAMILY MEDICINE | Facility: CLINIC | Age: 49
End: 2024-07-29
Payer: MEDICAID

## 2024-07-29 DIAGNOSIS — M77.12 LEFT TENNIS ELBOW: ICD-10-CM

## 2024-07-29 DIAGNOSIS — L30.0 NUMMULAR DERMATITIS: ICD-10-CM

## 2024-07-31 ENCOUNTER — TELEPHONE (OUTPATIENT)
Dept: DERMATOLOGY | Facility: CLINIC | Age: 49
End: 2024-07-31
Payer: MEDICAID

## 2024-07-31 NOTE — TELEPHONE ENCOUNTER
Received incoming call from patient. Wanted to check on the status of PA appeal for Rinvoq and confirm if we are still planning to connect over the phone on 8/7/24 for a follow-up to review if this was covered. Told her that we should keep appointment for now & that I will reach out to her sooner if I receive any other updates on Rinvoq.     Prema Giraldo, PharmD  [facility] Pharmacist

## 2024-08-01 RX ORDER — NAPROXEN 500 MG/1
500 TABLET ORAL 2 TIMES DAILY PRN
Qty: 30 TABLET | Refills: 0 | Status: SHIPPED | OUTPATIENT
Start: 2024-08-01 | End: 2024-09-21

## 2024-08-01 RX ORDER — CLOBETASOL PROPIONATE 0.5 MG/G
OINTMENT TOPICAL 2 TIMES DAILY
Qty: 60 G | Refills: 3 | Status: SHIPPED | OUTPATIENT
Start: 2024-08-01

## 2024-08-01 NOTE — TELEPHONE ENCOUNTER
clobetasol (TEMOVATE) 0.05 % external ointment    Apply topically 2 times daily To feet and ankles, cover with saran wrap at bedtime      Last Written Prescription Date:  1/18/24  Last Fill Quantity: 60g,   # refills: 1  Last Office Visit : 7/11/24  Future Office visit:  1/14/25    Refilled  per protocol.

## 2024-08-01 NOTE — TELEPHONE ENCOUNTER
naproxen (NAPROSYN) 500 MG tablet         Sig: Take 1 tablet (500 mg) by mouth 2 times daily as needed for moderate pain    Disp: 30 tablet    Refills: 1    Start: 7/29/2024    Class: E-Prescribe    Non-formulary For: Left tennis elbow    Last ordered: 8 months ago (12/3/2023) by Kade Yousif DO    NSAID Medications [id number]/29/2024 11:17 PM   Protocol Details Always Fail Criteria - Chart Review Required    Normal GFR on file in past 12 months    Blood pressure under 140/90 in past 12 months    Patient is age 6-64 years    Normal CBC on file in past 12 months    Medication is active on med list    Recent (12 mo) or future (90 days) visit within the authorizing provider's specialty    No active pregnancy on record    No positive pregnancy test in past 12 months      To be filled at: None [Patient requested: [facility]]        GARRY Trammell, BSN

## 2024-08-08 ENCOUNTER — TELEPHONE (OUTPATIENT)
Dept: DERMATOLOGY | Facility: CLINIC | Age: 49
End: 2024-08-08
Payer: MEDICAID

## 2024-08-08 NOTE — TELEPHONE ENCOUNTER
MTM appointment no showed, we made one more attempt to reschedule.     Routing back to referring provider and MTM Pharmacist Team        Eddie Wolf [facility]

## 2024-08-09 PROBLEM — R21 SKIN ERUPTION: Status: ACTIVE | Noted: 2024-08-09

## 2024-08-09 PROBLEM — L28.0 LICHEN SIMPLEX CHRONICUS: Status: ACTIVE | Noted: 2024-08-09

## 2024-08-20 NOTE — TELEPHONE ENCOUNTER
MEDICATION APPEAL DENIED    Medication: RINVOQ 15 MG PO TB24  Insurance Company: [place] Medicaid  Denial Date:  8/20/24  Denial Reason(s):     Second Level Appeal Information:     Patient Notified: No              Thank you,     José Miguel Anton [facility]  Pharmacy Clinic [facility]  [email or web address]   Phone: [phone]  Fax: [phone]

## 2024-08-20 NOTE — TELEPHONE ENCOUNTER
Appeal was denied. Next step would be to file a formal appeal. Would provider like to proceed with formal appeal?    Thank you,     José Miguel Anton [facility]  Pharmacy Clinic [facility]  [email or web address]   Phone: [phone]  Fax: [phone]

## 2024-08-22 ENCOUNTER — TELEPHONE (OUTPATIENT)
Dept: DERMATOLOGY | Facility: CLINIC | Age: 49
End: 2024-08-22

## 2024-08-22 DIAGNOSIS — L20.89 OTHER ATOPIC DERMATITIS: Primary | ICD-10-CM

## 2024-08-22 NOTE — TELEPHONE ENCOUNTER
Called & spoke with patient about denial for Rinvoq. Discussed that our options include either applying for PAP for Rinvoq or potentially trying for coverage of Cibinqo instead (listed as preferred by plan). Patient was in favor of trying Cibinqo (abrocitinib) if Dr. Li is comfortable with this. Informed patient that I would connect with Dr. Li in clinic today about whether he is open to this. Will keep patient updated on the outcome of this appeal.     Prema Giraldo [facility]

## 2024-08-22 NOTE — TELEPHONE ENCOUNTER
Confirmed with Dr. Li that he is comfortable with patient trying Cibinqo (abrocitinib) 100 mg daily or Rinvoq (upadacitinib) 15 mg daily. Will pursue whichever is preferred by patient's insurance plan.     Prema Giraldo RPh

## 2024-08-23 NOTE — TELEPHONE ENCOUNTER
In order to proceed with state level appeal, written consent from the patient is needed to appeal on her behalf. [employer] message sent.     Thank you,     José Miguel Anton [facility]  Pharmacy Clinic Liaison   [facility]  [email or web address]   Phone: [phone]  Fax: [phone]

## 2024-08-27 ASSESSMENT — PATIENT HEALTH QUESTIONNAIRE - PHQ9
10. IF YOU CHECKED OFF ANY PROBLEMS, HOW DIFFICULT HAVE THESE PROBLEMS MADE IT FOR YOU TO DO YOUR WORK, TAKE CARE OF THINGS AT HOME, OR GET ALONG WITH OTHER PEOPLE: SOMEWHAT DIFFICULT
SUM OF ALL RESPONSES TO PHQ QUESTIONS 1-9: 13
SUM OF ALL RESPONSES TO PHQ QUESTIONS 1-9: 13

## 2024-08-28 ENCOUNTER — OFFICE VISIT (OUTPATIENT)
Dept: FAMILY MEDICINE | Facility: CLINIC | Age: 49
End: 2024-08-28
Payer: MEDICAID

## 2024-08-28 VITALS
BODY MASS INDEX: 34.16 KG/M2 | HEIGHT: 62 IN | TEMPERATURE: 97 F | HEART RATE: 72 BPM | WEIGHT: 185.6 LBS | DIASTOLIC BLOOD PRESSURE: 91 MMHG | RESPIRATION RATE: 18 BRPM | OXYGEN SATURATION: 100 % | SYSTOLIC BLOOD PRESSURE: 135 MMHG

## 2024-08-28 DIAGNOSIS — R10.13 DYSPEPSIA: ICD-10-CM

## 2024-08-28 DIAGNOSIS — D50.9 IRON DEFICIENCY ANEMIA, UNSPECIFIED IRON DEFICIENCY ANEMIA TYPE: ICD-10-CM

## 2024-08-28 DIAGNOSIS — R19.7 DIARRHEA, UNSPECIFIED TYPE: ICD-10-CM

## 2024-08-28 DIAGNOSIS — B00.9 HSV (HERPES SIMPLEX VIRUS) INFECTION: Primary | ICD-10-CM

## 2024-08-28 LAB
ADV 40+41 DNA STL QL NAA+NON-PROBE: NEGATIVE
ASTRO TYP 1-8 RNA STL QL NAA+NON-PROBE: NEGATIVE
C CAYETANENSIS DNA STL QL NAA+NON-PROBE: NEGATIVE
CAMPYLOBACTER DNA SPEC NAA+PROBE: NEGATIVE
CRYPTOSP DNA STL QL NAA+NON-PROBE: NEGATIVE
E COLI O157 DNA STL QL NAA+NON-PROBE: NORMAL
E HISTOLYT DNA STL QL NAA+NON-PROBE: NEGATIVE
EAEC ASTA GENE ISLT QL NAA+PROBE: NEGATIVE
EC STX1+STX2 GENES STL QL NAA+NON-PROBE: NEGATIVE
EPEC EAE GENE STL QL NAA+NON-PROBE: NEGATIVE
ETEC LTA+ST1A+ST1B TOX ST NAA+NON-PROBE: NEGATIVE
FERRITIN SERPL-MCNC: 42 NG/ML (ref 6–175)
G LAMBLIA DNA STL QL NAA+NON-PROBE: NEGATIVE
HGB BLD-MCNC: 12.6 G/DL (ref 11.7–15.7)
IRON BINDING CAPACITY (ROCHE): 353 UG/DL (ref 240–430)
IRON SATN MFR SERPL: 16 % (ref 15–46)
IRON SERPL-MCNC: 58 UG/DL (ref 37–145)
NOROVIRUS GI+II RNA STL QL NAA+NON-PROBE: NEGATIVE
P SHIGELLOIDES DNA STL QL NAA+NON-PROBE: NEGATIVE
RVA RNA STL QL NAA+NON-PROBE: NEGATIVE
SALMONELLA SP RPOD STL QL NAA+PROBE: NEGATIVE
SAPO I+II+IV+V RNA STL QL NAA+NON-PROBE: NEGATIVE
SHIGELLA SP+EIEC IPAH ST NAA+NON-PROBE: NEGATIVE
V CHOLERAE DNA SPEC QL NAA+PROBE: NEGATIVE
VIBRIO DNA SPEC NAA+PROBE: NEGATIVE
Y ENTEROCOL DNA STL QL NAA+PROBE: NEGATIVE

## 2024-08-28 PROCEDURE — 87338 HPYLORI STOOL AG IA: CPT | Performed by: STUDENT IN AN ORGANIZED HEALTH CARE EDUCATION/TRAINING PROGRAM

## 2024-08-28 PROCEDURE — 82728 ASSAY OF FERRITIN: CPT | Performed by: STUDENT IN AN ORGANIZED HEALTH CARE EDUCATION/TRAINING PROGRAM

## 2024-08-28 PROCEDURE — 87507 IADNA-DNA/RNA PROBE TQ 12-25: CPT | Performed by: STUDENT IN AN ORGANIZED HEALTH CARE EDUCATION/TRAINING PROGRAM

## 2024-08-28 PROCEDURE — 83550 IRON BINDING TEST: CPT | Performed by: STUDENT IN AN ORGANIZED HEALTH CARE EDUCATION/TRAINING PROGRAM

## 2024-08-28 PROCEDURE — 36415 COLL VENOUS BLD VENIPUNCTURE: CPT | Performed by: STUDENT IN AN ORGANIZED HEALTH CARE EDUCATION/TRAINING PROGRAM

## 2024-08-28 PROCEDURE — 83540 ASSAY OF IRON: CPT | Performed by: STUDENT IN AN ORGANIZED HEALTH CARE EDUCATION/TRAINING PROGRAM

## 2024-08-28 PROCEDURE — 85018 HEMOGLOBIN: CPT | Performed by: STUDENT IN AN ORGANIZED HEALTH CARE EDUCATION/TRAINING PROGRAM

## 2024-08-28 PROCEDURE — 99214 OFFICE O/P EST MOD 30 MIN: CPT | Performed by: STUDENT IN AN ORGANIZED HEALTH CARE EDUCATION/TRAINING PROGRAM

## 2024-08-28 RX ORDER — ACYCLOVIR 200 MG/5ML
800 SUSPENSION ORAL
Qty: 700 ML | Refills: 0 | Status: SHIPPED | OUTPATIENT
Start: 2024-08-28 | End: 2024-09-04

## 2024-08-28 NOTE — PROGRESS NOTES
"  Assessment & Plan     HSV (herpes simplex virus) infection  Repeat what patient has done in the past. She requests liquid. Not a dermatomal pattern so less likely shingles. Could be herpes but without visualization, relying on patient's description and history of similar.   - acyclovir (ZOVIRAX) 200 MG/5ML suspension; Take 20 mLs (800 mg) by mouth 5 times daily for 7 days.    Iron deficiency anemia, unspecified iron deficiency anemia type  - Iron & Iron Binding Capacity; Future  - Ferritin; Future  - Hemoglobin; Future  - Hemoglobin  - Ferritin  - Iron & Iron Binding Capacity    Dyspepsia  - Helicobacter pylori Antigen Stool; Future  - Helicobacter pylori Antigen Stool    Diarrhea, unspecified type  - Enteric Bacteria and Virus Panel by TYSHAWN Stool; Future  - Enteric Bacteria and Virus Panel by TYSHAWN Stool          BMI  Estimated body mass index is 33.72 kg/m  as calculated from the following:    Height as of this encounter: 1.58 m (5' 2.21\").    Weight as of this encounter: 84.2 kg (185 lb 9.6 oz).       Depression Screening Follow Up        8/27/2024     4:35 AM   PHQ   PHQ-9 Total Score 13   Q9: Thoughts of better off dead/self-harm past 2 weeks Not at all           Follow Up Actions Taken  Patient counseled, no additional follow up at this time.           No follow-ups on file.    Samina Phelps is a 48 year old, presenting for the following health issues:  Shingles (Pt states she got shingle of her Back of left leg, it is burns and pain been going on for 4 days ) and Bowel Problems (Pt states she has been couldn't hold her bowel lately )      8/28/2024     8:49 AM   Additional Questions   Roomed by ML   Accompanied by self         8/28/2024    Information    services provided? No        HPI   Shingles - 4 days. She has had history of herpes infection in the past and has taken medication. She had the lesions bandaged. It feels the same as before. Does not follow a dermatomal pattern. " "    Diarrhea - for a while maybe months. She knows she is lactose intolerant but has been avoiding dairy. She reports but now all foods (most recently chips) are causing symptoms.                 Objective    BP (!) 135/91 (BP Location: Left arm, Patient Position: Sitting, Cuff Size: Adult Regular)   Pulse 72   Temp 97  F (36.1  C) (Oral)   Resp 18   Ht 1.58 m (5' 2.21\")   Wt 84.2 kg (185 lb 9.6 oz)   LMP 07/23/2022 (Approximate)   SpO2 100%   BMI 33.72 kg/m    Body mass index is 33.72 kg/m .  Physical Exam  HENT:      Head: Normocephalic.      Nose: Nose normal.      Mouth/Throat:      Mouth: Mucous membranes are moist.   Eyes:      Extraocular Movements: Extraocular movements intact.      Conjunctiva/sclera: Conjunctivae normal.   Cardiovascular:      Rate and Rhythm: Normal rate and regular rhythm.   Pulmonary:      Effort: Pulmonary effort is normal.      Breath sounds: Normal breath sounds.   Abdominal:      Palpations: Abdomen is soft.   Musculoskeletal:      Cervical back: Normal range of motion.   Skin:     General: Skin is warm.      Capillary Refill: Capillary refill takes less than 2 seconds.      Comments: Bandaged leg - did not remove per request.   Neurological:      General: No focal deficit present.      Mental Status: She is alert.                    Signed Electronically by: Harvey Wylie MD    "

## 2024-08-29 LAB — H PYLORI AG STL QL IA: NEGATIVE

## 2024-09-03 NOTE — TELEPHONE ENCOUNTER
Patient authorization to appeal has been obtained. We will need a new appeal letter or to make sure the first one addresses the reasoning for denial for the state level appeal. Is appeal letter on file okay or would you like to write a new appeal letter?     Thank you,     José Miguel Anton, [facility]  Pharmacy Clinic [facility]  [email or web address]   Phone: [phone]  Fax: [phone]

## 2024-09-04 LAB
PATH REPORT.COMMENTS IMP SPEC: NORMAL
PATH REPORT.FINAL DX SPEC: NORMAL
PATH REPORT.GROSS SPEC: NORMAL
PATH REPORT.MICROSCOPIC SPEC OTHER STN: NORMAL
PATH REPORT.RELEVANT HX SPEC: NORMAL

## 2024-09-04 NOTE — TELEPHONE ENCOUNTER
Second level appeal letter is ready to go. I highlighted & addressed all of the coverage criteria in the first letter & I'm not sure how to make this more clear. Please feel free to bold/underline areas of letter that you see fit if you think this would help our case to get this approved. Based on insurance denial the patient does meet the plan's criteria for coverage of Rinvoq.     Prema Giraldo, PharmD  [facility] Pharmacist

## 2024-09-05 NOTE — TELEPHONE ENCOUNTER
Spoke with pharmacy liaison team - based on second denial letter it seems that Cibinqo is preferred by patients insurance. Suggested we attempt a PA and/or appeal for Cibinqo prior to pursuing second level appeal for Rinvoq.     Sent orders to [facility] work queue today for Cibinqo 100 mg once daily. The Cibinqo or Rinvoq will NOT be released to pharmacy to fill until coverage has been confirmed. Patient will only start one or the other as these are both oral CYRUS inhibitors. Will cancel one of these scripts from patients profile once we've confirmed insurance coverage.     Prema Giraldo, PharmD  [facility] Pharmacist

## 2024-09-06 ENCOUNTER — TELEPHONE (OUTPATIENT)
Dept: DERMATOLOGY | Facility: CLINIC | Age: 49
End: 2024-09-06

## 2024-09-06 NOTE — TELEPHONE ENCOUNTER
PA Initiation    Medication: CIBINQO 100 MG PO TABS  Insurance Company: [place] Medicaid ([facility]) - Phone [phone] Fax [phone]  Pharmacy Filling the Rx: [facility] - [place], [place] - [address]  Filling Pharmacy Phone:    Filling Pharmacy Fax:    Start Date: 9/6/2024       Mattie Pierre (Key: [id number])

## 2024-09-09 NOTE — TELEPHONE ENCOUNTER
Retail Pharmacy Prior Authorization Team   Phone: [phone]    PA team received a phone call from rep at [facility] - states that the insurance notified them of a denial. They are sending appeal information via fax.

## 2024-09-10 NOTE — TELEPHONE ENCOUNTER
PRIOR AUTHORIZATION DENIED    Medication: CIBINQO 100 MG PO TABS  Insurance Company: [place] Medicaid ([facility]) - Phone [phone] Fax [phone]  Denial Date: 9/6/2024  Denial Reason(s):     Appeal Information:     Patient Notified: No            Thank you,     José Miguel Anton [facility]  Pharmacy Clinic [facility]  [email or web address]   Phone: [phone]  Fax: [phone]

## 2024-09-10 NOTE — TELEPHONE ENCOUNTER
PA denied. Please see below. Will provider be appealing?    Thank you,     José Miguel Anton CP  Pharmacy Clinic [facility]  [email or web address]   Phone: [phone]  Fax: [phone]

## 2024-09-12 ENCOUNTER — OFFICE VISIT (OUTPATIENT)
Dept: FAMILY MEDICINE | Facility: CLINIC | Age: 49
End: 2024-09-12
Payer: MEDICAID

## 2024-09-12 VITALS
OXYGEN SATURATION: 99 % | WEIGHT: 185 LBS | TEMPERATURE: 98.7 F | HEIGHT: 62 IN | SYSTOLIC BLOOD PRESSURE: 136 MMHG | RESPIRATION RATE: 20 BRPM | HEART RATE: 73 BPM | BODY MASS INDEX: 34.04 KG/M2 | DIASTOLIC BLOOD PRESSURE: 84 MMHG

## 2024-09-12 DIAGNOSIS — R14.0 ABDOMINAL BLOATING: ICD-10-CM

## 2024-09-12 DIAGNOSIS — R23.2 HOT FLASHES: ICD-10-CM

## 2024-09-12 DIAGNOSIS — E73.9 LACTOSE INTOLERANCE: Primary | ICD-10-CM

## 2024-09-12 LAB
ALBUMIN SERPL BCG-MCNC: 4.3 G/DL (ref 3.5–5.2)
ALP SERPL-CCNC: 82 U/L (ref 40–150)
ALT SERPL W P-5'-P-CCNC: 25 U/L (ref 0–50)
ANION GAP SERPL CALCULATED.3IONS-SCNC: 10 MMOL/L (ref 7–15)
AST SERPL W P-5'-P-CCNC: 23 U/L (ref 0–45)
BILIRUB SERPL-MCNC: 0.2 MG/DL
BUN SERPL-MCNC: 19.9 MG/DL (ref 6–20)
CALCIUM SERPL-MCNC: 9.5 MG/DL (ref 8.8–10.4)
CHLORIDE SERPL-SCNC: 105 MMOL/L (ref 98–107)
CREAT SERPL-MCNC: 1.11 MG/DL (ref 0.51–0.95)
EGFRCR SERPLBLD CKD-EPI 2021: 61 ML/MIN/1.73M2
ERYTHROCYTE [DISTWIDTH] IN BLOOD BY AUTOMATED COUNT: 15 % (ref 10–15)
GLUCOSE SERPL-MCNC: 152 MG/DL (ref 70–99)
HCO3 SERPL-SCNC: 29 MMOL/L (ref 22–29)
HCT VFR BLD AUTO: 41.6 % (ref 35–47)
HGB BLD-MCNC: 12.7 G/DL (ref 11.7–15.7)
MCH RBC QN AUTO: 24.5 PG (ref 26.5–33)
MCHC RBC AUTO-ENTMCNC: 30.5 G/DL (ref 31.5–36.5)
MCV RBC AUTO: 80 FL (ref 78–100)
PLATELET # BLD AUTO: 343 10E3/UL (ref 150–450)
POTASSIUM SERPL-SCNC: 4.6 MMOL/L (ref 3.4–5.3)
PROT SERPL-MCNC: 7.9 G/DL (ref 6.4–8.3)
RBC # BLD AUTO: 5.18 10E6/UL (ref 3.8–5.2)
SODIUM SERPL-SCNC: 144 MMOL/L (ref 135–145)
TSH SERPL DL<=0.005 MIU/L-ACNC: 3.1 UIU/ML (ref 0.3–4.2)
WBC # BLD AUTO: 6.2 10E3/UL (ref 4–11)

## 2024-09-12 PROCEDURE — 84443 ASSAY THYROID STIM HORMONE: CPT

## 2024-09-12 PROCEDURE — 99214 OFFICE O/P EST MOD 30 MIN: CPT | Mod: GC

## 2024-09-12 PROCEDURE — 36415 COLL VENOUS BLD VENIPUNCTURE: CPT

## 2024-09-12 PROCEDURE — 85027 COMPLETE CBC AUTOMATED: CPT

## 2024-09-12 PROCEDURE — 80053 COMPREHEN METABOLIC PANEL: CPT

## 2024-09-12 ASSESSMENT — PATIENT HEALTH QUESTIONNAIRE - PHQ9: SUM OF ALL RESPONSES TO PHQ QUESTIONS 1-9: 12

## 2024-09-12 NOTE — TELEPHONE ENCOUNTER
Received CoverMyMeds Key: [id number] for appeal. Still waiting on MTM to verify if we will be appealing this denial.

## 2024-09-12 NOTE — TELEPHONE ENCOUNTER
Yes we will appeal Cibinqo. Holding off on second level appeal for Rinvoq right now because it seems like Cibinqo vs Rinvoq is preferred by plan.     Need additional time to complete appeal letter due to the extensive attestations that insurance is requiring for coverage. Ideally will have done by end of day tomorrow (9/13/24) and route back to pharmacy liaison team.     Prema Giraldo, PharmD  [facility] Pharmacist

## 2024-09-12 NOTE — PATIENT INSTRUCTIONS
Thank you for visiting our clinic today. As we discussed;    1) Take your diet form with you and submit it to [facility]    2) We will check labs today - I will call you with the results    3) Make sure you avoid lactose!    Please feel free to call the clinic with any questions or concerns at [phone], or send a [employer] message and we will get back to you as soon as we can.    Emory Cisneros, DO

## 2024-09-12 NOTE — PROGRESS NOTES
"  Assessment & Plan     Lactose intolerance  Abdominal bloating  Long standing abdominal bloating, occasionally loose stools. Endorses occasionally drinking milk products but tries to stay with lactaid, not a fan of the vanilla flavor. Benign abdominal exam. Wide differential, and symptoms very possibly could be due to continued lactose intake, but will start work up as below.  Provided patient with Special Diet Verification form from [facility]  - CBC with platelets; Future  - Comprehensive metabolic panel; Future  - Comprehensive metabolic panel  - CBC with platelets    Hot flashes  Possible that these hot/cold flashes are symptoms of perimenopause. Hx of subclinical hypothyroidism - will recheck TSH.  - TSH with free T4 reflex; Future  - TSH with free T4 reflex      BMI  Estimated body mass index is 33.61 kg/m  as calculated from the following:    Height as of this encounter: 1.58 m (5' 2.21\").    Weight as of this encounter: 83.9 kg (185 lb).     Depression Screening Follow Up        9/12/2024     9:01 AM   PHQ   PHQ-9 Total Score 12   Q9: Thoughts of better off dead/self-harm past 2 weeks Not at all     Follow Up Actions Taken  Crisis resource information provided in After Visit Summary  Referred patient back to PCP     Return for Follow up with Dr Yousif for any new abdominal symptoms..    Emory Cisneros, DO [place]   Mattie is a 48 year old, presenting for the following health issues:  Forms (Need to fill out special diet form ), Derm Problem (Itching around body and having cramping pain around stomach due to period ), and Results (Want to know her arm test?)      9/12/2024     8:56 AM   Additional Questions   Roomed by msy   Accompanied by self         9/12/2024   Forms   Any forms needing to be completed Yes          9/12/2024    Information    services provided? No        HPI   48-year-old female presenting to clinic for completion of special diet form as well as for " "abdominal bloating, and to discuss test results from last visit.    Previously had a diet form completed by her PCP in 08/2023, recommended lactose diet and high-protein diet greater than 80 g.    She has had a longstanding, off-and-on bloating and loose stools, and would like to discuss her previous test.  Discussed that H. pylori was negative, iron studies were normal, normal hemoglobin.  She does endorse occasionally drinking milk as she does not like the taste of Lactaid, but she tries to avoid cheese and yogurt and other dairy products.  Denies any bloody stools, melanotic stools, N/V/D, or abdominal pain at this time.  When she experiences abdominal pain feels like a bloating sensation.    She would also like her thyroid levels checked.  She is having hot and cold flashes, and is wondering if it is her lateral of her thyroid.  History of subclinical hypothyroidism.  She says she has not had her period since the last time her thyroid levels were checked in 2022.       Objective    /84 (BP Location: Left arm, Patient Position: Sitting, Cuff Size: Adult Regular)   Pulse 73   Temp 98.7  F (37.1  C) (Oral)   Resp 20   Ht 1.58 m (5' 2.21\")   Wt 83.9 kg (185 lb)   LMP 07/23/2022 (Approximate)   SpO2 99%   BMI 33.61 kg/m    Body mass index is 33.61 kg/m .  Physical Exam   GENERAL: alert and no distress  NECK: no adenopathy, no asymmetry, masses, or scars  RESP: lungs clear to auscultation - no rales, rhonchi or wheezes  CV: regular rate and rhythm, normal S1 S2, no S3 or S4, no murmur, click or rub, no peripheral edema  ABDOMEN: soft, nontender, no hepatosplenomegaly, no masses and bowel sounds normal  MS: no gross musculoskeletal defects noted, no edema        Signed Electronically by: Emory Cisneros,   "

## 2024-09-12 NOTE — TELEPHONE ENCOUNTER
Holding off on state level appeal for now. Trying for coverage of Cibinqo.     Thank you,     José Miguel Anton [facility]  Pharmacy Clinic [facility]  [email or web address]   Phone: [phone]  Fax: [phone]

## 2024-09-18 ENCOUNTER — TELEPHONE (OUTPATIENT)
Dept: DERMATOLOGY | Facility: CLINIC | Age: 49
End: 2024-09-18
Payer: MEDICAID

## 2024-09-18 NOTE — TELEPHONE ENCOUNTER
Denial rationale. Please submit chart notes with the following information.   Approval requires attestation that the patient is not considered to be at high risk for thrombosis   Approval requires attestation that the patient individual risks and benefits have been considered prior to initiating or continuing therapy in those at higher risk for malignancy and/or major adverse effects cardiovascular events   Approval requires verification that the patient does not have severe hepatic impairment or severe renal impairment (eGFR <30 mL/min)   Approval requires attestation that the patient has been evaluated & screened for the presence of latent TB infection prior to initiating treatment & will receive ongoing monitoring for presence of TB during treatment  Approval requires attestation that the patient does not have an active infection, including clinically important localized infections   Approval requires that the patient will avoid concomitant therapy with strong CHF8M28 inhibitors   Approval requires verification that the patient will not be prescribed concurrent therapy with other monoclonal antibody biologics or another non-biologic agent (apremilast, baricitinib, tofacitinib, upadacitinib)   Approval requires attestation that the patient will not receive live vaccines during therapy   Approval requires verification that the patient's concomitant therapy with strong BCX0V65 inhibitors (a contraindicated medication) is unavoidable AND that the prescriber attests that the patient will be monitored closely for adverse reaction and/or dose modifications will be implemented  Approval requires that the prescriber attest that the patient is not on concomitant antiplatelet therapies during the first 3 months of treatment   Approval requires verification that the patient is up-to-date with all vaccinations in accordance with current vaccination guidelines prior to initiating therapy   Approval requires verification that  the patient has been evaluated & screened for the presence of viral hepatitis prior to initiating treatment in accordance with clinical guidelines   Approval requires verification that the patient does not have any clinically relevant laboratory abnormalities (I.e., platelet count <150,000/mm3, an ANC <1000.mm3, or Hemoglobin <8g/dL)  Approval requires verification that the patient has been evaluated to identify or rule out other causes of Atopic Dermatitis (I.e., hypersensitivity allergen identification, cutaneous lymphomas, etc)     Note: [facility] will need to confirm the above attestations with Derm (Dr. Li) prior to submitting appeal to insurance

## 2024-09-18 NOTE — TELEPHONE ENCOUNTER
Sent provider and patient form for Prema Giraldo via e-mail.    Thank you,     José Miguel Anton [facility]  Pharmacy Clinic [facility]  [email or web address]   Phone: [phone]  Fax: [phone]

## 2024-09-18 NOTE — TELEPHONE ENCOUNTER
Called patient today to discuss applying for Rinvoq PAP. We were not able to get into her online account with [employer] to apply this way. Instead I will mail the patient a physical form with application tomorrow when I am in clinic. Explained that she will need to complete & sign this form, then mail it to [employer]. Patient agreed to this. Instructed patient to reach out to me if she has questions when completing the physical application for Rinvoq PAP.     Prema Giraldo, PharmD  [facility] Pharmacist

## 2024-09-20 ENCOUNTER — TELEPHONE (OUTPATIENT)
Dept: FAMILY MEDICINE | Facility: CLINIC | Age: 49
End: 2024-09-20

## 2024-09-20 NOTE — TELEPHONE ENCOUNTER
Test Results        Who ordered the test:  ghada    Type of test: Lab    Date of test:  09122024    Where was the test performed:  [facility]    What are your questions/concerns?:  results    Could we send this information to you in [facility] or would you prefer to receive a phone call?:   Patient would prefer a phone call   Okay to leave a detailed message?: Yes at Home number on file [phone] (home)    Does patient or caller know when to expect a call? Yes, Nurses will return call within 2-3 business hours.       Ananda Casarez on 9/20/2024 at 2:07 PM

## 2024-09-20 NOTE — TELEPHONE ENCOUNTER
MTJOSUE was not able to connect with provider in clinic yesterday. Currently pursuing Rinvoq PAP application. Mailed to patient yesterday. Will discuss this appeal & all of the authorizations required with provider (Dr. Li) in clinic next Thurs.     Prema Giraldo, PharmD  [facility] Pharmacist

## 2024-09-20 NOTE — TELEPHONE ENCOUNTER
Mailed patient portion to her yesterday (9/19/24) with instructions on how to complete & mail in to [employer]. Will connect with patient next week to confirm that she received this & has mailed to [employer]. Sent email with scanned in provider portion to José Miguel.     Prema Giraldo, PharmD  [facility] Pharmacist

## 2024-09-21 ENCOUNTER — MYC REFILL (OUTPATIENT)
Dept: DERMATOLOGY | Facility: CLINIC | Age: 49
End: 2024-09-21
Payer: MEDICAID

## 2024-09-21 ENCOUNTER — MYC REFILL (OUTPATIENT)
Dept: FAMILY MEDICINE | Facility: CLINIC | Age: 49
End: 2024-09-21
Payer: MEDICAID

## 2024-09-21 DIAGNOSIS — L20.89 OTHER ATOPIC DERMATITIS: ICD-10-CM

## 2024-09-21 DIAGNOSIS — M77.12 LEFT TENNIS ELBOW: ICD-10-CM

## 2024-09-23 RX ORDER — NAPROXEN 500 MG/1
500 TABLET ORAL 2 TIMES DAILY PRN
Qty: 30 TABLET | Refills: 0 | Status: SHIPPED | OUTPATIENT
Start: 2024-09-23

## 2024-09-23 NOTE — TELEPHONE ENCOUNTER
naproxen (NAPROSYN) 500 MG tablet         Sig: Take 1 tablet (500 mg) by mouth 2 times daily as needed for moderate pain.    Disp: 30 tablet    Refills: 0    Start: 9/21/2024    Class: E-Prescribe    Non-formulary For: Left tennis elbow    Last ordered: 1 month ago (8/1/2024) by Rebeca Rodriguez MD    NSAID Medications [id number]/21/2024 03:59 PM   Protocol Details Always Fail Criteria - Chart Review Required        GARRY Trammell, BSN

## 2024-09-23 NOTE — TELEPHONE ENCOUNTER
FREE DRUG APPLICATION INITIATED    Medication:  Rinvoq  Free Drug Program Name:  MyAbbvie  Date Submitted: 9/23/2024  9:27 AM  Phone #: [phone]  Fax #: [phone]  Additional Information: Faxed completed provider form to [employer] at the fax number listed above. Fax confirmed sent.    Thank you,     José Miguel Anton [facility]  Pharmacy Clinic [facility]  [email or web address]   Phone: [phone]  Fax: [phone]

## 2024-09-23 NOTE — TELEPHONE ENCOUNTER
Sent staff to Dr. Li on 9.23.24 to confirm he is comfortable attesting criteria #1, 2, 5, and 14. Have confirmed all other criteria are met.     Prema Giraldo [facility]

## 2024-09-23 NOTE — TELEPHONE ENCOUNTER
We are pursuing Rinvoq (upadacitinib) patient assistance program for free drug instead of Cibinqo (abrocitinib) at the moment. Patient is aware of this. Also she has not started the Cibinqo so I am declining the refill request for this. It hasn't been released to the pharmacy because we didn't end up getting Cibinqo covered by insurance.     Prema Giraldo RP

## 2024-09-23 NOTE — TELEPHONE ENCOUNTER
Called patient to confirm she received Rinvoq PAP ana in mail. Reports she signed & mailed this to [employer] as I directed.  Routing to Derm pharmacy liaisons please check to make sure program received patient's portion of application in the next week.     Prema Giraldo, PharmD  [facility] Pharmacist

## 2024-09-26 NOTE — TELEPHONE ENCOUNTER
Confirmed Dr. Li is open to attesting the current requirements for coverage of Cibinqo. Will hold off submitting an appeal for now. Awaiting response from Rinvoq PAP.     Prema Giraldo, PharmD  [facility] Pharmacist

## 2024-10-03 NOTE — PROGRESS NOTES
Results are ready for pickup and are at the    To be completed in Nursing note:    Please reference list for forms that require a visit for completion.  Please remind patients that providers are given 3-5 business days to complete and return forms.      Form type: [employer] Res[employer] Gudelines from [facility]    Date form received: 19    Date form completed by Physician: 19    How was form returned to patient (mailed, faxed, or at  for patient to ): fax to [phone] ATTN: Delia    Date form mailed/faxed/left at  for patient and sent to HIM for scannin19      Once form is left for patient, faxed, or mailed PCS will then close the documentation only encounter.

## 2024-10-03 NOTE — TELEPHONE ENCOUNTER
[facility] to check status of application was informed they still need providers portion and copy of insurance card.  Resent provider portion of application, insurance card and denial letters from insurance to [facility] fax# [phone]

## 2024-10-07 NOTE — TELEPHONE ENCOUNTER
Called Rafael at [phone]. They stated that they received the needed information. They are working on processing the application. They stated that it usually takes 7-10 business days to complete the process.    Thank you,     José Miguel Anton [facility]  Pharmacy Clinic [facility]  [email or web address]   Phone: [phone]  Fax: [phone]

## 2024-10-09 NOTE — TELEPHONE ENCOUNTER
Called [employer] at [phone]. They stated that they needed an appeal denial letter. That had already been sent so they said that they would myra that and continue processing the application.         Thank you,     José Miguel Anton [facility]  [facility]  [email or web address]   Phone: [phone]  Fax: [phone]

## 2024-10-14 NOTE — TELEPHONE ENCOUNTER
FREE DRUG APPLICATION APPROVED    Medication: RINVOQ 15 MG PO TB24  Program Name:  MyAbbvie  Effective Date: 10/10/2024  Expiration Date: 10/10/2025  Pharmacy Filling the Rx:  [facility]  Patient Notified:   Additional Information:             Thank you,     José Miguel Anton, [facility]  Pharmacy Clinic [facility]  [email or web address]   Phone: [phone]  Fax: [phone]

## 2024-10-15 NOTE — TELEPHONE ENCOUNTER
Routing update to liaisons. This encounter can be closed. Patient was approved for Rinvoq PAP. Will start Rinvoq vs Cibinqo. Canceled Rx orders for Cibinqo.     Prema Giraldo, PharmD  [facility] Pharmacist

## 2024-11-25 NOTE — MR AVS SNAPSHOT
After Visit Summary   2/28/2017    Mattie Pierre    MRN: [id number]           Patient Information     Date Of Birth          1975        Visit Information        Provider Department      2/28/2017 2:30 PM Marcelo Arriaga MD [facility]        Today's Diagnoses     Chronic pain syndrome    -  1       Follow-ups after your visit        Additional Services     MENTAL HEALTH REFERRAL       Use this form for in clinic and community psychiatry and behavioral health consults. The referral coordinator will help to determine whether patients are best served by clinic behavioral health staff or by community providers.    Reason for referral: Chronic Pain Management    Please provide data for below screening tools if available.   PHQ-9 Score:   Bipolar Screen:   TRUE & Score:  PC-PTSD Score:   MMSE:   [place] (ADHD):   MCHAT (Autism Screen):  Pediatric Symptom Checklist (PSC):   Other Screening measureAdult Psychiatry--for diagnosis and medication managements used:     Type of referral requested (indicate all that apply):         needed: No  Language: English                  Your next 10 appointments already scheduled     Mar 15, 2017  2:30 PM CDT   RETURN EXTENDED with Marcelo Arriaga MD   [facility] ([facility])    [address] [place] [address]   [phone]              Future tests that were ordered for you today     Open Future Orders        Priority Expected Expires Ordered    MENTAL HEALTH REFERRAL Routine  2/28/2018 2/28/2017            Who to contact     Please call your clinic at [phone] to:    Ask questions about your health    Make or cancel appointments    Discuss your medicines    Learn about your test results    Speak to your doctor   If you have compliments or concerns about an experience at your clinic, or if you wish to file a complaint, please contact [facility] Patient Relations at [phone] or email us at  76 yr old male from home with PMH of recent diagnosed prostate cancer, s/p transurethral focal water vapor ablation on 07/01/24, asthma (controlled no recent hosp or exacerbation), gout, former smoker (quit 1990) and lung nodule, cerebral aneurysm (sx 2019 [place]), PVD, osteoarthritis presenting to ER with cc of SOB for the past couple of weeks.      #SOB 2/2 acute asthma exacerbation likely due to viral PNA   #hx of bilateral lung nodules   - admit to medicine with telemetry and pulse oximetry  - CT chest done 11/6 with stable lung nodule; f/u pulm outpatient for repeat imaging in 6 months (Out pat pulm f/u with Dr. Mason)  - RSV positive   - trop neg  - TTE: lvef: 55 to 60 %.  - DDimer neg, no DVT on LE doppler   - Incentive spirometer, OOB chair  - PT eval: home PT   - pulm rec appreciated:        taper steroids (now on solumedrol 40mg q12hr)        c/w nebs        c/w advair and spiriva while here (pt on trelegy at home)       c/w singulair 10gm qd       hycodan 5ml Q8h x 3 days for cough       c/w Azithromycin, CTX      #Recent prostate cancer, s/p surgery  - F/u outpatient; not on further treatment    #Gout  - C/w allopurinol    #Hx of cerebral neurysm 2019  - Stable    #OA  #Neuropathy  - Lyrica 75mg TID  - LE doppler neg DVT    #Insomnia  - C/w ambien 5mg qhs prn    DVT PPX: Lovenox  Full code  DISP pending  respiratory improvement       Discussed with Dr Daniels  [email or web address]         Additional Information About Your Visit        Care EveryWhere ID     This is your Care EveryWhere ID. This could be used by other organizations to access your [place] medical records  [id number]        Your Vitals Were     Pulse Temperature Last Period BMI (Body Mass Index)          72 97.9  F (36.6  C) (Oral) 02/14/2017 (Approximate) 37.19 kg/m2         Blood Pressure from Last 3 Encounters:   02/28/17 111/75   02/21/17 112/74   01/17/17 113/74    Weight from Last 3 Encounters:   02/28/17 205 lb (93 kg)   02/21/17 203 lb 12.8 oz (92.4 kg)   01/17/17 205 lb 12.8 oz (93.4 kg)              We Performed the Following     Rapid Urine Drug Screen ([facility])        Primary Care Provider Office Phone # Fax #    Marcelo Arriaga [phone]       [facility] [address] [place] [address]        Thank you!     Thank you for choosing [facility]  for your care. Our goal is always to provide you with excellent care. Hearing back from our patients is one way we can continue to improve our services. Please take a few minutes to complete the written survey that you may receive in the mail after your visit with us. Thank you!             Your Updated Medication List - Protect others around you: Learn how to safely use, store and throw away your medicines at www.disposemymeds.org.          This list is accurate as of: 2/28/17  4:25 PM.  Always use your most recent med list.                   Brand Name Dispense Instructions for use    KLONOPIN PO          * order for DME     150 Units    Equipment being ordered: Adult incontinence diapers       * order for DME     1 Device    Equipment being ordered: 4 wheeled walker with seat.       oxybutynin 10 MG 24 hr tablet    DITROPAN XL    90 tablet    Take 1 tablet (10 mg) by mouth daily       * SEROQUEL PO      Take 50 mg by mouth 2 times daily       * SEROQUEL PO      Take 50 mg by mouth 1 in am and 1  at night and 1 prn       * Notice:  This list has 4 medication(s) that are the same as other medications prescribed for you. Read the directions carefully, and ask your doctor or other care provider to review them with you.

## 2024-12-04 ENCOUNTER — OFFICE VISIT (OUTPATIENT)
Dept: FAMILY MEDICINE | Facility: CLINIC | Age: 49
End: 2024-12-04
Payer: MEDICAID

## 2024-12-04 VITALS
DIASTOLIC BLOOD PRESSURE: 79 MMHG | TEMPERATURE: 98.1 F | HEIGHT: 62 IN | SYSTOLIC BLOOD PRESSURE: 141 MMHG | WEIGHT: 180 LBS | HEART RATE: 67 BPM | OXYGEN SATURATION: 99 % | BODY MASS INDEX: 33.13 KG/M2 | RESPIRATION RATE: 20 BRPM

## 2024-12-04 DIAGNOSIS — Z00.00 ROUTINE GENERAL MEDICAL EXAMINATION AT A HEALTH CARE FACILITY: Primary | ICD-10-CM

## 2024-12-04 DIAGNOSIS — I10 PRIMARY HYPERTENSION: ICD-10-CM

## 2024-12-04 DIAGNOSIS — Z12.4 CERVICAL CANCER SCREENING: ICD-10-CM

## 2024-12-04 DIAGNOSIS — M94.0 COSTOCHONDRITIS: ICD-10-CM

## 2024-12-04 DIAGNOSIS — F14.20 COCAINE USE DISORDER, MODERATE, DEPENDENCE (H): ICD-10-CM

## 2024-12-04 DIAGNOSIS — Z11.3 ROUTINE SCREENING FOR STI (SEXUALLY TRANSMITTED INFECTION): ICD-10-CM

## 2024-12-04 DIAGNOSIS — Z13.9 ENCOUNTER FOR SCREENING INVOLVING SOCIAL DETERMINANTS OF HEALTH (SDOH): ICD-10-CM

## 2024-12-04 LAB
CLUE CELLS: PRESENT
TRICHOMONAS, WET PREP: ABNORMAL
WBC'S/HIGH POWER FIELD, WET PREP: ABNORMAL
YEAST, WET PREP: ABNORMAL

## 2024-12-04 RX ORDER — PANTOPRAZOLE SODIUM 40 MG/1
40 TABLET, DELAYED RELEASE ORAL DAILY
COMMUNITY
Start: 2024-11-27

## 2024-12-04 RX ORDER — ASPIRIN 325 MG
1 TABLET ORAL DAILY
COMMUNITY
Start: 2024-11-27

## 2024-12-04 RX ORDER — LOSARTAN POTASSIUM 100 MG/1
100 TABLET ORAL DAILY
Qty: 90 TABLET | Refills: 3 | Status: SHIPPED | OUTPATIENT
Start: 2024-12-04

## 2024-12-04 RX ORDER — MELOXICAM 7.5 MG/1
7.5 TABLET ORAL DAILY
Qty: 7 TABLET | Refills: 0 | Status: SHIPPED | OUTPATIENT
Start: 2024-12-04 | End: 2024-12-11

## 2024-12-04 RX ORDER — LEVETIRACETAM 750 MG/1
750 TABLET ORAL 2 TIMES DAILY
COMMUNITY
Start: 2024-11-26

## 2024-12-04 SDOH — HEALTH STABILITY: PHYSICAL HEALTH: ON AVERAGE, HOW MANY DAYS PER WEEK DO YOU ENGAGE IN MODERATE TO STRENUOUS EXERCISE (LIKE A BRISK WALK)?: 2 DAYS

## 2024-12-04 ASSESSMENT — PATIENT HEALTH QUESTIONNAIRE - PHQ9
SUM OF ALL RESPONSES TO PHQ QUESTIONS 1-9: 18
SUM OF ALL RESPONSES TO PHQ QUESTIONS 1-9: 18
10. IF YOU CHECKED OFF ANY PROBLEMS, HOW DIFFICULT HAVE THESE PROBLEMS MADE IT FOR YOU TO DO YOUR WORK, TAKE CARE OF THINGS AT HOME, OR GET ALONG WITH OTHER PEOPLE: EXTREMELY DIFFICULT

## 2024-12-04 ASSESSMENT — SOCIAL DETERMINANTS OF HEALTH (SDOH): HOW OFTEN DO YOU GET TOGETHER WITH FRIENDS OR RELATIVES?: NEVER

## 2024-12-04 NOTE — PATIENT INSTRUCTIONS
Patient Education   Preventive Care Advice   This is general advice given by our system to help you stay healthy. However, your care team may have specific advice just for you. Please talk to your care team about your preventive care needs.  Nutrition  Eat 5 or more servings of fruits and vegetables each day.  Try wheat bread, brown rice and whole grain pasta (instead of white bread, rice, and pasta).  Get enough calcium and vitamin D. Check the label on foods and aim for 100% of the RDA (recommended daily allowance).  Lifestyle  Exercise at least 150 minutes each week  (30 minutes a day, 5 days a week).  Do muscle strengthening activities 2 days a week. These help control your weight and prevent disease.  No smoking.  Wear sunscreen to prevent skin cancer.  Have a dental exam and cleaning every 6 months.  Yearly exams  See your health care team every year to talk about:  Any changes in your health.  Any medicines your care team has prescribed.  Preventive care, family planning, and ways to prevent chronic diseases.  Shots (vaccines)   HPV shots (up to age 26), if you've never had them before.  Hepatitis B shots (up to age 59), if you've never had them before.  COVID-19 shot: Get this shot when it's due.  Flu shot: Get a flu shot every year.  Tetanus shot: Get a tetanus shot every 10 years.  Pneumococcal, hepatitis A, and RSV shots: Ask your care team if you need these based on your risk.  Shingles shot (for age 50 and up)  General health tests  Diabetes screening:  Starting at age 35, Get screened for diabetes at least every 3 years.  If you are younger than age 35, ask your care team if you should be screened for diabetes.  Cholesterol test: At age 39, start having a cholesterol test every 5 years, or more often if advised.  Bone density scan (DEXA): At age 50, ask your care team if you should have this scan for osteoporosis (brittle bones).  Hepatitis C: Get tested at least once in your life.  STIs (sexually  transmitted infections)  Before age 24: Ask your care team if you should be screened for STIs.  After age 24: Get screened for STIs if you're at risk. You are at risk for STIs (including HIV) if:  You are sexually active with more than one person.  You don't use condoms every time.  You or a partner was diagnosed with a sexually transmitted infection.  If you are at risk for HIV, ask about PrEP medicine to prevent HIV.  Get tested for HIV at least once in your life, whether you are at risk for HIV or not.  Cancer screening tests  Cervical cancer screening: If you have a cervix, begin getting regular cervical cancer screening tests starting at age 21.  Breast cancer scan (mammogram): If you've ever had breasts, begin having regular mammograms starting at age 40. This is a scan to check for breast cancer.  Colon cancer screening: It is important to start screening for colon cancer at age 45.  Have a colonoscopy test every 10 years (or more often if you're at risk) Or, ask your provider about stool tests like a FIT test every year or Cologuard test every 3 years.  To learn more about your testing options, visit:   .  For help making a decision, visit:   https://bit.ly/bz35480.  Prostate cancer screening test: If you have a prostate, ask your care team if a prostate cancer screening test (PSA) at age 55 is right for you.  Lung cancer screening: If you are a current or former smoker ages 50 to 80, ask your care team if ongoing lung cancer screenings are right for you.  For informational purposes only. Not to replace the advice of your health care provider. Copyright   2023 [facility]. All rights reserved. Clinically reviewed by the [facility]. [employer] [id number] - REV 01/24.  Preventing Falls: Care Instructions  Injuries and health problems such as trouble walking or poor eyesight can increase your risk of falling. So can some medicines. But there are things you can do to help  "prevent falls. You can exercise to get stronger. You can also arrange your home to make it safer.    Talk to your doctor about the medicines you take. Ask if any of them increase the risk of falls and whether they can be changed or stopped.   Try to exercise regularly. It can help improve your strength and balance. This can help lower your risk of falling.         Practice fall safety and prevention.   Wear low-heeled shoes that fit well and give your feet good support. Talk to your doctor if you have foot problems that make this hard.  Carry a cellphone or wear a medical alert device that you can use to call for help.  Use stepladders instead of chairs to reach high objects. Don't climb if you're at risk for falls. Ask for help, if needed.  Wear the correct eyeglasses, if you need them.        Make your home safer.   Remove rugs, cords, clutter, and furniture from walkways.  Keep your house well lit. Use night-lights in hallways and bathrooms.  Install and use sturdy handrails on stairways.  Wear nonskid footwear, even inside. Don't walk barefoot or in socks without shoes.        Be safe outside.   Use handrails, curb cuts, and ramps whenever possible.  Keep your hands free by using a shoulder bag or backpack.  Try to walk in well-lit areas. Watch out for uneven ground, changes in pavement, and debris.  Be careful in the winter. Walk on the grass or gravel when sidewalks are slippery. Use de-icer on steps and walkways. Add non-slip devices to shoes.    Put grab bars and nonskid mats in your shower or tub and near the toilet. Try to use a shower chair or bath bench when bathing.   Get into a tub or shower by putting in your weaker leg first. Get out with your strong side first. Have a phone or medical alert device in the bathroom with you.   Where can you learn more?  Go to https://www.[employer]wise.net/patiented  Enter G117 in the search box to learn more about \"Preventing Falls: Care Instructions.\"  Current as of: " July 17, 2023  Content Version: 14.2 2024 [employer].   Care instructions adapted under license by your healthcare professional. If you have questions about a medical condition or this instruction, always ask your healthcare professional. Healthwise, Incorporated disclaims any warranty or liability for your use of this information.    Learning About Stress  What is stress?     Stress is your body's response to a hard situation. Your body can have a physical, emotional, or mental response. Stress is a fact of life for most people, and it affects everyone differently. What causes stress for you may not be stressful for someone else.  A lot of things can cause stress. You may feel stress when you go on a job interview, take a test, or run a race. This kind of short-term stress is normal and even useful. It can help you if you need to work hard or react quickly. For example, stress can help you finish an important job on time.  Long-term stress is caused by ongoing stressful situations or events. Examples of long-term stress include long-term health problems, ongoing problems at work, or conflicts in your family. Long-term stress can harm your health.  How does stress affect your health?  When you are stressed, your body responds as though you are in danger. It makes hormones that speed up your heart, make you breathe faster, and give you a burst of energy. This is called the fight-or-flight stress response. If the stress is over quickly, your body goes back to normal and no harm is done.  But if stress happens too often or lasts too long, it can have bad effects. Long-term stress can make you more likely to get sick, and it can make symptoms of some diseases worse. If you tense up when you are stressed, you may develop neck, shoulder, or low back pain. Stress is linked to high blood pressure and heart disease.  Stress also harms your emotional health. It can make you mak, tense, or depressed. Your  relationships may suffer, and you may not do well at work or school.  What can you do to manage stress?  You can try these things to help manage stress:   Do something active. Exercise or activity can help reduce stress. Walking is a great way to get started. Even everyday activities such as housecleaning or yard work can help.  Try yoga or javier chi. These techniques combine exercise and meditation. You may need some training at first to learn them.  Do something you enjoy. For example, listen to music or go to a movie. Practice your hobby or do volunteer work.  Meditate. This can help you relax, because you are not worrying about what happened before or what may happen in the future.  Do guided imagery. Imagine yourself in any setting that helps you feel calm. You can use online videos, books, or a teacher to guide you.  Do breathing exercises. For example:  From a standing position, bend forward from the waist with your knees slightly bent. Let your arms dangle close to the floor.  Breathe in slowly and deeply as you return to a standing position. Roll up slowly and lift your head last.  Hold your breath for just a few seconds in the standing position.  Breathe out slowly and bend forward from the waist.  Let your feelings out. Talk, laugh, cry, and express anger when you need to. Talking with supportive friends or family, a counselor, or a piper leader about your feelings is a healthy way to relieve stress. Avoid discussing your feelings with people who make you feel worse.  Write. It may help to write about things that are bothering you. This helps you find out how much stress you feel and what is causing it. When you know this, you can find better ways to cope.  What can you do to prevent stress?  You might try some of these things to help prevent stress:  Manage your time. This helps you find time to do the things you want and need to do.  Get enough sleep. Your body recovers from the stresses of the day while  "you are sleeping.  Get support. Your family, friends, and community can make a difference in how you experience stress.  Limit your news feed. Avoid or limit time on social media or news that may make you feel stressed.  Do something active. Exercise or activity can help reduce stress. Walking is a great way to get started.  Where can you learn more?  Go to https://www.Snapfish.net/patiented  Enter N032 in the search box to learn more about \"Learning About Stress.\"  Current as of: October 24, 2023  Content Version: 14.2 2024 [employer].   Care instructions adapted under license by your healthcare professional. If you have questions about a medical condition or this instruction, always ask your healthcare professional. Healthwise, [employer] disclaims any warranty or liability for your use of this information.    Learning About Depression Screening  What is depression screening?  Depression screening is a way to see if you have depression symptoms. It may be done by a doctor or counselor. It's often part of a routine checkup. That's because your mental health is just as important as your physical health.  Depression is a mental health condition that affects how you feel, think, and act. You may:  Have less energy.  Lose interest in your daily activities.  Feel sad and grouchy for a long time.  Depression is very common. It affects people of all ages.  Many things can lead to depression. Some people become depressed after they have a stroke or find out they have a major illness like cancer or heart disease. The death of a loved one or a breakup may lead to depression. It can run in families. Most experts believe that a combination of inherited genes and stressful life events can cause it.  What happens during screening?  You may be asked to fill out a form about your depression symptoms. You and the doctor will discuss your answers. The doctor may ask you more questions to learn more about how you " "think, act, and feel.  What happens after screening?  If you have symptoms of depression, your doctor will talk to you about your options.  Doctors usually treat depression with medicines or counseling. Often, combining the two works best. Many people don't get help because they think that they'll get over the depression on their own. But people with depression may not get better unless they get treatment.  The cause of depression is not well understood. There may be many factors involved. But if you have depression, it's not your fault.  A serious symptom of depression is thinking about death or suicide. If you or someone you care about talks about this or about feeling hopeless, get help right away.  It's important to know that depression can be treated. Medicine, counseling, and self-care may help.  Where can you learn more?  Go to https://www.expressor software.net/patiented  Enter T185 in the search box to learn more about \"Learning About Depression Screening.\"  Current as of: June 24, 2023  Content Version: 14.2 2024 [facility] [employer].   Care instructions adapted under license by your healthcare professional. If you have questions about a medical condition or this instruction, always ask your healthcare professional. Healthwise, [employer] disclaims any warranty or liability for your use of this information.    Substance Use Disorder: Care Instructions  Overview     You can improve your life and health by stopping your use of alcohol or drugs. When you don't drink or use drugs, you may feel and sleep better. You may get along better with your family, friends, and coworkers. There are medicines and programs that can help with substance use disorder.  How can you care for yourself at home?  Here are some ways to help you stay sober and prevent relapse.  If you have been given medicine to help keep you sober or reduce your cravings, be sure to take it exactly as prescribed.  Talk to your doctor about programs " that can help you stop using drugs or drinking alcohol.  Do not keep alcohol or drugs in your home.  Plan ahead. Think about what you'll say if other people ask you to drink or use drugs. Try not to spend time with people who drink or use drugs.  Use the time and money spent on drinking or drugs to do something that's important to you.  Preventing a relapse  Have a plan to deal with relapse. Learn to recognize changes in your thinking that lead you to drink or use drugs. Get help before you start to drink or use drugs again.  Try to stay away from situations, friends, or places that may lead you to drink or use drugs.  If you feel the need to drink alcohol or use drugs again, seek help right away. Call a trusted friend or family member. Some people get support from organizations such as Narcotics Anonymous or [employer] or from treatment facilities.  If you relapse, get help as soon as you can. Some people make a plan with another person that outlines what they want that person to do for them if they relapse. The plan usually includes how to handle the relapse and who to notify in case of relapse.  Don't give up. Remember that a relapse doesn't mean that you have failed. Use the experience to learn the triggers that lead you to drink or use drugs. Then quit again. Recovery is a lifelong process. Many people have several relapses before they are able to quit for good.  Follow-up care is a key part of your treatment and safety. Be sure to make and go to all appointments, and call your doctor if you are having problems. It's also a good idea to know your test results and keep a list of the medicines you take.  When should you call for help?   Call 911  anytime you think you may need emergency care. For example, call if you or someone else:    Has overdosed or has withdrawal signs. Be sure to tell the emergency workers that you are or someone else is using or trying to quit using drugs. Overdose or withdrawal signs  "may include:  Losing consciousness.  Seizure.  Seeing or hearing things that aren't there (hallucinations).     Is thinking or talking about suicide or harming others.   Where to get help 24 hours a day, 7 days a week   If you or someone you know talks about suicide, self-harm, a mental health crisis, a substance use crisis, or any other kind of emotional distress, get help right away. You can:    Call the Suicide and Crisis Lifeline at 478.     Call 9-661-513-TALK (1-809.949.1409).     Text HOME to 347188 to access the Crisis Text Line.   Consider saving these numbers in your phone.  Go to [facility] for more information or to chat online.  Call your doctor now or seek immediate medical care if:    You are having withdrawal symptoms. These may include nausea or vomiting, sweating, shakiness, and anxiety.   Watch closely for changes in your health, and be sure to contact your doctor if:    You have a relapse.     You need more help or support to stop.   Where can you learn more?  Go to https://www.[employer].net/patiented  Enter H573 in the search box to learn more about \"Substance Use Disorder: Care Instructions.\"  Current as of: November 15, 2023  Content Version: 14.2 2024 [facility] [employer].   Care instructions adapted under license by your healthcare professional. If you have questions about a medical condition or this instruction, always ask your healthcare professional. Healthwise, Incorporated disclaims any warranty or liability for your use of this information.       "

## 2024-12-04 NOTE — PROGRESS NOTES
Preventive Care Visit  [facility]  Kade Yousif DO, Family Medicine  Dec 4, 2024    Assessment & Plan     Routine general medical examination at a health care facility  Encounter for screening involving social determinants of health (SDoH)  Cervical cancer screening  -Presents today for routine preventative.  Immunizations offered and declined by patient.  HPV testing was performed today as was Pap smear.  Declined colonoscopy or FIT testing.  She has a number of medications in her med list and she is unaware of what medication she is taking on a daily basis.  I recommended an MTM pharmacy visit and she declined this as well.  - HPV and Gynecologic Cytology Panel - Recommended Age 30 - 65 Years    Routine screening for STI (sexually transmitted infection)  -He reports a remote history of unprotected sexual intercourse with a male partner and requests sexually transmitted infection testing.  She is asymptomatic.  - Wet preparation  - Chlamydia trachomatis/Neisseria gonorrhoeae by PCR - Clinic Collect    Primary hypertension  She has a history of hypertension.  She does not know which medication she is taking, I will send in a refill of losartan to pharmacy on file.  Recommended MTM pharmacy visit for medical reconciliation, she declined this.  If she does not get set up with an MTM visit, I recommend follow-up in about a month for repeat blood pressure testing.  - losartan (COZAAR) 100 MG tablet  Dispense: 90 tablet; Refill: 3    Costochondritis  She was recently diagnosed with costochondritis.  Her kidney function was stable at 1.1.  Will send in a short course of low-dose meloxicam.  No reported history of GI bleed.  Will also send in topical diclofenac gel which she can use on an as-needed basis.  She requested stronger, opioid medication for costochondritis, however given her history of cocaine use and that NSAIDs would likely provide more benefit I declined this request.  She became  "agitated at this and left the office.  - meloxicam (MOBIC) 7.5 MG tablet  Dispense: 7 tablet; Refill: 0  - diclofenac (VOLTAREN) 1 % topical gel  Dispense: 350 g; Refill: 0    Cocaine use disorder, moderate, dependence (H)  -Last reported use of cocaine was 7/19/2024.  I take this diagnosis into consideration when making medical decisions about this patient's care.  Declined mental health referral today.      BMI  Estimated body mass index is 32.92 kg/m  as calculated from the following:    Height as of this encounter: 1.575 m (5' 2\").    Weight as of this encounter: 81.6 kg (180 lb).     Depression Screening Follow Up        12/4/2024     9:57 AM   PHQ   PHQ-9 Total Score 18    Q9: Thoughts of better off dead/self-harm past 2 weeks Not at all        Patient-reported     -Patient declined mental health referral.      Follow Up Actions Taken  Crisis resource information provided in After Visit Summary  Patient declined referral.     Counseling  Appropriate preventive services were addressed with this patient via screening, questionnaire, or discussion as appropriate for fall prevention, nutrition, physical activity, Tobacco-use cessation, social engagement, weight loss and cognition.  Checklist reviewing preventive services available has been given to the patient.  Reviewed patient's diet, addressing concerns and/or questions.   She is at risk for lack of exercise and has been provided with information to increase physical activity for the benefit of her well-being.   Patient is at risk for social isolation and has been provided with information about the benefit of social connection.   She is at risk for psychosocial distress and has been provided with information to reduce risk.   The patient's PHQ-9 score is consistent with moderate depression. She was provided with information regarding depression.       Return in about 2 weeks (around 12/18/2024) for  Follow up MTM.    Samina   Mattie is a 49 year old, " presenting for the following:  Physical and Gyn Exam        12/4/2024    10:13 AM   Additional Questions   Roomed by Mao   Accompanied by SELF         12/4/2024    Information    services provided? No             HPI  Mattie presents today for routine preventative appointment.  She was seen at [facility] on 11/23/2024.  And was discharged on 11/26/2024.  She was diagnosed with chest pain and costochondritis.  A code stroke was called on her on 11/24/2024 due to confusion and garbled speech along with weakness.  Neurology was consulted and she was started on twice daily Keppra.  She has a follow-up appointment with neurology scheduled.  She denies headaches, fever, chills, nausea, vomiting, shortness of breath.  She does affirm chest pain which is elicited with palpation.  Reports that she was recently diagnosed with costochondritis.  Denies chest pain with exertion.  Denies any palpitations.  She reports that she had a remote history of unprotected sexual intercourse and requests sexual transmitted infection testing.  She reports no additional concerns at this time.    Health Care Directive  Patient does not have a Health Care Directive: Discussed advance care planning with patient; however, patient declined at this time.      12/4/2024   General Health   How would you rate your overall physical health? (!) POOR   Feel stress (tense, anxious, or unable to sleep) Very much      (!) STRESS CONCERN      12/4/2024   Nutrition   Three or more servings of calcium each day? (!) I DON'T KNOW   Diet: Low salt    Low fat/cholesterol    Carbohydrate counting   How many servings of fruit and vegetables per day? (!) 0-1   How many sweetened beverages each day? (!) 4+       Multiple values from one day are sorted in reverse-chronological order         12/4/2024   Exercise   Days per week of moderate/strenous exercise 2 days      (!) EXERCISE CONCERN      12/4/2024   Social Factors   Frequency of gathering with  friends or relatives Never   Worry food won't last until get money to buy more Yes   Food not last or not have enough money for food? Yes   Do you have housing? (Housing is defined as stable permanent housing and does not include staying ouside in a car, in a tent, in an abandoned building, in an overnight shelter, or couch-surfing.) Patient declined   Are you worried about losing your housing? Yes   Lack of transportation? Yes   Unable to get utilities (heat,electricity)? Yes   Want help with housing or utility concern? (!) YES      (!) FOOD SECURITY CONCERN PRESENT (!) TRANSPORTATION CONCERN PRESENT(!) HOUSING CONCERN PRESENT(!) FINANCIAL RESOURCE STRAIN CONCERN(!) SOCIAL CONNECTIONS CONCERN      12/4/2024   Fall Risk   Fallen 2 or more times in the past year? Yes    Trouble with walking or balance? Yes    Gait Speed Test (Document in seconds) 11.22   Gait Speed Test Interpretation Greater than 5.01 seconds - ABNORMAL       Patient-reported          12/4/2024   Dental   Dentist two times every year? (!) DECLINE             Today's PHQ-9 Score:       12/4/2024     9:57 AM   PHQ-9 SCORE   PHQ-9 Total Score MyChart 18 (Moderately severe depression)   PHQ-9 Total Score 18        Patient-reported         12/4/2024   Substance Use   If I could quit smoking, I would Somewhat disagree   I want to quit somking, worry about health affects Somewhat disagree   Willing to make a plan to quit smoking Somewhat disagree   Willing to cut down before quitting Somewhat disagree   Alcohol more than 3/day or more than 7/wk Not Applicable   Do you use any other substances recreationally? (!) COCAINE        Social History     Tobacco Use    Smoking status: Every Day     Current packs/day: 0.50     Average packs/day: 0.5 packs/day for 23.0 years (11.5 ttl pk-yrs)     Types: Cigarettes    Smokeless tobacco: Never    Tobacco comments:     trying to quit, but states that it's hard   Vaping Use    Vaping status: Never Used   Substance Use  Topics    Alcohol use: Not Currently     Alcohol/week: 1.0 standard drink of alcohol     Types: 1 Standard drinks or equivalent per week    Drug use: Yes     Types: Cocaine           1/5/2024   LAST FHS-7 RESULTS   1st degree relative breast or ovarian cancer No   Any relative bilateral breast cancer No   Any male have breast cancer No   Any ONE woman have BOTH breast AND ovarian cancer No   Any woman with breast cancer before 50yrs No   2 or more relatives with breast AND/OR ovarian cancer No   2 or more relatives with breast AND/OR bowel cancer No          8/27/2024     4:35 AM 9/12/2024     9:01 AM 12/4/2024     9:57 AM   PHQ   PHQ-9 Total Score 13 12 18    Q9: Thoughts of better off dead/self-harm past 2 weeks Not at all  Not at all Not at all        Patient-reported           12/4/2024   STI Screening   New sexual partner(s) since last STI/HIV test? (!) DECLINE        History of abnormal Pap smear: No - age 30-64 HPV with reflex Pap every 5 years recommended        Latest Ref Rng & Units 6/13/2019    11:14 AM   PAP / HPV   HPV 16 DNA NEG Negative    HPV 18 DNA NEG Negative    Other HR HPV NEG Negative      ASCVD Risk   The 10-year ASCVD risk score (Feliz DEL CASTILLO, et al., 2019) is: 13.7%    Values used to calculate the score:      Age: 49 years      Sex: Female      Is Non- : Yes      Diabetic: No      Tobacco smoker: Yes      Systolic Blood Pressure: 141 mmHg      Is BP treated: Yes      HDL Cholesterol: 51 mg/dL      Total Cholesterol: 258 mg/dL      Reviewed and updated as needed this visit by Provider                  Past Medical History:   Diagnosis Date    Anemia     Arthritis     COPD (chronic obstructive pulmonary disease) (H)     Depression     Depressive disorder     Diabetes (H)     History of blood transfusion     Hypertension     Obesity     Seizures (H)     Thyroid disease      Past Surgical History:   Procedure Laterality Date    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     "Procedure: PARTIAL PHALANGECTOMY 2ND TOE LEFT FOOT, EXOSTECTOMY LEFT GREAT TOE;  Surgeon: Mustapha Pollard DPM;  Location: [facility];  Service:      SECTION      EYE SURGERY      GYN SURGERY           IMPLANT STIMULATOR SACRAL NERVE PERCUTANEOUS TRIAL N/A 2024    Procedure: INSERTION, NEUROSTIMULATOR, SACRAL, PERCUTANEOUS, FOR TRIAL (trial for axonics);  Surgeon: Sindhu Garibay MD;  Location: [facility] [place]    NO HISTORY OF SURGERY           Review of Systems  Constitutional, HEENT, cardiovascular, pulmonary, gi and gu systems are negative, except as otherwise noted.  ROS reviewed, see HPI for pertinent positives and negatives.  Kade Yousif, DO       Objective    Exam  BP (!) 141/79   Pulse 67   Temp 98.1  F (36.7  C) (Oral)   Resp 20   Ht 1.575 m (5' 2\")   Wt 81.6 kg (180 lb)   LMP 2022 (Approximate)   SpO2 99%   BMI 32.92 kg/m     Estimated body mass index is 32.92 kg/m  as calculated from the following:    Height as of this encounter: 1.575 m (5' 2\").    Weight as of this encounter: 81.6 kg (180 lb).    Physical Exam  Exam conducted with a chaperone present.   Constitutional:       General: She is not in acute distress.     Appearance: She is not toxic-appearing or diaphoretic.   HENT:      Head: Normocephalic and atraumatic.      Right Ear: External ear normal.      Left Ear: External ear normal.      Nose: Nose normal.      Mouth/Throat:      Mouth: Mucous membranes are moist.   Eyes:      Conjunctiva/sclera: Conjunctivae normal.   Cardiovascular:      Rate and Rhythm: Normal rate.      Heart sounds: No murmur heard.     No friction rub. No gallop.   Pulmonary:      Effort: Pulmonary effort is normal. No respiratory distress.   Abdominal:      General: Abdomen is flat. Bowel sounds are normal. There is no distension.   Genitourinary:     Urethra: No prolapse.      Vagina: No lesions.      Cervix: Normal. No friability.      Comments: Daniel Wolf present as " chaperone   Musculoskeletal:      Right lower leg: No edema.      Left lower leg: No edema.   Skin:     Findings: No rash.   Neurological:      General: No focal deficit present.      Mental Status: She is alert and oriented to person, place, and time.   Psychiatric:         Attention and Perception: She is inattentive.         Behavior: Behavior is agitated.         Precepted with Dr. Justo Wylie DO who agrees with assessment and plan as outlined above    Signed Electronically by: Kade Yousif DO    Answers submitted by the patient for this visit:  Patient Health Questionnaire (Submitted on 12/4/2024)  If you checked off any problems, how difficult have these problems made it for you to do your work, take care of things at home, or get along with other people?: Extremely difficult  PHQ9 TOTAL SCORE: 18

## 2024-12-05 LAB
C TRACH DNA SPEC QL PROBE+SIG AMP: NEGATIVE
HPV HR 12 DNA CVX QL NAA+PROBE: NEGATIVE
HPV16 DNA CVX QL NAA+PROBE: NEGATIVE
HPV18 DNA CVX QL NAA+PROBE: NEGATIVE
HUMAN PAPILLOMA VIRUS FINAL DIAGNOSIS: NORMAL
N GONORRHOEA DNA SPEC QL NAA+PROBE: NEGATIVE

## 2024-12-09 LAB
BKR AP ASSOCIATED HPV REPORT: NORMAL
BKR LAB AP GYN ADEQUACY: NORMAL
BKR LAB AP GYN INTERPRETATION: NORMAL
BKR LAB AP PREVIOUS ABNORMAL: NORMAL
PATH REPORT.COMMENTS IMP SPEC: NORMAL
PATH REPORT.COMMENTS IMP SPEC: NORMAL
PATH REPORT.RELEVANT HX SPEC: NORMAL

## 2024-12-18 ENCOUNTER — NURSE TRIAGE (OUTPATIENT)
Dept: FAMILY MEDICINE | Facility: CLINIC | Age: 49
End: 2024-12-18
Payer: MEDICAID

## 2024-12-18 NOTE — TELEPHONE ENCOUNTER
"Nurse Triage SBAR    Is this a 2nd Level Triage? YES, LICENSED PRACTITIONER REVIEW IS REQUIRED    Situation/Background: Hypertension  Earlier BP readings this afternoon was 190/118, 183/114  At 1350 BP reading of 207/120      Assessment: Feels feverish, endorses headache  Denies chest pain, denies difficulty breathing, denies vision or gait changes  Does endorse she had two episodes of urinary incontinence (this is not atypical for her) and increased urination    Protocol Recommended Disposition:   See Today In Office    Recommendation: Advised patient of disposition recommendation to be seen today but patient declines, asking for her PCP to review and \"please just send me a medication. I don't have a ride today.\" Strongly encouraged pt to be seen, but advised writer would send to PCP for review and advisement.    Routed to provider    HEATHER BrandN, PHN, AMB-BC (she/her)  [facility] RN      Reason for Disposition   Systolic BP >= 180 OR Diastolic >= 110    Additional Information   Negative: Pregnant > 20 weeks or postpartum (< 6 weeks after delivery) and new hand or face swelling   Negative: Pregnant > 20 weeks and BP > 140/90   Negative: Sounds like a life-threatening emergency to the triager   Negative: Systolic BP >= 160 OR Diastolic >= 100, and any cardiac or neurologic symptoms (e.g., chest pain, difficulty breathing, unsteady gait, blurred vision)   Negative: Patient sounds very sick or weak to the triager   Negative: Systolic BP >= 180 OR Diastolic >= 110, and missed most recent dose of blood pressure medication   Negative: BP Systolic BP >= 140 OR Diastolic >= 90 and postpartum (from 0 to 6 weeks after delivery)    Protocols used: High Blood Pressure-A-[place]    "

## 2024-12-19 NOTE — TELEPHONE ENCOUNTER
Called and spoke to pt who reports going to the ER yesterday and left due to the wait. She reports a headache still today and taking tylenol and naproxen with no relief. She also confirms taking losartan as directed for her BP. Pt was able to take her BP while on line today with a reading of 173/105 and pulse of 75. Denies SOB, H/A, vision or gait changes.     Recommenced same-day visit, however, pt unable to due to transportation issues. Pt agreeable to visit Monday 12/23/24 with Dr. Hurley. ED precautions given.     Tre Cesar, RN, MSN

## 2024-12-23 ENCOUNTER — OFFICE VISIT (OUTPATIENT)
Dept: FAMILY MEDICINE | Facility: CLINIC | Age: 49
End: 2024-12-23
Payer: MEDICAID

## 2024-12-23 VITALS
OXYGEN SATURATION: 99 % | HEIGHT: 62 IN | BODY MASS INDEX: 32.5 KG/M2 | HEART RATE: 70 BPM | DIASTOLIC BLOOD PRESSURE: 86 MMHG | SYSTOLIC BLOOD PRESSURE: 136 MMHG | RESPIRATION RATE: 18 BRPM | WEIGHT: 176.6 LBS | TEMPERATURE: 98 F

## 2024-12-23 DIAGNOSIS — E78.5 HYPERLIPIDEMIA LDL GOAL <70: ICD-10-CM

## 2024-12-23 DIAGNOSIS — I10 PRIMARY HYPERTENSION: Primary | ICD-10-CM

## 2024-12-23 DIAGNOSIS — K59.04 CHRONIC IDIOPATHIC CONSTIPATION: ICD-10-CM

## 2024-12-23 DIAGNOSIS — U07.1 LAB TEST POSITIVE FOR DETECTION OF COVID-19 VIRUS: ICD-10-CM

## 2024-12-23 DIAGNOSIS — F31.31 BIPOLAR AFFECTIVE DISORDER, CURRENTLY DEPRESSED, MILD (H): ICD-10-CM

## 2024-12-23 DIAGNOSIS — R10.13 DYSPEPSIA: ICD-10-CM

## 2024-12-23 RX ORDER — ROSUVASTATIN CALCIUM 20 MG/1
20 TABLET, COATED ORAL DAILY
Qty: 90 TABLET | Refills: 3 | Status: SHIPPED | OUTPATIENT
Start: 2024-12-23

## 2024-12-23 RX ORDER — PANTOPRAZOLE SODIUM 20 MG/1
20 TABLET, DELAYED RELEASE ORAL DAILY
Qty: 90 TABLET | Refills: 1 | Status: SHIPPED | OUTPATIENT
Start: 2024-12-23

## 2024-12-23 RX ORDER — PRAZOSIN HYDROCHLORIDE 5 MG/1
5 CAPSULE ORAL AT BEDTIME
Qty: 30 CAPSULE | Refills: 3 | Status: SHIPPED | OUTPATIENT
Start: 2024-12-23

## 2024-12-23 RX ORDER — LOSARTAN POTASSIUM 100 MG/1
100 TABLET ORAL DAILY
Qty: 90 TABLET | Refills: 3 | Status: SHIPPED | OUTPATIENT
Start: 2024-12-23

## 2024-12-23 ASSESSMENT — PATIENT HEALTH QUESTIONNAIRE - PHQ9: SUM OF ALL RESPONSES TO PHQ QUESTIONS 1-9: 20

## 2024-12-26 NOTE — PROGRESS NOTES
ASSESSMENT/PLAN:  Mattie was seen today for hypertension.    Diagnoses and all orders for this visit:    Primary hypertension  -     losartan (COZAAR) 100 MG tablet; Take 1 tablet (100 mg) by mouth daily.    Hyperlipidemia LDL goal <70  -     rosuvastatin (CRESTOR) 20 MG tablet; Take 1 tablet (20 mg) by mouth daily.    Bipolar affective disorder, currently depressed, mild (H)  -     prazosin (MINIPRESS) 5 MG capsule; Take 1 capsule (5 mg) by mouth at bedtime.    Dyspepsia  -     pantoprazole (PROTONIX) 20 MG EC tablet; Take 1 tablet (20 mg) by mouth daily.    Chronic idiopathic constipation  -     calcium polycarbophil (FIBERCON) 625 MG tablet; Take 1 tablet (625 mg) by mouth daily.    Lab test positive for detection of COVID-19 virus      Patient's BP is better controlled now that she is taking medications again and we agreed that she will plan to continue taking medications daily.  She does complain of chronic constipation and I suggested that a fiber supplement daily with several pints of water may be effective and she is willing to try this.    Day 5 post diagnosis of COVID she should be wearing a mask and I gave her this.  She has minimal symptoms.  She will suggest that close contacts become tested.       Total visit time with patient was 25 mins, all of which was face to face MD time, and over 50% of this time was spent in counseling and coordination of care.  Including post-encounter documentation and orders on the date of service, total encounter time was 32 mins.    Subjective   Mattie is a 49 year old, presenting for the following health issues:  Hypertension (Check htn, need blood pressure refills)     49-year-old not previously known to me.  She had called 5 days ago noting that her BP was elevated however divulges today that she had stopped taking her pills because she felt depressed.  She started taking her antihypertensives again and noticed that the BP improved.    She had attended [facility] and I  "commented that she tested positive for COVID.  She reports that no one ever told her that and she has not been exercising any precautions since then.  This makes her distressed and she calls her partner to discuss this with him.  She is unhappy with the ER in not communicating the test result to her.    Review of Systems         9/12/2024     9:01 AM 12/4/2024     9:57 AM 12/23/2024    11:24 AM   PHQ   PHQ-9 Total Score 12 18  20   Q9: Thoughts of better off dead/self-harm past 2 weeks Not at all Not at all Not at all       Patient-reported       Objective    Physical Exam   /86 (BP Location: Right arm, Patient Position: Sitting, Cuff Size: Adult Regular)   Pulse 70   Temp 98  F (36.7  C) (Tympanic)   Resp 18   Ht 1.582 m (5' 2.3\")   Wt 80.1 kg (176 lb 9.6 oz)   LMP 07/23/2022 (Approximate)   SpO2 99%   BMI 31.99 kg/m       Vitals stable -BP is high normal  Well nourished and in no distress  Neck supple without lymphadenopathy, no goiter  Heart sounds normal without murmur    Lungs clear to auscultation, no wheezes/rales/rhonchi, good air entry   No lower extremity edema     I reviewed patient's med list.  She is actually out of several meds and I refilled these.    "

## 2025-01-03 ENCOUNTER — MEDICAL CORRESPONDENCE (OUTPATIENT)
Dept: HEALTH INFORMATION MANAGEMENT | Facility: CLINIC | Age: 50
End: 2025-01-03
Payer: MEDICAID

## 2025-01-07 ENCOUNTER — OFFICE VISIT (OUTPATIENT)
Dept: NEUROLOGY | Facility: CLINIC | Age: 50
End: 2025-01-07
Payer: MEDICAID

## 2025-01-07 ENCOUNTER — PATIENT OUTREACH (OUTPATIENT)
Dept: CARE COORDINATION | Facility: CLINIC | Age: 50
End: 2025-01-07

## 2025-01-07 VITALS
RESPIRATION RATE: 14 BRPM | DIASTOLIC BLOOD PRESSURE: 92 MMHG | HEART RATE: 80 BPM | OXYGEN SATURATION: 100 % | SYSTOLIC BLOOD PRESSURE: 138 MMHG

## 2025-01-07 DIAGNOSIS — G40.909 SEIZURE DISORDER (H): Primary | ICD-10-CM

## 2025-01-07 DIAGNOSIS — Z59.82 TRANSPORTATION INSECURITY: Primary | ICD-10-CM

## 2025-01-07 RX ORDER — LEVETIRACETAM 750 MG/1
750 TABLET ORAL 2 TIMES DAILY
Qty: 180 TABLET | Refills: 3 | Status: SHIPPED | OUTPATIENT
Start: 2025-01-07 | End: 2026-01-07

## 2025-01-07 SDOH — ECONOMIC STABILITY - TRANSPORTATION SECURITY: TRANSPORTATION INSECURITY: Z59.82

## 2025-01-07 ASSESSMENT — PAIN SCALES - GENERAL: PAINLEVEL_OUTOF10: WORST PAIN (10)

## 2025-01-07 NOTE — NURSING NOTE
Chief Complaint   Patient presents with    RECHECK     BP (!) 138/92 (BP Location: Right arm, Patient Position: Sitting, Cuff Size: Adult Regular)   Pulse 80   Resp 14   LMP 07/23/2022 (Approximate)   SpO2 100%   Kellen Lou

## 2025-01-07 NOTE — LETTER
2025       RE: Mattie Pierre  [address]  [facility] [address]     Dear Colleague,    Thank you for referring your patient, Mattie Pierre, to the [facility]UROLOG[facility] [place] at [facility]. Please see a copy of my visit note below.    [facility] Epilepsy Care Progress Note    Patient:  Mattie Pierre  :  1975   Age:  49 year old   Today's Office Visit:  2025    Interval History  Ms. Pierre is a poor historian and reported feeling very irritable, as she is not pleased about answering questions today. During her index visit with Dr. Mojica in 2023, he discussed obtaining a prolonged EEG (24 hours), appeared to only have been done for 7 hours but showed diffuse slowing, as well as a sleep study. Ms. Pierre reports not completing the sleep study, stating that she simply did not feel like doing it.    She was admitted on 2023 due to chest pain. However, the next day, she experienced an episode of dysarthria, confusion, and urinary incontinence, which led to a code stroke activation. She was suspected to have had a seizure and was started on Keppra 750 mg twice a day, to which she reports being compliant. She reports no side effects at the moment, but when asked about irritability, depression, and aggression, she acknowledged feeling slightly more irritable. She also mentioned that she has not had any nocturnal episodes since starting Keppra, which used to occur at least once a week.    Lamotrigine is listed on her medication list, but she is unable to explain why it was started or when. I offered the option of increasing the lamotrigine dose and weaning off levetiracetam, but she stated that she would like to stay on levetiracetam for now, as it seems to be controlling her symptoms.    When I asked about recreational drug use, she asked not to be asked that question, as she doesn't remember. She mentioned that  topiramate has helped with her cravings, and that's all I needed to know.    Epilepsy Data:  Ms Pierre is a 47-year-old ambidextrous, predominantly uses right hand. Index visit was with Dr. Mojica and below is a summary of the visit.  Mattie states she has spells at night where she might have tongue biting and wakes up in the morning and states that her tongue is chewed up.  This can happen up to 4 times a month.  She states that she has had grand mal seizures since childhood and has taken many medications such as Depakote which cause weight gain and Dilantin.  She also has chronic daily headaches right now.  Topiramate is on her medication list, however she does not take this medication.  She does have a bed partner with her boyfriend however she states he usually sleeps through the spells and not able to describe further which she does during her spells.  She is not sure if she has shaking or hypermotor movements and states she does fall off of the bed.   She last used cocaine three months ago (June 2023), she goes [employer], her boyfriend motivates her to stay sober,   Spell/Seizure type 1: Wakes up with tongue bite and falls off beds. She is not able to describe more.   Epilepsy Risk Factors:  Patient has no history of encephalitis/meningitis, no history of stroke, no history of tumor, She has history of hit her head as child with loss of consciousness, possible traumatic brain injury.  The patient had a normal birth and delivery.  No developmental delays noted.  No febrile seizures.  No family members with epilepsy.   She had special education.   Precipitating factors:   Sleep Deprivation and Stress          Treatment History:  Current AED -lamotrigine, levetiracetam  Prior AED - Depakote-weight gain, Dilantin-?  Poor control     Prior workup  MRI of Brain: 2/14/2023: IMPRESSION:  1.  No acute intracranial process. Specifically, no acute hemorrhage, mass, mass effect, abnormal extra-axial fluid collection or  acute/subacute infarct.  2.  There are moderate, advanced for age confluent periventricular, subcortical and deep white matter T2/Flair signal abnormalities. These findings are most often seen in the setting of chronic microangiopathic ischemic change, however other processes   such as demyelination (including multiple sclerosis), Lyme disease, or even autoimmune vasculitis could have a similar appearance.     48 hr EEG 11/2023  IMPRESSION: This ambulatory electroencephalogram is abnormal due to the presences of generalized intermittent slowing consistent with mild encephalopathy. No electrographic seizures or epileptiform discharges were recorded. Clinical correlation is advised.        Impression  Ms. Pierre is 49-year-old woman reports a long history of spells starting in childhood, which are concerning for generalized seizures vs sleep behaviors disorder. Her epilepsy care is complicated by substance use disorder and poor medication compliance. She has tried several medications in the past, including Depakote and Dilantin, and is currently taking topiramate, lamotrigine and levetiracetam. She started levetiracetam in late November 2024 and reports that since then, she has stopped having nocturnal episodes, which she typically experienced at least once a week.  During an index visit in 2023 with Dr. Mojica for epilepsy evaluation, a prolonged EEG was conducted, but it was only done for 7 hours. A sleep study was recommended, which the patient did not complete. The EEG showed mild diffuse slowing.  It appears that she is experiencing side effects from Keppra, such as irritability, but she notes that she wants to remain on it for a longer period to see how things evolve. She is currently taking lamotrigine 150 mg twice a day and is unsure why she is on this medication. She said she takes topiramate for cravings. I mentioned that this may be a possible alternative to levetiracetam by increasing the dose, and she is  agreeable to this approach. I recommended obtaining levetiracetam levels, but she indicated that she would prefer not to have a blood draw today and might consider doing it at another time.    She reported needing help with transportation home today, so I reached out to our  for assistance.    Plan  Levetiracetam levels  Continue current dose of levetiracetam 750 mg twice daily.  Unclear what lamotrigine is prescribed for.    RTC in 6 months    I spent 30  minutes in total today to provide comprehensive  medical care.      The rest of the time was spent with the patient in face to face interview. During this time key medical decisions were made with review of medical chart prior to visit, visit with patient, counseling/education, and post visit work, including documentation of note on the day of visit. I also personally reviewed prior investigations - laboratory and imaging related to the patients epilepsy care.  I addressed all questions the patient/caregiver raised in regards to epilepsy or related medical questions.         Again, thank you for allowing me to participate in the care of your patient.      Sincerely,    Bailey Connolly MD

## 2025-01-07 NOTE — PROGRESS NOTES
[facility]    Patient:  Mattie Pierre  :  1975   Age:  49 year old   Today's Office Visit:  2025    Interval History  Ms. Pierre is a poor historian and reported feeling very irritable, as she is not pleased about answering questions today. During her index visit with Dr. Mojica in 2023, he discussed obtaining a prolonged EEG (24 hours), appeared to only have been done for 7 hours but showed diffuse slowing, as well as a sleep study. Ms. Pierre reports not completing the sleep study, stating that she simply did not feel like doing it.    She was admitted on 2023 due to chest pain. However, the next day, she experienced an episode of dysarthria, confusion, and urinary incontinence, which led to a code stroke activation. She was suspected to have had a seizure and was started on Keppra 750 mg twice a day, to which she reports being compliant. She reports no side effects at the moment, but when asked about irritability, depression, and aggression, she acknowledged feeling slightly more irritable. She also mentioned that she has not had any nocturnal episodes since starting Keppra, which used to occur at least once a week.    Lamotrigine is listed on her medication list, but she is unable to explain why it was started or when. I offered the option of increasing the lamotrigine dose and weaning off levetiracetam, but she stated that she would like to stay on levetiracetam for now, as it seems to be controlling her symptoms.    When I asked about recreational drug use, she asked not to be asked that question, as she doesn't remember. She mentioned that topiramate has helped with her cravings, and that's all I needed to know.    Epilepsy Data:  Ms Pierre is a 47-year-old ambidextrous, predominantly uses right hand. Index visit was with Dr. Mojica and below is a summary of the visit.  Mattie states she has spells at night where she might have tongue biting and wakes  up in the morning and states that her tongue is chewed up.  This can happen up to 4 times a month.  She states that she has had grand mal seizures since childhood and has taken many medications such as Depakote which cause weight gain and Dilantin.  She also has chronic daily headaches right now.  Topiramate is on her medication list, however she does not take this medication.  She does have a bed partner with her boyfriend however she states he usually sleeps through the spells and not able to describe further which she does during her spells.  She is not sure if she has shaking or hypermotor movements and states she does fall off of the bed.   She last used cocaine three months ago (June 2023), she goes [employer], her boyfriend motivates her to stay sober,   Spell/Seizure type 1: Wakes up with tongue bite and falls off beds. She is not able to describe more.   Epilepsy Risk Factors:  Patient has no history of encephalitis/meningitis, no history of stroke, no history of tumor, She has history of hit her head as child with loss of consciousness, possible traumatic brain injury.  The patient had a normal birth and delivery.  No developmental delays noted.  No febrile seizures.  No family members with epilepsy.   She had special education.   Precipitating factors:   Sleep Deprivation and Stress          Treatment History:  Current AED -lamotrigine, levetiracetam  Prior AED - Depakote-weight gain, Dilantin-?  Poor control     Prior workup  MRI of Brain: 2/14/2023: IMPRESSION:  1.  No acute intracranial process. Specifically, no acute hemorrhage, mass, mass effect, abnormal extra-axial fluid collection or acute/subacute infarct.  2.  There are moderate, advanced for age confluent periventricular, subcortical and deep white matter T2/Flair signal abnormalities. These findings are most often seen in the setting of chronic microangiopathic ischemic change, however other processes   such as demyelination (including multiple  sclerosis), Lyme disease, or even autoimmune vasculitis could have a similar appearance.     48 hr EEG 11/2023  IMPRESSION: This ambulatory electroencephalogram is abnormal due to the presences of generalized intermittent slowing consistent with mild encephalopathy. No electrographic seizures or epileptiform discharges were recorded. Clinical correlation is advised.        Impression  Ms. Pierre is 49-year-old woman reports a long history of spells starting in childhood, which are concerning for generalized seizures vs sleep behaviors disorder. Her epilepsy care is complicated by substance use disorder and poor medication compliance. She has tried several medications in the past, including Depakote and Dilantin, and is currently taking topiramate, lamotrigine and levetiracetam. She started levetiracetam in late November 2024 and reports that since then, she has stopped having nocturnal episodes, which she typically experienced at least once a week.  During an index visit in 2023 with Dr. Mojica for epilepsy evaluation, a prolonged EEG was conducted, but it was only done for 7 hours. A sleep study was recommended, which the patient did not complete. The EEG showed mild diffuse slowing.  It appears that she is experiencing side effects from Keppra, such as irritability, but she notes that she wants to remain on it for a longer period to see how things evolve. She is currently taking lamotrigine 150 mg twice a day and is unsure why she is on this medication. She said she takes topiramate for cravings. I mentioned that this may be a possible alternative to levetiracetam by increasing the dose, and she is agreeable to this approach. I recommended obtaining levetiracetam levels, but she indicated that she would prefer not to have a blood draw today and might consider doing it at another time.    She reported needing help with transportation home today, so I reached out to our  for  assistance.    Plan  Levetiracetam levels  Continue current dose of levetiracetam 750 mg twice daily.  Unclear what lamotrigine is prescribed for.    RTC in 6 months    I spent 30  minutes in total today to provide comprehensive  medical care.      The rest of the time was spent with the patient in face to face interview. During this time key medical decisions were made with review of medical chart prior to visit, visit with patient, counseling/education, and post visit work, including documentation of note on the day of visit. I also personally reviewed prior investigations - laboratory and imaging related to the patients epilepsy care.  I addressed all questions the patient/caregiver raised in regards to epilepsy or related medical questions.

## 2025-01-14 ENCOUNTER — DOCUMENTATION ONLY (OUTPATIENT)
Dept: FAMILY MEDICINE | Facility: CLINIC | Age: 50
End: 2025-01-14

## 2025-01-14 NOTE — PROGRESS NOTES
To be completed in Nursing note:    Please reference list for forms that require a visit for completion.  Please remind patients that providers are given 3-5 business days to complete and return forms.        Form type:  Prescription / certificate of medical necessity- pull ups     Date form received: 25    Date form completed by Physician:  25    How was form returned to patient (mailed, faxed, or at  for patient to ):  faxed to [employer] [phone]    Date form mailed/faxed/left at  for patient and sent to HIM for scannin25      Once form is left for patient, faxed, or mailed [facility] will then close the documentation only encounter.

## 2025-01-15 ENCOUNTER — TELEPHONE (OUTPATIENT)
Dept: NEUROLOGY | Facility: CLINIC | Age: 50
End: 2025-01-15
Payer: MEDICAID

## 2025-01-15 NOTE — TELEPHONE ENCOUNTER
Sent [employer] (1st Attempt) for the patient to call back and schedule the following:    Appointment type: Return Seizure  Provider: Dr. Connolly  Return date: July 2025  Specialty phone number: [phone]  Additional appointment(s) needed: Labs  Additonal Notes:

## 2025-01-20 ENCOUNTER — TELEPHONE (OUTPATIENT)
Dept: NEUROLOGY | Facility: CLINIC | Age: 50
End: 2025-01-20
Payer: MEDICAID

## 2025-01-20 NOTE — TELEPHONE ENCOUNTER
Sent [employer] (1st Attempt) for the patient to call back and schedule the following:    Appointment type: Return Seizure  Provider: Dr. Marti  Return date: July 2025  Specialty phone number: [phone]  Additional appointment(s) needed: Labs  Additonal Notes:

## 2025-02-13 NOTE — PROGRESS NOTES
"  Assessment & Plan     Essential hypertension  Blood pressure uncontrolled at 162/108. She declined lab testing today. She reports compliance with losartan. Will initiate amlodipine 5mg daily and recommend she present in two weeks for [facility] staff BP check followed by MD/DO visit in 4 weeks to recheck BP and labs. She verbalised clear understanding and agreement to treatment plan and had no further questions or concerns at this time.   - amLODIPine (NORVASC) 5 MG tablet  Dispense: 90 tablet; Refill: 1    The longitudinal plan of care for the diagnosis(es)/condition(s) as documented were addressed during this visit. Due to the added complexity in care, I will continue to support Mattie in the subsequent management and with ongoing continuity of care.    BMI  Estimated body mass index is 32.15 kg/m  as calculated from the following:    Height as of this encounter: 1.575 m (5' 2\").    Weight as of this encounter: 79.7 kg (175 lb 12.8 oz).       Return in about 1 month (around 3/14/2025).      Subjective   Mattie is a 49 year old, presenting for the following health issues:  Hypertension and Eye Problem (Watery eyes)    HPI   Mattie presents for blood pressure check. She reports that she is compliant with her medications and reports no additional concerns. Denies visual changes or new headaches. She reports caffeine and nicotine use, but denies recreational substance use.       Review of Systems  Constitutional, HEENT, cardiovascular, pulmonary, gi and gu systems are negative, except as otherwise noted.  ROS reviewed, see HPI for pertinent positives and negatives.  Kade Yousif,         Objective    BP (!) 162/108   Pulse 83   Temp 98.1  F (36.7  C) (Oral)   Resp 16   Ht 1.575 m (5' 2\")   Wt 79.7 kg (175 lb 12.8 oz)   LMP 07/23/2022 (Approximate)   SpO2 97%   BMI 32.15 kg/m    Body mass index is 32.15 kg/m .  Physical Exam  Constitutional:       General: She is not in acute distress.     Appearance: She is not " toxic-appearing or diaphoretic.   HENT:      Head: Normocephalic and atraumatic.      Right Ear: External ear normal.      Left Ear: External ear normal.      Mouth/Throat:      Mouth: Mucous membranes are moist.   Cardiovascular:      Rate and Rhythm: Normal rate and regular rhythm.   Pulmonary:      Effort: No respiratory distress.   Neurological:      General: No focal deficit present.      Mental Status: She is alert and oriented to person, place, and time.   Psychiatric:         Mood and Affect: Mood normal.         Behavior: Behavior normal.              Precepted with Dr. Adriano Nieves MD who agrees with assessment and plan as outlined above    Signed Electronically by: Kade Yousif DO

## 2025-02-14 ENCOUNTER — OFFICE VISIT (OUTPATIENT)
Dept: FAMILY MEDICINE | Facility: CLINIC | Age: 50
End: 2025-02-14
Payer: MEDICAID

## 2025-02-14 VITALS
HEART RATE: 83 BPM | BODY MASS INDEX: 32.35 KG/M2 | TEMPERATURE: 98.1 F | WEIGHT: 175.8 LBS | HEIGHT: 62 IN | OXYGEN SATURATION: 97 % | RESPIRATION RATE: 16 BRPM | SYSTOLIC BLOOD PRESSURE: 162 MMHG | DIASTOLIC BLOOD PRESSURE: 108 MMHG

## 2025-02-14 DIAGNOSIS — I10 ESSENTIAL HYPERTENSION: Primary | ICD-10-CM

## 2025-02-14 PROCEDURE — G2211 COMPLEX E/M VISIT ADD ON: HCPCS

## 2025-02-14 PROCEDURE — 99214 OFFICE O/P EST MOD 30 MIN: CPT | Mod: GC

## 2025-02-14 RX ORDER — AMLODIPINE BESYLATE 5 MG/1
5 TABLET ORAL DAILY
Qty: 90 TABLET | Refills: 1 | Status: SHIPPED | OUTPATIENT
Start: 2025-02-14

## 2025-02-14 NOTE — PATIENT INSTRUCTIONS
I sent in a new daily blood pressure medication called amlodipine to your pharmacy on file.    Follow up in clinic in approximately 2 weeks for blood pressure check with [facility] staff, followed by follow-up with a physician in our clinic for blood pressure recheck in 1 month.  At that time would recommend lab work to check your kidney function.

## 2025-03-17 ENCOUNTER — TELEPHONE (OUTPATIENT)
Dept: PHARMACY | Facility: CLINIC | Age: 50
End: 2025-03-17
Payer: MEDICAID

## 2025-03-17 NOTE — TELEPHONE ENCOUNTER
Called & spoke with patient today. Reports she has been taking the Rinvoq 15 mg daily for the past few months without any improvement in itch or scaly skin. Suspected start date around 11/2024. Does not want to continue to take this. Given lack of reported response & patient preference, agreed that she can discontinue the Rinvoq. Patient does not want to do injectable therapies which limits alternative treatment options.     Will route message to Dr. Li & care team to see if they would like to see patient for a follow-up or if he has any alternative treatment options he's recommend.     Prema Giraldo, PharmD  [facility] Pharmacist

## 2025-03-25 ENCOUNTER — DOCUMENTATION ONLY (OUTPATIENT)
Dept: FAMILY MEDICINE | Facility: CLINIC | Age: 50
End: 2025-03-25
Payer: MEDICAID

## 2025-03-25 NOTE — PROGRESS NOTES
To be completed in Nursing note:    Please reference list for forms that require a visit for completion.  Please remind patients that providers are given 3-5 business days to complete and return forms.      Form type: underpads/pull ups    Date form received: 3/24/25    Date form completed by Physician: 3/25/25    How was form returned to patient (mailed, faxed, or at  for patient to ):  faxed to [employer] [phone]    Date form mailed/faxed/left at  for patient and sent to HIM for scanning:  3/25/25      Once form is left for patient, faxed, or mailed [facility] will then close the documentation only encounter.

## 2025-03-28 ENCOUNTER — MYC MEDICAL ADVICE (OUTPATIENT)
Dept: DERMATOLOGY | Facility: CLINIC | Age: 50
End: 2025-03-28
Payer: MEDICAID

## 2025-03-29 ENCOUNTER — HEALTH MAINTENANCE LETTER (OUTPATIENT)
Age: 50
End: 2025-03-29

## 2025-04-23 ENCOUNTER — TELEPHONE (OUTPATIENT)
Dept: DERMATOLOGY | Facility: CLINIC | Age: 50
End: 2025-04-23
Payer: MEDICAID

## 2025-04-23 NOTE — TELEPHONE ENCOUNTER
Patient Contact     Attempt # 1     Was call answered?   Yes      Called Mattie Pierre and scheduled with Dr. Li on May 1st at 1:00    LAVON Zarate

## 2025-04-23 NOTE — TELEPHONE ENCOUNTER
[facility] Call Center    Phone Message    May a detailed message be left on voicemail: yes     Reason for Call: Symptoms or Concerns     If patient has red-flag symptoms, warm transfer to triage line    Current symptom or concern: atopic dermatitis and lichen simplex chronicus of medial ankles is flaring up and getting worse.     Symptoms have been present for:  1 month(s)    Has patient previously been seen for this? No    By : NA    Date: NA    Are there any new or worsening symptoms? Yes: mostly at night. Pt is scheduled for 12/04 and wait listed. Pt is scratching so bad the skin is really raw. Please call pt back to discuss. MyChart is not active at this time.     Action Taken: Message routed to:  [facility] Derm    Travel Screening: Not Applicable     Date of Service:

## 2025-05-27 NOTE — PROGRESS NOTES
To be completed in Nursing note:    Please reference list for forms that require a visit for completion.  Please remind patients that providers are given 3-5 business days to complete and return forms.      Form type: [employer] ~ CONFIRMATION OF ORDER CODE     Date form received: 11/26/2021    Date form completed by Physician: 11/30/2021    How was form returned to patient (mailed, faxed, or at  for patient to ):    Fax to [phone]    Date form mailed/faxed/left at  for patient and sent to HIM for scanning:    Fax on 11/30/2021    Once form is left for patient, faxed, or mailed [facility] will then close the documentation only encounter.     
52.9

## 2025-06-02 NOTE — PROGRESS NOTES
"  Assessment & Plan         PTSD (post-traumatic stress disorder)  Night terrors, adult  -Discussed with Mattie that given her history of PTSD and night terrors, I would recommend we reinitiate prazosin.  She previously tolerated 5 mg well.  This medication may also help her chronic essential hypertension.   -PHQ-9 elevated today at 22.  No thoughts of harm to self or others.  Recommend re-initiating prazosin as noted above.  Will follow closely within the next 2 weeks.  - prazosin (MINIPRESS) 5 MG capsule  Dispense: 90 capsule; Refill: 3    Essential Hypertension  - As noted above will initiate prazosin.  Recommend she continue losartan and amlodipine 5 mg.  Recommend she follow-up in 2 weeks and if her blood pressure is still persistently elevated, would increase amlodipine to 10 mg.  She verbalized clear understanding and agreement to this treatment plan and had no further questions or concerns at this time.    Lichen simplex chronicus  -Exam consistent with lichen simplex chronicus versus atopic dermatitis.  Will send in a refill of clobetasol and also send in AmLactin.  Her skin rash does appear to be at least in part mediated by self-inflicted excoriations, I think Zyrtec may help reduce some of the pruritus promote healing.  We will recheck on her skin rashes in 2 weeks as well.  - clobetasol (TEMOVATE) 0.05 % external ointment  Dispense: 60 g; Refill: 3  - ammonium lactate (AMLACTIN) 12 % external cream  Dispense: 285 g; Refill: 0  - cetirizine (ZYRTEC) 10 MG tablet  Dispense: 90 tablet; Refill: 0      The longitudinal plan of care for the diagnosis(es)/condition(s) as documented were addressed during this visit. Due to the added complexity in care, I will continue to support Mattie in the subsequent management and with ongoing continuity of care.    BMI  Estimated body mass index is 29.81 kg/m  as calculated from the following:    Height as of this encounter: 1.575 m (5' 2\").    Weight as of this encounter: " "73.9 kg (163 lb).       Depression Screening Follow Up        6/3/2025     1:42 PM   PHQ   PHQ-9 Total Score 22   Q9: Thoughts of better off dead/self-harm past 2 weeks Not at all       Subjective   Mattie is a 49 year old, presenting for the following health issues:  Letter Request (For jury duty - starting 6/29) and Abdominal Pain (Per pt states that she has trouble holding her stools)    HPI    Mattie presents today with several concerns.  She would like a letter excusing her from jury duty.  In addition she reports worsening nightmares/night terrors.  She reports a history of PTSD.  She has a history of elevated blood pressure.  In addition she has a rash on her feet and left thigh.  Reports no additional concerns.    Review of Systems  Constitutional, HEENT, cardiovascular, pulmonary, gi and gu systems are negative, except as otherwise noted.  ROS reviewed, see HPI for pertinent positives and negatives.  Kade Steer, DO        Objective    BP (!) 157/106   Pulse 70   Temp 97.8  F (36.6  C) (Oral)   Resp 16   Ht 1.575 m (5' 2\")   Wt 73.9 kg (163 lb)   LMP 07/23/2022 (Approximate)   SpO2 99%   BMI 29.81 kg/m    Body mass index is 29.81 kg/m .  Physical Exam  Constitutional:       General: She is not in acute distress.     Appearance: She is not toxic-appearing or diaphoretic.   HENT:      Head: Normocephalic and atraumatic.      Right Ear: External ear normal.      Left Ear: External ear normal.      Nose: Nose normal.      Mouth/Throat:      Mouth: Mucous membranes are moist.   Eyes:      General: No scleral icterus.     Conjunctiva/sclera: Conjunctivae normal.   Cardiovascular:      Rate and Rhythm: Normal rate and regular rhythm.      Heart sounds: No murmur heard.     No friction rub. No gallop.   Pulmonary:      Effort: Pulmonary effort is normal. No respiratory distress.   Abdominal:      General: There is no distension.   Skin:     Findings: Rash present.      Comments: Thickened skin at the " medial aspect of both ankles bilaterally.  Small area of erythema with secondary excoriations on posterior left thigh.   Neurological:      Mental Status: She is alert and oriented to person, place, and time. Mental status is at baseline.   Psychiatric:         Mood and Affect: Mood normal.              12/4/2024     9:57 AM 12/23/2024    11:24 AM 6/3/2025     1:42 PM   PHQ   PHQ-9 Total Score 18  20 22   Q9: Thoughts of better off dead/self-harm past 2 weeks Not at all Not at all Not at all       Patient-reported         Precepted with Dr. Belgica Saleh MD who agrees with assessment and plan as outlined above    Signed Electronically by: Kade Yousif DO     Normal rate, regular rhythm, normal S1, S2 heart sounds heard.

## 2025-06-03 ENCOUNTER — OFFICE VISIT (OUTPATIENT)
Dept: FAMILY MEDICINE | Facility: CLINIC | Age: 50
End: 2025-06-03
Payer: MEDICAID

## 2025-06-03 VITALS
DIASTOLIC BLOOD PRESSURE: 106 MMHG | TEMPERATURE: 97.8 F | HEART RATE: 70 BPM | BODY MASS INDEX: 30 KG/M2 | WEIGHT: 163 LBS | SYSTOLIC BLOOD PRESSURE: 157 MMHG | RESPIRATION RATE: 16 BRPM | OXYGEN SATURATION: 99 % | HEIGHT: 62 IN

## 2025-06-03 DIAGNOSIS — F51.4 NIGHT TERRORS, ADULT: ICD-10-CM

## 2025-06-03 DIAGNOSIS — L28.0 LICHEN SIMPLEX CHRONICUS: ICD-10-CM

## 2025-06-03 DIAGNOSIS — F14.10 COCAINE ABUSE (H): ICD-10-CM

## 2025-06-03 DIAGNOSIS — F43.10 PTSD (POST-TRAUMATIC STRESS DISORDER): Primary | ICD-10-CM

## 2025-06-03 DIAGNOSIS — I10 ESSENTIAL HYPERTENSION: ICD-10-CM

## 2025-06-03 PROCEDURE — 3077F SYST BP >= 140 MM HG: CPT

## 2025-06-03 PROCEDURE — G2211 COMPLEX E/M VISIT ADD ON: HCPCS

## 2025-06-03 PROCEDURE — 3080F DIAST BP >= 90 MM HG: CPT

## 2025-06-03 PROCEDURE — 99214 OFFICE O/P EST MOD 30 MIN: CPT | Mod: GC

## 2025-06-03 RX ORDER — CLOBETASOL PROPIONATE 0.5 MG/G
OINTMENT TOPICAL 2 TIMES DAILY
Qty: 60 G | Refills: 3 | Status: SHIPPED | OUTPATIENT
Start: 2025-06-03

## 2025-06-03 RX ORDER — AMMONIUM LACTATE 12 G/100G
CREAM TOPICAL 2 TIMES DAILY
Qty: 285 G | Refills: 0 | Status: SHIPPED | OUTPATIENT
Start: 2025-06-03

## 2025-06-03 RX ORDER — CETIRIZINE HYDROCHLORIDE 10 MG/1
10 TABLET ORAL DAILY PRN
Qty: 90 TABLET | Refills: 0 | Status: SHIPPED | OUTPATIENT
Start: 2025-06-03

## 2025-06-03 RX ORDER — PRAZOSIN HYDROCHLORIDE 5 MG/1
5 CAPSULE ORAL AT BEDTIME
Qty: 90 CAPSULE | Refills: 3 | Status: SHIPPED | OUTPATIENT
Start: 2025-06-03

## 2025-06-03 ASSESSMENT — PATIENT HEALTH QUESTIONNAIRE - PHQ9: SUM OF ALL RESPONSES TO PHQ QUESTIONS 1-9: 22

## 2025-06-03 NOTE — TELEPHONE ENCOUNTER
Name from pharmacy: TOPIRAMATE 50MG TABLETS          Will file in chart as: topiramate (TOPAMAX) 50 MG tablet    Sig: TAKE 1 TABLET(50 MG) BY MOUTH TWICE DAILY    Disp: 90 tablet    Refills: 1 (Pharmacy requested: Not specified)    Start: 6/3/2025    Class: E-Prescribe    Non-formulary For: Cocaine abuse (H)    Last ordered: 1 year ago (12/27/2023) by Belgica Saleh MD    Last refill: 11/19/2024    Rx #: [id number]    Anti-Seizure Meds Protocol  Eitkik3406/03/2025 04:20 PM   Protocol Details Review Authorizing provider's last note.    Medication indicated for associated diagnosis        GARRY Trammell, BSN

## 2025-06-03 NOTE — LETTER
Gloria 3, 2025      Mattie Pierre  [address]  [place] [address]              To whom it may concern,    It is of my professional opinion that Mattie Pierre would be ineligible as a juror.  She has several chronic mental health diagnoses which would impair her ability to act as a member of a jury          Sincerely,      Kade Yousif DO

## 2025-06-03 NOTE — PROGRESS NOTES
Critical Vital Report    Did patient have a critical vital during today's visit? Yes  Which vital is reporting as critical?: Blood Pressure    I personally notified the following: Provider    Action(s) taken: I left room and notified provider personally      BP Readings from Last 3 Encounters:   06/03/25 (!) 157/106   02/14/25 (!) 162/108   01/07/25 (!) 138/92

## 2025-06-03 NOTE — PROGRESS NOTES
"Preceptor attestation:  Vital signs reviewed: BP (!) 157/106   Pulse 70   Temp 97.8  F (36.6  C) (Oral)   Resp 16   Ht 1.575 m (5' 2\")   Wt 73.9 kg (163 lb)   LMP 07/23/2022 (Approximate)   SpO2 99%   BMI 29.81 kg/m      Patient seen, evaluated, and discussed with the resident.  I verified the content of the note, which accurately reflects my assessment of the patient and the plan of care.    Supervising physician: Belgica Saleh MD  [facility]  "

## 2025-06-04 RX ORDER — TOPIRAMATE 50 MG/1
50 TABLET, FILM COATED ORAL 2 TIMES DAILY
Qty: 90 TABLET | Refills: 1 | OUTPATIENT
Start: 2025-06-04

## 2025-06-11 RX ORDER — TOPIRAMATE 50 MG/1
50 TABLET, FILM COATED ORAL 2 TIMES DAILY
Qty: 90 TABLET | Refills: 1 | Status: SHIPPED | OUTPATIENT
Start: 2025-06-11

## 2025-06-20 ENCOUNTER — TELEPHONE (OUTPATIENT)
Dept: FAMILY MEDICINE | Facility: CLINIC | Age: 50
End: 2025-06-20
Payer: MEDICAID

## 2025-06-20 NOTE — TELEPHONE ENCOUNTER
Pt reports Dr. Yousif needs to list specific examples and more detailed documentation on why pt is ineligible as a juror. Pt states [facility] told her letter must state how medical diagnosis impairs her ability to perform with more detail.     Pt states if we need to contact them, we can reach [facility] Jury Commisoner: [phone]      Tre Cesar RN, MSN

## 2025-06-20 NOTE — LETTER
June 23, 2025      Mattie Pierre  [address]  [facility] [place] [address]              To whom it may concern,       It is of my professional opinion that Mattie Pierre is likely ineligible as a juror.  She has several chronic mental health diagnoses including a history of bipolar disorder which would impair her ability to act as a member of a jury. These chronic diagnoses limit her ability to maintain focus and critically analyze findings.           Sincerely,      Kade Yousif DO

## 2025-06-20 NOTE — TELEPHONE ENCOUNTER
General Call      Reason for Call:  call back     What are your questions or concerns:  She got a note for why she can not attend jury duty the letter needs to have specific details on it     Date of last appointment with provider: ?    Could we send this information to you in [employer] or would you prefer to receive a phone call?:   Patient would prefer a phone call   Okay to leave a detailed message?: Yes at Home number on file [phone] (home)      Does this patient currently have active insurance coverage?  Yes, Pt has active insurance coverage.     Does patient or caller know when to expect a call? Yes, Nurses will return call within 2-3 business hours.    Nate Nevarez on 6/20/2025 at 2:40 PM

## 2025-06-23 ENCOUNTER — TELEPHONE (OUTPATIENT)
Dept: FAMILY MEDICINE | Facility: CLINIC | Age: 50
End: 2025-06-23
Payer: MEDICAID

## 2025-06-23 NOTE — TELEPHONE ENCOUNTER
06/23/25    Spoke to Patient regarding excessive no-shows. Reviewed No-show policy and the importance of coming to their appointments. They have voiced understanding and have agreed to call or use their MyChart to cancel their appointments. They are aware if they continue to no-show appointments, they may only be eligible for same day appointments, if available.    Was MyChart offered to patient?  Yes, Patient has active MyChart, but needs help accessing account. [facility] phone number [phone] given to patient.     Was Text Reminders offered to patient?  Yes, Pt accepted text reminders.    Christina Rose on 6/23/2025 at 8:26 AM

## 2025-06-23 NOTE — TELEPHONE ENCOUNTER
New letter drafted by PCP. Spoke to pt who will come to [facility] to pickup letter.    Tre Cesar RN, MSN

## 2025-07-21 ENCOUNTER — OFFICE VISIT (OUTPATIENT)
Dept: FAMILY MEDICINE | Facility: CLINIC | Age: 50
End: 2025-07-21
Payer: MEDICAID

## 2025-07-21 ENCOUNTER — DOCUMENTATION ONLY (OUTPATIENT)
Dept: FAMILY MEDICINE | Facility: CLINIC | Age: 50
End: 2025-07-21

## 2025-07-21 VITALS
TEMPERATURE: 98 F | OXYGEN SATURATION: 99 % | SYSTOLIC BLOOD PRESSURE: 137 MMHG | HEART RATE: 83 BPM | DIASTOLIC BLOOD PRESSURE: 83 MMHG | RESPIRATION RATE: 18 BRPM | HEIGHT: 62 IN | WEIGHT: 171 LBS | BODY MASS INDEX: 31.47 KG/M2

## 2025-07-21 DIAGNOSIS — Z86.39 HX OF HYPERLIPIDEMIA: ICD-10-CM

## 2025-07-21 DIAGNOSIS — R19.7 DIARRHEA OF PRESUMED INFECTIOUS ORIGIN: ICD-10-CM

## 2025-07-21 DIAGNOSIS — G40.909 SEIZURE DISORDER (H): ICD-10-CM

## 2025-07-21 DIAGNOSIS — F14.90 COCAINE USE: ICD-10-CM

## 2025-07-21 DIAGNOSIS — L28.0 LICHEN SIMPLEX CHRONICUS: ICD-10-CM

## 2025-07-21 DIAGNOSIS — I10 ESSENTIAL HYPERTENSION: ICD-10-CM

## 2025-07-21 DIAGNOSIS — F41.9 ANXIETY: ICD-10-CM

## 2025-07-21 DIAGNOSIS — B02.9 HERPES ZOSTER WITHOUT COMPLICATION: ICD-10-CM

## 2025-07-21 DIAGNOSIS — Z13.6 SCREENING FOR CARDIOVASCULAR CONDITION: ICD-10-CM

## 2025-07-21 DIAGNOSIS — Z11.3 SCREEN FOR STD (SEXUALLY TRANSMITTED DISEASE): Primary | ICD-10-CM

## 2025-07-21 DIAGNOSIS — F31.5 BIPOLAR DISORDER, CURRENT EPISODE DEPRESSED, SEVERE, WITH PSYCHOTIC FEATURES (H): ICD-10-CM

## 2025-07-21 DIAGNOSIS — E78.5 HYPERLIPIDEMIA LDL GOAL <70: ICD-10-CM

## 2025-07-21 DIAGNOSIS — M94.0 COSTOCHONDRITIS: ICD-10-CM

## 2025-07-21 DIAGNOSIS — N39.41 URGE INCONTINENCE: ICD-10-CM

## 2025-07-21 DIAGNOSIS — Z12.11 SCREEN FOR COLON CANCER: ICD-10-CM

## 2025-07-21 RX ORDER — MIRABEGRON 25 MG/1
25 TABLET, FILM COATED, EXTENDED RELEASE ORAL DAILY
Qty: 30 TABLET | Refills: 11 | Status: SHIPPED | OUTPATIENT
Start: 2025-07-21

## 2025-07-21 RX ORDER — AMLODIPINE BESYLATE 5 MG/1
5 TABLET ORAL DAILY
Qty: 90 TABLET | Refills: 1 | Status: SHIPPED | OUTPATIENT
Start: 2025-07-21

## 2025-07-21 RX ORDER — HYDROXYZINE HYDROCHLORIDE 25 MG/1
25 TABLET, FILM COATED ORAL 3 TIMES DAILY PRN
Qty: 30 TABLET | Refills: 2 | Status: SHIPPED | OUTPATIENT
Start: 2025-07-21

## 2025-07-21 RX ORDER — HYDROXYZINE HYDROCHLORIDE 25 MG/1
TABLET, FILM COATED ORAL
Qty: 25 TABLET | Refills: 2 | Status: SHIPPED | OUTPATIENT
Start: 2025-07-21

## 2025-07-21 RX ORDER — MELOXICAM 7.5 MG/1
7.5 TABLET ORAL DAILY
Qty: 7 TABLET | Refills: 0 | Status: SHIPPED | OUTPATIENT
Start: 2025-07-21

## 2025-07-21 ASSESSMENT — ENCOUNTER SYMPTOMS
CONSTIPATION: 0
COUGH: 0
DIARRHEA: 1
DEPRESSION: 1
HEMOPTYSIS: 0
RESPIRATORY NEGATIVE: 1
NERVOUS/ANXIOUS: 1
VOMITING: 0
FREQUENCY: 0
HEMATURIA: 0
PALPITATIONS: 0
NAUSEA: 0
HALLUCINATIONS: 1
BLOOD IN STOOL: 0
DYSURIA: 0
HEADACHES: 1
TREMORS: 1
CARDIOVASCULAR NEGATIVE: 1
SHORTNESS OF BREATH: 0
WHEEZING: 0
FEVER: 0
CHILLS: 0
MUSCULOSKELETAL NEGATIVE: 1
MEMORY LOSS: 1

## 2025-07-21 ASSESSMENT — LIFESTYLE VARIABLES: SUBSTANCE_ABUSE: 1

## 2025-07-21 NOTE — PATIENT INSTRUCTIONS
Shingles:  Happens because you had chickenpox as a kid.  The virus lives in your nerves and can come out from time to time and cause shingles.  It is not contagious.

## 2025-07-21 NOTE — PROGRESS NOTES
Assessment & Plan   Jolie is a 49-year-old female presenting for medication follow-up and diarrhea.  This is her first visit with this provider.  She has medication noncompliance, and does not know what medications she is or is not taking. She has a complex medical history including psychiatric diagnoses, cocaine use disorder, lichen planus, urge incontinence, hypertension.  Upon entry to the room, she request testing for STDs and HIV. She endorses diarrhea for the past 2 days with 5 episodes per day.  Endorses constant itching that increases at nighttime with her lichen planus on her ankles.  She says that the creams and Zyrtec are not adequately addressing itching or plaques.  Describes that she still has urge incontinence.  Describes a recent episode of possible shingles by history on left thigh.  Describes using Xanax acquired from acquaintance, requests more from this provider.  Endorses continued cocaine use.  Currently smoking cigarettes.      She describes that she is currently taking around 5 medications, but when reviewing the list with her she endorses taking almost all of them currently (around 20), this is not possible when reviewing medication dispense record as most of them have been out of date at least since the start of the year.  Does not endorse any recent seizures.  She appears confused and does not recall any medical visits beyond the past 2 months including visits to the ER and inpatient hospitalization.  She does not recall a  recent psychiatry visit.    # STD screening  - Requests STD and HIV screening.  Ordered RPR, HIV, hep B surface antigen, core antigen, surface antibody (no history of vaccination), and hep C blood test.  Ordered gonorrhea and chlamydia by urine test.  Patient was unable to get to the lab before it closed.  She will come back tomorrow or later this week to provide the samples.    #Routine health screening  Patient strongly denies consent for any preventative health  screening, including colonoscopy, vaccines, and hepatitis A screening despite discussion of risks and benefits with her expressing understanding.    #Hypertension  -/83.  Patient when she was taking prazosin currently and possibly amlodipine.  - Recommend continuing both medications and considering an additional agent once guaranteeing medication compliance with these.  - Recommend obtaining CBC, CMP in context of ambiguous current medications.    # Bipolar 1 with psychotic features, currently in depressed episode.  Anxiety.  - Was previously taking Remeron, duloxetine, Seroquel.  Upon medication dispense record, all of these look like they are out of the refill period and have not been filled recently.   - Due to the complexity of her condition and current cocaine use, it is difficult to approach restarting psychiatric medications at this appointment as it is unclear what she is currently taking. Recommend regular follow up to address these complexities as well as consultation by psychiatry.  -Recommend psychology referral to assist with behavioral health management.  - Endorses increased anxiety and specifically asked this provider for Xanax during the appointment.  Discussed that this would not be appropriate in context of her current medication compliance and substance use as well as it not be an effective long-term solution.  Expresses displeasure at not receiving this medication today.  - Agreed to prescribe Atarax for as needed anxiety management, which may also address pruritus.  - She did not endorse any suicidal ideation but scored high on the PHQ-9.  - She describes not tolerating gabapentin in the past and does not want to consider taking again.    # Seizure disorder  - Has a history of taking Keppra  - Does not appear to be currently taking an antiepileptic, this is also likely affecting her management of bipolar    #Urge Incontinence  - Describes being in public and having her sudden urge to  "urinate, not being able to find the bathroom, and urinating quickly without control.  - Was on Myrbetriq in the past, recommend restarting this.    # Lichen simplex chronicus versus atopic dermatitis  - Currently using clobetasol and AmLactin topicals.  Claims Zyrtec does not help itching.  Recommend continuing topicals.  She describes having an injection modulator at the dermatologist, but was not willing to continue treatment because she could not self inject outpatient.  - Monitor if restarting hydroxyzine helps with itching.  - Continue to follow with dermatology as scheduled.    # Diarrhea  - Endorses 2 days of diarrhea with at least 5 episodes per day.  - Recommend obtaining stool sample, patient does not desire to do this.  Placed order in case she is willing to bring a sample in.  - Stool sample obtained, recommend obtaining C. difficile toxin and placed enteric pathogen panel.  - Has taken meloxicam in the past, represcribed today.    # History of herpes zoster  - History of acyclovir use and valacyclovir use.  Recommend represcribing if she has another episode of shingles confirmed by physical exam.  - Described etiology of herpes zoster with patient per her request.    # History of hyperlipidemia, obesity.  - Recommend obtaining lipid panel.  - Previously on statin, she does not consent to restarting right now.    BMI  Estimated body mass index is 31.39 kg/m  as calculated from the following:    Height as of this encounter: 1.572 m (5' 1.89\").    Weight as of this encounter: 77.6 kg (171 lb).   Weight management plan: Discussion deferred due to medical complexity    Depression Screening Follow Up        7/21/2025     3:22 PM   PHQ   PHQ-9 Total Score 18    Q9: Thoughts of better off dead/self-harm past 2 weeks Not at all        Proxy-reported     Follow Up Actions Taken  Crisis resource information provided in After Visit Summary  Mental Health Referral placed     Follow-up  1-2 weeks      Subjective " "  Mattie is a 49 year old, presenting for the following health issues:  Foot Problems (Itching really bad ), Headache, and Shingles (At the back of left thigh really bad )      7/21/2025     3:42 PM   Additional Questions   Roomed by ML   Accompanied by self         7/21/2025    Information    services provided? No     Review of Systems   Constitutional:  Negative for chills and fever.   Respiratory: Negative.  Negative for cough, hemoptysis, shortness of breath and wheezing.    Cardiovascular: Negative.  Negative for chest pain and palpitations.   Gastrointestinal:  Positive for diarrhea. Negative for blood in stool, constipation, nausea and vomiting.   Genitourinary:  Positive for urgency. Negative for dysuria, frequency and hematuria.   Musculoskeletal: Negative.    Skin:  Positive for itching and rash.   Neurological:  Positive for tremors and headaches.   Psychiatric/Behavioral:  Positive for depression, hallucinations, memory loss and substance abuse. Negative for suicidal ideas. The patient is nervous/anxious.    She has poor health literacy, poor insight, has trouble understanding her condition and reasons for her medications.  Psychomotor retardation.    Behvioral/Psych: positive for anxiety, bad mood, bipolar, depression, and illegal drug usage        Objective    /83   Pulse 83   Temp 98  F (36.7  C) (Oral)   Resp 18   Ht 1.572 m (5' 1.89\")   Wt 77.6 kg (171 lb)   LMP 07/23/2022 (Approximate)   SpO2 99%   BMI 31.39 kg/m    Body mass index is 31.39 kg/m .  Physical Exam  Constitutional:       General: She is not in acute distress.     Appearance: She is obese. She is ill-appearing. She is not diaphoretic.   Eyes:      General: Scleral icterus present.   Cardiovascular:      Rate and Rhythm: Normal rate and regular rhythm.      Heart sounds: Normal heart sounds.   Pulmonary:      Effort: Pulmonary effort is normal.      Breath sounds: Normal breath sounds.   Abdominal:    "   General: Bowel sounds are normal.   Skin:     Findings: Rash present.      Comments: Scaly plaque on bilateral ankles   Neurological:      Mental Status: She is alert. She is disoriented.   Psychiatric:      Comments: Anxious, impaired reasoning, poor insight     She is rocking forwards and backwards during entire visit.        Signed Electronically by: Isaac Tucker MD    Discussed plan with Dr. Funes, who also spoke with and examined the patient.

## 2025-07-21 NOTE — PROGRESS NOTES
Interprofessional Team Consultation Note     Requesting Provider: Dr. Tucker    Consultants:  Behavioral Health: Dr. Bill (s)  Care Coordination: Naima Toledo Shannon  PharmD: Munir). [facility] Medicine Physicians: (s). Caitlin Vitale    Identifying Data/Reason for Referral:  Patient Mattie Pierre, preferred name Mattie, is 49 year old female who is cared for by Dr. Tucker. Provider is requesting consultation related to development of care plan. Relevant clinical information obtained from requesting provider, interprofessional team members noted above, and review of the medical record. Isaac Tucker is the primary care provider assigned in Epic.    Patient Active Problem List   Diagnosis    Bipolar disorder (H)    Obesity    Cellulitis of axilla, left    Iron deficiency anemia    Seizure disorder (H)    Hidradenitis suppurativa    Urgency incontinence    Chronic pain syndrome    Acne vulgaris    Tinea capitis    Fissure in skin    Severe stimulant use disorder (H)    Bipolar disorder, current episode depressed, severe, with psychotic features (H)    Hypertension    Subclinical hypothyroidism    Vitamin D deficiency    Ganglion cyst of right foot    Hypertrophy of bone    Anhidrosis    Pes planovalgus    Other eczema    Nummular dermatitis    Other atopic dermatitis    Class 2 severe obesity due to excess calories with serious comorbidity in adult (H)    Encounter for long-term current use of high risk medication    Cocaine use disorder, moderate, dependence (H)    Urge incontinence    Urgency of urination    Frequency of urination    Incontinence of feces with fecal urgency    Skin eruption    Lichen simplex chronicus     Current Outpatient Medications   Medication Sig Dispense Refill    acyclovir (ZOVIRAX) 200 MG/5ML suspension Take 20 mLs (800 mg) by mouth 5 times daily for 7 days. 700 mL 0    amLODIPine (NORVASC) 5 MG tablet Take 1 tablet (5 mg) by mouth daily. 90 tablet 1    ammonium lactate  (AMLACTIN) 12 % external cream Apply topically 2 times daily. Apply to areas of thickened skin on feet and ankles 285 g 0    aspirin (ASA) 325 MG tablet Take 1 tablet by mouth daily.      calcium polycarbophil (FIBERCON) 625 MG tablet Take 1 tablet (625 mg) by mouth daily. 90 tablet 1    cetirizine (ZYRTEC) 10 MG tablet Take 1 tablet (10 mg) by mouth daily as needed for allergies. 90 tablet 0    clindamycin (CLEOCIN T) 1 % external lotion Apply topically 2 times daily (Patient not taking: Reported on 1/7/2025) 60 mL 2    clobetasol (TEMOVATE) 0.05 % external ointment Apply topically 2 times daily. Apply to areas of thickened skin on feet and ankles 60 g 3    diclofenac (VOLTAREN) 1 % topical gel Apply 2 g topically 4 times daily. (Patient not taking: Reported on 1/7/2025) 350 g 0    DULoxetine (CYMBALTA) 60 MG capsule Take 1 capsule (60 mg) by mouth daily 30 capsule 1    Fesoterodine Fumarate (TOVIAZ) 8 MG TB24 Take 1 tablet (8 mg) by mouth daily 30 tablet 3    hydrOXYzine (VISTARIL) 50 MG capsule Take 1 capsule (50 mg) by mouth 2 times daily as needed for itching or other (sleep) 90 capsule 3    lamoTRIgine (LAMICTAL) 150 MG tablet Take 1 tablet (150 mg) by mouth 2 times daily 180 tablet 3    levETIRAcetam (KEPPRA) 750 MG tablet Take 1 tablet (750 mg) by mouth 2 times daily. 180 tablet 3    losartan (COZAAR) 100 MG tablet Take 1 tablet (100 mg) by mouth daily. 90 tablet 3    meloxicam (MOBIC) 7.5 MG tablet Take 1 tablet (7.5 mg) by mouth daily for 7 days. 7 tablet 0    methylcellulose (CITRUCEL) powder Take 2 g by mouth daily Start with 1 tablespoon per day and if tolerated, may increase up to 2-3 tablespoons per day. (Patient not taking: Reported on 1/7/2025) 454 g 3    mirabegron (MYRBETRIQ) 25 MG 24 hr tablet Take 1 tablet (25 mg) by mouth daily 30 tablet 11    mirtazapine (REMERON) 7.5 MG tablet Take 1 tablet (7.5 mg) by mouth at bedtime For 3 nights then increase to 2 tablets (15 mg) after third night.  (Patient not taking: Reported on 1/7/2025) 60 tablet 1    mycophenolate (GENERIC EQUIVALENT) 500 MG tablet Take 1 tablet (500 mg) by mouth 2 times daily 60 tablet 0    naproxen (NAPROSYN) 500 MG tablet Take 1 tablet (500 mg) by mouth 2 times daily as needed for moderate pain. 30 tablet 0    oxyBUTYnin ER (DITROPAN XL) 10 MG 24 hr tablet Take 1 tablet (10 mg) by mouth daily (Patient not taking: Reported on 1/7/2025) 90 tablet 3    pantoprazole (PROTONIX) 20 MG EC tablet Take 1 tablet (20 mg) by mouth daily. 90 tablet 1    prazosin (MINIPRESS) 5 MG capsule Take 1 capsule (5 mg) by mouth at bedtime. 90 capsule 3    rosuvastatin (CRESTOR) 20 MG tablet Take 1 tablet (20 mg) by mouth daily. 90 tablet 3    topiramate (TOPAMAX) 50 MG tablet Take 1 tablet (50 mg) by mouth 2 times daily. 90 tablet 1    upadacitinib ER (RINVOQ) 15 MG tablet Take 1 tablet (15 mg) by mouth daily 30 tablet 3    valACYclovir (VALTREX) 500 MG tablet Take 2 tablets (1,000 mg) by mouth 3 times daily (Patient not taking: Reported on 1/7/2025) 42 tablet 0     No current facility-administered medications for this visit.     Topics Discussed:  Scheduled for med check later today.  Concerned about risk of serotonin syndrome with antidepressant in the context of cocaine use.   Had a psychiatrist in the past (per care team) but it is unclear if she is currently connected to care.  Reviewed dispense report to see if we could tell who is prescribing for her and what she is filling.  Many meds have not been filled in some time.    Had a [facility] worker at one time but again unclear if that is current and I do not see a MH  on file.        12/4/2024     9:57 AM 12/23/2024    11:24 AM 6/3/2025     1:42 PM   PHQ   PHQ-9 Total Score 18  20 22   Q9: Thoughts of better off dead/self-harm past 2 weeks Not at all Not at all Not at all       Patient-reported         1/11/2023    12:20 PM 11/22/2023    10:00 AM 2/7/2024     9:03 AM   TRUE-7 SCORE   Total Score    16 (severe anxiety)   Total Score 18 19 16     Recommendations/Action Items:  Dr. Tucker will attempt to complete med reconciliation today.  Will enlist the help of Pharmacy team if needed/possible.  Will plan to schedule MTM visit with pharmacy team in the future if needed.  Dr. Tucker will attempt to clarify date of last cocaine use if possible.  Dr. Tucker will attempt to clarify any MH providers involved in her care (therapist? Psychiatrist? ? [facility] worker?) at visit today and will update the care team if needed.    Leeanna Khan, PhD,      Disclaimer:  The above treatment recommendations are based on consultation with the patient's primary care provider and a review of relevant information in EPIC. I have not personally examined the patient. All recommendations should be implemented with considerations of the patient's relevant prior history and current clinical status. Please contact me with any questions about the care of this patient.

## 2025-07-22 ENCOUNTER — LAB (OUTPATIENT)
Dept: LAB | Facility: CLINIC | Age: 50
End: 2025-07-22
Payer: MEDICAID

## 2025-07-22 ENCOUNTER — TELEPHONE (OUTPATIENT)
Dept: FAMILY MEDICINE | Facility: CLINIC | Age: 50
End: 2025-07-22

## 2025-07-22 DIAGNOSIS — Z13.6 SCREENING FOR CARDIOVASCULAR CONDITION: ICD-10-CM

## 2025-07-22 DIAGNOSIS — R19.7 DIARRHEA OF PRESUMED INFECTIOUS ORIGIN: ICD-10-CM

## 2025-07-22 DIAGNOSIS — Z11.3 SCREEN FOR STD (SEXUALLY TRANSMITTED DISEASE): ICD-10-CM

## 2025-07-22 LAB
ALBUMIN SERPL BCG-MCNC: 4.2 G/DL (ref 3.5–5.2)
ALP SERPL-CCNC: 79 U/L (ref 40–150)
ALT SERPL W P-5'-P-CCNC: 15 U/L (ref 0–50)
ANION GAP SERPL CALCULATED.3IONS-SCNC: 11 MMOL/L (ref 7–15)
AST SERPL W P-5'-P-CCNC: 26 U/L (ref 0–45)
BASOPHILS # BLD AUTO: 0 10E3/UL (ref 0–0.2)
BASOPHILS NFR BLD AUTO: 1 %
BILIRUB SERPL-MCNC: 0.3 MG/DL
BUN SERPL-MCNC: 18.5 MG/DL (ref 6–20)
CALCIUM SERPL-MCNC: 9.5 MG/DL (ref 8.8–10.4)
CHLORIDE SERPL-SCNC: 106 MMOL/L (ref 98–107)
CREAT SERPL-MCNC: 1.26 MG/DL (ref 0.51–0.95)
EGFRCR SERPLBLD CKD-EPI 2021: 52 ML/MIN/1.73M2
EOSINOPHIL # BLD AUTO: 0 10E3/UL (ref 0–0.7)
EOSINOPHIL NFR BLD AUTO: 0 %
ERYTHROCYTE [DISTWIDTH] IN BLOOD BY AUTOMATED COUNT: 14.8 % (ref 10–15)
GLUCOSE SERPL-MCNC: 115 MG/DL (ref 70–99)
HBV CORE AB SERPL QL IA: REACTIVE
HBV SURFACE AB SERPL IA-ACNC: 118 M[IU]/ML
HBV SURFACE AB SERPL IA-ACNC: REACTIVE M[IU]/ML
HBV SURFACE AG SERPL QL IA: NONREACTIVE
HCO3 SERPL-SCNC: 27 MMOL/L (ref 22–29)
HCT VFR BLD AUTO: 43.4 % (ref 35–47)
HCV AB SERPL QL IA: NONREACTIVE
HGB BLD-MCNC: 13.4 G/DL (ref 11.7–15.7)
HIV 1+2 AB+HIV1 P24 AG SERPL QL IA: NONREACTIVE
IMM GRANULOCYTES # BLD: 0 10E3/UL
IMM GRANULOCYTES NFR BLD: 0 %
LYMPHOCYTES # BLD AUTO: 2.4 10E3/UL (ref 0.8–5.3)
LYMPHOCYTES NFR BLD AUTO: 32 %
MCH RBC QN AUTO: 25.1 PG (ref 26.5–33)
MCHC RBC AUTO-ENTMCNC: 30.9 G/DL (ref 31.5–36.5)
MCV RBC AUTO: 81 FL (ref 78–100)
MONOCYTES # BLD AUTO: 0.6 10E3/UL (ref 0–1.3)
MONOCYTES NFR BLD AUTO: 8 %
NEUTROPHILS # BLD AUTO: 4.4 10E3/UL (ref 1.6–8.3)
NEUTROPHILS NFR BLD AUTO: 60 %
PLATELET # BLD AUTO: 322 10E3/UL (ref 150–450)
POTASSIUM SERPL-SCNC: 4.7 MMOL/L (ref 3.4–5.3)
PROT SERPL-MCNC: 7.7 G/DL (ref 6.4–8.3)
RBC # BLD AUTO: 5.34 10E6/UL (ref 3.8–5.2)
SODIUM SERPL-SCNC: 144 MMOL/L (ref 135–145)
T PALLIDUM AB SER QL: NONREACTIVE
WBC # BLD AUTO: 7.4 10E3/UL (ref 4–11)

## 2025-07-22 PROCEDURE — 87389 HIV-1 AG W/HIV-1&-2 AB AG IA: CPT

## 2025-07-22 PROCEDURE — 87491 CHLMYD TRACH DNA AMP PROBE: CPT

## 2025-07-22 PROCEDURE — 86780 TREPONEMA PALLIDUM: CPT

## 2025-07-22 PROCEDURE — 85025 COMPLETE CBC W/AUTO DIFF WBC: CPT

## 2025-07-22 PROCEDURE — 36415 COLL VENOUS BLD VENIPUNCTURE: CPT

## 2025-07-22 PROCEDURE — 86704 HEP B CORE ANTIBODY TOTAL: CPT

## 2025-07-22 PROCEDURE — 87591 N.GONORRHOEAE DNA AMP PROB: CPT

## 2025-07-22 PROCEDURE — 80053 COMPREHEN METABOLIC PANEL: CPT

## 2025-07-22 PROCEDURE — 86803 HEPATITIS C AB TEST: CPT

## 2025-07-22 PROCEDURE — 87340 HEPATITIS B SURFACE AG IA: CPT

## 2025-07-22 PROCEDURE — 86706 HEP B SURFACE ANTIBODY: CPT

## 2025-07-22 NOTE — TELEPHONE ENCOUNTER
Symptoms    Describe your symptoms: CRAMPING IN FOOT AND TOE     Any pain: Yes: minor     How long have you been having symptoms: 3    days    Have you been seen for this:  No    Appointment offered?: Yes: 7/21/2025 was seen    Triage offered?: Yes:     Home remedies tried:     Preferred Pharmacy:       [id number] - [place], [place] - [address]   [address] [place] [address]  Phone: [phone] Fax: [phone]      Could we send this information to you in [employer] or would you prefer to receive a phone call?:   Patient would prefer a phone call   Okay to leave a detailed message?: Yes at Work number on file:  There is no work phone number on file.      Does this patient currently have active insurance coverage?  Yes, Pt has active insurance coverage.     Does patient or caller know when to expect a call? Yes, Nurses will return call within 2-3 business hours.    Sunday Lemos on 7/22/2025 at 2:04 PM

## 2025-07-22 NOTE — TELEPHONE ENCOUNTER
LVM - to call clinic to further discuss and schedule appointment if needed.    Tre Cesar, RN, MSN

## 2025-07-22 NOTE — TELEPHONE ENCOUNTER
Pt reports on/off cramping in both feet she forgot to tell provider about at her last visit. She declines another visit with provider at time and would rather monitor it. Pt also requesting lab results and advised they have not resulted yet. Advised someone would call once all completed. Pt in understanding and no further questions.    Tre Cesar, RN, MSN

## 2025-07-23 LAB
C TRACH DNA SPEC QL NAA+PROBE: NEGATIVE
N GONORRHOEA DNA SPEC QL NAA+PROBE: NEGATIVE
SPECIMEN TYPE: NORMAL
SPECIMEN TYPE: NORMAL

## 2025-08-07 PROBLEM — N18.31 CHRONIC KIDNEY DISEASE, STAGE 3A (H): Status: ACTIVE | Noted: 2025-08-07

## 2025-08-18 ENCOUNTER — TELEPHONE (OUTPATIENT)
Dept: FAMILY MEDICINE | Facility: CLINIC | Age: 50
End: 2025-08-18
Payer: MEDICAID

## 2025-10-10 ENCOUNTER — TELEPHONE (OUTPATIENT)
Dept: DERMATOLOGY | Facility: CLINIC | Age: 50
End: 2025-10-10
Payer: MEDICAID

## (undated) DEVICE — DRSG GAUZE 4X4" TRAY 6939

## (undated) DEVICE — GLOVE RADIATION RESISTANT SZ 7.5  95-395

## (undated) DEVICE — SYR 10ML FINGER CONTROL W/O NDL 309695

## (undated) DEVICE — PREP CHLORAPREP W/ORANGE TINT 10.5ML 260715

## (undated) DEVICE — GOWN LG DISP 9515

## (undated) DEVICE — NDL 27GA 1.25" 305136

## (undated) DEVICE — DRAPE BACK TABLE  44X90" 8377

## (undated) DEVICE — GLOVE BIOGEL PI MICRO SZ 7.0 48570

## (undated) DEVICE — PEN MARKING SKIN W/LABELS 31145884

## (undated) DEVICE — SOL BENZOIN 0.5OZ

## (undated) DEVICE — DRSG STERI STRIP 1/2X4" R1547

## (undated) RX ORDER — LIDOCAINE HYDROCHLORIDE 10 MG/ML
INJECTION, SOLUTION EPIDURAL; INFILTRATION; INTRACAUDAL; PERINEURAL
Status: DISPENSED
Start: 2024-04-02

## (undated) RX ORDER — FLUOCINONIDE 0.5 MG/G
OINTMENT TOPICAL
Status: DISPENSED
Start: 2023-11-30